# Patient Record
Sex: FEMALE | Race: WHITE | NOT HISPANIC OR LATINO | Employment: UNEMPLOYED | URBAN - NONMETROPOLITAN AREA
[De-identification: names, ages, dates, MRNs, and addresses within clinical notes are randomized per-mention and may not be internally consistent; named-entity substitution may affect disease eponyms.]

---

## 2017-02-24 ENCOUNTER — LAB REQUISITION (OUTPATIENT)
Dept: LAB | Facility: HOSPITAL | Age: 52
End: 2017-02-24

## 2017-02-24 DIAGNOSIS — Z00.00 ENCOUNTER FOR GENERAL ADULT MEDICAL EXAMINATION WITHOUT ABNORMAL FINDINGS: ICD-10-CM

## 2017-02-24 LAB
ALBUMIN SERPL-MCNC: 4.8 G/DL (ref 3.5–5)
ALBUMIN/GLOB SERPL: 1.9 G/DL (ref 1.1–2.5)
ALP SERPL-CCNC: 97 U/L (ref 24–120)
ALT SERPL W P-5'-P-CCNC: 78 U/L (ref 0–54)
ANION GAP SERPL CALCULATED.3IONS-SCNC: 13 MMOL/L (ref 4–13)
AST SERPL-CCNC: 49 U/L (ref 7–45)
BASOPHILS # BLD AUTO: 0.03 10*3/MM3 (ref 0–0.2)
BASOPHILS NFR BLD AUTO: 0.5 % (ref 0–2)
BILIRUB SERPL-MCNC: 0.5 MG/DL (ref 0.1–1)
BUN BLD-MCNC: 8 MG/DL (ref 5–21)
BUN/CREAT SERPL: 10.3 (ref 7–25)
CALCIUM SPEC-SCNC: 9.9 MG/DL (ref 8.4–10.4)
CHLORIDE SERPL-SCNC: 102 MMOL/L (ref 98–110)
CO2 SERPL-SCNC: 28 MMOL/L (ref 24–31)
CREAT BLD-MCNC: 0.78 MG/DL (ref 0.5–1.4)
DEPRECATED RDW RBC AUTO: 45.5 FL (ref 40–54)
EOSINOPHIL # BLD AUTO: 0.12 10*3/MM3 (ref 0–0.7)
EOSINOPHIL NFR BLD AUTO: 2 % (ref 0–4)
ERYTHROCYTE [DISTWIDTH] IN BLOOD BY AUTOMATED COUNT: 14.8 % (ref 12–15)
GFR SERPL CREATININE-BSD FRML MDRD: 78 ML/MIN/1.73
GLOBULIN UR ELPH-MCNC: 2.5 GM/DL
GLUCOSE BLD-MCNC: 79 MG/DL (ref 70–100)
HCT VFR BLD AUTO: 41.5 % (ref 37–47)
HGB BLD-MCNC: 13.9 G/DL (ref 12–16)
IMM GRANULOCYTES # BLD: 0.01 10*3/MM3 (ref 0–0.03)
IMM GRANULOCYTES NFR BLD: 0.2 % (ref 0–5)
LYMPHOCYTES # BLD AUTO: 1.45 10*3/MM3 (ref 0.72–4.86)
LYMPHOCYTES NFR BLD AUTO: 24.2 % (ref 15–45)
MCH RBC QN AUTO: 28.5 PG (ref 28–32)
MCHC RBC AUTO-ENTMCNC: 33.5 G/DL (ref 33–36)
MCV RBC AUTO: 85 FL (ref 82–98)
MONOCYTES # BLD AUTO: 0.59 10*3/MM3 (ref 0.19–1.3)
MONOCYTES NFR BLD AUTO: 9.9 % (ref 4–12)
NEUTROPHILS # BLD AUTO: 3.78 10*3/MM3 (ref 1.87–8.4)
NEUTROPHILS NFR BLD AUTO: 63.2 % (ref 39–78)
PLATELET # BLD AUTO: 280 10*3/MM3 (ref 130–400)
PMV BLD AUTO: 10.2 FL (ref 6–12)
POTASSIUM BLD-SCNC: 4.2 MMOL/L (ref 3.5–5.3)
PROT SERPL-MCNC: 7.3 G/DL (ref 6.3–8.7)
RBC # BLD AUTO: 4.88 10*6/MM3 (ref 4.2–5.4)
SODIUM BLD-SCNC: 143 MMOL/L (ref 135–145)
TSH SERPL DL<=0.05 MIU/L-ACNC: 2.63 MIU/ML (ref 0.47–4.68)
WBC NRBC COR # BLD: 5.98 10*3/MM3 (ref 4.8–10.8)

## 2017-02-24 PROCEDURE — 80053 COMPREHEN METABOLIC PANEL: CPT | Performed by: INTERNAL MEDICINE

## 2017-02-24 PROCEDURE — 85025 COMPLETE CBC W/AUTO DIFF WBC: CPT | Performed by: INTERNAL MEDICINE

## 2017-02-24 PROCEDURE — 84443 ASSAY THYROID STIM HORMONE: CPT | Performed by: INTERNAL MEDICINE

## 2017-04-13 ENCOUNTER — TRANSCRIBE ORDERS (OUTPATIENT)
Dept: ADMINISTRATIVE | Facility: HOSPITAL | Age: 52
End: 2017-04-13

## 2017-04-13 DIAGNOSIS — Z12.31 ENCOUNTER FOR SCREENING MAMMOGRAM FOR MALIGNANT NEOPLASM OF BREAST: Primary | ICD-10-CM

## 2017-04-14 ENCOUNTER — HOSPITAL ENCOUNTER (OUTPATIENT)
Dept: MAMMOGRAPHY | Facility: HOSPITAL | Age: 52
Discharge: HOME | End: 2017-04-14
Attending: OBSTETRICS & GYNECOLOGY | Admitting: OBSTETRICS & GYNECOLOGY

## 2017-04-14 DIAGNOSIS — Z12.31 ENCOUNTER FOR SCREENING MAMMOGRAM FOR MALIGNANT NEOPLASM OF BREAST: ICD-10-CM

## 2017-04-14 PROCEDURE — G0202 SCR MAMMO BI INCL CAD: HCPCS

## 2017-04-14 PROCEDURE — 77063 BREAST TOMOSYNTHESIS BI: CPT

## 2017-08-04 ENCOUNTER — TELEPHONE (OUTPATIENT)
Dept: FAMILY MEDICINE CLINIC | Age: 52
End: 2017-08-04

## 2017-08-04 RX ORDER — BUPROPION HYDROCHLORIDE 300 MG/1
300 TABLET ORAL EVERY MORNING
Qty: 90 TABLET | Refills: 1 | Status: SHIPPED | OUTPATIENT
Start: 2017-08-04 | End: 2017-08-07 | Stop reason: SDUPTHER

## 2017-08-07 DIAGNOSIS — E66.09 EXOGENOUS OBESITY: ICD-10-CM

## 2017-08-07 DIAGNOSIS — F41.8 DEPRESSION WITH ANXIETY: ICD-10-CM

## 2017-08-07 DIAGNOSIS — J30.9 ALLERGIC RHINITIS, UNSPECIFIED ALLERGIC RHINITIS TRIGGER, UNSPECIFIED RHINITIS SEASONALITY: ICD-10-CM

## 2017-08-07 DIAGNOSIS — E78.2 MIXED HYPERLIPIDEMIA: ICD-10-CM

## 2017-08-07 DIAGNOSIS — G25.81 RLS (RESTLESS LEGS SYNDROME): ICD-10-CM

## 2017-08-07 DIAGNOSIS — E04.2 MULTIPLE THYROID NODULES: ICD-10-CM

## 2017-08-07 DIAGNOSIS — G43.009 MIGRAINE WITHOUT AURA AND WITHOUT STATUS MIGRAINOSUS, NOT INTRACTABLE: ICD-10-CM

## 2017-08-07 DIAGNOSIS — R03.0 ELEVATED BLOOD PRESSURE READING WITHOUT DIAGNOSIS OF HYPERTENSION: ICD-10-CM

## 2017-08-07 DIAGNOSIS — G44.219 EPISODIC TENSION-TYPE HEADACHE, NOT INTRACTABLE: ICD-10-CM

## 2017-08-07 DIAGNOSIS — N94.10 DYSPAREUNIA IN FEMALE: ICD-10-CM

## 2017-08-07 DIAGNOSIS — E55.9 VITAMIN D DEFICIENCY: ICD-10-CM

## 2017-08-07 DIAGNOSIS — F51.01 IDIOPATHIC INSOMNIA: ICD-10-CM

## 2017-08-07 DIAGNOSIS — E03.9 ACQUIRED HYPOTHYROIDISM: ICD-10-CM

## 2017-08-07 DIAGNOSIS — M15.9 GENERALIZED OSTEOARTHRITIS: ICD-10-CM

## 2017-08-07 DIAGNOSIS — E03.9 ACQUIRED HYPOTHYROIDISM: Primary | ICD-10-CM

## 2017-08-07 RX ORDER — GLUCOSAMINE HCL 500 MG
TABLET ORAL
COMMUNITY

## 2017-08-07 RX ORDER — LANOLIN ALCOHOL/MO/W.PET/CERES
500 CREAM (GRAM) TOPICAL NIGHTLY
COMMUNITY
End: 2019-03-14 | Stop reason: SDUPTHER

## 2017-08-07 RX ORDER — TRIAMCINOLONE ACETONIDE 55 UG/1
2 SPRAY, METERED NASAL DAILY
COMMUNITY
End: 2017-10-25 | Stop reason: ALTCHOICE

## 2017-08-07 RX ORDER — LEVOTHYROXINE SODIUM 0.1 MG/1
100 TABLET ORAL DAILY
COMMUNITY
End: 2017-11-20 | Stop reason: SDUPTHER

## 2017-08-07 RX ORDER — TRAMADOL HYDROCHLORIDE 50 MG/1
50 TABLET ORAL EVERY 12 HOURS PRN
COMMUNITY
End: 2017-11-20 | Stop reason: SDUPTHER

## 2017-08-07 RX ORDER — MELOXICAM 15 MG/1
15 TABLET ORAL DAILY PRN
COMMUNITY
End: 2017-12-01 | Stop reason: CLARIF

## 2017-08-07 RX ORDER — BUPROPION HYDROCHLORIDE 300 MG/1
300 TABLET ORAL EVERY MORNING
Qty: 90 TABLET | Refills: 1 | Status: SHIPPED | OUTPATIENT
Start: 2017-08-07 | End: 2017-08-08 | Stop reason: ALTCHOICE

## 2017-08-07 RX ORDER — LANOLIN ALCOHOL/MO/W.PET/CERES
1000 CREAM (GRAM) TOPICAL DAILY
COMMUNITY

## 2017-08-07 RX ORDER — FLUTICASONE PROPIONATE 50 MCG
1 SPRAY, SUSPENSION (ML) NASAL DAILY
COMMUNITY
End: 2019-10-04 | Stop reason: SDUPTHER

## 2017-08-07 RX ORDER — TEMAZEPAM 15 MG/1
30 CAPSULE ORAL NIGHTLY PRN
COMMUNITY
End: 2017-08-31 | Stop reason: SDUPTHER

## 2017-08-07 RX ORDER — BUTALBITAL, ACETAMINOPHEN AND CAFFEINE 50; 325; 40 MG/1; MG/1; MG/1
2 TABLET ORAL EVERY 6 HOURS PRN
COMMUNITY
End: 2017-08-31 | Stop reason: SDUPTHER

## 2017-08-07 RX ORDER — OMEGA-3 FATTY ACIDS/FISH OIL 300-1000MG
CAPSULE ORAL
COMMUNITY

## 2017-08-07 RX ORDER — GABAPENTIN 300 MG/1
300 CAPSULE ORAL DAILY
COMMUNITY
End: 2018-08-01 | Stop reason: SDUPTHER

## 2017-08-08 ENCOUNTER — OFFICE VISIT (OUTPATIENT)
Dept: FAMILY MEDICINE CLINIC | Age: 52
End: 2017-08-08
Payer: COMMERCIAL

## 2017-08-08 VITALS
WEIGHT: 157.38 LBS | HEART RATE: 86 BPM | BODY MASS INDEX: 29.71 KG/M2 | OXYGEN SATURATION: 97 % | DIASTOLIC BLOOD PRESSURE: 70 MMHG | SYSTOLIC BLOOD PRESSURE: 118 MMHG | RESPIRATION RATE: 18 BRPM | HEIGHT: 61 IN | TEMPERATURE: 97.3 F

## 2017-08-08 DIAGNOSIS — H93.19 TINNITUS, UNSPECIFIED LATERALITY: ICD-10-CM

## 2017-08-08 DIAGNOSIS — J01.90 ACUTE SINUSITIS, RECURRENCE NOT SPECIFIED, UNSPECIFIED LOCATION: Primary | ICD-10-CM

## 2017-08-08 PROCEDURE — 99213 OFFICE O/P EST LOW 20 MIN: CPT | Performed by: NURSE PRACTITIONER

## 2017-08-08 RX ORDER — METHYLPREDNISOLONE 4 MG/1
TABLET ORAL
Qty: 1 KIT | Refills: 0 | Status: SHIPPED | OUTPATIENT
Start: 2017-08-08 | End: 2017-08-14

## 2017-08-08 RX ORDER — CEFDINIR 300 MG/1
300 CAPSULE ORAL 2 TIMES DAILY
Qty: 14 CAPSULE | Refills: 0 | Status: SHIPPED | OUTPATIENT
Start: 2017-08-08 | End: 2017-08-15

## 2017-08-08 ASSESSMENT — PATIENT HEALTH QUESTIONNAIRE - PHQ9
SUM OF ALL RESPONSES TO PHQ9 QUESTIONS 1 & 2: 0
SUM OF ALL RESPONSES TO PHQ QUESTIONS 1-9: 0
1. LITTLE INTEREST OR PLEASURE IN DOING THINGS: 0
2. FEELING DOWN, DEPRESSED OR HOPELESS: 0

## 2017-08-08 ASSESSMENT — ENCOUNTER SYMPTOMS
CHEST TIGHTNESS: 0
SORE THROAT: 1
COUGH: 1
WHEEZING: 0
SINUS PRESSURE: 1
SHORTNESS OF BREATH: 0

## 2017-08-18 ENCOUNTER — TELEPHONE (OUTPATIENT)
Dept: FAMILY MEDICINE CLINIC | Age: 52
End: 2017-08-18

## 2017-08-18 ENCOUNTER — OFFICE VISIT (OUTPATIENT)
Dept: FAMILY MEDICINE CLINIC | Age: 52
End: 2017-08-18
Payer: COMMERCIAL

## 2017-08-18 VITALS
RESPIRATION RATE: 18 BRPM | TEMPERATURE: 98.4 F | BODY MASS INDEX: 29.88 KG/M2 | DIASTOLIC BLOOD PRESSURE: 82 MMHG | OXYGEN SATURATION: 98 % | HEIGHT: 61 IN | HEART RATE: 98 BPM | WEIGHT: 158.25 LBS | SYSTOLIC BLOOD PRESSURE: 136 MMHG

## 2017-08-18 DIAGNOSIS — J01.11 ACUTE RECURRENT FRONTAL SINUSITIS: ICD-10-CM

## 2017-08-18 DIAGNOSIS — J02.9 ACUTE PHARYNGITIS, UNSPECIFIED ETIOLOGY: Primary | ICD-10-CM

## 2017-08-18 PROCEDURE — 96372 THER/PROPH/DIAG INJ SC/IM: CPT | Performed by: NURSE PRACTITIONER

## 2017-08-18 PROCEDURE — 99213 OFFICE O/P EST LOW 20 MIN: CPT | Performed by: NURSE PRACTITIONER

## 2017-08-18 RX ORDER — AZITHROMYCIN 250 MG/1
TABLET, FILM COATED ORAL
Qty: 1 PACKET | Refills: 0 | Status: SHIPPED | OUTPATIENT
Start: 2017-08-18 | End: 2017-08-28

## 2017-08-18 RX ORDER — HYDROCODONE POLISTIREX AND CHLORPHENIRAMINE POLISTIREX 10; 8 MG/5ML; MG/5ML
5 SUSPENSION, EXTENDED RELEASE ORAL EVERY 12 HOURS PRN
Qty: 120 ML | Refills: 0 | Status: SHIPPED | OUTPATIENT
Start: 2017-08-18 | End: 2017-10-25 | Stop reason: SDUPTHER

## 2017-08-18 RX ORDER — METHYLPREDNISOLONE ACETATE 80 MG/ML
80 INJECTION, SUSPENSION INTRA-ARTICULAR; INTRALESIONAL; INTRAMUSCULAR; SOFT TISSUE ONCE
Status: COMPLETED | OUTPATIENT
Start: 2017-08-18 | End: 2017-08-18

## 2017-08-18 RX ADMIN — METHYLPREDNISOLONE ACETATE 80 MG: 80 INJECTION, SUSPENSION INTRA-ARTICULAR; INTRALESIONAL; INTRAMUSCULAR; SOFT TISSUE at 14:30

## 2017-08-18 ASSESSMENT — ENCOUNTER SYMPTOMS
COUGH: 1
NAUSEA: 0
CHEST TIGHTNESS: 0
ABDOMINAL PAIN: 0
SINUS PRESSURE: 1
DIARRHEA: 0
WHEEZING: 0
SORE THROAT: 1
SHORTNESS OF BREATH: 0

## 2017-08-24 DIAGNOSIS — E78.2 MIXED HYPERLIPIDEMIA: ICD-10-CM

## 2017-08-24 DIAGNOSIS — E03.9 ACQUIRED HYPOTHYROIDISM: ICD-10-CM

## 2017-08-24 LAB
ALBUMIN SERPL-MCNC: 4.5 G/DL (ref 3.5–5.2)
ALP BLD-CCNC: 120 U/L (ref 35–104)
ALT SERPL-CCNC: 28 U/L (ref 5–33)
ANION GAP SERPL CALCULATED.3IONS-SCNC: 14 MMOL/L (ref 7–19)
AST SERPL-CCNC: 23 U/L (ref 5–32)
BILIRUB SERPL-MCNC: 0.3 MG/DL (ref 0.2–1.2)
BUN BLDV-MCNC: 7 MG/DL (ref 6–20)
CALCIUM SERPL-MCNC: 9.6 MG/DL (ref 8.6–10)
CHLORIDE BLD-SCNC: 101 MMOL/L (ref 98–111)
CHOLESTEROL, TOTAL: 226 MG/DL (ref 160–199)
CO2: 28 MMOL/L (ref 22–29)
CREAT SERPL-MCNC: 0.7 MG/DL (ref 0.5–0.9)
GFR NON-AFRICAN AMERICAN: >60
GLUCOSE BLD-MCNC: 82 MG/DL (ref 74–109)
HDLC SERPL-MCNC: 44 MG/DL (ref 65–121)
LDL CHOLESTEROL CALCULATED: 156 MG/DL
POTASSIUM SERPL-SCNC: 4 MMOL/L (ref 3.5–5)
SODIUM BLD-SCNC: 143 MMOL/L (ref 136–145)
TOTAL PROTEIN: 7.6 G/DL (ref 6.6–8.7)
TRIGL SERPL-MCNC: 130 MG/DL (ref 150–199)
TSH SERPL DL<=0.05 MIU/L-ACNC: 3.24 UIU/ML (ref 0.27–4.2)

## 2017-08-31 ENCOUNTER — OFFICE VISIT (OUTPATIENT)
Dept: FAMILY MEDICINE CLINIC | Age: 52
End: 2017-08-31
Payer: COMMERCIAL

## 2017-08-31 VITALS
OXYGEN SATURATION: 99 % | DIASTOLIC BLOOD PRESSURE: 84 MMHG | HEART RATE: 92 BPM | RESPIRATION RATE: 20 BRPM | TEMPERATURE: 98.5 F | BODY MASS INDEX: 29.55 KG/M2 | WEIGHT: 156.5 LBS | SYSTOLIC BLOOD PRESSURE: 130 MMHG | HEIGHT: 61 IN

## 2017-08-31 DIAGNOSIS — K02.9 DENTAL CARIES NOTED ON EXAMINATION: ICD-10-CM

## 2017-08-31 DIAGNOSIS — F51.01 PRIMARY INSOMNIA: ICD-10-CM

## 2017-08-31 DIAGNOSIS — G43.009 MIGRAINE WITHOUT AURA AND WITHOUT STATUS MIGRAINOSUS, NOT INTRACTABLE: Primary | ICD-10-CM

## 2017-08-31 DIAGNOSIS — F41.8 DEPRESSION WITH ANXIETY: ICD-10-CM

## 2017-08-31 DIAGNOSIS — G44.219 EPISODIC TENSION-TYPE HEADACHE, NOT INTRACTABLE: ICD-10-CM

## 2017-08-31 DIAGNOSIS — E03.9 ACQUIRED HYPOTHYROIDISM: ICD-10-CM

## 2017-08-31 DIAGNOSIS — G25.81 RESTLESS LEGS SYNDROME (RLS): ICD-10-CM

## 2017-08-31 DIAGNOSIS — E78.2 MIXED HYPERLIPIDEMIA: ICD-10-CM

## 2017-08-31 PROCEDURE — 99214 OFFICE O/P EST MOD 30 MIN: CPT | Performed by: INTERNAL MEDICINE

## 2017-08-31 RX ORDER — TEMAZEPAM 15 MG/1
30 CAPSULE ORAL NIGHTLY PRN
Qty: 30 CAPSULE | Refills: 2 | Status: SHIPPED | OUTPATIENT
Start: 2017-08-31 | End: 2019-05-08 | Stop reason: SDUPTHER

## 2017-08-31 RX ORDER — BUTALBITAL, ACETAMINOPHEN AND CAFFEINE 50; 325; 40 MG/1; MG/1; MG/1
TABLET ORAL
Qty: 90 TABLET | Refills: 0 | Status: SHIPPED | OUTPATIENT
Start: 2017-08-31 | End: 2017-11-20 | Stop reason: SDUPTHER

## 2017-08-31 ASSESSMENT — ENCOUNTER SYMPTOMS
EYE REDNESS: 0
COLOR CHANGE: 0
WHEEZING: 0
EYE PAIN: 0
ABDOMINAL PAIN: 0
SINUS PRESSURE: 0
DIARRHEA: 0
EYE DISCHARGE: 0
FACIAL SWELLING: 0
VOICE CHANGE: 0
CHEST TIGHTNESS: 0
SORE THROAT: 0
COUGH: 0
VOMITING: 0
RHINORRHEA: 0
SHORTNESS OF BREATH: 0
BLOOD IN STOOL: 0

## 2017-10-24 ENCOUNTER — TELEPHONE (OUTPATIENT)
Dept: FAMILY MEDICINE CLINIC | Age: 52
End: 2017-10-24

## 2017-10-25 ENCOUNTER — OFFICE VISIT (OUTPATIENT)
Dept: FAMILY MEDICINE CLINIC | Age: 52
End: 2017-10-25
Payer: COMMERCIAL

## 2017-10-25 VITALS
RESPIRATION RATE: 16 BRPM | TEMPERATURE: 98.4 F | OXYGEN SATURATION: 97 % | DIASTOLIC BLOOD PRESSURE: 78 MMHG | BODY MASS INDEX: 28.91 KG/M2 | WEIGHT: 153 LBS | SYSTOLIC BLOOD PRESSURE: 122 MMHG | HEART RATE: 82 BPM

## 2017-10-25 DIAGNOSIS — R30.0 DYSURIA: ICD-10-CM

## 2017-10-25 DIAGNOSIS — J20.9 ACUTE BRONCHITIS, UNSPECIFIED ORGANISM: Primary | ICD-10-CM

## 2017-10-25 LAB
BACTERIA: NEGATIVE /HPF
BILIRUBIN URINE: NEGATIVE
BLOOD, URINE: NEGATIVE
CLARITY: CLEAR
COLOR: YELLOW
EPITHELIAL CELLS, UA: 1 /HPF (ref 0–5)
GLUCOSE URINE: NEGATIVE MG/DL
HYALINE CASTS: 0 /HPF (ref 0–8)
KETONES, URINE: NEGATIVE MG/DL
LEUKOCYTE ESTERASE, URINE: ABNORMAL
NITRITE, URINE: NEGATIVE
PH UA: 7.5
PROTEIN UA: NEGATIVE MG/DL
RBC UA: 1 /HPF (ref 0–4)
SPECIFIC GRAVITY UA: 1.01
UROBILINOGEN, URINE: 0.2 E.U./DL
WBC UA: 1 /HPF (ref 0–5)

## 2017-10-25 PROCEDURE — 99213 OFFICE O/P EST LOW 20 MIN: CPT | Performed by: NURSE PRACTITIONER

## 2017-10-25 RX ORDER — CIPROFLOXACIN 500 MG/1
500 TABLET, FILM COATED ORAL 2 TIMES DAILY
Qty: 14 TABLET | Refills: 0 | Status: SHIPPED | OUTPATIENT
Start: 2017-10-25 | End: 2017-11-01

## 2017-10-25 RX ORDER — HYDROCODONE POLISTIREX AND CHLORPHENIRAMINE POLISTIREX 10; 8 MG/5ML; MG/5ML
5 SUSPENSION, EXTENDED RELEASE ORAL EVERY 12 HOURS PRN
Qty: 120 ML | Refills: 0 | Status: SHIPPED | OUTPATIENT
Start: 2017-10-25 | End: 2018-03-10 | Stop reason: ALTCHOICE

## 2017-10-25 ASSESSMENT — ENCOUNTER SYMPTOMS
CHEST TIGHTNESS: 0
COUGH: 1
SORE THROAT: 0
VOMITING: 0
DIARRHEA: 0
WHEEZING: 0
ABDOMINAL PAIN: 0
SHORTNESS OF BREATH: 0
NAUSEA: 0

## 2017-10-25 ASSESSMENT — PATIENT HEALTH QUESTIONNAIRE - PHQ9
SUM OF ALL RESPONSES TO PHQ QUESTIONS 1-9: 0
SUM OF ALL RESPONSES TO PHQ9 QUESTIONS 1 & 2: 0
1. LITTLE INTEREST OR PLEASURE IN DOING THINGS: 0
2. FEELING DOWN, DEPRESSED OR HOPELESS: 0

## 2017-10-25 NOTE — PROGRESS NOTES
JVD present. No tracheal deviation present. No thyromegaly present. Cardiovascular: Normal rate, regular rhythm, normal heart sounds and intact distal pulses. No murmur heard. Pulmonary/Chest: Effort normal. No respiratory distress. Mild rhonchi at bases. No wheeze or rales noted. Abdominal: Soft. She exhibits no distension and no mass. There is no tenderness. Musculoskeletal: Normal range of motion. She exhibits no edema. Lymphadenopathy:     She has no cervical adenopathy. Skin: Skin is warm and dry. No rash noted. Psychiatric: She has a normal mood and affect. Vitals reviewed. Assessment:    ICD-10-CM ICD-9-CM    1. Acute bronchitis, unspecified organism J20.9 466.0    2. Dysuria R30.0 788. 1 Urinalysis      Urine Culture       Plan:  -Treat empirically with cipro for acute bronchitis. -Refill tussionex to take prn for cough. She takes this appropriately and as prescribed. -UA and urine culture today due to urinary symptoms.  -Call for any acute worsening or no improvement. -F/U as scheduled. [facility] was seen today for cough and other. Diagnoses and all orders for this visit:    Acute bronchitis, unspecified organism    Dysuria  -     Urinalysis; Future  -     Urine Culture; Future    Other orders  -     hydrocodone-chlorpheniramine (Bob Mates ER) 10-8 MG/5ML SUER; Take 5 mLs by mouth every 12 hours as needed (cough) . -     ciprofloxacin (CIPRO) 500 MG tablet; Take 1 tablet by mouth 2 times daily for 7 days      Medications Discontinued During This Encounter   Medication Reason    triamcinolone (NASACORT ALLERGY 24HR) 55 MCG/ACT nasal inhaler Therapy completed    hydrocodone-chlorpheniramine (Bob Mates ER) 10-8 MG/5ML SUER Reorder     There are no Patient Instructions on file for this visit. Patient voices understanding and agrees to plans along with risks and benefits of plan.     Counseling:  John Lees's case, medications and options were discussed in detail. Patient was instructed to call the office if she questions regarding her treatment. Should her conditions worsen, she should return to office to be reassessed by KALI Reece. she Should to go the closest Emergency Department for any emergency. They verbalized understanding the above instructions. No Follow-up on file.

## 2017-10-27 LAB — URINE CULTURE, ROUTINE: NORMAL

## 2017-11-20 DIAGNOSIS — E03.9 ACQUIRED HYPOTHYROIDISM: ICD-10-CM

## 2017-11-20 DIAGNOSIS — G44.219 EPISODIC TENSION-TYPE HEADACHE, NOT INTRACTABLE: ICD-10-CM

## 2017-11-20 DIAGNOSIS — E78.2 MIXED HYPERLIPIDEMIA: ICD-10-CM

## 2017-11-20 DIAGNOSIS — G43.009 MIGRAINE WITHOUT AURA AND WITHOUT STATUS MIGRAINOSUS, NOT INTRACTABLE: ICD-10-CM

## 2017-11-20 LAB
ALBUMIN SERPL-MCNC: 4.7 G/DL (ref 3.5–5.2)
ALP BLD-CCNC: 109 U/L (ref 35–104)
ALT SERPL-CCNC: 46 U/L (ref 5–33)
ANION GAP SERPL CALCULATED.3IONS-SCNC: 18 MMOL/L (ref 7–19)
AST SERPL-CCNC: 38 U/L (ref 5–32)
BILIRUB SERPL-MCNC: 0.4 MG/DL (ref 0.2–1.2)
BUN BLDV-MCNC: 8 MG/DL (ref 6–20)
CALCIUM SERPL-MCNC: 9.5 MG/DL (ref 8.6–10)
CHLORIDE BLD-SCNC: 102 MMOL/L (ref 98–111)
CHOLESTEROL, TOTAL: 224 MG/DL (ref 160–199)
CO2: 24 MMOL/L (ref 22–29)
CREAT SERPL-MCNC: 0.7 MG/DL (ref 0.5–0.9)
GFR NON-AFRICAN AMERICAN: >60
GLUCOSE BLD-MCNC: 91 MG/DL (ref 74–109)
HDLC SERPL-MCNC: 51 MG/DL (ref 65–121)
LDL CHOLESTEROL CALCULATED: 141 MG/DL
POTASSIUM SERPL-SCNC: 3.8 MMOL/L (ref 3.5–5)
SODIUM BLD-SCNC: 144 MMOL/L (ref 136–145)
TOTAL PROTEIN: 7.2 G/DL (ref 6.6–8.7)
TRIGL SERPL-MCNC: 162 MG/DL (ref 0–149)
TSH SERPL DL<=0.05 MIU/L-ACNC: 0.8 UIU/ML (ref 0.27–4.2)

## 2017-11-21 RX ORDER — TRAMADOL HYDROCHLORIDE 50 MG/1
50 TABLET ORAL EVERY 12 HOURS PRN
Qty: 60 TABLET | Refills: 0 | Status: SHIPPED | OUTPATIENT
Start: 2017-11-21 | End: 2018-02-02 | Stop reason: SDUPTHER

## 2017-11-21 RX ORDER — LEVOTHYROXINE SODIUM 0.1 MG/1
100 TABLET ORAL DAILY
Qty: 90 TABLET | Refills: 3 | Status: SHIPPED | OUTPATIENT
Start: 2017-11-21 | End: 2018-09-13 | Stop reason: DRUGHIGH

## 2017-11-21 RX ORDER — BUTALBITAL, ACETAMINOPHEN AND CAFFEINE 50; 325; 40 MG/1; MG/1; MG/1
TABLET ORAL
Qty: 90 TABLET | Refills: 0 | Status: SHIPPED | OUTPATIENT
Start: 2017-11-21 | End: 2018-02-02 | Stop reason: SDUPTHER

## 2017-12-01 ENCOUNTER — OFFICE VISIT (OUTPATIENT)
Dept: FAMILY MEDICINE CLINIC | Age: 52
End: 2017-12-01
Payer: COMMERCIAL

## 2017-12-01 VITALS
HEART RATE: 85 BPM | TEMPERATURE: 97.9 F | WEIGHT: 155.6 LBS | SYSTOLIC BLOOD PRESSURE: 122 MMHG | OXYGEN SATURATION: 98 % | DIASTOLIC BLOOD PRESSURE: 78 MMHG | BODY MASS INDEX: 29.4 KG/M2

## 2017-12-01 DIAGNOSIS — M15.9 GENERALIZED OSTEOARTHRITIS: ICD-10-CM

## 2017-12-01 DIAGNOSIS — M54.42 CHRONIC BILATERAL LOW BACK PAIN WITH LEFT-SIDED SCIATICA: ICD-10-CM

## 2017-12-01 DIAGNOSIS — R74.8 ELEVATED LIVER ENZYMES: ICD-10-CM

## 2017-12-01 DIAGNOSIS — G43.001 MIGRAINE WITHOUT AURA AND WITH STATUS MIGRAINOSUS, NOT INTRACTABLE: ICD-10-CM

## 2017-12-01 DIAGNOSIS — Z00.00 ANNUAL PHYSICAL EXAM: ICD-10-CM

## 2017-12-01 DIAGNOSIS — G25.81 RLS (RESTLESS LEGS SYNDROME): ICD-10-CM

## 2017-12-01 DIAGNOSIS — E78.2 MIXED HYPERLIPIDEMIA: ICD-10-CM

## 2017-12-01 DIAGNOSIS — E03.9 ACQUIRED HYPOTHYROIDISM: Primary | ICD-10-CM

## 2017-12-01 DIAGNOSIS — F41.8 DEPRESSION WITH ANXIETY: ICD-10-CM

## 2017-12-01 DIAGNOSIS — G89.29 CHRONIC BILATERAL LOW BACK PAIN WITH LEFT-SIDED SCIATICA: ICD-10-CM

## 2017-12-01 DIAGNOSIS — E55.9 VITAMIN D DEFICIENCY: ICD-10-CM

## 2017-12-01 DIAGNOSIS — F51.01 IDIOPATHIC INSOMNIA: ICD-10-CM

## 2017-12-01 DIAGNOSIS — E66.09 EXOGENOUS OBESITY: ICD-10-CM

## 2017-12-01 DIAGNOSIS — G44.219 EPISODIC TENSION-TYPE HEADACHE, NOT INTRACTABLE: ICD-10-CM

## 2017-12-01 LAB
AMPHETAMINE SCREEN, URINE: NEGATIVE
BARBITURATE SCREEN, URINE: NEGATIVE
BENZODIAZEPINE SCREEN, URINE: NEGATIVE
COCAINE METABOLITE SCREEN URINE: NEGATIVE
MDMA URINE: NEGATIVE
METHADONE SCREEN, URINE: NEGATIVE
METHAMPHETAMINE, URINE: NEGATIVE
OPIATE SCREEN URINE: NEGATIVE
OXYCODONE SCREEN URINE: NEGATIVE
PHENCYCLIDINE SCREEN URINE: NEGATIVE
PROPOXYPHENE SCREEN, URINE: NEGATIVE
THC: NEGATIVE
TRICYCLIC ANTIDEPRESSANTS, UR: NEGATIVE

## 2017-12-01 PROCEDURE — 99214 OFFICE O/P EST MOD 30 MIN: CPT | Performed by: INTERNAL MEDICINE

## 2017-12-01 PROCEDURE — 96372 THER/PROPH/DIAG INJ SC/IM: CPT | Performed by: INTERNAL MEDICINE

## 2017-12-01 RX ORDER — METHYLPREDNISOLONE ACETATE 80 MG/ML
80 INJECTION, SUSPENSION INTRA-ARTICULAR; INTRALESIONAL; INTRAMUSCULAR; SOFT TISSUE ONCE
Status: COMPLETED | OUTPATIENT
Start: 2017-12-01 | End: 2017-12-01

## 2017-12-01 RX ORDER — CELECOXIB 200 MG/1
200 CAPSULE ORAL DAILY PRN
Qty: 30 CAPSULE | Refills: 2 | Status: SHIPPED | OUTPATIENT
Start: 2017-12-01 | End: 2018-09-13 | Stop reason: SDUPTHER

## 2017-12-01 RX ADMIN — METHYLPREDNISOLONE ACETATE 80 MG: 80 INJECTION, SUSPENSION INTRA-ARTICULAR; INTRALESIONAL; INTRAMUSCULAR; SOFT TISSUE at 12:53

## 2017-12-01 ASSESSMENT — ENCOUNTER SYMPTOMS
EYE DISCHARGE: 0
ABDOMINAL PAIN: 0
COLOR CHANGE: 0
WHEEZING: 0
VOMITING: 0
SHORTNESS OF BREATH: 0
COUGH: 1
EYE REDNESS: 0
BACK PAIN: 1
SORE THROAT: 0
DIARRHEA: 0
EYE PAIN: 0
BLOOD IN STOOL: 0
RHINORRHEA: 0
CHEST TIGHTNESS: 0
VOICE CHANGE: 0
SINUS PRESSURE: 0

## 2017-12-01 ASSESSMENT — PATIENT HEALTH QUESTIONNAIRE - PHQ9
SUM OF ALL RESPONSES TO PHQ QUESTIONS 1-9: 0
2. FEELING DOWN, DEPRESSED OR HOPELESS: 0
SUM OF ALL RESPONSES TO PHQ9 QUESTIONS 1 & 2: 0
1. LITTLE INTEREST OR PLEASURE IN DOING THINGS: 0

## 2017-12-01 NOTE — PROGRESS NOTES
Kandi Anderson is a 47 y.o. female who presents today for   Chief Complaint   Patient presents with    3 Month Follow-Up    Tinnitus    Other     discuss OTC meds       HPI  49y/o WF here for 3 mth f/u on acquired hypothyroidism, mixed hyperlipidemia, depression with anxiety, insomnia, recurrent migraine HAs, and chronic neck pain with controlled med refill. Her lower back has been more painful recently and shoots down her left leg at times. She takes meloxicam for OA pain since ibuprofen irritates her stomach if she takes it very often. Pain worsens when she sits down and stands up. She is left handed and carries purse on left side and her grandchildren also. HAs have actually been less frequent recently and fioricet works well when she does have one. Her mood has been good overall without mood swings and she is tolerating her buproprion XL. She has had some more stress recently because her daughter found out she is having her 4th child and her youngest is only 3 yr old. She helps her daughter by babysitting her grandchildren but does not think she will be able to watch all of them at once. She has issues with tinnitus in both ears but no vertigo/dizziness. Her mother also has this issue. No hearing loss or she does not think so. She has a Lipo flavonoid supplement her mother recommended to her and she wonders if it safe. She did just get treated for bronchitis which has improved but she is still congested and coughing some. No fever and not productive now. Review of Systems   Constitutional: Positive for fatigue. Negative for appetite change, chills and fever. HENT: Positive for congestion. Negative for ear pain, rhinorrhea, sinus pressure, sore throat and voice change. Eyes: Negative for pain, discharge and redness. Respiratory: Positive for cough. Negative for chest tightness, shortness of breath and wheezing. Cardiovascular: Negative for chest pain and palpitations.    Gastrointestinal: Negative for abdominal pain, blood in stool, diarrhea and vomiting. Endocrine: Negative for cold intolerance and polydipsia. Genitourinary: Negative for dysuria and hematuria. Musculoskeletal: Positive for arthralgias, back pain and neck pain. Negative for myalgias and neck stiffness. Skin: Negative for color change and rash. Allergic/Immunologic: Positive for environmental allergies. Neurological: Positive for headaches. Negative for dizziness, weakness and numbness. Hematological: Negative for adenopathy. Does not bruise/bleed easily. Psychiatric/Behavioral: Positive for sleep disturbance. Negative for agitation, confusion, self-injury and suicidal ideas. The patient is nervous/anxious. See HPI, no mood swings or anhedonia       Past Medical History:   Diagnosis Date    Allergic rhinitis     Chronic bilateral low back pain with left-sided sciatica 12/1/2017    Headache     Hyperlipidemia     Obesity     Osteoarthritis        Current Outpatient Prescriptions   Medication Sig Dispense Refill    celecoxib (CELEBREX) 200 MG capsule Take 1 capsule by mouth daily as needed for Pain (arthritis) 30 capsule 2    levothyroxine (SYNTHROID) 100 MCG tablet Take 1 tablet by mouth daily 90 tablet 3    traMADol (ULTRAM) 50 MG tablet Take 1 tablet by mouth every 12 hours as needed for Pain . 60 tablet 0    butalbital-acetaminophen-caffeine (FIORICET, ESGIC) -40 MG per tablet 1-2 tablets po q6 hours prn HAs 90 tablet 0    hydrocodone-chlorpheniramine (TUSSIONEX PENNKINETIC ER) 10-8 MG/5ML SUER Take 5 mLs by mouth every 12 hours as needed (cough) .  120 mL 0    temazepam (RESTORIL) 15 MG capsule Take 2 capsules by mouth nightly as needed for Sleep 30 capsule 2    vitamin B-12 (CYANOCOBALAMIN) 1000 MCG tablet Take 1,000 mcg by mouth daily      fluticasone (FLONASE) 50 MCG/ACT nasal spray 1 spray by Nasal route daily      gabapentin (NEURONTIN) 300 MG capsule Take 300 mg by mouth daily  Multiple Vitamins-Minerals (MULTIVITAMIN ADULT PO) Take by mouth      niacin (SLO-NIACIN) 500 MG extended release tablet Take 500 mg by mouth nightly      Omega 3 1000 MG CAPS Take by mouth      Cholecalciferol (VITAMIN D3) 3000 units TABS Take by mouth      buPROPion (WELLBUTRIN XL) 300 MG extended release tablet TAKE ONE TABLET BY MOUTH ONCE DAILY 30 tablet 3     Current Facility-Administered Medications   Medication Dose Route Frequency Provider Last Rate Last Dose    methylPREDNISolone acetate (DEPO-MEDROL) injection 80 mg  80 mg Intramuscular Once Jin Tran MD           No Known Allergies    Past Surgical History:   Procedure Laterality Date     SECTION      HYSTERECTOMY      MYOMECTOMY      TONSILLECTOMY         Social History   Substance Use Topics    Smoking status: Never Smoker    Smokeless tobacco: Never Used    Alcohol use Yes      Comment: rarely       Family History   Problem Relation Age of Onset    Heart Disease Mother     High Blood Pressure Mother     Other Mother      skin CA    Heart Disease Father     High Blood Pressure Father     Other Father      jaw CA    Heart Disease Sister     Diabetes Sister     Stroke Sister     High Blood Pressure Sister     Other Sister      fibromyalgia, thyroid CA       /78   Pulse 85   Temp 97.9 °F (36.6 °C) (Temporal)   Wt 155 lb 9.6 oz (70.6 kg)   SpO2 98%   BMI 29.40 kg/m²     Physical Exam   Constitutional: She is oriented to person, place, and time. No distress. HENT:   Head: Normocephalic and atraumatic. Eyes: Conjunctivae and EOM are normal. Pupils are equal, round, and reactive to light. Neck: Neck supple. No JVD present. No thyromegaly present. No carotid bruits   Cardiovascular: Normal rate, regular rhythm, normal heart sounds and intact distal pulses. Exam reveals no gallop and no friction rub. No murmur heard.   Pulmonary/Chest: Effort normal and breath sounds normal.   Abdominal: Soft. Bowel sounds are normal. She exhibits no distension. There is no tenderness. Musculoskeletal: She exhibits tenderness. She exhibits no edema. +bilateral lumbar paraspinal muscle spasm with mild TTP. Negative SLR bilaterally. Lymphadenopathy:     She has no cervical adenopathy. Neurological: She is alert and oriented to person, place, and time. Skin: Skin is warm. No rash noted. Psychiatric: She has a normal mood and affect. Her behavior is normal. Judgment and thought content normal.     Lab Results   Component Value Date    WBC 5.89 08/13/2015    HGB 14.1 08/13/2015    HCT 40.7 08/13/2015     08/13/2015    CHOL 224 (H) 11/20/2017    TRIG 162 (H) 11/20/2017    HDL 51 (L) 11/20/2017    LDLDIRECT 130 (H) 08/13/2015    ALT 46 (H) 11/20/2017    AST 38 (H) 11/20/2017     11/20/2017    K 3.8 11/20/2017     11/20/2017    CREATININE 0.7 11/20/2017    BUN 8 11/20/2017    CO2 24 11/20/2017    TSH 0.797 11/20/2017   FBG 91  Lab Results   Component Value Date    LDLCALC 141 11/20/2017    LDLDIRECT 130 (H) 08/13/2015     Assessment:    ICD-10-CM ICD-9-CM    1. Acquired hypothyroidism E03.9 244.9 TSH without Reflex   2. Depression with anxiety F41.8 300.4    3. Episodic tension-type headache, not intractable G44.219 339.11    4. Exogenous obesity E66.09 278.00    5. Idiopathic insomnia F51.01 307.42    6. Migraine without aura and with status migrainosus, not intractable G43.001 346.12    7. RLS (restless legs syndrome) G25.81 333.94    8. Mixed hyperlipidemia E78.2 272.2 Comprehensive Metabolic Panel      Lipid Panel   9. Generalized osteoarthritis M15.9 715.00 methylPREDNISolone acetate (DEPO-MEDROL) injection 80 mg   10. Chronic bilateral low back pain with left-sided sciatica M54.42 724.2 methylPREDNISolone acetate (DEPO-MEDROL) injection 80 mg    G89.29 724.3      338.29    11. Annual physical exam Z00.00 V70.0 CBC Auto Differential   12.  Vitamin D deficiency E55.9 268.9 Vitamin D 25 Procedures    CBC Auto Differential     Standing Status:   Future     Standing Expiration Date:   12/1/2018    Comprehensive Metabolic Panel     Standing Status:   Future     Standing Expiration Date:   12/1/2018    Lipid Panel     Standing Status:   Future     Standing Expiration Date:   12/1/2018     Order Specific Question:   Is Patient Fasting?/# of Hours     Answer:   yes/8 hrs    TSH without Reflex     Standing Status:   Future     Standing Expiration Date:   12/1/2018    Vitamin D 25 Hydroxy     Standing Status:   Future     Standing Expiration Date:   12/1/2018    Hepatitis Panel, Acute     Standing Status:   Future     Standing Expiration Date:   12/1/2018     Orders Placed This Encounter   Medications    celecoxib (CELEBREX) 200 MG capsule     Sig: Take 1 capsule by mouth daily as needed for Pain (arthritis)     Dispense:  30 capsule     Refill:  2    methylPREDNISolone acetate (DEPO-MEDROL) injection 80 mg     Medications Discontinued During This Encounter   Medication Reason    meloxicam (MOBIC) 15 MG tablet Formulary change     Patient Instructions     Patient Education        Back Stretches: Exercises  Your Care Instructions  Here are some examples of exercises for stretching your back. Start each exercise slowly. Ease off the exercise if you start to have pain. Your doctor or physical therapist will tell you when you can start these exercises and which ones will work best for you. How to do the exercises  Overhead stretch    1. Stand comfortably with your feet shoulder-width apart. 2. Looking straight ahead, raise both arms over your head and reach toward the ceiling. Do not allow your head to tilt back. 3. Hold for 15 to 30 seconds, then lower your arms to your sides. 4. Repeat 2 to 4 times. Side stretch    1. Stand comfortably with your feet shoulder-width apart. 2. Raise one arm over your head, and then lean to the other side.   3. Slide your hand down your leg as you let the weight of your arm gently stretch your side muscles. Hold for 15 to 30 seconds. 4. Repeat 2 to 4 times on each side. Press-up    1. Lie on your stomach, supporting your body with your forearms. 2. Press your elbows down into the floor to raise your upper back. As you do this, relax your stomach muscles and allow your back to arch without using your back muscles. As your press up, do not let your hips or pelvis come off the floor. 3. Hold for 15 to 30 seconds, then relax. 4. Repeat 2 to 4 times. Relax and rest    1. Lie on your back with a rolled towel under your neck and a pillow under your knees. Extend your arms comfortably to your sides. 2. Relax and breathe normally. 3. Remain in this position for about 10 minutes. 4. If you can, do this 2 or 3 times each day. Follow-up care is a key part of your treatment and safety. Be sure to make and go to all appointments, and call your doctor if you are having problems. It's also a good idea to know your test results and keep a list of the medicines you take. Where can you learn more? Go to https://Welcome Funds.RÃƒÂ¶sler miniDaT. org and sign in to your [employer] account. Enter G582 in the [employer] box to learn more about \"Back Stretches: Exercises. \"     If you do not have an account, please click on the \"Sign Up Now\" link. Current as of: March 21, 2017  Content Version: 11.3  © 1124-4925 [employer], Lab42. Care instructions adapted under license by [facility] ([facility]). If you have questions about a medical condition or this instruction, always ask your healthcare professional. [place] [address] any warranty or liability for your use of this information. Patient voices understanding and agrees to plans along with risks and benefits of plan. Counseling:  Rishi Mccalloks Lees's case, medications and options were discussed in detail. Patient was instructed to call the office if she   questions regarding her treatment.  Should

## 2017-12-01 NOTE — PATIENT INSTRUCTIONS
Patient Education        Back Stretches: Exercises  Your Care Instructions  Here are some examples of exercises for stretching your back. Start each exercise slowly. Ease off the exercise if you start to have pain. Your doctor or physical therapist will tell you when you can start these exercises and which ones will work best for you. How to do the exercises  Overhead stretch    1. Stand comfortably with your feet shoulder-width apart. 2. Looking straight ahead, raise both arms over your head and reach toward the ceiling. Do not allow your head to tilt back. 3. Hold for 15 to 30 seconds, then lower your arms to your sides. 4. Repeat 2 to 4 times. Side stretch    1. Stand comfortably with your feet shoulder-width apart. 2. Raise one arm over your head, and then lean to the other side. 3. Slide your hand down your leg as you let the weight of your arm gently stretch your side muscles. Hold for 15 to 30 seconds. 4. Repeat 2 to 4 times on each side. Press-up    1. Lie on your stomach, supporting your body with your forearms. 2. Press your elbows down into the floor to raise your upper back. As you do this, relax your stomach muscles and allow your back to arch without using your back muscles. As your press up, do not let your hips or pelvis come off the floor. 3. Hold for 15 to 30 seconds, then relax. 4. Repeat 2 to 4 times. Relax and rest    1. Lie on your back with a rolled towel under your neck and a pillow under your knees. Extend your arms comfortably to your sides. 2. Relax and breathe normally. 3. Remain in this position for about 10 minutes. 4. If you can, do this 2 or 3 times each day. Follow-up care is a key part of your treatment and safety. Be sure to make and go to all appointments, and call your doctor if you are having problems. It's also a good idea to know your test results and keep a list of the medicines you take. Where can you learn more?   Go to choice. You also may want to do other activities, such as running, swimming, cycling, or playing tennis or team sports. · Limit alcohol, or do not drink. Alcohol can damage the liver and cause health problems. When should you call for help? Call your doctor now or seek immediate medical care if:  · You have yellowing of the skin or the whites of the eyes. (This is called jaundice.)  · You have pain in the upper right part of your belly. Watch closely for changes in your health, and be sure to contact your doctor if:  · You have swelling in your legs or belly. · Your skin itches. Where can you learn more? Go to https://RocketickpeLocallyeb.[employer] Energy Technology. org and sign in to your Fleep account. Enter F964 in the Usermind box to learn more about \"Nonalcoholic Steatohepatitis (ALEX): Care Instructions. \"     If you do not have an account, please click on the \"Sign Up Now\" link. Current as of: August 9, 2016  Content Version: 11.3  © 3702-2944 [employer], Incorporated. Care instructions adapted under license by [facility] ([facility]). If you have questions about a medical condition or this instruction, always ask your healthcare professional. [address] any warranty or liability for your use of this information.

## 2017-12-28 RX ORDER — BUPROPION HYDROCHLORIDE 300 MG/1
TABLET ORAL
Qty: 30 TABLET | Refills: 3 | Status: SHIPPED | OUTPATIENT
Start: 2017-12-28 | End: 2018-03-05 | Stop reason: ALTCHOICE

## 2018-02-02 DIAGNOSIS — G44.219 EPISODIC TENSION-TYPE HEADACHE, NOT INTRACTABLE: ICD-10-CM

## 2018-02-02 DIAGNOSIS — G43.009 MIGRAINE WITHOUT AURA AND WITHOUT STATUS MIGRAINOSUS, NOT INTRACTABLE: ICD-10-CM

## 2018-02-02 RX ORDER — BUTALBITAL, ACETAMINOPHEN AND CAFFEINE 50; 325; 40 MG/1; MG/1; MG/1
TABLET ORAL
Qty: 90 TABLET | Refills: 0 | Status: SHIPPED | OUTPATIENT
Start: 2018-02-02 | End: 2018-04-02 | Stop reason: SDUPTHER

## 2018-02-02 RX ORDER — TRAMADOL HYDROCHLORIDE 50 MG/1
50 TABLET ORAL EVERY 12 HOURS PRN
Qty: 60 TABLET | Refills: 0 | Status: SHIPPED | OUTPATIENT
Start: 2018-02-02 | End: 2018-08-21 | Stop reason: SDUPTHER

## 2018-02-02 NOTE — TELEPHONE ENCOUNTER
From: Cuba Mack  Sent: 2/1/2018 10:40 PM CST  Subject: Medication Renewal Request    Valeria WOODARDRonnell Belcher Darin would like a refill of the following medications:  traMADol (ULTRAM) 50 MG tablet Mabel Ortiz MD]  butalbital-acetaminophen-caffeine (FIORICET, ESGIC) -40 MG per tablet Mabel Ortiz MD]    Preferred pharmacy: [address], Partha Springer [address] [place] [address]    Comment:

## 2018-02-26 DIAGNOSIS — E78.2 MIXED HYPERLIPIDEMIA: ICD-10-CM

## 2018-02-26 DIAGNOSIS — Z00.00 ANNUAL PHYSICAL EXAM: ICD-10-CM

## 2018-02-26 DIAGNOSIS — E03.9 ACQUIRED HYPOTHYROIDISM: ICD-10-CM

## 2018-02-26 DIAGNOSIS — R74.8 ELEVATED LIVER ENZYMES: ICD-10-CM

## 2018-02-26 DIAGNOSIS — E55.9 VITAMIN D DEFICIENCY: ICD-10-CM

## 2018-02-26 LAB
ALBUMIN SERPL-MCNC: 4.5 G/DL (ref 3.5–5.2)
ALP BLD-CCNC: 113 U/L (ref 35–104)
ALT SERPL-CCNC: 27 U/L (ref 5–33)
ANION GAP SERPL CALCULATED.3IONS-SCNC: 15 MMOL/L (ref 7–19)
AST SERPL-CCNC: 24 U/L (ref 5–32)
BASOPHILS ABSOLUTE: 0.1 K/UL (ref 0–0.2)
BASOPHILS RELATIVE PERCENT: 0.8 % (ref 0–1)
BILIRUB SERPL-MCNC: <0.2 MG/DL (ref 0.2–1.2)
BUN BLDV-MCNC: 6 MG/DL (ref 6–20)
CALCIUM SERPL-MCNC: 9.4 MG/DL (ref 8.6–10)
CHLORIDE BLD-SCNC: 102 MMOL/L (ref 98–111)
CHOLESTEROL, TOTAL: 216 MG/DL (ref 160–199)
CO2: 27 MMOL/L (ref 22–29)
CREAT SERPL-MCNC: 0.7 MG/DL (ref 0.5–0.9)
EOSINOPHILS ABSOLUTE: 0.2 K/UL (ref 0–0.6)
EOSINOPHILS RELATIVE PERCENT: 2.6 % (ref 0–5)
GFR NON-AFRICAN AMERICAN: >60
GLUCOSE BLD-MCNC: 91 MG/DL (ref 74–109)
HAV IGM SER IA-ACNC: NORMAL
HCT VFR BLD CALC: 40.5 % (ref 37–47)
HDLC SERPL-MCNC: 48 MG/DL (ref 65–121)
HEMOGLOBIN: 12.7 G/DL (ref 12–16)
HEPATITIS B CORE IGM ANTIBODY: NORMAL
HEPATITIS B SURFACE ANTIGEN INTERPRETATION: NORMAL
HEPATITIS C ANTIBODY INTERPRETATION: NORMAL
LDL CHOLESTEROL CALCULATED: 129 MG/DL
LYMPHOCYTES ABSOLUTE: 1.4 K/UL (ref 1.1–4.5)
LYMPHOCYTES RELATIVE PERCENT: 22.9 % (ref 20–40)
MCH RBC QN AUTO: 26 PG (ref 27–31)
MCHC RBC AUTO-ENTMCNC: 31.4 G/DL (ref 33–37)
MCV RBC AUTO: 83 FL (ref 81–99)
MONOCYTES ABSOLUTE: 0.5 K/UL (ref 0–0.9)
MONOCYTES RELATIVE PERCENT: 7.3 % (ref 0–10)
NEUTROPHILS ABSOLUTE: 4.1 K/UL (ref 1.5–7.5)
NEUTROPHILS RELATIVE PERCENT: 66.1 % (ref 50–65)
PDW BLD-RTO: 15.7 % (ref 11.5–14.5)
PLATELET # BLD: 337 K/UL (ref 130–400)
PMV BLD AUTO: 10.1 FL (ref 9.4–12.3)
POTASSIUM SERPL-SCNC: 4.1 MMOL/L (ref 3.5–5)
RBC # BLD: 4.88 M/UL (ref 4.2–5.4)
SODIUM BLD-SCNC: 144 MMOL/L (ref 136–145)
TOTAL PROTEIN: 7.2 G/DL (ref 6.6–8.7)
TRIGL SERPL-MCNC: 195 MG/DL (ref 0–149)
TSH SERPL DL<=0.05 MIU/L-ACNC: 0.84 UIU/ML (ref 0.27–4.2)
VITAMIN D 25-HYDROXY: 44.5 NG/ML
WBC # BLD: 6.2 K/UL (ref 4.8–10.8)

## 2018-03-05 ENCOUNTER — OFFICE VISIT (OUTPATIENT)
Dept: FAMILY MEDICINE CLINIC | Age: 53
End: 2018-03-05
Payer: COMMERCIAL

## 2018-03-05 VITALS
HEART RATE: 86 BPM | BODY MASS INDEX: 31.41 KG/M2 | HEIGHT: 60 IN | DIASTOLIC BLOOD PRESSURE: 80 MMHG | OXYGEN SATURATION: 96 % | SYSTOLIC BLOOD PRESSURE: 136 MMHG | WEIGHT: 160 LBS | TEMPERATURE: 97.9 F

## 2018-03-05 DIAGNOSIS — G43.009 MIGRAINE WITHOUT AURA AND WITHOUT STATUS MIGRAINOSUS, NOT INTRACTABLE: ICD-10-CM

## 2018-03-05 DIAGNOSIS — J01.41 ACUTE RECURRENT PANSINUSITIS: ICD-10-CM

## 2018-03-05 DIAGNOSIS — Z00.00 ANNUAL PHYSICAL EXAM: Primary | ICD-10-CM

## 2018-03-05 DIAGNOSIS — M54.42 CHRONIC BILATERAL LOW BACK PAIN WITH LEFT-SIDED SCIATICA: ICD-10-CM

## 2018-03-05 DIAGNOSIS — E55.9 VITAMIN D DEFICIENCY: ICD-10-CM

## 2018-03-05 DIAGNOSIS — E78.2 MIXED HYPERLIPIDEMIA: ICD-10-CM

## 2018-03-05 DIAGNOSIS — G89.29 CHRONIC BILATERAL LOW BACK PAIN WITH LEFT-SIDED SCIATICA: ICD-10-CM

## 2018-03-05 DIAGNOSIS — F51.01 IDIOPATHIC INSOMNIA: ICD-10-CM

## 2018-03-05 DIAGNOSIS — F41.8 DEPRESSION WITH ANXIETY: ICD-10-CM

## 2018-03-05 DIAGNOSIS — R51.9 SINUS HEADACHE: ICD-10-CM

## 2018-03-05 DIAGNOSIS — G44.219 EPISODIC TENSION-TYPE HEADACHE, NOT INTRACTABLE: ICD-10-CM

## 2018-03-05 DIAGNOSIS — G25.81 RLS (RESTLESS LEGS SYNDROME): ICD-10-CM

## 2018-03-05 DIAGNOSIS — E03.9 ACQUIRED HYPOTHYROIDISM: ICD-10-CM

## 2018-03-05 PROCEDURE — 96372 THER/PROPH/DIAG INJ SC/IM: CPT | Performed by: INTERNAL MEDICINE

## 2018-03-05 PROCEDURE — 99396 PREV VISIT EST AGE 40-64: CPT | Performed by: INTERNAL MEDICINE

## 2018-03-05 RX ORDER — BUPROPION HYDROCHLORIDE 150 MG/1
TABLET ORAL
Qty: 14 TABLET | Refills: 3 | Status: SHIPPED | OUTPATIENT
Start: 2018-03-05 | End: 2018-04-06 | Stop reason: ALTCHOICE

## 2018-03-05 RX ORDER — DESVENLAFAXINE 50 MG/1
50 TABLET, EXTENDED RELEASE ORAL DAILY
Qty: 30 TABLET | Refills: 3 | Status: SHIPPED | OUTPATIENT
Start: 2018-03-05 | End: 2018-07-24 | Stop reason: SDUPTHER

## 2018-03-05 RX ORDER — KETOROLAC TROMETHAMINE 30 MG/ML
60 INJECTION, SOLUTION INTRAMUSCULAR; INTRAVENOUS ONCE
Status: COMPLETED | OUTPATIENT
Start: 2018-03-05 | End: 2018-03-05

## 2018-03-05 RX ORDER — CEFDINIR 300 MG/1
300 CAPSULE ORAL 2 TIMES DAILY
Qty: 28 CAPSULE | Refills: 1 | Status: SHIPPED | OUTPATIENT
Start: 2018-03-05 | End: 2018-03-19

## 2018-03-05 RX ORDER — VITAMIN B COMPLEX
1 CAPSULE ORAL DAILY
COMMUNITY
End: 2018-04-26

## 2018-03-05 RX ADMIN — KETOROLAC TROMETHAMINE 60 MG: 30 INJECTION, SOLUTION INTRAMUSCULAR; INTRAVENOUS at 15:44

## 2018-03-05 ASSESSMENT — ENCOUNTER SYMPTOMS
BLOOD IN STOOL: 0
COUGH: 0
BACK PAIN: 1
VOICE CHANGE: 0
RHINORRHEA: 0
DIARRHEA: 0
WHEEZING: 0
EYE PAIN: 0
SHORTNESS OF BREATH: 0
SORE THROAT: 0
EYE DISCHARGE: 0
VOMITING: 0
EYE REDNESS: 0
CHEST TIGHTNESS: 0
COLOR CHANGE: 0
ABDOMINAL PAIN: 0
SINUS PRESSURE: 0

## 2018-03-05 NOTE — PATIENT INSTRUCTIONS
doctor if you are having problems. It's also a good idea to know your test results and keep a list of the medicines you take. How can you care for yourself at home? · Set realistic goals. Many people expect to lose much more weight than is likely. A weight loss of 5% to 10% of your body weight may be enough to improve your health. · Get family and friends involved to provide support. Talk to them about why you are trying to lose weight, and ask them to help. They can help by participating in exercise and having meals with you, even if they may be eating something different. · Find what works best for you. If you do not have time or do not like to cook, a program that offers meal replacement bars or shakes may be better for you. Or if you like to prepare meals, finding a plan that includes daily menus and recipes may be best.  · Ask your doctor about other health professionals who can help you achieve your weight loss goals. ¨ A dietitian can help you make healthy changes in your diet. ¨ An exercise specialist or  can help you develop a safe and effective exercise program.  ¨ A counselor or psychiatrist can help you cope with issues such as depression, anxiety, or family problems that can make it hard to focus on weight loss. · Consider joining a support group for people who are trying to lose weight. Your doctor can suggest groups in your area. Where can you learn more? Go to https://KARALITmichael.Fluentify. org and sign in to your [employer] [facility] account. Enter Y300 in the [facility] box to learn more about \"Starting a Weight Loss Plan: Care Instructions. \"     If you do not have an account, please click on the \"Sign Up Now\" link. Current as of: October 13, 2016  Content Version: 11.5  © 8788-5437 Healthwise, Incorporated. Care instructions adapted under license by [facility] ([facility]).  If you have questions about a medical condition or this instruction, always ask your healthcare

## 2018-03-05 NOTE — PROGRESS NOTES
per tablet 1-2 tablets po q6 hours prn HAs 90 tablet 0    levothyroxine (SYNTHROID) 100 MCG tablet Take 1 tablet by mouth daily 90 tablet 3    temazepam (RESTORIL) 15 MG capsule Take 2 capsules by mouth nightly as needed for Sleep 30 capsule 2    fluticasone (FLONASE) 50 MCG/ACT nasal spray 1 spray by Nasal route daily      gabapentin (NEURONTIN) 300 MG capsule Take 300 mg by mouth daily       Multiple Vitamins-Minerals (MULTIVITAMIN ADULT PO) Take by mouth      Omega 3 1000 MG CAPS Take by mouth      Cholecalciferol (VITAMIN D3) 3000 units TABS Take by mouth      celecoxib (CELEBREX) 200 MG capsule Take 1 capsule by mouth daily as needed for Pain (arthritis) 30 capsule 2    hydrocodone-chlorpheniramine (TUSSIONEX PENNKINETIC ER) 10-8 MG/5ML SUER Take 5 mLs by mouth every 12 hours as needed (cough) . 120 mL 0    vitamin B-12 (CYANOCOBALAMIN) 1000 MCG tablet Take 1,000 mcg by mouth daily      niacin (SLO-NIACIN) 500 MG extended release tablet Take 500 mg by mouth nightly       No current facility-administered medications for this visit.         No Known Allergies    Past Surgical History:   Procedure Laterality Date     SECTION      COLONOSCOPY  2015    colon polyp x1, recheck in 5 yrs (Dr Karen Rush)   [address]         Social History   Substance Use Topics    Smoking status: Never Smoker    Smokeless tobacco: Never Used    Alcohol use Yes      Comment: rarely       Family History   Problem Relation Age of Onset    Heart Disease Mother     High Blood Pressure Mother     Other Mother      skin CA    Osteoporosis Mother     Heart Disease Father     High Blood Pressure Father     Other Father      jaw CA    Heart Disease Sister     Diabetes Sister     Stroke Sister     High Blood Pressure Sister     Other Sister      fibromyalgia, thyroid CA       /80 (Site: Left Arm)   Pulse 86   Temp 97.9 °F (36.6 °C) (Temporal)   Ht 5' (1.524 m)   Wt MCV 83.0 02/26/2018     02/26/2018    LABLYMP 1.34 08/13/2015    LYMPHOPCT 22.9 02/26/2018    RBC 4.88 02/26/2018    MCH 26.0 (L) 02/26/2018    MCHC 31.4 (L) 02/26/2018    RDW 15.7 (H) 02/26/2018     Lab Results   Component Value Date    TSH 0.840 02/26/2018     Lab Results   Component Value Date    VITD25 44.5 02/26/2018     Cholesterol, Total 216   160 - 199 mg/dL Final 02/26/2018  2:12 PM [address] [place] [address] Lab   <160 MG/DL=OPTIMAL     160-199 MG/DL= DESIRABLE     200-239 MG/DL=BORDERLINE-INCREASED RISK OF ATHEROSCLEROTIC   CARDIOVASCULAR DISEASE     > OR = 240 MG/DL-ASSOCIATED WITH AN INCREASED RISK OF   ATHROSCLEROTIC CARDIOVASCULAR DISEASE    Triglycerides 195   0 - 149 mg/dL Final 02/26/2018  2:12 PM [address] [place] [address] Lab   HDL 48   65 - 121 mg/dL Final 02/26/2018  2:12 PM [address] [place] [address] Lab   VALUES>60 MG/DL ARE ASSOCIATED WITH A DECREASED RISK OF   ATHEROSCLEROTIC CARDIOVASCULAR DISEASE    LDL Calculated 129  <100 mg/dL Final 02/26/2018  2:12 PM [address] [place] [address] Lab   <100 MG/DL=OPITIMAL     100-129 MG/DL=DESIRABLE     130-159 MG/DL BORDERLINE=INCREASED RISK OF ATHEROSCLEROTIC   CARDIOVASCULAR DISEASE     > OR = 160 MG/DL=ASSOCIATED WITH AN INCREASE RISK OF   ATHEROSCLEROTIC CARDIOVASCULAR DISEASE      Hep A IgM Non-Reactive  Non-reactive Final 02/26/2018  2:12 PM [address] [place] [address] Lab   Hep B Core Ab, IgM Non-Reactive  Non-reactive Final 02/26/2018  2:12 PM [address] [place] [address] Lab   Hep B S Ag Interp Non-Reactive  Non-reactive Final 02/26/2018  2:12 PM [facility] [place] Lab   Hep C Ab Interp Non-Reactive   Final 02/26/2018  2:12 PM [address] [place] [address] Lab       Assessment:    ICD-10-CM ICD-9-CM    1. Annual physical exam Z00.00 V70.0    2. Acquired hypothyroidism E03.9 244.9    3. Vitamin D deficiency E55.9 268.9    4. RLS (restless legs syndrome) G25.81 333.94    5. Mixed hyperlipidemia E78.2 272.2    6.  Migraine without aura and without status migrainosus, not and pelvic exam. Ask your doctor how often you should have a Pap test. You may not need to have a Pap test as often as you used to. · Vision. Have your eyes checked every year or two or as often as your doctor suggests. Some experts recommend that you have yearly exams for glaucoma and other age-related eye problems starting at age 48. · Hearing. Tell your doctor if you notice any change in your hearing. You can have tests to find out how well you hear. · Diabetes. Ask your doctor whether you should have tests for diabetes. · Colon cancer. You should begin tests for colon cancer at age 48. You may have one of several tests. Your doctor will tell you how often to have tests based on your age and risk. Risks include whether you already had a precancerous polyp removed from your colon or whether your parents, sisters and brothers, or children have had colon cancer. · Thyroid disease. Talk to your doctor about whether to have your thyroid checked as part of a regular physical exam. Women have an increased chance of a thyroid problem. · Osteoporosis. You should begin tests for bone density at age 72. If you are younger than 72, ask your doctor whether you have factors that may increase your risk for this disease. You may want to have this test before age 72. · Heart attack and stroke risk. At least every 4 to 6 years, you should have your risk for heart attack and stroke assessed. Your doctor uses factors such as your age, blood pressure, cholesterol, and whether you smoke or have diabetes to show what your risk for a heart attack or stroke is over the next 10 years. When should you call for help? Watch closely for changes in your health, and be sure to contact your doctor if you have any problems or symptoms that concern you. Where can you learn more? Go to https://kevin.healthMarketArt. org and sign in to your Mev[employer] account.  Enter Y506 in the [employer] box to learn more about \"Well them to help. They can help by participating in exercise and having meals with you, even if they may be eating something different. · Find what works best for you. If you do not have time or do not like to cook, a program that offers meal replacement bars or shakes may be better for you. Or if you like to prepare meals, finding a plan that includes daily menus and recipes may be best.  · Ask your doctor about other health professionals who can help you achieve your weight loss goals. ¨ A dietitian can help you make healthy changes in your diet. ¨ An exercise specialist or  can help you develop a safe and effective exercise program.  ¨ A counselor or psychiatrist can help you cope with issues such as depression, anxiety, or family problems that can make it hard to focus on weight loss. · Consider joining a support group for people who are trying to lose weight. Your doctor can suggest groups in your area. Where can you learn more? Go to https://Pharmworks.Yandex. org and sign in to your AMDL account. Enter X224 in the [employer] box to learn more about \"Starting a Weight Loss Plan: Care Instructions. \"     If you do not have an account, please click on the \"Sign Up Now\" link. Current as of: October 13, 2016  Content Version: 11.5  © 9731-9181 [employer]. Care instructions adapted under license by [facility] ([facility]). If you have questions about a medical condition or this instruction, always ask your healthcare professional. [place] [address] any warranty or liability for your use of this information. Patient Education        Managing Your Allergies: Care Instructions  Your Care Instructions  Managing your allergies is an important part of staying healthy. Your doctor will help you find out what may be causing the allergies. Common causes of allergy symptoms are house dust and dust mites, animal dander, mold, and pollen.   As soon as you know what triggers your symptoms, try to reduce your exposure to your triggers. This can help prevent allergy symptoms, asthma, and other health problems. Ask your doctor about allergy medicine or immunotherapy. These treatments may help reduce or prevent allergy symptoms. Follow-up care is a key part of your treatment and safety. Be sure to make and go to all appointments, and call your doctor if you are having problems. It's also a good idea to know your test results and keep a list of the medicines you take. How can you care for yourself at home? · If you think that dust or dust mites are causing your allergies:  ¨ Wash sheets, pillowcases, and other bedding every week in hot water. ¨ Use airtight, dust-proof covers for pillows, duvets, and mattresses. Avoid plastic covers, because they tend to tear quickly and do not \"breathe. \" Wash according to the instructions. ¨ Remove extra blankets and pillows that you don't need. ¨ Use blankets that are machine-washable. ¨ Don't use home humidifiers. They can help mites live longer. · Use air-conditioning. Change or clean all filters every month. Keep windows closed. Use high-efficiency air filters. Don't use window or attic fans, which draw dust into the air. · If you're allergic to pet dander, keep pets outside or, at the very least, out of your bedroom. Old carpet and cloth-covered furniture can hold a lot of animal dander. You may need to replace them. · Look for signs of cockroaches. Use cockroach baits to get rid of them. Then clean your home well. · If you're allergic to mold, don't keep indoor plants, because molds can grow in soil. Get rid of furniture, rugs, and drapes that smell musty. Check for mold in the bathroom. · If you're allergic to pollen, stay inside when pollen counts are high. · Don't smoke or let anyone else smoke in your house. Don't use fireplaces or wood-burning stoves. Avoid paint fumes, perfumes, and other strong odors.   When

## 2018-04-02 DIAGNOSIS — G44.219 EPISODIC TENSION-TYPE HEADACHE, NOT INTRACTABLE: ICD-10-CM

## 2018-04-02 DIAGNOSIS — G43.009 MIGRAINE WITHOUT AURA AND WITHOUT STATUS MIGRAINOSUS, NOT INTRACTABLE: ICD-10-CM

## 2018-04-02 RX ORDER — BUTALBITAL, ACETAMINOPHEN AND CAFFEINE 50; 325; 40 MG/1; MG/1; MG/1
TABLET ORAL
Qty: 90 TABLET | Refills: 0 | Status: SHIPPED | OUTPATIENT
Start: 2018-04-02 | End: 2018-05-23 | Stop reason: SDUPTHER

## 2018-04-02 RX ORDER — TRAMADOL HYDROCHLORIDE 50 MG/1
50 TABLET ORAL EVERY 12 HOURS PRN
Qty: 60 TABLET | Refills: 0 | Status: SHIPPED | OUTPATIENT
Start: 2018-04-02 | End: 2018-07-02

## 2018-04-03 ENCOUNTER — TELEPHONE (OUTPATIENT)
Dept: FAMILY MEDICINE CLINIC | Age: 53
End: 2018-04-03

## 2018-04-03 NOTE — TELEPHONE ENCOUNTER
Called PT to let her know she would have to pick the scripts up from [facility] due to Dr. Carin Maier being out of the office she understood and will be picking them up.

## 2018-04-03 NOTE — TELEPHONE ENCOUNTER
Patient called to see if prescription has been called into [facility] she checked and they had not received it yet

## 2018-04-06 ENCOUNTER — OFFICE VISIT (OUTPATIENT)
Dept: FAMILY MEDICINE CLINIC | Age: 53
End: 2018-04-06
Payer: COMMERCIAL

## 2018-04-06 VITALS
OXYGEN SATURATION: 98 % | DIASTOLIC BLOOD PRESSURE: 90 MMHG | BODY MASS INDEX: 31.52 KG/M2 | WEIGHT: 161.4 LBS | TEMPERATURE: 99.4 F | SYSTOLIC BLOOD PRESSURE: 138 MMHG | HEART RATE: 95 BPM

## 2018-04-06 DIAGNOSIS — J01.11 ACUTE RECURRENT FRONTAL SINUSITIS: Primary | ICD-10-CM

## 2018-04-06 DIAGNOSIS — R05.9 COUGH: ICD-10-CM

## 2018-04-06 PROCEDURE — 99213 OFFICE O/P EST LOW 20 MIN: CPT | Performed by: CLINICAL NURSE SPECIALIST

## 2018-04-06 PROCEDURE — 96372 THER/PROPH/DIAG INJ SC/IM: CPT | Performed by: CLINICAL NURSE SPECIALIST

## 2018-04-06 RX ORDER — HYDROCODONE POLISTIREX AND CHLORPHENIRAMINE POLISTIREX 10; 8 MG/5ML; MG/5ML
5 SUSPENSION, EXTENDED RELEASE ORAL EVERY 12 HOURS PRN
Qty: 90 ML | Refills: 0 | Status: SHIPPED | OUTPATIENT
Start: 2018-04-06 | End: 2018-04-16 | Stop reason: SDUPTHER

## 2018-04-06 RX ORDER — METHYLPREDNISOLONE 4 MG/1
TABLET ORAL
Qty: 21 TABLET | Refills: 0 | Status: SHIPPED | OUTPATIENT
Start: 2018-04-06 | End: 2018-04-12

## 2018-04-06 RX ORDER — METHYLPREDNISOLONE ACETATE 80 MG/ML
80 INJECTION, SUSPENSION INTRA-ARTICULAR; INTRALESIONAL; INTRAMUSCULAR; SOFT TISSUE ONCE
Status: COMPLETED | OUTPATIENT
Start: 2018-04-06 | End: 2018-04-06

## 2018-04-06 RX ADMIN — METHYLPREDNISOLONE ACETATE 80 MG: 80 INJECTION, SUSPENSION INTRA-ARTICULAR; INTRALESIONAL; INTRAMUSCULAR; SOFT TISSUE at 14:54

## 2018-04-06 ASSESSMENT — ENCOUNTER SYMPTOMS
EYE DISCHARGE: 1
FACIAL SWELLING: 0
RHINORRHEA: 0
SINUS PAIN: 1
EYE PAIN: 0
COUGH: 1
VOMITING: 0
SINUS PRESSURE: 1
NAUSEA: 0
SHORTNESS OF BREATH: 0
SORE THROAT: 1
WHEEZING: 0
CHEST TIGHTNESS: 0

## 2018-04-10 ENCOUNTER — TELEPHONE (OUTPATIENT)
Dept: FAMILY MEDICINE CLINIC | Age: 53
End: 2018-04-10

## 2018-04-11 PROBLEM — Z00.00 ANNUAL PHYSICAL EXAM: Status: RESOLVED | Noted: 2018-03-05 | Resolved: 2018-04-11

## 2018-04-16 ENCOUNTER — TELEPHONE (OUTPATIENT)
Dept: FAMILY MEDICINE CLINIC | Age: 53
End: 2018-04-16

## 2018-04-16 DIAGNOSIS — R05.9 COUGH: ICD-10-CM

## 2018-04-16 RX ORDER — HYDROCODONE POLISTIREX AND CHLORPHENIRAMINE POLISTIREX 10; 8 MG/5ML; MG/5ML
5 SUSPENSION, EXTENDED RELEASE ORAL EVERY 12 HOURS PRN
Qty: 90 ML | Refills: 0 | Status: SHIPPED | OUTPATIENT
Start: 2018-04-16 | End: 2018-04-25

## 2018-04-17 ENCOUNTER — HOSPITAL ENCOUNTER (OUTPATIENT)
Dept: GENERAL RADIOLOGY | Age: 53
Discharge: HOME | End: 2018-04-17
Payer: COMMERCIAL

## 2018-04-17 ENCOUNTER — NURSE ONLY (OUTPATIENT)
Dept: FAMILY MEDICINE CLINIC | Age: 53
End: 2018-04-17

## 2018-04-17 DIAGNOSIS — R05.3 COUGH, PERSISTENT: Primary | ICD-10-CM

## 2018-04-17 DIAGNOSIS — R05.3 COUGH, PERSISTENT: ICD-10-CM

## 2018-04-17 PROCEDURE — 71046 X-RAY EXAM CHEST 2 VIEWS: CPT

## 2018-04-17 PROCEDURE — 99999 PR OFFICE/OUTPT VISIT,PROCEDURE ONLY: CPT | Performed by: INTERNAL MEDICINE

## 2018-04-19 ENCOUNTER — NURSE ONLY (OUTPATIENT)
Dept: FAMILY MEDICINE CLINIC | Age: 53
End: 2018-04-19

## 2018-04-19 DIAGNOSIS — R05.9 COUGH: Primary | ICD-10-CM

## 2018-04-19 DIAGNOSIS — R05.9 COUGH: ICD-10-CM

## 2018-04-24 LAB
BORDETELLA PERTUSSIS IGG: 0.71 IV
BORDETELLA PERTUSSIS IGM: 0.6 IV

## 2018-04-26 ENCOUNTER — OFFICE VISIT (OUTPATIENT)
Dept: FAMILY MEDICINE CLINIC | Age: 53
End: 2018-04-26
Payer: COMMERCIAL

## 2018-04-26 VITALS
BODY MASS INDEX: 30.08 KG/M2 | OXYGEN SATURATION: 98 % | HEART RATE: 95 BPM | SYSTOLIC BLOOD PRESSURE: 110 MMHG | WEIGHT: 154 LBS | DIASTOLIC BLOOD PRESSURE: 84 MMHG | TEMPERATURE: 98.2 F

## 2018-04-26 DIAGNOSIS — F41.8 DEPRESSION WITH ANXIETY: Primary | ICD-10-CM

## 2018-04-26 DIAGNOSIS — J01.41 ACUTE RECURRENT PANSINUSITIS: ICD-10-CM

## 2018-04-26 DIAGNOSIS — R05.3 PERSISTENT COUGH: ICD-10-CM

## 2018-04-26 PROCEDURE — 99214 OFFICE O/P EST MOD 30 MIN: CPT | Performed by: CLINICAL NURSE SPECIALIST

## 2018-04-26 RX ORDER — FLUTICASONE PROPIONATE 110 UG/1
2 AEROSOL, METERED RESPIRATORY (INHALATION) 2 TIMES DAILY
Qty: 1 INHALER | Refills: 0
Start: 2018-04-26 | End: 2018-09-13 | Stop reason: CLARIF

## 2018-04-26 ASSESSMENT — ENCOUNTER SYMPTOMS
FACIAL SWELLING: 0
NAUSEA: 0
SINUS PAIN: 1
SHORTNESS OF BREATH: 0
SINUS PRESSURE: 1
VOMITING: 0
EYE DISCHARGE: 0
SORE THROAT: 0
COUGH: 1
WHEEZING: 0
CHEST TIGHTNESS: 0
EYE PAIN: 0
RHINORRHEA: 1

## 2018-05-23 DIAGNOSIS — G44.219 EPISODIC TENSION-TYPE HEADACHE, NOT INTRACTABLE: ICD-10-CM

## 2018-05-23 DIAGNOSIS — G43.009 MIGRAINE WITHOUT AURA AND WITHOUT STATUS MIGRAINOSUS, NOT INTRACTABLE: ICD-10-CM

## 2018-05-23 RX ORDER — BUTALBITAL, ACETAMINOPHEN AND CAFFEINE 50; 325; 40 MG/1; MG/1; MG/1
TABLET ORAL
Qty: 90 TABLET | Refills: 0 | Status: SHIPPED | OUTPATIENT
Start: 2018-05-23 | End: 2018-07-23 | Stop reason: SDUPTHER

## 2018-07-23 DIAGNOSIS — G43.009 MIGRAINE WITHOUT AURA AND WITHOUT STATUS MIGRAINOSUS, NOT INTRACTABLE: ICD-10-CM

## 2018-07-23 DIAGNOSIS — G44.219 EPISODIC TENSION-TYPE HEADACHE, NOT INTRACTABLE: ICD-10-CM

## 2018-07-24 DIAGNOSIS — F41.8 DEPRESSION WITH ANXIETY: ICD-10-CM

## 2018-07-24 RX ORDER — BUTALBITAL, ACETAMINOPHEN AND CAFFEINE 50; 325; 40 MG/1; MG/1; MG/1
TABLET ORAL
Qty: 90 TABLET | Refills: 0 | Status: SHIPPED | OUTPATIENT
Start: 2018-07-24 | End: 2018-09-13 | Stop reason: SDUPTHER

## 2018-07-24 RX ORDER — DESVENLAFAXINE 50 MG/1
TABLET, EXTENDED RELEASE ORAL
Qty: 30 TABLET | Refills: 3 | Status: SHIPPED | OUTPATIENT
Start: 2018-07-24 | End: 2018-12-13 | Stop reason: SDUPTHER

## 2018-08-01 ENCOUNTER — OFFICE VISIT (OUTPATIENT)
Dept: FAMILY MEDICINE CLINIC | Age: 53
End: 2018-08-01
Payer: COMMERCIAL

## 2018-08-01 VITALS
TEMPERATURE: 97.8 F | WEIGHT: 151.9 LBS | HEIGHT: 61 IN | BODY MASS INDEX: 28.68 KG/M2 | OXYGEN SATURATION: 98 % | SYSTOLIC BLOOD PRESSURE: 146 MMHG | HEART RATE: 97 BPM | DIASTOLIC BLOOD PRESSURE: 88 MMHG

## 2018-08-01 DIAGNOSIS — R51.9 CHRONIC DAILY HEADACHE: ICD-10-CM

## 2018-08-01 DIAGNOSIS — F51.01 IDIOPATHIC INSOMNIA: ICD-10-CM

## 2018-08-01 DIAGNOSIS — G25.81 RLS (RESTLESS LEGS SYNDROME): ICD-10-CM

## 2018-08-01 DIAGNOSIS — G43.009 MIGRAINE WITHOUT AURA AND WITHOUT STATUS MIGRAINOSUS, NOT INTRACTABLE: Primary | ICD-10-CM

## 2018-08-01 DIAGNOSIS — M54.42 CHRONIC BILATERAL LOW BACK PAIN WITH LEFT-SIDED SCIATICA: ICD-10-CM

## 2018-08-01 DIAGNOSIS — F41.8 DEPRESSION WITH ANXIETY: ICD-10-CM

## 2018-08-01 DIAGNOSIS — G44.219 EPISODIC TENSION-TYPE HEADACHE, NOT INTRACTABLE: ICD-10-CM

## 2018-08-01 DIAGNOSIS — G89.29 CHRONIC BILATERAL LOW BACK PAIN WITH LEFT-SIDED SCIATICA: ICD-10-CM

## 2018-08-01 DIAGNOSIS — E03.9 ACQUIRED HYPOTHYROIDISM: ICD-10-CM

## 2018-08-01 DIAGNOSIS — E78.2 MIXED HYPERLIPIDEMIA: ICD-10-CM

## 2018-08-01 DIAGNOSIS — E55.9 VITAMIN D DEFICIENCY: ICD-10-CM

## 2018-08-01 DIAGNOSIS — R03.0 ELEVATED BLOOD PRESSURE READING WITHOUT DIAGNOSIS OF HYPERTENSION: ICD-10-CM

## 2018-08-01 PROCEDURE — 99214 OFFICE O/P EST MOD 30 MIN: CPT | Performed by: INTERNAL MEDICINE

## 2018-08-01 RX ORDER — GABAPENTIN 300 MG/1
CAPSULE ORAL
Qty: 90 CAPSULE | Refills: 2 | Status: SHIPPED | OUTPATIENT
Start: 2018-08-01 | End: 2018-12-13 | Stop reason: SDUPTHER

## 2018-08-01 RX ORDER — PREDNISONE 10 MG/1
TABLET ORAL
Qty: 14 TABLET | Refills: 1 | Status: SHIPPED | OUTPATIENT
Start: 2018-08-01 | End: 2018-09-13 | Stop reason: CLARIF

## 2018-08-01 ASSESSMENT — ENCOUNTER SYMPTOMS
COUGH: 0
EYE REDNESS: 0
WHEEZING: 0
SHORTNESS OF BREATH: 0
DIARRHEA: 0
RHINORRHEA: 0
BACK PAIN: 1
SORE THROAT: 0
EYE DISCHARGE: 0
VOMITING: 0
BLOOD IN STOOL: 0
EYE PAIN: 0
VOICE CHANGE: 0
ABDOMINAL PAIN: 0
COLOR CHANGE: 0
SINUS PRESSURE: 0
CHEST TIGHTNESS: 0

## 2018-08-01 NOTE — PROGRESS NOTES
Amy Bennett is a 46 y.o. female who presents today for   Chief Complaint   Patient presents with    Follow-up       HPI  47 y/o WF here for f/u on acquired hypothyroidism, depression and anxiety, migraine HAs, primary insomnia, and chronic neck and back pain with controlled med refill. Mood has been better on pristiq after transition from wellbutrin 4 mths ago. She had been on wellbutrin for a long time but it seemed to stop working and she became more irritable and short tempered. Despite her mood having improved, she is still having a lot of HAs. She is not sure if this is due to stress or lack of sleep or sinuses. HAs are daily at this point and usually SCHWAB is in the back of her head/neck and \"behind her eyes\". Fioricet does help her HAs but she has to take it daily. She is on gabapentin but only 300 mg at bedtime currently which is for migraine prophylaxis. She has a hx of migraine HAs, tension HAs, and also DDD/OA cervical spine however which can all be contributing to her HAs. She has been having to help her daughter care for her grandsons more because she just had another child recently so she needs more help with the other kids. She does take temazepam for insomnia but she does not take it every night. Hypothyroidism  Patient presents for evaluation of thyroid function. Symptoms consist of fatigue, weight gain, constipation. Symptoms have present for several months. The symptoms are moderate. The problem has been gradually worsening. Previous thyroid studies include TSH. Patient has been compliant with her levothyroxine 100 mcg daily. Amy Bennett is here for follow up of dyslipidemia. Compliance with treatment has been fair. The patient exercises intermittently. Patient denies muscle pain associated with their medications (Niacin and omega 3 FAs). Review of Systems   Constitutional: Positive for fatigue. Negative for appetite change, chills and fever.    HENT: Negative for ear pain, mouth daily      celecoxib (CELEBREX) 200 MG capsule Take 1 capsule by mouth daily as needed for Pain (arthritis) 30 capsule 2    levothyroxine (SYNTHROID) 100 MCG tablet Take 1 tablet by mouth daily 90 tablet 3    temazepam (RESTORIL) 15 MG capsule Take 2 capsules by mouth nightly as needed for Sleep 30 capsule 2    vitamin B-12 (CYANOCOBALAMIN) 1000 MCG tablet Take 1,000 mcg by mouth daily      fluticasone (FLONASE) 50 MCG/ACT nasal spray 1 spray by Nasal route daily      Multiple Vitamins-Minerals (MULTIVITAMIN ADULT PO) Take by mouth      niacin (SLO-NIACIN) 500 MG extended release tablet Take 500 mg by mouth nightly      Omega 3 1000 MG CAPS Take by mouth      Cholecalciferol (VITAMIN D3) 3000 units TABS Take by mouth       No current facility-administered medications for this visit. No Known Allergies    Past Surgical History:   Procedure Laterality Date     SECTION      COLONOSCOPY  2015    colon polyp x1, recheck in 5 yrs (Dr Amarilis Guardado)   [address]         Social History   Substance Use Topics    Smoking status: Never Smoker    Smokeless tobacco: Never Used    Alcohol use Yes      Comment: rarely       Family History   Problem Relation Age of Onset    Heart Disease Mother     High Blood Pressure Mother     Other Mother         skin CA    Osteoporosis Mother     Heart Disease Father     High Blood Pressure Father     Other Father         jaw CA    Heart Disease Sister     Diabetes Sister     Stroke Sister     High Blood Pressure Sister     Other Sister         fibromyalgia, thyroid CA       BP (!) 146/88 (Site: Left Arm)   Pulse 97   Temp 97.8 °F (36.6 °C)   Ht 5' 1\" (1.549 m)   Wt 151 lb 14.4 oz (68.9 kg)   SpO2 98%   BMI 28.70 kg/m²     Physical Exam   Constitutional: She is oriented to person, place, and time. No distress. HENT:   Head: Normocephalic and atraumatic.    Right Ear: External ear normal.   Left Ear: External ear normal.   Mouth/Throat: Oropharynx is clear and moist.   Eyes: Conjunctivae and EOM are normal. Pupils are equal, round, and reactive to light. Neck: Neck supple. No JVD present. No thyromegaly present. No carotid bruits   Cardiovascular: Normal rate, regular rhythm, normal heart sounds and intact distal pulses. Exam reveals no gallop and no friction rub. No murmur heard. Pulmonary/Chest: Effort normal and breath sounds normal.   Abdominal: Soft. Bowel sounds are normal. She exhibits no distension. There is no tenderness. Musculoskeletal: She exhibits tenderness. She exhibits no edema.   +mild cervical paraspinal muscle spasm and TTP on exam, minimal TTP bilateral occipital nerves, moderate muscle spasm and TTP bilateral trapezius muscles. Lymphadenopathy:     She has no cervical adenopathy. Neurological: She is alert and oriented to person, place, and time. Skin: Skin is warm. No rash noted. Psychiatric: She has a normal mood and affect. Her behavior is normal. Judgment and thought content normal.       Lab Results   Component Value Date    WBC 6.2 02/26/2018    HGB 12.7 02/26/2018    HCT 40.5 02/26/2018    MCV 83.0 02/26/2018     02/26/2018    LABLYMP 1.34 08/13/2015    LYMPHOPCT 22.9 02/26/2018    RBC 4.88 02/26/2018    MCH 26.0 (L) 02/26/2018    MCHC 31.4 (L) 02/26/2018    RDW 15.7 (H) 02/26/2018     Lab Results   Component Value Date     02/26/2018    K 4.1 02/26/2018     02/26/2018    CO2 27 02/26/2018    BUN 6 02/26/2018    CREATININE 0.7 02/26/2018    GLUCOSE 91 02/26/2018    CALCIUM 9.4 02/26/2018     Lab Results   Component Value Date    CHOL 216 02/26/2018    TRIG 195 02/26/2018    HDL 48 02/26/2018    LDLCALC 129 02/26/2018       Lab Results   Component Value Date    TSH 0.840 02/26/2018     Lab Results   Component Value Date    VITD25 44.5 02/26/2018       Assessment:    ICD-10-CM ICD-9-CM    1.  Migraine without aura and without status migrainosus, not hypertension    Vitamin D deficiency  -     Vitamin D 25 Hydroxy; Future    Mixed hyperlipidemia  -     Comprehensive Metabolic Panel; Future  -     Lipid Panel; Future    1. Increase gabapentin to 300 mg twice daily for 2 weeks then increase to 300 mg 3x/day (or 300 mg in am and 600 mg at bedtime) for better control of migraine HAs but may also improve treatment of neck and LBP with radiculopathy, insomnia, and RLS given gabapentin is also used to treat these problems. 2. Will also try treating with course of prednisone to see if this helps stop the chronic daily HAs. Continue fioricet prn HA (works for tension and migraine). 3. Most recent TSH and vitamin D 25OH levels in normal range. Will continue current medications and recheck levels prior to next follow-up. 4. BP elevated today but no previous hx of HTN. May be elevated due to stress or HA or HAs may be related to recent elevation of BP. Will try increasing the gabapentin for HA control and handout on DASH diet provided to help with BP control and recheck BP on RN schedule in 1-2 weeks. Pt warned prednisone taper may elevate BP temporarily also however. 5. Continue niacin and omega 3 FAs for cholesterol but stressed importance of increasing efforts at low cholesterol diet, routine exercise, and weight loss which can also help with mood and stress.    6. F/u in 6 weeks, sooner if symptoms worsen prior to appmt    Orders Placed This Encounter   Procedures    Comprehensive Metabolic Panel     Standing Status:   Future     Standing Expiration Date:   8/1/2019    Lipid Panel     Standing Status:   Future     Standing Expiration Date:   8/1/2019     Order Specific Question:   Is Patient Fasting?/# of Hours     Answer:   yes/8 hrs    TSH without Reflex     Standing Status:   Future     Standing Expiration Date:   8/1/2019    Vitamin D 25 Hydroxy     Standing Status:   Future     Standing Expiration Date:   8/1/2019     Orders Placed This Encounter Medications    predniSONE (DELTASONE) 10 MG tablet     Sig: 3 tabs daily x2 days then 2 tablets daily x2 days then 1 tablet daily x2 days     Dispense:  14 tablet     Refill:  1    gabapentin (NEURONTIN) 300 MG capsule     Si tablet twice daily x2 weeks and then increase to 1 tablet 3x/day. Dispense:  90 capsule     Refill:  2     Medications Discontinued During This Encounter   Medication Reason    gabapentin (NEURONTIN) 300 MG capsule REORDER     Patient Instructions       Patient Education            Current as of: 2017  Content Version: 11.6  © 1042-4893 [employer]. Care instructions adapted under license by [facility] ([facility]). If you have questions about a medical condition or this instruction, always ask your healthcare professional. [place] [address] any warranty or liability for your use of this information. Patient Education        Learning About How to Have a Healthy Back  What causes back pain? Back pain is often caused by overuse, strain, or injury. For example, people often hurt their backs playing sports or working in the yard, being jolted in a car accident, or lifting something too heavy. Aging plays a part too. Your bones and muscles tend to lose strength as you age, which makes injury more likely. The spongy discs between the bones of the spine (vertebrae) may suffer from wear and tear and no longer provide enough cushion between the bones. A disc that bulges or breaks open (herniated disc) can press on nerves, causing back pain. In some people, back pain is the result of arthritis, broken vertebrae caused by bone loss (osteoporosis), illness, or a spine problem. Although most people have back pain at one time or another, there are steps you can take to make it less likely. How can you have a healthy back? Reduce stress on your back through good posture  Slumping or slouching alone may not cause low back pain.  But after the back has been strained or injured, bad posture can make pain worse. · Sleep in a position that maintains your back's normal curves and on a mattress that feels comfortable. Sleep on your side with a pillow between your knees, or sleep on your back with a pillow under your knees. These positions can reduce strain on your back. · Stand and sit up straight. \"Good posture\" generally means your ears, shoulders, and hips are in a straight line. · If you must stand for a long time, put one foot on a stool, ledge, or box. Switch feet every now and then. · Sit in a chair that is low enough to let you place both feet flat on the floor with both knees nearly level with your hips. If your chair or desk is too high, use a footrest to raise your knees. Place a small pillow, a rolled-up towel, or a lumbar roll in the curve of your back if you need extra support. · Try a kneeling chair, which helps tilt your hips forward. This takes pressure off your lower back. · Try sitting on an exercise ball. It can rock from side to side, which helps keep your back loose. · When driving, keep your knees nearly level with your hips. Sit straight, and drive with both hands on the steering wheel. Your arms should be in a slightly bent position. Reduce stress on your back through careful lifting  · Squat down, bending at the hips and knees only. If you need to, put one knee to the floor and extend your other knee in front of you, bent at a right angle (half kneeling). · Press your chest straight forward. This helps keep your upper back straight while keeping a slight arch in your low back. · Hold the load as close to your body as possible, at the level of your belly button (navel). · Use your feet to change direction, taking small steps. · Lead with your hips as you change direction. Keep your shoulders in line with your hips as you move. · Set down your load carefully, squatting with your knees and hips only.   Exercise and stretch your back  · Do trouble speaking. ¨ Sudden confusion or trouble understanding simple statements. ¨ Sudden problems with walking or balance. ¨ A sudden, severe headache that is different from past headaches.    Call your doctor now or seek immediate medical care if:    · You have new or worse nausea and vomiting.     · You have a new or higher fever.     · Your headache gets much worse.    Watch closely for changes in your health, and be sure to contact your doctor if:    · You are not getting better after 2 days (48 hours). Where can you learn more? Go to https://[employer].Nimbuzz. org and sign in to your [employer] account. Enter 84 [id number] in the ScheduleSoft box to learn more about \"Tension Headache: Care Instructions. \"     If you do not have an account, please click on the \"Sign Up Now\" link. Current as of: October 9, 2017  Content Version: 11.6  © 4506-3826 [employer]. Care instructions adapted under license by [facility] ([facility]). If you have questions about a medical condition or this instruction, always ask your healthcare professional. [place] [address] any warranty or liability for your use of this information. Patient Education        Shoulder Blade: Exercises  Your Care Instructions  Here are some examples of typical exercises for your condition. Start each exercise slowly. Ease off the exercise if you start to have pain. Your doctor or physical therapist will tell you when you can start these exercises and which ones will work best for you. How to do the exercises  Shoulder roll    1. Stand tall with your chin slightly tucked. Imagine that a string at the top of your head is pulling you straight up. 2. Keep your arms relaxed. All motion will be in your shoulders. 3. Shrug your shoulders up toward your ears, then up and back. [place] your shoulders down and back, like you're sliding your hands down into your back pants pockets.   4. Repeat the circles at least 2 to 4 times.  5. This exercise is also helpful anytime you want to relax. Lower neck and upper back stretch    1. With your arms about shoulder height, clasp your hands in front of you. 2. Drop your chin toward your chest.  3. Reach straight forward so you are rounding your upper back. Think about pulling your shoulder blades apart. Melisa Gibbs feel a stretch across your upper back and shoulders. Hold for at least 6 seconds. 4. Repeat 2 to 4 times. Triceps stretch    1. Reach your arm straight up. 2. Keeping your elbow in place, bend your arm and reach your hand down behind your back. 3. With your other hand, apply gentle pressure to the bent elbow. Melisa Gibbs feel a stretch at the back of your upper arm and shoulder. Hold about 6 seconds. 4. Repeat 2 to 4 times with each arm. Shoulder stretch    1. Relax your shoulders. 2. Raise one arm to shoulder height, and reach it across your chest.  3. Pull the arm slightly toward you with your other arm. This will help you get a gentle stretch. Hold for about 6 seconds. 4. Repeat 2 to 4 times. Shoulder blade squeeze    1. Sit or stand up tall with your arms at your sides. 2. Keep your shoulders relaxed and down, not shrugged. 3. Squeeze your shoulder blades together. Hold for 6 seconds, then relax. 4. Repeat 8 to 12 times. Straight-arm shoulder blade squeeze    1. Sit or stand tall. Relax your shoulders. 2. With palms down, hold your elastic tubing or band straight out in front of you. 3. Start with slight tension in the tubing or band, with your hands about shoulder-width apart. 4. Slowly pull straight out to the sides, squeezing your shoulder blades together. Keep your arms straight and at shoulder height. Slowly release. 5. Repeat 8 to 12 times. Rowing    1. [place] your elastic tubing or band at about waist height. Take one end in each hand. 2. Sit or stand with your feet hip-width apart. 3. Hold your arms straight in front of you.  Adjust your distance to create chest, use a foam roll or tightly rolled blanket under your spine, from your tailbone to your head. 6. Relax in this position for at least 15 to 30 seconds while you breathe normally. Repeat 2 to 4 times. 7. Each day you do this exercise, add a little more time until you can relax in this position for 2 or 3 minutes. When you can relax for at least 2 minutes, you only need to do the exercise 1 time per session. Follow-up care is a key part of your treatment and safety. Be sure to make and go to all appointments, and call your doctor if you are having problems. It's also a good idea to know your test results and keep a list of the medicines you take. Where can you learn more? Go to https://"[email or web address].EdRover. org and sign in to your [employer] account. Enter [id number] in the Connectloud box to learn more about \"Shoulder Blade: Exercises. \"     If you do not have an account, please click on the \"Sign Up Now\" link. Current as of: November 29, 2017  Content Version: 11.6  © 7053-9388 [employer]. Care instructions adapted under license by [facility] [address] If you have questions about a medical condition or this instruction, always ask your healthcare professional. [address] any warranty or liability for your use of this information. Patient Education        DASH Diet: Care Instructions  Your Care Instructions    The DASH diet is an eating plan that can help lower your blood pressure. DASH stands for Dietary Approaches to Stop Hypertension. Hypertension is high blood pressure. The DASH diet focuses on eating foods that are high in calcium, potassium, and magnesium. These nutrients can lower blood pressure. The foods that are highest in these nutrients are fruits, vegetables, low-fat dairy products, nuts, seeds, and legumes.  But taking calcium, potassium, and magnesium supplements instead of eating foods that are high in those nutrients does not have the same

## 2018-08-01 NOTE — PATIENT INSTRUCTIONS
back is pressing to the floor and your hips and pelvis are slightly lifting off the floor. Hold for 6 seconds while breathing smoothly. ¨ Sit with your back flat against a wall. · Keep your core muscles strong. The muscles of your back, belly (abdomen), and buttocks support your spine. ¨ Pull in your belly and imagine pulling your navel toward your spine. Hold this for 6 seconds, then relax. Remember to keep breathing normally as you tense your muscles. ¨ Do curl-ups. Always do them with your knees bent. Keep your low back on the floor, and curl your shoulders toward your knees using a smooth, slow motion. Keep your arms folded across your chest. If this bothers your neck, try putting your hands behind your neck (not your head), with your elbows spread apart. ¨ Lie on your back with your knees bent and your feet flat on the floor. Tighten your belly muscles, and then push with your feet and raise your buttocks up a few inches. Hold this position 6 seconds as you continue to breathe normally, then lower yourself slowly to the floor. Repeat 8 to 12 times. ¨ If you like group exercise, try Pilates or yoga. These classes have poses that strengthen the core muscles. Lead a healthy lifestyle  · Stay at a healthy weight to avoid strain on your back. · Do not smoke. Smoking increases the risk of osteoporosis, which weakens the spine. If you need help quitting, talk to your doctor about stop-smoking programs and medicines. These can increase your chances of quitting for good. Where can you learn more? Go to https://BiopharmacopaeeleFirst To File.soup.me. org and sign in to your YouTab account. Enter L315 in the YoungCracks box to learn more about \"Learning About How to Have a Healthy Back. \"     If you do not have an account, please click on the \"Sign Up Now\" link. Current as of: November 29, 2017  Content Version: 11.6  © 1293-5160 [employer]orporated.  Care instructions adapted under license by [facility] better after 2 days (48 hours). Where can you learn more? Go to https://chpepiceweb.Ambient Corporation. org and sign in to your Share Practice account. Enter 84 [phone] in the Soluble Systems box to learn more about \"Tension Headache: Care Instructions. \"     If you do not have an account, please click on the \"Sign Up Now\" link. Current as of: October 9, 2017  Content Version: 11.6  © 9528-7472 [employer], [place]. Care instructions adapted under license by [facility] ([facility]). If you have questions about a medical condition or this instruction, always ask your healthcare professional. [place] [address] any warranty or liability for your use of this information. Patient Education        Shoulder Blade: Exercises  Your Care Instructions  Here are some examples of typical exercises for your condition. Start each exercise slowly. Ease off the exercise if you start to have pain. Your doctor or physical therapist will tell you when you can start these exercises and which ones will work best for you. How to do the exercises  Shoulder roll    1. Stand tall with your chin slightly tucked. Imagine that a string at the top of your head is pulling you straight up. 2. Keep your arms relaxed. All motion will be in your shoulders. 3. Shrug your shoulders up toward your ears, then up and back. Petty your shoulders down and back, like you're sliding your hands down into your back pants pockets. 4. Repeat the circles at least 2 to 4 times. 5. This exercise is also helpful anytime you want to relax. Lower neck and upper back stretch    1. With your arms about shoulder height, clasp your hands in front of you. 2. Drop your chin toward your chest.  3. Reach straight forward so you are rounding your upper back. Think about pulling your shoulder blades apart. Lamb Pert feel a stretch across your upper back and shoulders. Hold for at least 6 seconds. 4. Repeat 2 to 4 times. Triceps stretch    1.  Reach your arm straight up. 2. Keeping your elbow in place, bend your arm and reach your hand down behind your back. 3. With your other hand, apply gentle pressure to the bent elbow. Sajijose Laraira feel a stretch at the back of your upper arm and shoulder. Hold about 6 seconds. 4. Repeat 2 to 4 times with each arm. Shoulder stretch    1. Relax your shoulders. 2. Raise one arm to shoulder height, and reach it across your chest.  3. Pull the arm slightly toward you with your other arm. This will help you get a gentle stretch. Hold for about 6 seconds. 4. Repeat 2 to 4 times. Shoulder blade squeeze    1. Sit or stand up tall with your arms at your sides. 2. Keep your shoulders relaxed and down, not shrugged. 3. Squeeze your shoulder blades together. Hold for 6 seconds, then relax. 4. Repeat 8 to 12 times. Straight-arm shoulder blade squeeze    1. Sit or stand tall. Relax your shoulders. 2. With palms down, hold your elastic tubing or band straight out in front of you. 3. Start with slight tension in the tubing or band, with your hands about shoulder-width apart. 4. Slowly pull straight out to the sides, squeezing your shoulder blades together. Keep your arms straight and at shoulder height. Slowly release. 5. Repeat 8 to 12 times. Rowing    1. [place] your elastic tubing or band at about waist height. Take one end in each hand. 2. Sit or stand with your feet hip-width apart. 3. Hold your arms straight in front of you. Adjust your distance to create slight tension in the tubing or band. 4. Slightly tuck your chin. Relax your shoulders. 5. Without shrugging your shoulders, pull straight back. Your elbows will pass alongside your waist.  Pull-downs    1. [place] your elastic tubing or band in the top of a closed door. Take one end in each hand. 2. Either sit or stand, depending on what is more comfortable. If you feel unsteady, sit on a chair. 3. Start with your arms up and comfortably apart, elbows straight.  There should be a slight tension in the tubing or band. 4. Slightly tuck your chin, and look straight ahead. 5. Keeping your back straight, slowly pull down and back, bending your elbows. 6. Stop where your hands are level with your chin, in a \"goalpost\" position. 7. Repeat 8 to 12 times. Chest T stretch    1. Lie on your back. Raise your knees so they are bent. Plant your feet on the floor, hip-width apart. 2. Tuck your chin, and relax your shoulders. 3. Reach your arms straight out to the sides. If you don't feel a mild stretch in your shoulders and across your chest, use a foam roll or a tightly rolled blanket under your spine, from your tailbone to your head. 4. Relax in this position for at least 15 to 30 seconds while you breathe normally. Repeat 2 to 4 times. 5. As you get used to this stretch, keep adding a little more time until you are able relax in this position for 2 or 3 minutes. When you can relax for at least 2 minutes, you only need to do the exercise 1 time per session. Chest goalpost stretch    1. Lie on your back. Raise your knees so they are bent. Plant your feet on the floor, hip-width apart. 2. Tuck your chin, and relax your shoulders. 3. Reach your arms straight out to the sides. 4. Bend your arms at the elbows, with your hands pointed toward the top of your head. Your arms should make an L on either side of your head. Your palms should be facing up. 5. If you don't feel a mild stretch in your shoulders and across your chest, use a foam roll or tightly rolled blanket under your spine, from your tailbone to your head. 6. Relax in this position for at least 15 to 30 seconds while you breathe normally. Repeat 2 to 4 times. 7. Each day you do this exercise, add a little more time until you can relax in this position for 2 or 3 minutes. When you can relax for at least 2 minutes, you only need to do the exercise 1 time per session. Follow-up care is a key part of your treatment and safety.  Be sure to make and go to all appointments, and call your doctor if you are having problems. It's also a good idea to know your test results and keep a list of the medicines you take. Where can you learn more? Go to https://chmichael.eBIZ.mobility. org and sign in to your Superhuman account. Enter [phone] in the [facility] box to learn more about \"Shoulder Blade: Exercises. \"     If you do not have an account, please click on the \"Sign Up Now\" link. Current as of: November 29, 2017  Content Version: 11.6  © 5836-7295 [employer]. Care instructions adapted under license by [facility] ([facility]). If you have questions about a medical condition or this instruction, always ask your healthcare professional. [address] any warranty or liability for your use of this information. Patient Education        DASH Diet: Care Instructions  Your Care Instructions    The DASH diet is an eating plan that can help lower your blood pressure. DASH stands for Dietary Approaches to Stop Hypertension. Hypertension is high blood pressure. The DASH diet focuses on eating foods that are high in calcium, potassium, and magnesium. These nutrients can lower blood pressure. The foods that are highest in these nutrients are fruits, vegetables, low-fat dairy products, nuts, seeds, and legumes. But taking calcium, potassium, and magnesium supplements instead of eating foods that are high in those nutrients does not have the same effect. The DASH diet also includes whole grains, fish, and poultry. The DASH diet is one of several lifestyle changes your doctor may recommend to lower your high blood pressure. Your doctor may also want you to decrease the amount of sodium in your diet. Lowering sodium while following the DASH diet can lower blood pressure even further than just the DASH diet alone. Follow-up care is a key part of your treatment and safety.  Be sure to make and go to all appointments, and call with them. Once those changes become habit, add a few more changes. · Try some of the following:  ¨ Make it a goal to eat a fruit or vegetable at every meal and at snacks. This will make it easy to get the recommended amount of fruits and vegetables each day. ¨ Try yogurt topped with fruit and nuts for a snack or healthy dessert. ¨ Add lettuce, tomato, cucumber, and onion to sandwiches. ¨ Combine a ready-made pizza crust with low-fat mozzarella cheese and lots of vegetable toppings. Try using tomatoes, squash, spinach, broccoli, carrots, cauliflower, and onions. ¨ Have a variety of cut-up vegetables with a low-fat dip as an appetizer instead of chips and dip. ¨ Sprinkle sunflower seeds or chopped almonds over salads. Or try adding chopped walnuts or almonds to cooked vegetables. ¨ Try some vegetarian meals using beans and peas. Add garbanzo or kidney beans to salads. Make burritos and tacos with mashed sloan beans or black beans. Where can you learn more? Go to https://Air Ion DevicespeJobmetoo.AllTrails. org and sign in to your Caixin Media account. Enter X284 in the [facility] box to learn more about \"DASH Diet: Care Instructions. \"     If you do not have an account, please click on the \"Sign Up Now\" link. Current as of: December 6, 2017  Content Version: 11.6  © 0579-5686 Zal[employer], Incorporated. Care instructions adapted under license by [facility] ([facility]). If you have questions about a medical condition or this instruction, always ask your healthcare professional. [place] [address] any warranty or liability for your use of this information.

## 2018-08-15 ENCOUNTER — NURSE ONLY (OUTPATIENT)
Dept: FAMILY MEDICINE CLINIC | Age: 53
End: 2018-08-15

## 2018-08-15 VITALS — SYSTOLIC BLOOD PRESSURE: 136 MMHG | DIASTOLIC BLOOD PRESSURE: 72 MMHG

## 2018-08-15 DIAGNOSIS — I10 HYPERTENSION, UNSPECIFIED TYPE: ICD-10-CM

## 2018-08-15 PROCEDURE — 99999 PR OFFICE/OUTPT VISIT,PROCEDURE ONLY: CPT | Performed by: INTERNAL MEDICINE

## 2018-08-21 DIAGNOSIS — G89.29 CHRONIC NECK PAIN: Primary | ICD-10-CM

## 2018-08-21 DIAGNOSIS — M54.2 CHRONIC NECK PAIN: Primary | ICD-10-CM

## 2018-08-22 RX ORDER — TRAMADOL HYDROCHLORIDE 50 MG/1
TABLET ORAL
Qty: 60 TABLET | Refills: 2 | Status: SHIPPED | OUTPATIENT
Start: 2018-08-22 | End: 2018-08-24 | Stop reason: SDUPTHER

## 2018-08-24 DIAGNOSIS — M54.2 CHRONIC NECK PAIN: ICD-10-CM

## 2018-08-24 DIAGNOSIS — G89.29 CHRONIC NECK PAIN: ICD-10-CM

## 2018-08-24 RX ORDER — TRAMADOL HYDROCHLORIDE 50 MG/1
50 TABLET ORAL EVERY 8 HOURS PRN
Qty: 60 TABLET | Refills: 2 | Status: SHIPPED | OUTPATIENT
Start: 2018-08-24 | End: 2019-04-03 | Stop reason: SDUPTHER

## 2018-09-04 DIAGNOSIS — E78.2 MIXED HYPERLIPIDEMIA: ICD-10-CM

## 2018-09-04 DIAGNOSIS — E55.9 VITAMIN D DEFICIENCY: ICD-10-CM

## 2018-09-04 DIAGNOSIS — R05.9 COUGH: ICD-10-CM

## 2018-09-04 DIAGNOSIS — E03.9 ACQUIRED HYPOTHYROIDISM: ICD-10-CM

## 2018-09-04 LAB
ALBUMIN SERPL-MCNC: 4.4 G/DL (ref 3.5–5.2)
ALP BLD-CCNC: 110 U/L (ref 35–104)
ALT SERPL-CCNC: 19 U/L (ref 5–33)
ANION GAP SERPL CALCULATED.3IONS-SCNC: 16 MMOL/L (ref 7–19)
AST SERPL-CCNC: 23 U/L (ref 5–32)
BILIRUB SERPL-MCNC: <0.2 MG/DL (ref 0.2–1.2)
BUN BLDV-MCNC: 7 MG/DL (ref 6–20)
CALCIUM SERPL-MCNC: 9.5 MG/DL (ref 8.6–10)
CHLORIDE BLD-SCNC: 103 MMOL/L (ref 98–111)
CHOLESTEROL, TOTAL: 210 MG/DL (ref 160–199)
CO2: 24 MMOL/L (ref 22–29)
CREAT SERPL-MCNC: 0.6 MG/DL (ref 0.5–0.9)
GFR NON-AFRICAN AMERICAN: >60
GLUCOSE BLD-MCNC: 98 MG/DL (ref 74–109)
HDLC SERPL-MCNC: 45 MG/DL (ref 65–121)
LDL CHOLESTEROL CALCULATED: 136 MG/DL
POTASSIUM SERPL-SCNC: 3.8 MMOL/L (ref 3.5–5)
SODIUM BLD-SCNC: 143 MMOL/L (ref 136–145)
TOTAL PROTEIN: 6.6 G/DL (ref 6.6–8.7)
TRIGL SERPL-MCNC: 143 MG/DL (ref 0–149)
TSH SERPL DL<=0.05 MIU/L-ACNC: 0.14 UIU/ML (ref 0.27–4.2)
VITAMIN D 25-HYDROXY: 39.3 NG/ML

## 2018-09-10 DIAGNOSIS — R05.9 COUGH: ICD-10-CM

## 2018-09-10 RX ORDER — HYDROCODONE POLISTIREX AND CHLORPHENIRAMINE POLISTIREX 10; 8 MG/5ML; MG/5ML
5 SUSPENSION, EXTENDED RELEASE ORAL EVERY 12 HOURS PRN
Qty: 90 ML | Refills: 0 | Status: SHIPPED | OUTPATIENT
Start: 2018-09-10 | End: 2018-09-11 | Stop reason: SDUPTHER

## 2018-09-11 DIAGNOSIS — R05.9 COUGH: ICD-10-CM

## 2018-09-11 RX ORDER — HYDROCODONE POLISTIREX AND CHLORPHENIRAMINE POLISTIREX 10; 8 MG/5ML; MG/5ML
5 SUSPENSION, EXTENDED RELEASE ORAL EVERY 12 HOURS PRN
Qty: 90 ML | Refills: 0 | Status: SHIPPED | OUTPATIENT
Start: 2018-09-11 | End: 2018-09-20

## 2018-09-13 ENCOUNTER — OFFICE VISIT (OUTPATIENT)
Dept: FAMILY MEDICINE CLINIC | Age: 53
End: 2018-09-13
Payer: COMMERCIAL

## 2018-09-13 VITALS
SYSTOLIC BLOOD PRESSURE: 122 MMHG | OXYGEN SATURATION: 98 % | BODY MASS INDEX: 29.85 KG/M2 | RESPIRATION RATE: 18 BRPM | TEMPERATURE: 97.7 F | HEART RATE: 82 BPM | WEIGHT: 158 LBS | DIASTOLIC BLOOD PRESSURE: 88 MMHG

## 2018-09-13 DIAGNOSIS — M15.9 PRIMARY OSTEOARTHRITIS INVOLVING MULTIPLE JOINTS: ICD-10-CM

## 2018-09-13 DIAGNOSIS — G44.219 EPISODIC TENSION-TYPE HEADACHE, NOT INTRACTABLE: ICD-10-CM

## 2018-09-13 DIAGNOSIS — E55.9 VITAMIN D DEFICIENCY: ICD-10-CM

## 2018-09-13 DIAGNOSIS — Z79.899 MEDICATION MANAGEMENT: ICD-10-CM

## 2018-09-13 DIAGNOSIS — Z23 NEED FOR INFLUENZA VACCINATION: ICD-10-CM

## 2018-09-13 DIAGNOSIS — G25.81 RLS (RESTLESS LEGS SYNDROME): ICD-10-CM

## 2018-09-13 DIAGNOSIS — E78.2 MIXED HYPERLIPIDEMIA: ICD-10-CM

## 2018-09-13 DIAGNOSIS — F41.8 DEPRESSION WITH ANXIETY: ICD-10-CM

## 2018-09-13 DIAGNOSIS — G89.29 CHRONIC BILATERAL LOW BACK PAIN WITH LEFT-SIDED SCIATICA: ICD-10-CM

## 2018-09-13 DIAGNOSIS — G43.009 MIGRAINE WITHOUT AURA AND WITHOUT STATUS MIGRAINOSUS, NOT INTRACTABLE: Primary | ICD-10-CM

## 2018-09-13 DIAGNOSIS — E03.9 ACQUIRED HYPOTHYROIDISM: ICD-10-CM

## 2018-09-13 DIAGNOSIS — R51.9 CHRONIC DAILY HEADACHE: ICD-10-CM

## 2018-09-13 DIAGNOSIS — G43.009 MIGRAINE WITHOUT AURA AND WITHOUT STATUS MIGRAINOSUS, NOT INTRACTABLE: ICD-10-CM

## 2018-09-13 DIAGNOSIS — F51.01 IDIOPATHIC INSOMNIA: ICD-10-CM

## 2018-09-13 DIAGNOSIS — M54.42 CHRONIC BILATERAL LOW BACK PAIN WITH LEFT-SIDED SCIATICA: ICD-10-CM

## 2018-09-13 LAB
AMPHETAMINE SCREEN, URINE: ABNORMAL
BARBITURATE SCREEN, URINE: ABNORMAL
BENZODIAZEPINE SCREEN, URINE: ABNORMAL
BUPRENORPHINE URINE: ABNORMAL
COCAINE METABOLITE SCREEN URINE: ABNORMAL
GABAPENTIN SCREEN, URINE: ABNORMAL
METHADONE SCREEN, URINE: ABNORMAL
METHAMPHETAMINE, URINE: ABNORMAL
OPIATE SCREEN URINE: ABNORMAL
OXYCODONE SCREEN URINE: ABNORMAL
PHENCYCLIDINE SCREEN URINE: ABNORMAL
PROPOXYPHENE SCREEN, URINE: ABNORMAL
THC SCREEN, URINE: ABNORMAL
TRICYCLIC ANTIDEPRESSANTS, UR: ABNORMAL

## 2018-09-13 PROCEDURE — 80305 DRUG TEST PRSMV DIR OPT OBS: CPT | Performed by: INTERNAL MEDICINE

## 2018-09-13 PROCEDURE — 90471 IMMUNIZATION ADMIN: CPT | Performed by: INTERNAL MEDICINE

## 2018-09-13 PROCEDURE — 90686 IIV4 VACC NO PRSV 0.5 ML IM: CPT | Performed by: INTERNAL MEDICINE

## 2018-09-13 PROCEDURE — 99214 OFFICE O/P EST MOD 30 MIN: CPT | Performed by: INTERNAL MEDICINE

## 2018-09-13 RX ORDER — BUTALBITAL, ACETAMINOPHEN AND CAFFEINE 50; 325; 40 MG/1; MG/1; MG/1
1 TABLET ORAL EVERY 8 HOURS PRN
Qty: 90 TABLET | Refills: 0 | Status: SHIPPED | OUTPATIENT
Start: 2018-09-13 | End: 2018-11-02 | Stop reason: SDUPTHER

## 2018-09-13 RX ORDER — CELECOXIB 200 MG/1
200 CAPSULE ORAL DAILY PRN
Qty: 30 CAPSULE | Refills: 5 | Status: SHIPPED | OUTPATIENT
Start: 2018-09-13 | End: 2018-09-13 | Stop reason: CLARIF

## 2018-09-13 RX ORDER — CELECOXIB 200 MG/1
200 CAPSULE ORAL DAILY PRN
Qty: 30 CAPSULE | Refills: 5 | Status: SHIPPED | OUTPATIENT
Start: 2018-09-13 | End: 2019-05-08 | Stop reason: SDUPTHER

## 2018-09-13 RX ORDER — BUTALBITAL, ACETAMINOPHEN AND CAFFEINE 50; 325; 40 MG/1; MG/1; MG/1
1 TABLET ORAL EVERY 8 HOURS PRN
Qty: 90 TABLET | Refills: 0 | Status: SHIPPED | OUTPATIENT
Start: 2018-09-13 | End: 2018-09-13 | Stop reason: CLARIF

## 2018-09-13 RX ORDER — LEVOTHYROXINE SODIUM 88 MCG
88 TABLET ORAL DAILY
Qty: 30 TABLET | Refills: 3 | Status: SHIPPED | OUTPATIENT
Start: 2018-09-13 | End: 2018-10-03 | Stop reason: ALTCHOICE

## 2018-09-13 RX ORDER — VALACYCLOVIR HYDROCHLORIDE 500 MG/1
TABLET, FILM COATED ORAL
COMMUNITY
Start: 2018-07-24 | End: 2021-08-02

## 2018-09-13 ASSESSMENT — PATIENT HEALTH QUESTIONNAIRE - PHQ9
SUM OF ALL RESPONSES TO PHQ QUESTIONS 1-9: 0
2. FEELING DOWN, DEPRESSED OR HOPELESS: 0
SUM OF ALL RESPONSES TO PHQ9 QUESTIONS 1 & 2: 0
SUM OF ALL RESPONSES TO PHQ QUESTIONS 1-9: 0
1. LITTLE INTEREST OR PLEASURE IN DOING THINGS: 0

## 2018-09-13 NOTE — PROGRESS NOTES
Avi Pérez is a 46 y.o. female who presents today for   Chief Complaint   Patient presents with    Medication Refill       HPI  47 y/o WF here for f/u on migraine HAs, RLS, and chronic bilateral LBP after increase in gabapentin and completing prednisone taper. She is taking 600 mg of gabapentin at bedtime instead of 300 mg twice daily because that made her too sleepy during the day. She is still having HAs but she is also still having a lot of sinus pressure which had improved some with steroids but she may have gotten a cold over the past 2-3 weeks. She is coughing at night which makes it hard to sleep. Everyone has been sick at home also per patient with URI symptoms. She has been having more knee, elbow, and hand pain recently but she has not been taking her celebrex for OA as she has previously. She takes tramadol for chronic neck pain and for more severe HAs at times also but fioricet for less severe HAs. She has a hx of vitamin D deficiency and usually takes OTC vitamin D supplement to treat. Hypothyroidism  Patient presents for evaluation of thyroid function. Symptoms consist of fatigue, weight gain. Symptoms have present for several months. The symptoms are mild. The problem has been gradually improving. Previous thyroid studies include TSH. The hypothyroidism is due to hypothyroidism. Patient has been compliant with synthroid 88 mcg. Avi Pérez is here for follow up of dyslipidemia. Compliance with treatment has been fair. The patient exercises rarely. Patient denies muscle pain associated with their medications which include slo Niacin 500 mg ER and OTC fish oil. Review of Systems   Constitutional: Negative for appetite change, chills, fatigue and fever. HENT: Positive for congestion, sinus pain and sinus pressure. Negative for ear pain, rhinorrhea, sore throat and voice change. See HPI   Eyes: Negative for pain, discharge and redness. Respiratory: Positive for cough. Negative for chest tightness, shortness of breath and wheezing. Cardiovascular: Negative for chest pain and palpitations. Gastrointestinal: Negative for abdominal pain, blood in stool, diarrhea and vomiting. Endocrine: Negative for cold intolerance and polydipsia. Genitourinary: Negative for dysuria and hematuria. Musculoskeletal: Positive for arthralgias, back pain and neck pain. Negative for myalgias and neck stiffness. See HPI   Skin: Negative for color change and rash. Neurological: Positive for headaches. Negative for dizziness, speech difficulty, weakness and numbness. Hematological: Negative for adenopathy. Does not bruise/bleed easily. Psychiatric/Behavioral: Positive for sleep disturbance. Negative for confusion, dysphoric mood, self-injury and suicidal ideas. The patient is nervous/anxious. Past Medical History:   Diagnosis Date    Allergic rhinitis     Chronic bilateral low back pain with left-sided sciatica 12/1/2017    Colon polyp     Obesity     Osteoarthritis        Current Outpatient Prescriptions   Medication Sig Dispense Refill    valACYclovir (VALTREX) 500 MG tablet       SYNTHROID 88 MCG tablet Take 1 tablet by mouth Daily 30 tablet 3    traMADol (ULTRAM) 50 MG tablet Take 1 tablet by mouth every 8 hours as needed for Pain for up to 30 days. . 60 tablet 2    gabapentin (NEURONTIN) 300 MG capsule 1 tablet twice daily x2 weeks and then increase to 1 tablet 3x/day.  (Patient taking differently: Two times daily at night.) 90 capsule 2    desvenlafaxine succinate (PRISTIQ) 50 MG TB24 extended release tablet TAKE 1 TABLET BY MOUTH ONCE DAILY 30 tablet 3    Potassium 99 MG TABS Take 1 tablet by mouth daily      BIOTIN PO Take 1 tablet by mouth daily      TURMERIC PO Take 1 tablet by mouth daily      temazepam (RESTORIL) 15 MG capsule Take 2 capsules by mouth nightly as needed for Sleep 30 capsule 2    vitamin B-12 (CYANOCOBALAMIN) 1000 MCG tablet Take 1,000 ESGIC) -40 MG per tablet   3. Chronic daily headache R51 784.0 XR SINUSES (MIN 3 VIEWS )   4. Chronic bilateral low back pain with left-sided sciatica M54.42 724.2     G89.29 724.3      338.29    5. Primary osteoarthritis involving multiple joints M15.0 715.09 DISCONTINUED: celecoxib (CELEBREX) 200 MG capsule   6. Acquired hypothyroidism E03.9 244.9 SYNTHROID 88 MCG tablet      TSH without Reflex   7. Vitamin D deficiency E55.9 268.9    8. Mixed hyperlipidemia E78.2 272.2 Comprehensive Metabolic Panel      Lipid Panel   9. Idiopathic insomnia F51.01 307.42    10. RLS (restless legs syndrome) G25.81 333.94    11. Medication management Z79.899 V58.69 POCT Rapid Drug Screen   12. Need for influenza vaccination Z23 V04.81 INFLUENZA, QUADV, 3 YRS AND OLDER, IM, PF, PREFILL SYR OR SDV, 0.5ML (FLUZONE QUADV, PF)       Plan:  Thang Castellon was seen today for medication refill. Diagnoses and all orders for this visit:    Migraine without aura and without status migrainosus, not intractable  -     Discontinue: butalbital-acetaminophen-caffeine (FIORICET, ESGIC) -40 MG per tablet; Take 1 tablet by mouth every 8 hours as needed for Headaches or Migraine    Episodic tension-type headache, not intractable  -     Discontinue: butalbital-acetaminophen-caffeine (FIORICET, ESGIC) -40 MG per tablet; Take 1 tablet by mouth every 8 hours as needed for Headaches or Migraine    Chronic daily headache  -     XR SINUSES (MIN 3 VIEWS ); Future    Chronic bilateral low back pain with left-sided sciatica    Primary osteoarthritis involving multiple joints  -     Discontinue: celecoxib (CELEBREX) 200 MG capsule; Take 1 capsule by mouth daily as needed for Pain (arthritis)    Acquired hypothyroidism  -     SYNTHROID 88 MCG tablet; Take 1 tablet by mouth Daily  -     TSH without Reflex; Future    Vitamin D deficiency    Mixed hyperlipidemia  -     Comprehensive Metabolic Panel; Future  -     Lipid Panel;  Future    Idiopathic in about 3 months (around 12/13/2018) for high cholesterol, thyroid, Controlled med refill.

## 2018-09-23 ASSESSMENT — ENCOUNTER SYMPTOMS
SORE THROAT: 0
WHEEZING: 0
VOICE CHANGE: 0
BLOOD IN STOOL: 0
ABDOMINAL PAIN: 0
COUGH: 1
COLOR CHANGE: 0
SINUS PAIN: 1
EYE REDNESS: 0
SINUS PRESSURE: 1
DIARRHEA: 0
BACK PAIN: 1
EYE PAIN: 0
VOMITING: 0
RHINORRHEA: 0
SHORTNESS OF BREATH: 0
CHEST TIGHTNESS: 0
EYE DISCHARGE: 0

## 2018-10-03 RX ORDER — LEVOTHYROXINE SODIUM 88 UG/1
88 TABLET ORAL DAILY
Qty: 30 TABLET | Refills: 3 | Status: SHIPPED | OUTPATIENT
Start: 2018-10-03 | End: 2018-12-13 | Stop reason: SDUPTHER

## 2018-10-16 ENCOUNTER — OFFICE VISIT (OUTPATIENT)
Dept: FAMILY MEDICINE CLINIC | Age: 53
End: 2018-10-16
Payer: COMMERCIAL

## 2018-10-16 VITALS
WEIGHT: 152 LBS | DIASTOLIC BLOOD PRESSURE: 82 MMHG | SYSTOLIC BLOOD PRESSURE: 136 MMHG | TEMPERATURE: 96.5 F | HEART RATE: 92 BPM | BODY MASS INDEX: 28.72 KG/M2 | OXYGEN SATURATION: 98 % | RESPIRATION RATE: 18 BRPM

## 2018-10-16 DIAGNOSIS — R09.81 COUGH WITH CONGESTION OF PARANASAL SINUS: ICD-10-CM

## 2018-10-16 DIAGNOSIS — R05.8 COUGH WITH CONGESTION OF PARANASAL SINUS: ICD-10-CM

## 2018-10-16 DIAGNOSIS — J01.10 ACUTE NON-RECURRENT FRONTAL SINUSITIS: Primary | ICD-10-CM

## 2018-10-16 PROCEDURE — 99213 OFFICE O/P EST LOW 20 MIN: CPT | Performed by: NURSE PRACTITIONER

## 2018-10-16 RX ORDER — PREDNISONE 20 MG/1
TABLET ORAL
Qty: 5 TABLET | Refills: 0 | Status: SHIPPED | OUTPATIENT
Start: 2018-10-16 | End: 2018-12-13

## 2018-10-16 RX ORDER — MOMETASONE FUROATE 50 UG/1
2 SPRAY, METERED NASAL DAILY
Qty: 1 INHALER | Refills: 3 | Status: SHIPPED | OUTPATIENT
Start: 2018-10-16 | End: 2018-12-13

## 2018-10-16 RX ORDER — HYDROCODONE POLISTIREX AND CHLORPHENIRAMINE POLISTIREX 10; 8 MG/5ML; MG/5ML
5 SUSPENSION, EXTENDED RELEASE ORAL EVERY 12 HOURS PRN
Qty: 90 ML | Refills: 0 | Status: SHIPPED | OUTPATIENT
Start: 2018-10-16 | End: 2018-10-25

## 2018-10-16 RX ORDER — AMOXICILLIN AND CLAVULANATE POTASSIUM 875; 125 MG/1; MG/1
1 TABLET, FILM COATED ORAL 2 TIMES DAILY
Qty: 14 TABLET | Refills: 0 | Status: SHIPPED | OUTPATIENT
Start: 2018-10-16 | End: 2018-10-23

## 2018-10-16 ASSESSMENT — ENCOUNTER SYMPTOMS
SORE THROAT: 0
SHORTNESS OF BREATH: 0
CHEST TIGHTNESS: 0
SINUS PRESSURE: 1
ABDOMINAL PAIN: 0
WHEEZING: 0
DIARRHEA: 0
COUGH: 1
NAUSEA: 0

## 2018-11-02 DIAGNOSIS — G44.219 EPISODIC TENSION-TYPE HEADACHE, NOT INTRACTABLE: ICD-10-CM

## 2018-11-02 DIAGNOSIS — G43.009 MIGRAINE WITHOUT AURA AND WITHOUT STATUS MIGRAINOSUS, NOT INTRACTABLE: ICD-10-CM

## 2018-11-02 NOTE — TELEPHONE ENCOUNTER
From: Rodrick Oconnor  Sent: 11/2/2018 10:05 AM CDT  Subject: Medication Renewal Request    Constance Gula.  Xiao Barbosa would like a refill of the following medications:     butalbital-acetaminophen-caffeine (FIORICET, ESGIC) -40 MG per tablet Cuca Nichols MD]    Preferred pharmacy: [place] [address] [address]    Comment:

## 2018-11-06 RX ORDER — BUTALBITAL, ACETAMINOPHEN AND CAFFEINE 50; 325; 40 MG/1; MG/1; MG/1
1 TABLET ORAL EVERY 8 HOURS PRN
Qty: 90 TABLET | Refills: 0 | Status: SHIPPED | OUTPATIENT
Start: 2018-11-06 | End: 2019-01-02 | Stop reason: SDUPTHER

## 2018-12-03 ENCOUNTER — OFFICE VISIT (OUTPATIENT)
Dept: URGENT CARE | Age: 53
End: 2018-12-03
Payer: COMMERCIAL

## 2018-12-03 VITALS
TEMPERATURE: 98.4 F | DIASTOLIC BLOOD PRESSURE: 90 MMHG | OXYGEN SATURATION: 96 % | BODY MASS INDEX: 28.7 KG/M2 | HEART RATE: 92 BPM | RESPIRATION RATE: 18 BRPM | WEIGHT: 152 LBS | SYSTOLIC BLOOD PRESSURE: 136 MMHG | HEIGHT: 61 IN

## 2018-12-03 DIAGNOSIS — J06.9 URI, ACUTE: Primary | ICD-10-CM

## 2018-12-03 DIAGNOSIS — R52 BODY ACHES: ICD-10-CM

## 2018-12-03 LAB
INFLUENZA A ANTIBODY: NEGATIVE
INFLUENZA B ANTIBODY: NEGATIVE

## 2018-12-03 PROCEDURE — 87804 INFLUENZA ASSAY W/OPTIC: CPT | Performed by: NURSE PRACTITIONER

## 2018-12-03 PROCEDURE — 99213 OFFICE O/P EST LOW 20 MIN: CPT | Performed by: NURSE PRACTITIONER

## 2018-12-03 RX ORDER — METHYLPREDNISOLONE 4 MG/1
TABLET ORAL
Qty: 1 KIT | Refills: 0 | Status: SHIPPED | OUTPATIENT
Start: 2018-12-03 | End: 2019-01-02 | Stop reason: ALTCHOICE

## 2018-12-03 RX ORDER — LORATADINE 10 MG/1
10 TABLET ORAL DAILY
Qty: 30 TABLET | Refills: 0 | Status: SHIPPED | OUTPATIENT
Start: 2018-12-03 | End: 2019-10-04

## 2018-12-03 ASSESSMENT — ENCOUNTER SYMPTOMS
COLOR CHANGE: 0
EYES NEGATIVE: 1
SORE THROAT: 1
RHINORRHEA: 0
CHOKING: 0
STRIDOR: 0
TROUBLE SWALLOWING: 0
WHEEZING: 0
SHORTNESS OF BREATH: 0
CHEST TIGHTNESS: 0
COUGH: 1
GASTROINTESTINAL NEGATIVE: 1
VOICE CHANGE: 0
SINUS PRESSURE: 0

## 2018-12-03 NOTE — PROGRESS NOTES
(CYANOCOBALAMIN) 1000 MCG tablet Take 1,000 mcg by mouth daily      niacin (SLO-NIACIN) 500 MG extended release tablet Take 500 mg by mouth nightly       No current facility-administered medications for this visit. No Known Allergies    Health Maintenance   Topic Date Due    HIV screen  07/03/1979    Cervical cancer screen  07/03/1985    Shingles Vaccine (1 of 2 - 2 Dose Series) 07/03/2014    Breast cancer screen  04/14/2019    TSH testing  09/04/2019    Lipid screen  09/04/2023    Colon cancer screen colonoscopy  03/04/2025    DTaP/Tdap/Td vaccine (2 - Td) 08/01/2026    Flu vaccine  Completed    Hepatitis C screen  Completed       Subjective:     Review of Systems   Constitutional: Negative. Negative for fever. HENT: Positive for congestion and sore throat. Negative for rhinorrhea, sinus pressure, sneezing, trouble swallowing and voice change. Eyes: Negative. Respiratory: Positive for cough. Negative for choking, chest tightness, shortness of breath, wheezing and stridor. Cardiovascular: Negative. Gastrointestinal: Negative. Endocrine: Negative. Genitourinary: Negative. Musculoskeletal: Negative. Skin: Negative for color change. Neurological: Negative. Hematological: Negative. Psychiatric/Behavioral: Negative. Objective:     Physical Exam   Constitutional: She is oriented to person, place, and time. Vital signs are normal. She appears well-developed and well-nourished. She is active. Non-toxic appearance. She does not have a sickly appearance. She does not appear ill. No distress. HENT:   Head: Normocephalic and atraumatic. Right Ear: Hearing, tympanic membrane, external ear and ear canal normal.   Left Ear: Hearing, tympanic membrane, external ear and ear canal normal.   Nose: Nose normal. Right sinus exhibits no maxillary sinus tenderness and no frontal sinus tenderness.  Left sinus exhibits no maxillary sinus tenderness and no frontal sinus

## 2018-12-04 ENCOUNTER — TELEPHONE (OUTPATIENT)
Dept: FAMILY MEDICINE CLINIC | Age: 53
End: 2018-12-04

## 2018-12-05 DIAGNOSIS — R05.3 COUGH, PERSISTENT: Primary | ICD-10-CM

## 2018-12-05 RX ORDER — HYDROCODONE POLISTIREX AND CHLORPHENIRAMINE POLISTIREX 10; 8 MG/5ML; MG/5ML
5 SUSPENSION, EXTENDED RELEASE ORAL EVERY 12 HOURS PRN
Qty: 1 BOTTLE | Refills: 0 | Status: SHIPPED | OUTPATIENT
Start: 2018-12-05 | End: 2018-12-10 | Stop reason: SDUPTHER

## 2018-12-05 RX ORDER — HYDROCODONE POLISTIREX AND CHLORPHENIRAMINE POLISTIREX 10; 8 MG/5ML; MG/5ML
5 SUSPENSION, EXTENDED RELEASE ORAL EVERY 12 HOURS PRN
Qty: 1 BOTTLE | Refills: 0 | Status: SHIPPED | OUTPATIENT
Start: 2018-12-05 | End: 2018-12-05

## 2018-12-05 NOTE — TELEPHONE ENCOUNTER
Called [facility] in [facility] and cancelled rx there.  Spoke with [facility]

## 2018-12-06 DIAGNOSIS — E03.9 ACQUIRED HYPOTHYROIDISM: ICD-10-CM

## 2018-12-06 DIAGNOSIS — E78.2 MIXED HYPERLIPIDEMIA: ICD-10-CM

## 2018-12-06 LAB
ALBUMIN SERPL-MCNC: 4.3 G/DL (ref 3.5–5.2)
ALP BLD-CCNC: 112 U/L (ref 35–104)
ALT SERPL-CCNC: 22 U/L (ref 5–33)
ANION GAP SERPL CALCULATED.3IONS-SCNC: 17 MMOL/L (ref 7–19)
AST SERPL-CCNC: 28 U/L (ref 5–32)
BILIRUB SERPL-MCNC: 0.3 MG/DL (ref 0.2–1.2)
BUN BLDV-MCNC: 5 MG/DL (ref 6–20)
CALCIUM SERPL-MCNC: 9.5 MG/DL (ref 8.6–10)
CHLORIDE BLD-SCNC: 103 MMOL/L (ref 98–111)
CHOLESTEROL, TOTAL: 206 MG/DL (ref 160–199)
CO2: 25 MMOL/L (ref 22–29)
CREAT SERPL-MCNC: 0.7 MG/DL (ref 0.5–0.9)
GFR NON-AFRICAN AMERICAN: >60
GLUCOSE BLD-MCNC: 109 MG/DL (ref 74–109)
HDLC SERPL-MCNC: 36 MG/DL (ref 65–121)
LDL CHOLESTEROL CALCULATED: 133 MG/DL
POTASSIUM SERPL-SCNC: 3.5 MMOL/L (ref 3.5–5)
SODIUM BLD-SCNC: 145 MMOL/L (ref 136–145)
TOTAL PROTEIN: 7.2 G/DL (ref 6.6–8.7)
TRIGL SERPL-MCNC: 183 MG/DL (ref 0–149)
TSH SERPL DL<=0.05 MIU/L-ACNC: 3.39 UIU/ML (ref 0.27–4.2)

## 2018-12-10 ENCOUNTER — OFFICE VISIT (OUTPATIENT)
Dept: FAMILY MEDICINE CLINIC | Age: 53
End: 2018-12-10
Payer: COMMERCIAL

## 2018-12-10 ENCOUNTER — HOSPITAL ENCOUNTER (OUTPATIENT)
Dept: GENERAL RADIOLOGY | Age: 53
Discharge: HOME | End: 2018-12-10
Payer: COMMERCIAL

## 2018-12-10 VITALS
RESPIRATION RATE: 18 BRPM | OXYGEN SATURATION: 98 % | WEIGHT: 153 LBS | SYSTOLIC BLOOD PRESSURE: 132 MMHG | HEART RATE: 103 BPM | BODY MASS INDEX: 28.91 KG/M2 | TEMPERATURE: 97.7 F | DIASTOLIC BLOOD PRESSURE: 78 MMHG

## 2018-12-10 DIAGNOSIS — J01.40 ACUTE PANSINUSITIS, RECURRENCE NOT SPECIFIED: ICD-10-CM

## 2018-12-10 DIAGNOSIS — J20.9 ACUTE BRONCHITIS, UNSPECIFIED ORGANISM: Primary | ICD-10-CM

## 2018-12-10 DIAGNOSIS — R51.9 CHRONIC DAILY HEADACHE: ICD-10-CM

## 2018-12-10 DIAGNOSIS — R05.3 COUGH, PERSISTENT: ICD-10-CM

## 2018-12-10 PROCEDURE — 70220 X-RAY EXAM OF SINUSES: CPT

## 2018-12-10 PROCEDURE — 96372 THER/PROPH/DIAG INJ SC/IM: CPT | Performed by: NURSE PRACTITIONER

## 2018-12-10 PROCEDURE — 99213 OFFICE O/P EST LOW 20 MIN: CPT | Performed by: NURSE PRACTITIONER

## 2018-12-10 RX ORDER — METHYLPREDNISOLONE ACETATE 80 MG/ML
80 INJECTION, SUSPENSION INTRA-ARTICULAR; INTRALESIONAL; INTRAMUSCULAR; SOFT TISSUE ONCE
Status: COMPLETED | OUTPATIENT
Start: 2018-12-10 | End: 2018-12-10

## 2018-12-10 RX ORDER — HYDROCODONE POLISTIREX AND CHLORPHENIRAMINE POLISTIREX 10; 8 MG/5ML; MG/5ML
5 SUSPENSION, EXTENDED RELEASE ORAL EVERY 12 HOURS PRN
Qty: 100 ML | Refills: 0 | Status: SHIPPED | OUTPATIENT
Start: 2018-12-10 | End: 2018-12-20

## 2018-12-10 RX ORDER — AZITHROMYCIN 250 MG/1
TABLET, FILM COATED ORAL
Qty: 6 TABLET | Refills: 0 | Status: SHIPPED | OUTPATIENT
Start: 2018-12-10 | End: 2019-01-02 | Stop reason: ALTCHOICE

## 2018-12-10 RX ADMIN — METHYLPREDNISOLONE ACETATE 80 MG: 80 INJECTION, SUSPENSION INTRA-ARTICULAR; INTRALESIONAL; INTRAMUSCULAR; SOFT TISSUE at 11:29

## 2018-12-10 ASSESSMENT — ENCOUNTER SYMPTOMS
SHORTNESS OF BREATH: 1
CHEST TIGHTNESS: 0
WHEEZING: 1
SINUS PRESSURE: 1
SORE THROAT: 0
ABDOMINAL PAIN: 0
DIARRHEA: 0
NAUSEA: 0
COUGH: 1

## 2018-12-10 NOTE — PROGRESS NOTES
and dry. No rash noted. Psychiatric: She has a normal mood and affect. Vitals reviewed. Assessment:    ICD-10-CM    1. Acute bronchitis, unspecified organism J20.9 hydrocodone-chlorpheniramine (TUSSIONEX PENNKINETIC ER) 10-8 MG/5ML SUER     methylPREDNISolone acetate (DEPO-MEDROL) injection 80 mg   2. Acute pansinusitis, recurrence not specified J01.40    3. Cough, persistent R05 hydrocodone-chlorpheniramine (TUSSIONEX PENNKINETIC ER) 10-8 MG/5ML SUER       Plan:  -Acute bronchitis, sinusitis will be treated with zpak. -Depo medrol 80 mg IM today.  -She has a MDP at home per urgent care. May start tomorrow if not improving after steroid injection today.  -Tussionex refilled x 1 for prn use for cough.  -Advised to use rescue inhaler for sob/wheeze routinely twice daily for the next couple of days and prn.  -She will get the sinus XR today as previously ordered.  -Keep f/u with Dr. David Robert later this week as already scheduled. Advised to notify office of any acute worsening. Cloretta Schilder was seen today for cough, headache and congestion. Diagnoses and all orders for this visit:    Acute bronchitis, unspecified organism  -     hydrocodone-chlorpheniramine (TUSSIONEX PENNKINETIC ER) 10-8 MG/5ML SUER; Take 5 mLs by mouth every 12 hours as needed (cough) for up to 10 days. .  -     methylPREDNISolone acetate (DEPO-MEDROL) injection 80 mg; Inject 1 mL into the muscle once    Acute pansinusitis, recurrence not specified    Cough, persistent  -     hydrocodone-chlorpheniramine (TUSSIONEX PENNKINETIC ER) 10-8 MG/5ML SUER; Take 5 mLs by mouth every 12 hours as needed (cough) for up to 10 days. .    Other orders  -     azithromycin (ZITHROMAX) 250 MG tablet;  Take 2 tablets by mouth on day 1 followed by 1 tablet daily on days 2 - 5      Medications Discontinued During This Encounter   Medication Reason    hydrocodone-chlorpheniramine (Analilia Embs ER) 10-8 MG/5ML SUER REORDER     There are no Patient

## 2018-12-13 ENCOUNTER — TELEPHONE (OUTPATIENT)
Dept: FAMILY MEDICINE CLINIC | Age: 53
End: 2018-12-13

## 2018-12-13 ENCOUNTER — OFFICE VISIT (OUTPATIENT)
Dept: FAMILY MEDICINE CLINIC | Age: 53
End: 2018-12-13
Payer: COMMERCIAL

## 2018-12-13 ENCOUNTER — HOSPITAL ENCOUNTER (OUTPATIENT)
Dept: GENERAL RADIOLOGY | Age: 53
Discharge: HOME | End: 2018-12-13
Payer: COMMERCIAL

## 2018-12-13 VITALS
HEART RATE: 96 BPM | OXYGEN SATURATION: 100 % | HEIGHT: 61 IN | SYSTOLIC BLOOD PRESSURE: 130 MMHG | TEMPERATURE: 97.7 F | WEIGHT: 152 LBS | BODY MASS INDEX: 28.7 KG/M2 | DIASTOLIC BLOOD PRESSURE: 78 MMHG

## 2018-12-13 DIAGNOSIS — G43.009 MIGRAINE WITHOUT AURA AND WITHOUT STATUS MIGRAINOSUS, NOT INTRACTABLE: ICD-10-CM

## 2018-12-13 DIAGNOSIS — E03.9 ACQUIRED HYPOTHYROIDISM: ICD-10-CM

## 2018-12-13 DIAGNOSIS — G89.29 CHRONIC BILATERAL LOW BACK PAIN WITH LEFT-SIDED SCIATICA: Primary | ICD-10-CM

## 2018-12-13 DIAGNOSIS — J20.9 ACUTE BRONCHITIS DUE TO INFECTION: ICD-10-CM

## 2018-12-13 DIAGNOSIS — E78.2 MIXED HYPERLIPIDEMIA: ICD-10-CM

## 2018-12-13 DIAGNOSIS — G25.81 RLS (RESTLESS LEGS SYNDROME): ICD-10-CM

## 2018-12-13 DIAGNOSIS — E53.8 B12 DEFICIENCY: ICD-10-CM

## 2018-12-13 DIAGNOSIS — E55.9 VITAMIN D DEFICIENCY: ICD-10-CM

## 2018-12-13 DIAGNOSIS — R51.9 CHRONIC DAILY HEADACHE: ICD-10-CM

## 2018-12-13 DIAGNOSIS — M54.42 CHRONIC BILATERAL LOW BACK PAIN WITH LEFT-SIDED SCIATICA: Primary | ICD-10-CM

## 2018-12-13 DIAGNOSIS — Z00.00 ANNUAL PHYSICAL EXAM: ICD-10-CM

## 2018-12-13 DIAGNOSIS — F51.01 IDIOPATHIC INSOMNIA: ICD-10-CM

## 2018-12-13 DIAGNOSIS — F41.8 DEPRESSION WITH ANXIETY: ICD-10-CM

## 2018-12-13 PROCEDURE — 99215 OFFICE O/P EST HI 40 MIN: CPT | Performed by: INTERNAL MEDICINE

## 2018-12-13 PROCEDURE — 71046 X-RAY EXAM CHEST 2 VIEWS: CPT

## 2018-12-13 RX ORDER — GABAPENTIN 300 MG/1
CAPSULE ORAL
Qty: 60 CAPSULE | Refills: 2 | Status: SHIPPED | OUTPATIENT
Start: 2018-12-13 | End: 2019-05-08 | Stop reason: SDUPTHER

## 2018-12-13 RX ORDER — LEVOTHYROXINE SODIUM 88 UG/1
88 TABLET ORAL DAILY
Qty: 30 TABLET | Refills: 3 | Status: SHIPPED | OUTPATIENT
Start: 2018-12-13 | End: 2019-05-10 | Stop reason: SDUPTHER

## 2018-12-13 RX ORDER — DESVENLAFAXINE 50 MG/1
TABLET, EXTENDED RELEASE ORAL
Qty: 30 TABLET | Refills: 5 | Status: SHIPPED | OUTPATIENT
Start: 2018-12-13 | End: 2019-05-08 | Stop reason: SDUPTHER

## 2018-12-13 ASSESSMENT — ENCOUNTER SYMPTOMS
WHEEZING: 0
BLOOD IN STOOL: 0
EYE REDNESS: 0
DIARRHEA: 0
ABDOMINAL PAIN: 0
SINUS PAIN: 0
SINUS PRESSURE: 1
VOICE CHANGE: 0
EYE PAIN: 0
CHEST TIGHTNESS: 0
COUGH: 1
SORE THROAT: 0
SHORTNESS OF BREATH: 0
BACK PAIN: 1
EYE DISCHARGE: 0
VOMITING: 0
RHINORRHEA: 0
COLOR CHANGE: 0

## 2018-12-13 NOTE — PROGRESS NOTES
Lolis Vera is a 46 y.o. female who presents today for   Chief Complaint   Patient presents with    3 Month Follow-Up       HPI  45 y/o WF here for f/u on migraine HAs, RLS, chronic bilateral LBP, and elevated BMI. She has lost 6 lbs in the past 3 mths but has been less active and not taking her vitamins while sick recently. . She got a steroid shot 2 days ago with NP at office and was given azithromycin which she is on day 3/5 now and cough is starting to improve. Sinus xray was normal on 12/10/18. She is taking 600 mg of gabapentin at bedtime instead of 300 mg twice daily because that made her too sleepy during the day and she tolerates the higher dose at bedtime. She is still having HAs but she is also still having a lot of sinus pressure which had improved some with steroids but she may have gotten a cold over the past 2-3 weeks. She is coughing at night which makes it hard to sleep. Everyone has been sick at home also per patient with URI symptoms like she has had. She has been having more knee, elbow, and hand pain recently but she has not been taking her celebrex for OA as she had been previously. She takes tramadol for chronic neck pain and for more severe HAs at times also but fioricet for less severe HAs and both work well for her. She has not had any escalation in use. She has a hx of vitamin D deficiency and usually takes OTC vitamin D supplement to treat. Hypothyroidism  Patient presents for evaluation of thyroid function. Symptoms consist of fatigue, weight gain. Symptoms have present for several months. The symptoms are mild. The problem has been gradually improving. Previous thyroid studies include TSH. The hypothyroidism is due to hypothyroidism. Patient has been compliant with synthroid 88 mcg. Lab Results   Component Value Date    TSH 3.390 12/06/2018       Lolis Vera is here for follow up of dyslipidemia. Compliance with treatment has been fair.   The patient exercises azithromycin (ZITHROMAX) 250 MG tablet Take 2 tablets by mouth on day 1 followed by 1 tablet daily on days 2 - 5 6 tablet 0    methylPREDNISolone (MEDROL DOSEPACK) 4 MG tablet Take by mouth. 1 kit 0    butalbital-acetaminophen-caffeine (FIORICET, ESGIC) -40 MG per tablet Take 1 tablet by mouth every 8 hours as needed for Headaches or Migraine 90 tablet 0    valACYclovir (VALTREX) 500 MG tablet       celecoxib (CELEBREX) 200 MG capsule Take 1 capsule by mouth daily as needed for Pain (arthritis) 30 capsule 5    Potassium 99 MG TABS Take 1 tablet by mouth daily      BIOTIN PO Take 1 tablet by mouth daily      TURMERIC PO Take 1 tablet by mouth daily      temazepam (RESTORIL) 15 MG capsule Take 2 capsules by mouth nightly as needed for Sleep 30 capsule 2    vitamin B-12 (CYANOCOBALAMIN) 1000 MCG tablet Take 1,000 mcg by mouth daily      fluticasone (FLONASE) 50 MCG/ACT nasal spray 1 spray by Nasal route daily      Multiple Vitamins-Minerals (MULTIVITAMIN ADULT PO) Take by mouth      niacin (SLO-NIACIN) 500 MG extended release tablet Take 500 mg by mouth nightly      Omega 3 1000 MG CAPS Take by mouth      Cholecalciferol (VITAMIN D3) 3000 units TABS Take by mouth      loratadine (CLARITIN) 10 MG tablet Take 1 tablet by mouth daily 30 tablet 0     No current facility-administered medications for this visit.         No Known Allergies    Past Surgical History:   Procedure Laterality Date     SECTION      COLONOSCOPY  2015    colon polyp x1, recheck in 5 yrs (Dr Yusuf Terry)   Sue [place] HYSTERECTOMY      MYOMECTOMY      TONSILLECTOMY         Social History   Substance Use Topics    Smoking status: Never Smoker    Smokeless tobacco: Never Used    Alcohol use Yes      Comment: rarely       Family History   Problem Relation Age of Onset    Heart Disease Mother     High Blood Pressure Mother     Other Mother         skin CA    Osteoporosis Mother     Heart Disease Father     High Blood Pressure syndrome)  -     gabapentin (NEURONTIN) 300 MG capsule; 1 tablet twice daily. Chronic daily headache  -     gabapentin (NEURONTIN) 300 MG capsule; 1 tablet twice daily. Depression with anxiety  -     desvenlafaxine succinate (PRISTIQ) 50 MG TB24 extended release tablet; TAKE 1 TABLET BY MOUTH ONCE DAILY    Vitamin D deficiency  -     Vitamin D 25 Hydroxy; Future    Acquired hypothyroidism  -     levothyroxine (SYNTHROID) 88 MCG tablet; Take 1 tablet by mouth Daily  -     TSH without Reflex; Future    Mixed hyperlipidemia  -     Comprehensive Metabolic Panel; Future  -     Lipid Panel; Future    Idiopathic insomnia    Acute bronchitis due to infection  -     XR CHEST STANDARD (2 VW); Future    B12 deficiency  -     Vitamin B12; Future    Annual physical exam  -     CBC Auto Differential; Future    1. Labs reviewed with patient  2. Refills provided  3. migraine HAs without aura and chronic neck and LBP with radiculopathy-refill on gabapentin at current dose. UDS and controlled substance contract updated today. No unusual filling on current [facility] report. Tx continues to be medically necessary. Suspect HAs mildly exacerbated due to current illness. 4. Continue temazepam prn insomnia with pristiq for depression with anxiety that is overall controlled. 5. Cute bronchitis with bronchospasm-No PNA on CXR. Complete course of oral steroids and azithromycin with tussionex prn cough. Follow-up if symptoms worsen or if they do not improve in the next 3-5 days. 6. Acquired hypothyroidism-continue synthroid 88 mcg once daily given recent TSH in normal range on review. 7. Continue niacin and omega 3 FAs for high cholesterol but stressed importance of increasing efforts at low cholesterol diet, routine exercise, and weight loss which can also help with mood and stress. 8. Vitamin D deficiency well controlled on previous lab work.  Continue current vitamin D replacement and will recheck levels in 3 mths when labs next Patient Education        Learning About Vitamin D  Why is it important to get enough vitamin D? Your body needs vitamin D to absorb calcium. Calcium keeps your bones and muscles, including your heart, healthy and strong. If your muscles don't get enough calcium, they can cramp, hurt, or feel weak. You may have long-term (chronic) muscle aches and pains. If you don't get enough vitamin D throughout life, you have an increased chance of having thin and brittle bones (osteoporosis) in your later years. Children who don't get enough vitamin D may not grow as much as others their age. They also have a chance of getting a rare disease called rickets. It causes weak bones. Vitamin D and calcium are added to many foods. And your body uses sunshine to make its own vitamin D. How much vitamin D do you need? The [place] [employer] recommends that people ages 3 through 79 get 600 IU (international units) every day. Adults 71 and older need 800 IU every day. Blood tests for vitamin D can check your vitamin D level. But there is no standard normal range used by all laboratories. You're likely getting enough vitamin D if your levels are in the range of 20 to 50 ng/mL. How can you get more vitamin D? Foods that contain vitamin D include:  · [place], tuna, and mackerel. These are some of the best foods to eat when you need to get more vitamin D.  · Cheese, egg yolks, and beef liver. These foods have vitamin D in small amounts. · Milk, soy drinks, orange juice, yogurt, margarine, and some kinds of cereal have vitamin D added to them. Some people don't make vitamin D as well as others. They may have to take extra care in getting enough vitamin D. Things that reduce how much vitamin D your body makes include:  · Dark skin, such as many  Americans have. · Age, especially if you are older than 72. · Digestive problems, such as Crohn's or celiac disease. · Liver and kidney disease.   Some people who do not get High Cholesterol. \"     If you do not have an account, please click on the \"Sign Up Now\" link. Current as of: December 6, 2017  Content Version: 11.8  © 9991-1227 [employer]. Care instructions adapted under license by [facility] ([facility]). If you have questions about a medical condition or this instruction, always ask your healthcare professional. [place] [address] any warranty or liability for your use of this information. Patient voices understanding and agrees to plans along with risks and benefits of plan. Counseling:  Charlie Lees's case, medications and options were discussed in detail. Patient was instructed to call the office if she   questions regarding her treatment. Should her conditions worsen, she should return to office to be reassessed by Dr. Warden Lombardi. she  Should to go the closest Emergency Department for any emergency. They verbalized understanding the above instructions. Return in about 3 months (around 3/13/2019) for ECPE, high cholesterol, Controlled med refill, thyroid.

## 2019-01-02 ENCOUNTER — OFFICE VISIT (OUTPATIENT)
Dept: FAMILY MEDICINE CLINIC | Age: 54
End: 2019-01-02
Payer: COMMERCIAL

## 2019-01-02 VITALS
WEIGHT: 150.6 LBS | BODY MASS INDEX: 28.43 KG/M2 | SYSTOLIC BLOOD PRESSURE: 130 MMHG | HEIGHT: 61 IN | TEMPERATURE: 97.6 F | HEART RATE: 94 BPM | OXYGEN SATURATION: 96 % | DIASTOLIC BLOOD PRESSURE: 78 MMHG

## 2019-01-02 DIAGNOSIS — G44.219 EPISODIC TENSION-TYPE HEADACHE, NOT INTRACTABLE: ICD-10-CM

## 2019-01-02 DIAGNOSIS — R05.3 PERSISTENT COUGH FOR 3 WEEKS OR LONGER: Primary | ICD-10-CM

## 2019-01-02 DIAGNOSIS — G43.009 MIGRAINE WITHOUT AURA AND WITHOUT STATUS MIGRAINOSUS, NOT INTRACTABLE: ICD-10-CM

## 2019-01-02 DIAGNOSIS — J01.01 RECURRENT MAXILLARY SINUSITIS: ICD-10-CM

## 2019-01-02 PROCEDURE — 99214 OFFICE O/P EST MOD 30 MIN: CPT | Performed by: INTERNAL MEDICINE

## 2019-01-02 PROCEDURE — 96372 THER/PROPH/DIAG INJ SC/IM: CPT | Performed by: INTERNAL MEDICINE

## 2019-01-02 RX ORDER — GUAIFENESIN AND DEXTROMETHORPHAN HYDROBROMIDE 600; 30 MG/1; MG/1
1 TABLET, EXTENDED RELEASE ORAL 2 TIMES DAILY PRN
Qty: 28 TABLET | Refills: 0 | COMMUNITY
Start: 2019-01-02 | End: 2019-10-04 | Stop reason: ALTCHOICE

## 2019-01-02 RX ORDER — BENZONATATE 100 MG/1
200 CAPSULE ORAL 3 TIMES DAILY PRN
Qty: 45 CAPSULE | Refills: 1 | Status: SHIPPED | OUTPATIENT
Start: 2019-01-02 | End: 2019-01-09

## 2019-01-02 RX ORDER — LEVOFLOXACIN 500 MG/1
500 TABLET, FILM COATED ORAL DAILY
Qty: 10 TABLET | Refills: 1 | Status: SHIPPED | OUTPATIENT
Start: 2019-01-02 | End: 2019-01-03 | Stop reason: ALTCHOICE

## 2019-01-02 RX ORDER — BUTALBITAL, ACETAMINOPHEN AND CAFFEINE 50; 325; 40 MG/1; MG/1; MG/1
1 TABLET ORAL EVERY 8 HOURS PRN
Qty: 90 TABLET | Refills: 0 | Status: SHIPPED | OUTPATIENT
Start: 2019-01-02 | End: 2019-03-05 | Stop reason: SDUPTHER

## 2019-01-02 RX ORDER — CEFTRIAXONE 1 G/1
1 INJECTION, POWDER, FOR SOLUTION INTRAMUSCULAR; INTRAVENOUS ONCE
Status: COMPLETED | OUTPATIENT
Start: 2019-01-02 | End: 2019-01-02

## 2019-01-02 RX ADMIN — CEFTRIAXONE 1 G: 1 INJECTION, POWDER, FOR SOLUTION INTRAMUSCULAR; INTRAVENOUS at 16:33

## 2019-01-02 ASSESSMENT — ENCOUNTER SYMPTOMS
ABDOMINAL PAIN: 0
COLOR CHANGE: 0
EYE DISCHARGE: 0
SINUS PAIN: 1
WHEEZING: 0
SINUS PRESSURE: 1
SORE THROAT: 0
EYE REDNESS: 0
SHORTNESS OF BREATH: 0
BLOOD IN STOOL: 0
CHEST TIGHTNESS: 0
DIARRHEA: 0
EYE PAIN: 0
COUGH: 1
RHINORRHEA: 1
VOMITING: 0
VOICE CHANGE: 0

## 2019-01-03 DIAGNOSIS — A49.1 INFECTION DUE TO STREPTOCOCCUS PYOGENES: Primary | ICD-10-CM

## 2019-01-03 DIAGNOSIS — J32.9 CHRONIC RECURRENT SINUSITIS: ICD-10-CM

## 2019-01-03 RX ORDER — CLINDAMYCIN HYDROCHLORIDE 300 MG/1
300 CAPSULE ORAL 3 TIMES DAILY
Qty: 30 CAPSULE | Refills: 0 | Status: SHIPPED | OUTPATIENT
Start: 2019-01-03 | End: 2019-01-13

## 2019-01-04 ENCOUNTER — TELEPHONE (OUTPATIENT)
Dept: FAMILY MEDICINE CLINIC | Age: 54
End: 2019-01-04

## 2019-01-04 DIAGNOSIS — R05.3 PERSISTENT COUGH FOR 3 WEEKS OR LONGER: Primary | ICD-10-CM

## 2019-01-04 RX ORDER — HYDROCODONE POLISTIREX AND CHLORPHENIRAMINE POLISTIREX 10; 8 MG/5ML; MG/5ML
5 SUSPENSION, EXTENDED RELEASE ORAL EVERY 12 HOURS PRN
Qty: 100 ML | Refills: 0 | Status: SHIPPED | OUTPATIENT
Start: 2019-01-04 | End: 2019-01-14

## 2019-03-05 DIAGNOSIS — G44.219 EPISODIC TENSION-TYPE HEADACHE, NOT INTRACTABLE: ICD-10-CM

## 2019-03-05 DIAGNOSIS — G43.009 MIGRAINE WITHOUT AURA AND WITHOUT STATUS MIGRAINOSUS, NOT INTRACTABLE: ICD-10-CM

## 2019-03-05 RX ORDER — BUTALBITAL, ACETAMINOPHEN AND CAFFEINE 50; 325; 40 MG/1; MG/1; MG/1
1 TABLET ORAL EVERY 8 HOURS PRN
Qty: 90 TABLET | Refills: 0 | OUTPATIENT
Start: 2019-03-05

## 2019-03-05 RX ORDER — BUTALBITAL, ACETAMINOPHEN AND CAFFEINE 50; 325; 40 MG/1; MG/1; MG/1
1 TABLET ORAL EVERY 8 HOURS PRN
Qty: 90 TABLET | Refills: 0 | Status: SHIPPED | OUTPATIENT
Start: 2019-03-05 | End: 2019-05-03 | Stop reason: SDUPTHER

## 2019-03-08 DIAGNOSIS — Z00.00 ANNUAL PHYSICAL EXAM: ICD-10-CM

## 2019-03-08 DIAGNOSIS — E03.9 ACQUIRED HYPOTHYROIDISM: ICD-10-CM

## 2019-03-08 DIAGNOSIS — E55.9 VITAMIN D DEFICIENCY: ICD-10-CM

## 2019-03-08 DIAGNOSIS — E78.2 MIXED HYPERLIPIDEMIA: ICD-10-CM

## 2019-03-08 DIAGNOSIS — E53.8 B12 DEFICIENCY: ICD-10-CM

## 2019-03-08 LAB
ALBUMIN SERPL-MCNC: 4.8 G/DL (ref 3.5–5.2)
ALP BLD-CCNC: 115 U/L (ref 35–104)
ALT SERPL-CCNC: 17 U/L (ref 5–33)
ANION GAP SERPL CALCULATED.3IONS-SCNC: 15 MMOL/L (ref 7–19)
AST SERPL-CCNC: 23 U/L (ref 5–32)
BASOPHILS ABSOLUTE: 0 K/UL (ref 0–0.2)
BASOPHILS RELATIVE PERCENT: 0.5 % (ref 0–1)
BILIRUB SERPL-MCNC: 0.4 MG/DL (ref 0.2–1.2)
BUN BLDV-MCNC: 6 MG/DL (ref 6–20)
CALCIUM SERPL-MCNC: 9.7 MG/DL (ref 8.6–10)
CHLORIDE BLD-SCNC: 100 MMOL/L (ref 98–111)
CHOLESTEROL, TOTAL: 248 MG/DL (ref 160–199)
CO2: 27 MMOL/L (ref 22–29)
CREAT SERPL-MCNC: 0.7 MG/DL (ref 0.5–0.9)
EOSINOPHILS ABSOLUTE: 0.1 K/UL (ref 0–0.6)
EOSINOPHILS RELATIVE PERCENT: 1.6 % (ref 0–5)
GFR NON-AFRICAN AMERICAN: >60
GLUCOSE BLD-MCNC: 100 MG/DL (ref 74–109)
HCT VFR BLD CALC: 42.2 % (ref 37–47)
HDLC SERPL-MCNC: 45 MG/DL (ref 65–121)
HEMOGLOBIN: 13.5 G/DL (ref 12–16)
LDL CHOLESTEROL CALCULATED: 171 MG/DL
LYMPHOCYTES ABSOLUTE: 1.4 K/UL (ref 1.1–4.5)
LYMPHOCYTES RELATIVE PERCENT: 21.9 % (ref 20–40)
MCH RBC QN AUTO: 26.9 PG (ref 27–31)
MCHC RBC AUTO-ENTMCNC: 32 G/DL (ref 33–37)
MCV RBC AUTO: 84.2 FL (ref 81–99)
MONOCYTES ABSOLUTE: 0.4 K/UL (ref 0–0.9)
MONOCYTES RELATIVE PERCENT: 6.8 % (ref 0–10)
NEUTROPHILS ABSOLUTE: 4.4 K/UL (ref 1.5–7.5)
NEUTROPHILS RELATIVE PERCENT: 68.7 % (ref 50–65)
PDW BLD-RTO: 15.5 % (ref 11.5–14.5)
PLATELET # BLD: 303 K/UL (ref 130–400)
PMV BLD AUTO: 10.2 FL (ref 9.4–12.3)
POTASSIUM SERPL-SCNC: 3.8 MMOL/L (ref 3.5–5)
RBC # BLD: 5.01 M/UL (ref 4.2–5.4)
SODIUM BLD-SCNC: 142 MMOL/L (ref 136–145)
TOTAL PROTEIN: 7.5 G/DL (ref 6.6–8.7)
TRIGL SERPL-MCNC: 162 MG/DL (ref 0–149)
TSH SERPL DL<=0.05 MIU/L-ACNC: 1.86 UIU/ML (ref 0.27–4.2)
VITAMIN B-12: 383 PG/ML (ref 211–946)
VITAMIN D 25-HYDROXY: 40.7 NG/ML
WBC # BLD: 6.5 K/UL (ref 4.8–10.8)

## 2019-03-14 ENCOUNTER — OFFICE VISIT (OUTPATIENT)
Dept: FAMILY MEDICINE CLINIC | Age: 54
End: 2019-03-14
Payer: COMMERCIAL

## 2019-03-14 VITALS
TEMPERATURE: 98.1 F | WEIGHT: 153.8 LBS | DIASTOLIC BLOOD PRESSURE: 86 MMHG | HEART RATE: 86 BPM | BODY MASS INDEX: 29.04 KG/M2 | HEIGHT: 61 IN | OXYGEN SATURATION: 98 % | SYSTOLIC BLOOD PRESSURE: 128 MMHG

## 2019-03-14 DIAGNOSIS — M54.42 CHRONIC BILATERAL LOW BACK PAIN WITH LEFT-SIDED SCIATICA: ICD-10-CM

## 2019-03-14 DIAGNOSIS — Z00.00 ANNUAL PHYSICAL EXAM: Primary | ICD-10-CM

## 2019-03-14 DIAGNOSIS — R53.82 CHRONIC FATIGUE: ICD-10-CM

## 2019-03-14 DIAGNOSIS — F41.8 DEPRESSION WITH ANXIETY: ICD-10-CM

## 2019-03-14 DIAGNOSIS — F51.01 IDIOPATHIC INSOMNIA: ICD-10-CM

## 2019-03-14 DIAGNOSIS — G89.29 CHRONIC BILATERAL LOW BACK PAIN WITH LEFT-SIDED SCIATICA: ICD-10-CM

## 2019-03-14 DIAGNOSIS — R41.840 DIFFICULTY CONCENTRATING: ICD-10-CM

## 2019-03-14 DIAGNOSIS — G43.009 MIGRAINE WITHOUT AURA AND WITHOUT STATUS MIGRAINOSUS, NOT INTRACTABLE: ICD-10-CM

## 2019-03-14 DIAGNOSIS — E78.2 MIXED HYPERLIPIDEMIA: ICD-10-CM

## 2019-03-14 DIAGNOSIS — E53.8 B12 DEFICIENCY: ICD-10-CM

## 2019-03-14 DIAGNOSIS — E03.9 ACQUIRED HYPOTHYROIDISM: ICD-10-CM

## 2019-03-14 DIAGNOSIS — R51.9 CHRONIC DAILY HEADACHE: ICD-10-CM

## 2019-03-14 DIAGNOSIS — Z23 NEED FOR PROPHYLACTIC VACCINATION AND INOCULATION AGAINST VARICELLA: ICD-10-CM

## 2019-03-14 DIAGNOSIS — E55.9 VITAMIN D DEFICIENCY: ICD-10-CM

## 2019-03-14 DIAGNOSIS — Z12.31 ENCOUNTER FOR SCREENING MAMMOGRAM FOR BREAST CANCER: ICD-10-CM

## 2019-03-14 PROCEDURE — 99396 PREV VISIT EST AGE 40-64: CPT | Performed by: INTERNAL MEDICINE

## 2019-03-14 PROCEDURE — 99214 OFFICE O/P EST MOD 30 MIN: CPT | Performed by: INTERNAL MEDICINE

## 2019-03-14 RX ORDER — LANOLIN ALCOHOL/MO/W.PET/CERES
500 CREAM (GRAM) TOPICAL NIGHTLY
Qty: 30 TABLET | Refills: 5 | COMMUNITY
Start: 2019-03-14 | End: 2019-10-04

## 2019-03-14 ASSESSMENT — ENCOUNTER SYMPTOMS
VOICE CHANGE: 0
ABDOMINAL PAIN: 0
SINUS PAIN: 0
EYE DISCHARGE: 0
RHINORRHEA: 0
BLOOD IN STOOL: 0
COLOR CHANGE: 0
WHEEZING: 0
SHORTNESS OF BREATH: 0
CHEST TIGHTNESS: 0
DIARRHEA: 0
COUGH: 1
EYE PAIN: 0
VOMITING: 0
SORE THROAT: 0
BACK PAIN: 1
EYE REDNESS: 0

## 2019-03-14 ASSESSMENT — PATIENT HEALTH QUESTIONNAIRE - PHQ9
SUM OF ALL RESPONSES TO PHQ QUESTIONS 1-9: 2
SUM OF ALL RESPONSES TO PHQ9 QUESTIONS 1 & 2: 2
1. LITTLE INTEREST OR PLEASURE IN DOING THINGS: 1
2. FEELING DOWN, DEPRESSED OR HOPELESS: 1
SUM OF ALL RESPONSES TO PHQ QUESTIONS 1-9: 2

## 2019-03-27 ASSESSMENT — ENCOUNTER SYMPTOMS: SINUS PRESSURE: 0

## 2019-04-02 DIAGNOSIS — G89.29 CHRONIC NECK PAIN: ICD-10-CM

## 2019-04-02 DIAGNOSIS — M54.2 CHRONIC NECK PAIN: ICD-10-CM

## 2019-04-03 RX ORDER — TRAMADOL HYDROCHLORIDE 50 MG/1
50 TABLET ORAL EVERY 8 HOURS PRN
Qty: 21 TABLET | Refills: 0 | Status: SHIPPED | OUTPATIENT
Start: 2019-04-03 | End: 2019-05-04 | Stop reason: SDUPTHER

## 2019-05-03 DIAGNOSIS — G44.219 EPISODIC TENSION-TYPE HEADACHE, NOT INTRACTABLE: ICD-10-CM

## 2019-05-03 DIAGNOSIS — G43.009 MIGRAINE WITHOUT AURA AND WITHOUT STATUS MIGRAINOSUS, NOT INTRACTABLE: ICD-10-CM

## 2019-05-03 RX ORDER — BUTALBITAL, ACETAMINOPHEN AND CAFFEINE 50; 325; 40 MG/1; MG/1; MG/1
1 TABLET ORAL EVERY 8 HOURS PRN
Qty: 90 TABLET | Refills: 0 | Status: SHIPPED | OUTPATIENT
Start: 2019-05-03 | End: 2019-06-28 | Stop reason: SDUPTHER

## 2019-05-03 NOTE — TELEPHONE ENCOUNTER
Tara Dior called requesting a refill of the below medication which has been pended for you:     Requested Prescriptions     Pending Prescriptions Disp Refills    butalbital-acetaminophen-caffeine (FIORICET, ESGIC) -40 MG per tablet 90 tablet 0     Sig: Take 1 tablet by mouth every 8 hours as needed for Headaches or Migraine       Last Appointment Date: 3/14/2019  Next Appointment Date: 5/8/2019    Allergies   Allergen Reactions    Augmentin [Amoxicillin-Pot Clavulanate] Diarrhea

## 2019-05-08 ENCOUNTER — OFFICE VISIT (OUTPATIENT)
Dept: FAMILY MEDICINE CLINIC | Age: 54
End: 2019-05-08
Payer: COMMERCIAL

## 2019-05-08 VITALS
HEART RATE: 89 BPM | HEIGHT: 61 IN | DIASTOLIC BLOOD PRESSURE: 76 MMHG | SYSTOLIC BLOOD PRESSURE: 122 MMHG | TEMPERATURE: 97.4 F | RESPIRATION RATE: 18 BRPM | WEIGHT: 153 LBS | OXYGEN SATURATION: 98 % | BODY MASS INDEX: 28.89 KG/M2

## 2019-05-08 DIAGNOSIS — M15.9 PRIMARY OSTEOARTHRITIS INVOLVING MULTIPLE JOINTS: ICD-10-CM

## 2019-05-08 DIAGNOSIS — Z51.81 THERAPEUTIC DRUG MONITORING: ICD-10-CM

## 2019-05-08 DIAGNOSIS — G43.009 MIGRAINE WITHOUT AURA AND WITHOUT STATUS MIGRAINOSUS, NOT INTRACTABLE: ICD-10-CM

## 2019-05-08 DIAGNOSIS — F51.01 PRIMARY INSOMNIA: ICD-10-CM

## 2019-05-08 DIAGNOSIS — M54.42 CHRONIC BILATERAL LOW BACK PAIN WITH LEFT-SIDED SCIATICA: ICD-10-CM

## 2019-05-08 DIAGNOSIS — G89.29 CHRONIC BILATERAL LOW BACK PAIN WITH LEFT-SIDED SCIATICA: ICD-10-CM

## 2019-05-08 DIAGNOSIS — R51.9 CHRONIC DAILY HEADACHE: ICD-10-CM

## 2019-05-08 DIAGNOSIS — F41.8 DEPRESSION WITH ANXIETY: Primary | ICD-10-CM

## 2019-05-08 DIAGNOSIS — G25.81 RLS (RESTLESS LEGS SYNDROME): ICD-10-CM

## 2019-05-08 DIAGNOSIS — E78.2 MIXED HYPERLIPIDEMIA: ICD-10-CM

## 2019-05-08 PROCEDURE — 99214 OFFICE O/P EST MOD 30 MIN: CPT | Performed by: INTERNAL MEDICINE

## 2019-05-08 RX ORDER — GABAPENTIN 300 MG/1
CAPSULE ORAL
Qty: 60 CAPSULE | Refills: 2 | Status: SHIPPED | OUTPATIENT
Start: 2019-05-08 | End: 2020-01-13

## 2019-05-08 RX ORDER — CELECOXIB 200 MG/1
200 CAPSULE ORAL DAILY PRN
Qty: 30 CAPSULE | Refills: 5 | Status: SHIPPED | OUTPATIENT
Start: 2019-05-08 | End: 2021-01-25

## 2019-05-08 RX ORDER — TEMAZEPAM 15 MG/1
30 CAPSULE ORAL NIGHTLY PRN
Qty: 60 CAPSULE | Refills: 2 | Status: SHIPPED | OUTPATIENT
Start: 2019-05-08 | End: 2019-10-04

## 2019-05-08 RX ORDER — DESVENLAFAXINE 50 MG/1
TABLET, EXTENDED RELEASE ORAL
Qty: 30 TABLET | Refills: 5 | Status: SHIPPED | OUTPATIENT
Start: 2019-05-08 | End: 2019-11-21 | Stop reason: DRUGHIGH

## 2019-05-08 ASSESSMENT — ENCOUNTER SYMPTOMS
SINUS PAIN: 0
RHINORRHEA: 0
EYE REDNESS: 0
BLOOD IN STOOL: 0
SHORTNESS OF BREATH: 0
VOICE CHANGE: 0
SINUS PRESSURE: 0
COLOR CHANGE: 0
DIARRHEA: 0
VOMITING: 0
EYE PAIN: 0
EYE DISCHARGE: 0
BACK PAIN: 1
CHEST TIGHTNESS: 0
COUGH: 1
WHEEZING: 0
SORE THROAT: 0
ABDOMINAL PAIN: 0

## 2019-05-08 NOTE — PROGRESS NOTES
(NEURONTIN) 300 MG capsule 1 tablet twice daily 60 capsule 2    celecoxib (CELEBREX) 200 MG capsule Take 1 capsule by mouth daily as needed for Pain (arthritis) 30 capsule 5    temazepam (RESTORIL) 15 MG capsule Take 2 capsules by mouth nightly as needed for Sleep for up to 30 days. 60 capsule 2    traMADol (ULTRAM) 50 MG tablet TAKE 1 TABLET BY MOUTH EVERY 8 HOURS AS NEEDED FOR PAIN FOR 7 DAYS 45 tablet 0    butalbital-acetaminophen-caffeine (FIORICET, ESGIC) -40 MG per tablet Take 1 tablet by mouth every 8 hours as needed for Headaches or Migraine 90 tablet 0    niacin (SLO-NIACIN) 500 MG extended release tablet Take 1 tablet by mouth nightly 30 tablet 5    Dextromethorphan-Guaifenesin (MUCINEX DM)  MG TB12 Take 1 tablet by mouth 2 times daily as needed (cough) 28 tablet 0    levothyroxine (SYNTHROID) 88 MCG tablet Take 1 tablet by mouth Daily 30 tablet 3    loratadine (CLARITIN) 10 MG tablet Take 1 tablet by mouth daily 30 tablet 0    valACYclovir (VALTREX) 500 MG tablet       Potassium 99 MG TABS Take 1 tablet by mouth daily      BIOTIN PO Take 1 tablet by mouth daily      TURMERIC PO Take 1 tablet by mouth daily      vitamin B-12 (CYANOCOBALAMIN) 1000 MCG tablet Take 1,000 mcg by mouth daily      fluticasone (FLONASE) 50 MCG/ACT nasal spray 1 spray by Nasal route daily      Multiple Vitamins-Minerals (MULTIVITAMIN ADULT PO) Take by mouth      Omega 3 1000 MG CAPS Take by mouth      Cholecalciferol (VITAMIN D3) 3000 units TABS Take by mouth       No current facility-administered medications for this visit.         Allergies   Allergen Reactions    Augmentin [Amoxicillin-Pot Clavulanate] Diarrhea       Past Surgical History:   Procedure Laterality Date     SECTION      COLONOSCOPY  2015    colon polyp x1, recheck in 5 yrs (Dr Onel Davidson)   [facility]      TONSILLECTOMY         Social History     Tobacco Use    Smoking status: Never Smoker    Smokeless tobacco: Never Used   Substance Use Topics    Alcohol use: Yes     Comment: rarely    Drug use: No       Family History   Problem Relation Age of Onset    Heart Disease Mother     High Blood Pressure Mother     Other Mother         skin CA    Osteoporosis Mother     Heart Disease Father     High Blood Pressure Father     Other Father         jaw CA    Heart Disease Sister     Diabetes Sister     Stroke Sister     High Blood Pressure Sister     Other Sister         fibromyalgia, thyroid CA       /76   Pulse 89   Temp 97.4 °F (36.3 °C) (Temporal)   Resp 18   Ht 5' 1\" (1.549 m)   Wt 153 lb (69.4 kg)   SpO2 98%   BMI 28.91 kg/m²     Physical Exam   Constitutional: She is oriented to person, place, and time. No distress. Overweight WF, appears tired but NAD   HENT:   Head: Normocephalic and atraumatic. Right Ear: External ear and ear canal normal. Tympanic membrane is not erythematous. Left Ear: External ear and ear canal normal. Tympanic membrane is not erythematous. Nose: Mucosal edema present. Mouth/Throat: Uvula is midline, oropharynx is clear and moist and mucous membranes are normal. No oropharyngeal exudate. +mild nasal congestion and cobblestoning of posterior OP mucosa   Eyes: Pupils are equal, round, and reactive to light. Conjunctivae, EOM and lids are normal.   Neck: Neck supple. No JVD present. Carotid bruit is not present. No thyromegaly present. Cardiovascular: Normal rate, regular rhythm, normal heart sounds and intact distal pulses. Exam reveals no gallop and no friction rub. No murmur heard. Pulses:       Carotid pulses are 2+ on the right side, and 2+ on the left side. Posterior tibial pulses are 2+ on the right side, and 2+ on the left side. Pulmonary/Chest: Effort normal. She has no decreased breath sounds. She has no wheezes. She has no rhonchi. She has no rales. Abdominal: Soft. Bowel sounds are normal. She exhibits no distension.  There is no hepatosplenomegaly. There is no tenderness. Musculoskeletal: She exhibits no edema or tenderness. Lymphadenopathy:        Head (right side): No submandibular adenopathy present. Head (left side): No submandibular adenopathy present. She has no cervical adenopathy. Right: No supraclavicular adenopathy present. Left: No supraclavicular adenopathy present. Neurological: She is alert and oriented to person, place, and time. She displays no tremor. She exhibits normal muscle tone. Coordination normal.   CN II-XII and sensation grossly intact, Speech is clear, no facial droop   Skin: Skin is warm. No rash noted. No cyanosis. Nails show no clubbing. Psychiatric: Her speech is normal and behavior is normal. Judgment and thought content normal. Her mood appears anxious. Cognition and memory are normal.   Vitals reviewed. Lab Results   Component Value Date     03/08/2019    K 3.8 03/08/2019     03/08/2019    CO2 27 03/08/2019    BUN 6 03/08/2019    CREATININE 0.7 03/08/2019    GLUCOSE 100 03/08/2019    CALCIUM 9.7 03/08/2019    PROT 7.5 03/08/2019    LABALBU 4.8 03/08/2019    BILITOT 0.4 03/08/2019    ALKPHOS 115 (H) 03/08/2019    AST 23 03/08/2019    ALT 17 03/08/2019    LABGLOM >60 03/08/2019     Lab Results   Component Value Date    CHOL 248 (H) 03/08/2019    CHOL 206 (H) 12/06/2018    CHOL 210 (H) 09/04/2018     Lab Results   Component Value Date    TRIG 162 (H) 03/08/2019    TRIG 183 (H) 12/06/2018    TRIG 143 09/04/2018     Lab Results   Component Value Date    HDL 45 (L) 03/08/2019    HDL 36 (L) 12/06/2018    HDL 45 (L) 09/04/2018     Lab Results   Component Value Date    LDLCALC 171 03/08/2019    LDLCALC 133 12/06/2018    LDLCALC 136 09/04/2018     No results found for: LABVLDL, VLDL  No results found for: CHOLHDLRATIO  Assessment:    ICD-10-CM    1. Depression with anxiety F41.8 desvenlafaxine succinate (PRISTIQ) 50 MG TB24 extended release tablet   2.  RLS (restless legs syndrome) G25.81 gabapentin (NEURONTIN) 300 MG capsule   3. Primary insomnia F51.01 temazepam (RESTORIL) 15 MG capsule   4. Chronic bilateral low back pain with left-sided sciatica M54.42 gabapentin (NEURONTIN) 300 MG capsule    G89.29    5. Migraine without aura and without status migrainosus, not intractable G43.009 gabapentin (NEURONTIN) 300 MG capsule   6. Chronic daily headache R51 gabapentin (NEURONTIN) 300 MG capsule   7. Mixed hyperlipidemia E78.2 Lipid Panel     Comprehensive Metabolic Panel   8. Primary osteoarthritis involving multiple joints M15.0 celecoxib (CELEBREX) 200 MG capsule   9. Therapeutic drug monitoring Z51.81 CANCELED: POCT Rapid Drug Screen       Plan:  Fransisco Culver was seen today for follow-up and cough. Diagnoses and all orders for this visit:    Depression with anxiety  -     desvenlafaxine succinate (PRISTIQ) 50 MG TB24 extended release tablet; TAKE 1 TABLET BY MOUTH ONCE DAILY    RLS (restless legs syndrome)  -     gabapentin (NEURONTIN) 300 MG capsule; 1 tablet twice daily    Primary insomnia  -     temazepam (RESTORIL) 15 MG capsule; Take 2 capsules by mouth nightly as needed for Sleep for up to 30 days. Chronic bilateral low back pain with left-sided sciatica  -     gabapentin (NEURONTIN) 300 MG capsule; 1 tablet twice daily    Migraine without aura and without status migrainosus, not intractable  -     gabapentin (NEURONTIN) 300 MG capsule; 1 tablet twice daily    Chronic daily headache  -     gabapentin (NEURONTIN) 300 MG capsule; 1 tablet twice daily    Mixed hyperlipidemia  -     Lipid Panel; Future  -     Comprehensive Metabolic Panel; Future    Primary osteoarthritis involving multiple joints  -     celecoxib (CELEBREX) 200 MG capsule; Take 1 capsule by mouth daily as needed for Pain (arthritis)    Therapeutic drug monitoring  -     Cancel: POCT Rapid Drug Screen    1. Depression with anxiety-improving with improved sleep and concentration/focus have also improved. Discussed importance of continuing good sleep hygiene habits, increasing amount of sleep at night, and need for exercise to help with stress. Continue pristiq for depression with anxiety and 600 mg gabapentin at bedtime for RLS. The current medical regimen is effective. Continue present plan and medications. UDS and controlled substance contract updated today. No unusual filling on current [facility] report. Tx continues to be medically necessary. 2. migraine HAs without aura and chronic neck and LBP with radiculopathy-continue gabapentin at current dose. Continue fioricet prn HAs. Controlled substance contract and urine drug screen are up-to-date currently. No unusual filling on recent [facility] report. Treatment continues to be medically necessary. 3. Primary insomnia- improved with lower dose of 15 mg of temazepam prn insomnia instead of 30 mg (changed so that she could rest better but still feel able to wake up if grandchildren need her). Controlled substance contract and urine drug screen are up-to-date currently. No unusual filling on recent [facility] report. Treatment continues to be medically necessary. 4. Mild cough-suspect mild post-nasal gtt. Add mucinex DM to 200 mg tessalon perles at bedtime as needed and encouraged patient to resume claritin for allergies. Hydrocodone cough medicine not clinically indicated at this time. 5. F/u in 3 mths and will recheck fasting cholesterol prior to appmt      Over 50% of the total visit time of 25 min was spent on counseling and/or coordination of care of depression with anxiety, recurrent HAs, primary insomnia, RLS, and cough.       Orders Placed This Encounter   Procedures    Lipid Panel     Standing Status:   Future     Standing Expiration Date:   5/8/2020     Order Specific Question:   Is Patient Fasting?/# of Hours     Answer:   yes/8 hrs    Comprehensive Metabolic Panel     Standing Status:   Future     Standing Expiration Date:   5/8/2020     Orders Placed This Encounter   Medications    desvenlafaxine succinate (PRISTIQ) 50 MG TB24 extended release tablet     Sig: TAKE 1 TABLET BY MOUTH ONCE DAILY     Dispense:  30 tablet     Refill:  5     Please consider 90 day supplies to promote better adherence    gabapentin (NEURONTIN) 300 MG capsule     Si tablet twice daily     Dispense:  60 capsule     Refill:  2    celecoxib (CELEBREX) 200 MG capsule     Sig: Take 1 capsule by mouth daily as needed for Pain (arthritis)     Dispense:  30 capsule     Refill:  5    temazepam (RESTORIL) 15 MG capsule     Sig: Take 2 capsules by mouth nightly as needed for Sleep for up to 30 days. Dispense:  60 capsule     Refill:  2     Medications Discontinued During This Encounter   Medication Reason    desvenlafaxine succinate (PRISTIQ) 50 MG TB24 extended release tablet REORDER    gabapentin (NEURONTIN) 300 MG capsule REORDER    celecoxib (CELEBREX) 200 MG capsule REORDER    temazepam (RESTORIL) 15 MG capsule REORDER     There are no Patient Instructions on file for this visit. Patient voices understanding and agrees to plans along with risks and benefits of plan. Counseling:  Carlos Lees's case, medications and options were discussed in detail. Patient was instructed to call the office if she   questions regarding her treatment. Should her conditions worsen, she should return to office to be reassessed by Dr. Governor Phillips. she  Should to go the closest Emergency Department for any emergency. They verbalized understanding the above instructions. Return in about 3 months (around 2019) for recheck mood, Controlled med refill, high cholesterol.

## 2019-05-10 DIAGNOSIS — E03.9 ACQUIRED HYPOTHYROIDISM: ICD-10-CM

## 2019-05-10 RX ORDER — LEVOTHYROXINE SODIUM 88 UG/1
TABLET ORAL
Qty: 30 TABLET | Refills: 3 | Status: SHIPPED | OUTPATIENT
Start: 2019-05-10 | End: 2019-08-16 | Stop reason: SDUPTHER

## 2019-06-28 DIAGNOSIS — M54.2 CHRONIC NECK PAIN: ICD-10-CM

## 2019-06-28 DIAGNOSIS — G43.009 MIGRAINE WITHOUT AURA AND WITHOUT STATUS MIGRAINOSUS, NOT INTRACTABLE: ICD-10-CM

## 2019-06-28 DIAGNOSIS — G44.219 EPISODIC TENSION-TYPE HEADACHE, NOT INTRACTABLE: ICD-10-CM

## 2019-06-28 DIAGNOSIS — G89.29 CHRONIC NECK PAIN: ICD-10-CM

## 2019-06-28 RX ORDER — BUTALBITAL, ACETAMINOPHEN AND CAFFEINE 50; 325; 40 MG/1; MG/1; MG/1
1 TABLET ORAL EVERY 8 HOURS PRN
Qty: 90 TABLET | Refills: 0 | Status: SHIPPED | OUTPATIENT
Start: 2019-06-28 | End: 2019-08-28 | Stop reason: SDUPTHER

## 2019-06-28 RX ORDER — TRAMADOL HYDROCHLORIDE 50 MG/1
50 TABLET ORAL EVERY 6 HOURS PRN
Qty: 45 TABLET | Refills: 0 | Status: SHIPPED | OUTPATIENT
Start: 2019-06-28 | End: 2019-08-12 | Stop reason: SDUPTHER

## 2019-08-07 DIAGNOSIS — E78.2 MIXED HYPERLIPIDEMIA: ICD-10-CM

## 2019-08-07 LAB
ALBUMIN SERPL-MCNC: 4.6 G/DL (ref 3.5–5.2)
ALP BLD-CCNC: 105 U/L (ref 35–104)
ALT SERPL-CCNC: 22 U/L (ref 5–33)
ANION GAP SERPL CALCULATED.3IONS-SCNC: 13 MMOL/L (ref 7–19)
AST SERPL-CCNC: 27 U/L (ref 5–32)
BILIRUB SERPL-MCNC: 0.3 MG/DL (ref 0.2–1.2)
BUN BLDV-MCNC: 8 MG/DL (ref 6–20)
CALCIUM SERPL-MCNC: 9.2 MG/DL (ref 8.6–10)
CHLORIDE BLD-SCNC: 104 MMOL/L (ref 98–111)
CHOLESTEROL, TOTAL: 210 MG/DL (ref 160–199)
CO2: 24 MMOL/L (ref 22–29)
CREAT SERPL-MCNC: 0.6 MG/DL (ref 0.5–0.9)
GFR NON-AFRICAN AMERICAN: >60
GLUCOSE BLD-MCNC: 97 MG/DL (ref 74–109)
HDLC SERPL-MCNC: 46 MG/DL (ref 65–121)
LDL CHOLESTEROL CALCULATED: 136 MG/DL
POTASSIUM SERPL-SCNC: 3.6 MMOL/L (ref 3.5–5)
SODIUM BLD-SCNC: 141 MMOL/L (ref 136–145)
TOTAL PROTEIN: 7.5 G/DL (ref 6.6–8.7)
TRIGL SERPL-MCNC: 139 MG/DL (ref 0–149)

## 2019-08-12 DIAGNOSIS — G89.29 CHRONIC NECK PAIN: ICD-10-CM

## 2019-08-12 DIAGNOSIS — M54.2 CHRONIC NECK PAIN: ICD-10-CM

## 2019-08-12 RX ORDER — TRAMADOL HYDROCHLORIDE 50 MG/1
50 TABLET ORAL EVERY 6 HOURS PRN
Qty: 45 TABLET | Refills: 0 | Status: SHIPPED | OUTPATIENT
Start: 2019-08-12 | End: 2019-09-26

## 2019-08-16 ENCOUNTER — OFFICE VISIT (OUTPATIENT)
Dept: FAMILY MEDICINE CLINIC | Age: 54
End: 2019-08-16
Payer: COMMERCIAL

## 2019-08-16 VITALS
TEMPERATURE: 98.5 F | BODY MASS INDEX: 28.51 KG/M2 | WEIGHT: 151 LBS | RESPIRATION RATE: 16 BRPM | DIASTOLIC BLOOD PRESSURE: 80 MMHG | OXYGEN SATURATION: 99 % | HEART RATE: 95 BPM | SYSTOLIC BLOOD PRESSURE: 124 MMHG | HEIGHT: 61 IN

## 2019-08-16 DIAGNOSIS — Z23 NEED FOR SHINGLES VACCINE: ICD-10-CM

## 2019-08-16 DIAGNOSIS — F41.8 DEPRESSION WITH ANXIETY: Primary | ICD-10-CM

## 2019-08-16 DIAGNOSIS — G25.81 RLS (RESTLESS LEGS SYNDROME): ICD-10-CM

## 2019-08-16 DIAGNOSIS — M54.42 CHRONIC BILATERAL LOW BACK PAIN WITH LEFT-SIDED SCIATICA: ICD-10-CM

## 2019-08-16 DIAGNOSIS — E53.8 B12 DEFICIENCY: ICD-10-CM

## 2019-08-16 DIAGNOSIS — E03.9 ACQUIRED HYPOTHYROIDISM: ICD-10-CM

## 2019-08-16 DIAGNOSIS — Z51.81 THERAPEUTIC DRUG MONITORING: ICD-10-CM

## 2019-08-16 DIAGNOSIS — E55.9 VITAMIN D DEFICIENCY: ICD-10-CM

## 2019-08-16 DIAGNOSIS — G89.29 CHRONIC BILATERAL LOW BACK PAIN WITH LEFT-SIDED SCIATICA: ICD-10-CM

## 2019-08-16 DIAGNOSIS — F51.01 IDIOPATHIC INSOMNIA: ICD-10-CM

## 2019-08-16 DIAGNOSIS — E61.1 IRON DEFICIENCY: ICD-10-CM

## 2019-08-16 DIAGNOSIS — G43.009 MIGRAINE WITHOUT AURA AND WITHOUT STATUS MIGRAINOSUS, NOT INTRACTABLE: ICD-10-CM

## 2019-08-16 DIAGNOSIS — F51.01 PRIMARY INSOMNIA: ICD-10-CM

## 2019-08-16 DIAGNOSIS — M47.812 SPONDYLOSIS OF CERVICAL REGION WITHOUT MYELOPATHY OR RADICULOPATHY: ICD-10-CM

## 2019-08-16 DIAGNOSIS — E66.3 OVERWEIGHT (BMI 25.0-29.9): ICD-10-CM

## 2019-08-16 DIAGNOSIS — E78.2 MIXED HYPERLIPIDEMIA: ICD-10-CM

## 2019-08-16 LAB
AMPHETAMINE SCREEN, URINE: NORMAL
BARBITURATE SCREEN, URINE: POSITIVE
BENZODIAZEPINE SCREEN, URINE: NORMAL
BUPRENORPHINE URINE: NORMAL
COCAINE METABOLITE SCREEN URINE: NORMAL
GABAPENTIN SCREEN, URINE: NORMAL
MDMA URINE: NORMAL
METHADONE SCREEN, URINE: NORMAL
METHAMPHETAMINE, URINE: NORMAL
OPIATE SCREEN URINE: NORMAL
OXYCODONE SCREEN URINE: NORMAL
PHENCYCLIDINE SCREEN URINE: NORMAL
PROPOXYPHENE SCREEN, URINE: NORMAL
THC SCREEN, URINE: NORMAL
TRICYCLIC ANTIDEPRESSANTS, UR: NORMAL

## 2019-08-16 PROCEDURE — 99214 OFFICE O/P EST MOD 30 MIN: CPT | Performed by: INTERNAL MEDICINE

## 2019-08-16 PROCEDURE — 80305 DRUG TEST PRSMV DIR OPT OBS: CPT | Performed by: INTERNAL MEDICINE

## 2019-08-16 RX ORDER — LEVOTHYROXINE SODIUM 88 UG/1
TABLET ORAL
Qty: 90 TABLET | Refills: 3 | Status: SHIPPED | OUTPATIENT
Start: 2019-08-16 | End: 2020-08-26

## 2019-08-16 ASSESSMENT — ENCOUNTER SYMPTOMS
BLOOD IN STOOL: 0
ABDOMINAL PAIN: 0
EYE REDNESS: 0
WHEEZING: 0
BACK PAIN: 1
SHORTNESS OF BREATH: 0
VOMITING: 0
SINUS PAIN: 0
CHEST TIGHTNESS: 0
SORE THROAT: 0
COLOR CHANGE: 0
SINUS PRESSURE: 0
VOICE CHANGE: 0
DIARRHEA: 0
EYE DISCHARGE: 0
EYE PAIN: 0
RHINORRHEA: 0

## 2019-08-16 NOTE — PROGRESS NOTES
Anais Ramos is a 53 y.o. female who presents today for   Chief Complaint   Patient presents with    3 Month Follow-Up    Hyperlipidemia    Depression    Anxiety    Hypothyroidism    Chronic Pain     controlled med refill       Hyperlipidemia   Pertinent negatives include no chest pain, myalgias or shortness of breath. 45 y/o WF here for f/u on migraine HAs, RLS, chronic neck and bilateral LBP, HORACE, MDD, and elevated BMI/overweight. She has been having more pain and stiffness in her fingers and hands recently and she wonders if she can still take ibuprofen or aleve if she is taking celebrex. She woke up with legs, arms, and hands cramping the other night but took potassium, magnesium, and turmeric supplements which seemed to help the cramping per patient. Most recent potassium level was lower end of normal on 8/7/19 and those labs were collected before the cramping started. She takes 600 mg of gabapentin at bedtime for migraine prophylaxis as well as treatment of RLS and chronic LBP. If she takes the morning dose, it makes her too sleepy during the day. She takes tramadol for chronic neck/back pain once every 1-2 days and for more severe migraine HAs at times if fioricet is not effective. Migraine HA frequency is still a few times a week but not daily like they have been in the past. She has not had any escalation in use. She has a hx of vitamin D deficiency and usually takes OTC vitamin D supplement to treat. She is keeping her 3 yr old grandson a few nights a week now and then gets her 3 and 11 yr old grandchildren are staying with her less during the school week now since school has started so she is getting a little more sleep most nights Patient's daughter gets overwhelmed caring for the 4 kids so she is helping her by helping out more with her grandchildren. Mood has been good overall recently with pristiq and she has had less mood swings and anhedonia.  She takes temazepam to help with insomnia on nights she is not watching her grandchildren but she does not need it refilled today. Hypothyroidism  Patient presents for evaluation of thyroid function. Symptoms consist of fatigue, weight gain. Symptoms have present for several months. The symptoms are mild. The problem has been gradually improving. Previous thyroid studies include TSH. The hypothyroidism is due to hypothyroidism. Patient has been compliant with synthroid 88 mcg. Lab Results   Component Value Date    TSH 1.860 03/08/2019       Ravinder Fuller is here for follow up of dyslipidemia. Compliance with treatment has been fair. The patient exercises rarely. Patient denies muscle pain associated with their medications which include slo Niacin 500 mg ER and OTC fish oil. Review of Systems   Constitutional: Negative for appetite change, chills, fatigue, fever and unexpected weight change. HENT: Positive for congestion. Negative for ear pain, rhinorrhea, sinus pressure, sinus pain, sore throat and voice change. See HPI   Eyes: Negative for pain, discharge and redness. Respiratory: Negative for cough, chest tightness, shortness of breath and wheezing. Cardiovascular: Negative for chest pain and palpitations. Gastrointestinal: Negative for abdominal pain, blood in stool, diarrhea and vomiting. Endocrine: Negative for cold intolerance, heat intolerance, polydipsia and polyphagia. Genitourinary: Negative for dysuria and hematuria. Musculoskeletal: Positive for arthralgias, back pain and neck pain. Negative for myalgias and neck stiffness. See HPI   Skin: Negative for color change and rash. Allergic/Immunologic: Positive for environmental allergies. Neurological: Positive for headaches. Negative for dizziness, speech difficulty, weakness and numbness. Hematological: Negative for adenopathy. Does not bruise/bleed easily. Psychiatric/Behavioral: Positive for sleep disturbance.  Negative for confusion, dysphoric mood, self-injury and suicidal ideas. The patient is nervous/anxious. Anxiety and insomnia improving       Past Medical History:   Diagnosis Date    Allergic rhinitis     Chronic bilateral low back pain with left-sided sciatica 12/1/2017    Chronic bilateral low back pain with left-sided sciatica     Colon polyp     Depression with anxiety     Obesity     Osteoarthritis        Current Outpatient Medications   Medication Sig Dispense Refill    zoster recombinant adjuvanted vaccine (SHINGRIX) 50 MCG/0.5ML SUSR injection Inject 0.5 mLs into the muscle See Admin Instructions 1 dose now and repeat in 2-6 months 0.5 mL 0    levothyroxine (SYNTHROID) 88 MCG tablet TAKE 1 TABLET BY MOUTH ONCE DAILY 90 tablet 3    traMADol (ULTRAM) 50 MG tablet Take 1 tablet by mouth every 6 hours as needed for Pain for up to 45 days. 45 tablet 0    desvenlafaxine succinate (PRISTIQ) 50 MG TB24 extended release tablet TAKE 1 TABLET BY MOUTH ONCE DAILY 30 tablet 5    gabapentin (NEURONTIN) 300 MG capsule 1 tablet twice daily 60 capsule 2    celecoxib (CELEBREX) 200 MG capsule Take 1 capsule by mouth daily as needed for Pain (arthritis) 30 capsule 5    temazepam (RESTORIL) 15 MG capsule Take 2 capsules by mouth nightly as needed for Sleep for up to 30 days.  60 capsule 2    Dextromethorphan-Guaifenesin (MUCINEX DM)  MG TB12 Take 1 tablet by mouth 2 times daily as needed (cough) 28 tablet 0    loratadine (CLARITIN) 10 MG tablet Take 1 tablet by mouth daily 30 tablet 0    valACYclovir (VALTREX) 500 MG tablet       Potassium 99 MG TABS Take 1 tablet by mouth daily      BIOTIN PO Take 1 tablet by mouth daily      TURMERIC PO Take 1 tablet by mouth daily      vitamin B-12 (CYANOCOBALAMIN) 1000 MCG tablet Take 1,000 mcg by mouth daily      fluticasone (FLONASE) 50 MCG/ACT nasal spray 1 spray by Nasal route daily      Multiple Vitamins-Minerals (MULTIVITAMIN ADULT PO) Take by mouth      Omega 3 1000 MG keeps them if needed. Stressed importance of increasing amount of sleep at night to help with mood and anxiety also. The current medical regimen is effective. Continue present plan and medications. UDS and controlled substance contract updated today. No unusual filling on current [facility] report. Tx continues to be medically necessary and improves patient quality of life. No signs of misuse of controlled medication. 5. Depression with anxiety-improved since last visit with increase in amount of sleep at night/improvement in insomnia which has also helped her concentration somewhat. Discussed importance of good sleep hygiene habits, increasing amount of sleep at night, and need for exercise to help with stress. Continue pristiq for depression with anxiety   6. Acquired hypothyroidism-continue synthroid 88 mcg once daily given recent TSH in normal range on review of TSH in March and will recheck with next scheduled lab work. 7. Mixed hyperlipidemia-improved on recent lab work due to better compliance with low cholesterol diet and exercise. Encouraged patient to take slo-niacin at bedtime and omega 3 FAs for high cholesterol but stressed importance of need for continued efforts at low cholesterol diet, routine exercise, and weight loss which can also help with elevated BMI/overweight. 8. Vitamin D deficiency well controlled on lab work from March. Continue current vitamin D replacement and will recheck level with next scheduled labs. 9. shingrix vaccine recommended when it becomes available. Yearly flu vaccine also recommended. 10.  Follow up in 3 mths with labs as ordered prior to appmt and will also recheck labs for B12 and iron deficiency. Over 50% of the total visit time of 30 min was spent on counseling and/or coordination of care of depression with anxiety, recurrent HAs, chronic neck/back pain, acquired hypothyroidism, mixed hyperlipidemia, vitamin D deficiency, and elevated BMI.       Orders Placed This she  Should to go the closest Emergency Department for any emergency. They verbalized understanding the above instructions. Return in about 3 months (around 11/16/2019) for high cholesterol, thyroid, Controlled med refill, headaches.

## 2019-08-28 DIAGNOSIS — G44.219 EPISODIC TENSION-TYPE HEADACHE, NOT INTRACTABLE: ICD-10-CM

## 2019-08-28 DIAGNOSIS — G43.009 MIGRAINE WITHOUT AURA AND WITHOUT STATUS MIGRAINOSUS, NOT INTRACTABLE: ICD-10-CM

## 2019-08-29 RX ORDER — BUTALBITAL, ACETAMINOPHEN AND CAFFEINE 50; 325; 40 MG/1; MG/1; MG/1
TABLET ORAL
Qty: 90 TABLET | Refills: 0 | Status: SHIPPED | OUTPATIENT
Start: 2019-08-29 | End: 2019-11-04 | Stop reason: SDUPTHER

## 2019-09-02 PROBLEM — E66.3 OVERWEIGHT (BMI 25.0-29.9): Status: ACTIVE | Noted: 2019-09-02

## 2019-09-02 PROBLEM — M47.812 SPONDYLOSIS OF CERVICAL REGION WITHOUT MYELOPATHY OR RADICULOPATHY: Status: ACTIVE | Noted: 2019-09-02

## 2019-09-02 PROBLEM — R03.0 ELEVATED BLOOD PRESSURE READING WITHOUT DIAGNOSIS OF HYPERTENSION: Status: RESOLVED | Noted: 2017-08-07 | Resolved: 2019-09-02

## 2019-09-02 PROBLEM — E66.09 EXOGENOUS OBESITY: Status: RESOLVED | Noted: 2017-08-07 | Resolved: 2019-09-02

## 2019-09-02 ASSESSMENT — ENCOUNTER SYMPTOMS: COUGH: 0

## 2019-10-04 ENCOUNTER — OFFICE VISIT (OUTPATIENT)
Dept: FAMILY MEDICINE CLINIC | Age: 54
End: 2019-10-04
Payer: COMMERCIAL

## 2019-10-04 VITALS
SYSTOLIC BLOOD PRESSURE: 142 MMHG | HEART RATE: 89 BPM | BODY MASS INDEX: 29.06 KG/M2 | WEIGHT: 153.8 LBS | OXYGEN SATURATION: 98 % | TEMPERATURE: 97.5 F | DIASTOLIC BLOOD PRESSURE: 78 MMHG

## 2019-10-04 DIAGNOSIS — R05.9 COUGH: ICD-10-CM

## 2019-10-04 DIAGNOSIS — B96.89 ACUTE BACTERIAL SINUSITIS: Primary | ICD-10-CM

## 2019-10-04 DIAGNOSIS — J01.90 ACUTE BACTERIAL SINUSITIS: Primary | ICD-10-CM

## 2019-10-04 DIAGNOSIS — Z23 NEED FOR INFLUENZA VACCINATION: ICD-10-CM

## 2019-10-04 PROCEDURE — 99213 OFFICE O/P EST LOW 20 MIN: CPT | Performed by: CLINICAL NURSE SPECIALIST

## 2019-10-04 PROCEDURE — 90471 IMMUNIZATION ADMIN: CPT | Performed by: CLINICAL NURSE SPECIALIST

## 2019-10-04 PROCEDURE — 90686 IIV4 VACC NO PRSV 0.5 ML IM: CPT | Performed by: CLINICAL NURSE SPECIALIST

## 2019-10-04 RX ORDER — AZITHROMYCIN 250 MG/1
250 TABLET, FILM COATED ORAL SEE ADMIN INSTRUCTIONS
Qty: 6 TABLET | Refills: 0 | Status: SHIPPED | OUTPATIENT
Start: 2019-10-04 | End: 2019-10-09

## 2019-10-04 RX ORDER — TRAMADOL HYDROCHLORIDE 50 MG/1
50 TABLET ORAL EVERY 6 HOURS PRN
COMMUNITY
End: 2019-10-04 | Stop reason: SDUPTHER

## 2019-10-04 RX ORDER — FLUTICASONE PROPIONATE 50 MCG
1 SPRAY, SUSPENSION (ML) NASAL DAILY
Qty: 1 BOTTLE | Refills: 0 | Status: SHIPPED | OUTPATIENT
Start: 2019-10-04 | End: 2019-11-21 | Stop reason: ALTCHOICE

## 2019-10-04 RX ORDER — HYDROCODONE POLISTIREX AND CHLORPHENIRAMINE POLISTIREX 10; 8 MG/5ML; MG/5ML
5 SUSPENSION, EXTENDED RELEASE ORAL EVERY 12 HOURS PRN
Qty: 70 ML | Refills: 0 | Status: SHIPPED | OUTPATIENT
Start: 2019-10-04 | End: 2019-10-11

## 2019-10-04 ASSESSMENT — ENCOUNTER SYMPTOMS
NAUSEA: 0
EYE PAIN: 0
VOICE CHANGE: 1
SINUS PRESSURE: 1
SHORTNESS OF BREATH: 0
COUGH: 1
EYE DISCHARGE: 0
VOMITING: 0
WHEEZING: 0
CHEST TIGHTNESS: 0
FACIAL SWELLING: 0
RHINORRHEA: 0
SINUS PAIN: 1
SORE THROAT: 1

## 2019-11-04 DIAGNOSIS — G44.219 EPISODIC TENSION-TYPE HEADACHE, NOT INTRACTABLE: ICD-10-CM

## 2019-11-04 DIAGNOSIS — G43.009 MIGRAINE WITHOUT AURA AND WITHOUT STATUS MIGRAINOSUS, NOT INTRACTABLE: ICD-10-CM

## 2019-11-04 RX ORDER — BUTALBITAL, ACETAMINOPHEN AND CAFFEINE 50; 325; 40 MG/1; MG/1; MG/1
TABLET ORAL
Qty: 90 TABLET | Refills: 0 | Status: SHIPPED | OUTPATIENT
Start: 2019-11-04 | End: 2019-12-26

## 2019-11-19 ENCOUNTER — TELEPHONE (OUTPATIENT)
Dept: FAMILY MEDICINE CLINIC | Age: 54
End: 2019-11-19

## 2019-11-20 DIAGNOSIS — E03.9 ACQUIRED HYPOTHYROIDISM: ICD-10-CM

## 2019-11-20 DIAGNOSIS — E78.2 MIXED HYPERLIPIDEMIA: ICD-10-CM

## 2019-11-20 DIAGNOSIS — E55.9 VITAMIN D DEFICIENCY: ICD-10-CM

## 2019-11-20 DIAGNOSIS — E53.8 B12 DEFICIENCY: ICD-10-CM

## 2019-11-20 DIAGNOSIS — E61.1 IRON DEFICIENCY: ICD-10-CM

## 2019-11-20 LAB
ALBUMIN SERPL-MCNC: 4.7 G/DL (ref 3.5–5.2)
ALP BLD-CCNC: 106 U/L (ref 35–104)
ALT SERPL-CCNC: 21 U/L (ref 5–33)
ANION GAP SERPL CALCULATED.3IONS-SCNC: 16 MMOL/L (ref 7–19)
AST SERPL-CCNC: 24 U/L (ref 5–32)
BASOPHILS ABSOLUTE: 0.1 K/UL (ref 0–0.2)
BASOPHILS RELATIVE PERCENT: 0.9 % (ref 0–1)
BILIRUB SERPL-MCNC: 0.3 MG/DL (ref 0.2–1.2)
BUN BLDV-MCNC: 6 MG/DL (ref 6–20)
CALCIUM SERPL-MCNC: 9.5 MG/DL (ref 8.6–10)
CHLORIDE BLD-SCNC: 103 MMOL/L (ref 98–111)
CHOLESTEROL, TOTAL: 226 MG/DL (ref 160–199)
CO2: 22 MMOL/L (ref 22–29)
CREAT SERPL-MCNC: 0.6 MG/DL (ref 0.5–0.9)
EOSINOPHILS ABSOLUTE: 0.2 K/UL (ref 0–0.6)
EOSINOPHILS RELATIVE PERCENT: 2.7 % (ref 0–5)
GFR NON-AFRICAN AMERICAN: >60
GLUCOSE BLD-MCNC: 91 MG/DL (ref 74–109)
HCT VFR BLD CALC: 40.4 % (ref 37–47)
HDLC SERPL-MCNC: 46 MG/DL (ref 65–121)
HEMOGLOBIN: 12.6 G/DL (ref 12–16)
IMMATURE GRANULOCYTES #: 0 K/UL
IRON SATURATION: 16 % (ref 14–50)
IRON: 65 UG/DL (ref 37–145)
LDL CHOLESTEROL CALCULATED: 147 MG/DL
LYMPHOCYTES ABSOLUTE: 1.5 K/UL (ref 1.1–4.5)
LYMPHOCYTES RELATIVE PERCENT: 22.8 % (ref 20–40)
MCH RBC QN AUTO: 25.6 PG (ref 27–31)
MCHC RBC AUTO-ENTMCNC: 31.2 G/DL (ref 33–37)
MCV RBC AUTO: 82.1 FL (ref 81–99)
MONOCYTES ABSOLUTE: 0.5 K/UL (ref 0–0.9)
MONOCYTES RELATIVE PERCENT: 8.3 % (ref 0–10)
NEUTROPHILS ABSOLUTE: 4.1 K/UL (ref 1.5–7.5)
NEUTROPHILS RELATIVE PERCENT: 65.1 % (ref 50–65)
PDW BLD-RTO: 18.6 % (ref 11.5–14.5)
PLATELET # BLD: 288 K/UL (ref 130–400)
PMV BLD AUTO: 10 FL (ref 9.4–12.3)
POTASSIUM SERPL-SCNC: 3.4 MMOL/L (ref 3.5–5)
RBC # BLD: 4.92 M/UL (ref 4.2–5.4)
SODIUM BLD-SCNC: 141 MMOL/L (ref 136–145)
TOTAL IRON BINDING CAPACITY: 417 UG/DL (ref 250–400)
TOTAL PROTEIN: 7.3 G/DL (ref 6.6–8.7)
TRIGL SERPL-MCNC: 166 MG/DL (ref 0–149)
TSH SERPL DL<=0.05 MIU/L-ACNC: 2.44 UIU/ML (ref 0.27–4.2)
VITAMIN B-12: 452 PG/ML (ref 211–946)
VITAMIN D 25-HYDROXY: 32 NG/ML
WBC # BLD: 6.4 K/UL (ref 4.8–10.8)

## 2019-11-21 ENCOUNTER — OFFICE VISIT (OUTPATIENT)
Dept: FAMILY MEDICINE CLINIC | Age: 54
End: 2019-11-21
Payer: COMMERCIAL

## 2019-11-21 VITALS
SYSTOLIC BLOOD PRESSURE: 124 MMHG | WEIGHT: 152.4 LBS | HEART RATE: 74 BPM | DIASTOLIC BLOOD PRESSURE: 80 MMHG | HEIGHT: 61 IN | OXYGEN SATURATION: 98 % | BODY MASS INDEX: 28.77 KG/M2 | TEMPERATURE: 98.3 F

## 2019-11-21 DIAGNOSIS — J30.2 PERENNIAL ALLERGIC RHINITIS WITH SEASONAL VARIATION: ICD-10-CM

## 2019-11-21 DIAGNOSIS — E53.8 B12 DEFICIENCY: ICD-10-CM

## 2019-11-21 DIAGNOSIS — E66.3 OVERWEIGHT (BMI 25.0-29.9): ICD-10-CM

## 2019-11-21 DIAGNOSIS — F41.1 GAD (GENERALIZED ANXIETY DISORDER): ICD-10-CM

## 2019-11-21 DIAGNOSIS — G44.219 EPISODIC TENSION-TYPE HEADACHE, NOT INTRACTABLE: ICD-10-CM

## 2019-11-21 DIAGNOSIS — E55.9 VITAMIN D DEFICIENCY: ICD-10-CM

## 2019-11-21 DIAGNOSIS — G25.81 RLS (RESTLESS LEGS SYNDROME): ICD-10-CM

## 2019-11-21 DIAGNOSIS — E03.9 ACQUIRED HYPOTHYROIDISM: ICD-10-CM

## 2019-11-21 DIAGNOSIS — G89.29 CHRONIC NECK PAIN: ICD-10-CM

## 2019-11-21 DIAGNOSIS — F33.1 MODERATE RECURRENT MAJOR DEPRESSION (HCC): Primary | ICD-10-CM

## 2019-11-21 DIAGNOSIS — G43.009 MIGRAINE WITHOUT AURA AND WITHOUT STATUS MIGRAINOSUS, NOT INTRACTABLE: ICD-10-CM

## 2019-11-21 DIAGNOSIS — E61.1 IRON DEFICIENCY: ICD-10-CM

## 2019-11-21 DIAGNOSIS — G89.29 CHRONIC BILATERAL LOW BACK PAIN WITH LEFT-SIDED SCIATICA: ICD-10-CM

## 2019-11-21 DIAGNOSIS — F51.01 IDIOPATHIC INSOMNIA: ICD-10-CM

## 2019-11-21 DIAGNOSIS — M54.2 CHRONIC NECK PAIN: ICD-10-CM

## 2019-11-21 DIAGNOSIS — M54.42 CHRONIC BILATERAL LOW BACK PAIN WITH LEFT-SIDED SCIATICA: ICD-10-CM

## 2019-11-21 DIAGNOSIS — E78.2 MIXED HYPERLIPIDEMIA: ICD-10-CM

## 2019-11-21 DIAGNOSIS — J30.89 PERENNIAL ALLERGIC RHINITIS WITH SEASONAL VARIATION: ICD-10-CM

## 2019-11-21 PROCEDURE — 99215 OFFICE O/P EST HI 40 MIN: CPT | Performed by: INTERNAL MEDICINE

## 2019-11-21 RX ORDER — TRIAMCINOLONE ACETONIDE 55 UG/1
2 SPRAY, METERED NASAL DAILY
Qty: 1 INHALER | Refills: 5 | COMMUNITY
Start: 2019-11-21 | End: 2021-01-25

## 2019-11-21 RX ORDER — TRAMADOL HYDROCHLORIDE 50 MG/1
50 TABLET ORAL EVERY 12 HOURS PRN
Qty: 45 TABLET | Refills: 1 | Status: SHIPPED | OUTPATIENT
Start: 2019-11-21 | End: 2020-02-07

## 2019-11-21 RX ORDER — DESVENLAFAXINE 100 MG/1
100 TABLET, EXTENDED RELEASE ORAL DAILY
Qty: 30 TABLET | Refills: 5 | Status: SHIPPED | OUTPATIENT
Start: 2019-11-21 | End: 2020-04-23 | Stop reason: SDUPTHER

## 2019-11-21 ASSESSMENT — ENCOUNTER SYMPTOMS
BLOOD IN STOOL: 0
CHEST TIGHTNESS: 0
RHINORRHEA: 0
EYE REDNESS: 0
COUGH: 0
SORE THROAT: 0
COLOR CHANGE: 0
SINUS PRESSURE: 0
VOICE CHANGE: 0
SINUS PAIN: 0
EYE DISCHARGE: 0
EYE PAIN: 0
SHORTNESS OF BREATH: 0
WHEEZING: 0
DIARRHEA: 0
VOMITING: 0
BACK PAIN: 1
ABDOMINAL PAIN: 0

## 2019-12-03 PROBLEM — E61.1 IRON DEFICIENCY: Status: ACTIVE | Noted: 2019-12-03

## 2019-12-03 PROBLEM — F41.8 DEPRESSION WITH ANXIETY: Status: RESOLVED | Noted: 2017-08-07 | Resolved: 2019-12-03

## 2019-12-03 PROBLEM — E53.8 B12 DEFICIENCY: Status: ACTIVE | Noted: 2019-12-03

## 2019-12-13 ENCOUNTER — OFFICE VISIT (OUTPATIENT)
Dept: FAMILY MEDICINE CLINIC | Age: 54
End: 2019-12-13
Payer: COMMERCIAL

## 2019-12-13 VITALS
BODY MASS INDEX: 28.89 KG/M2 | RESPIRATION RATE: 18 BRPM | OXYGEN SATURATION: 98 % | HEART RATE: 80 BPM | WEIGHT: 153 LBS | DIASTOLIC BLOOD PRESSURE: 80 MMHG | HEIGHT: 61 IN | TEMPERATURE: 97.7 F | SYSTOLIC BLOOD PRESSURE: 132 MMHG

## 2019-12-13 DIAGNOSIS — J01.40 ACUTE NON-RECURRENT PANSINUSITIS: ICD-10-CM

## 2019-12-13 DIAGNOSIS — R05.8 COUGH WITH CONGESTION OF PARANASAL SINUS: Primary | ICD-10-CM

## 2019-12-13 DIAGNOSIS — R09.81 COUGH WITH CONGESTION OF PARANASAL SINUS: Primary | ICD-10-CM

## 2019-12-13 PROCEDURE — 99213 OFFICE O/P EST LOW 20 MIN: CPT | Performed by: NURSE PRACTITIONER

## 2019-12-13 RX ORDER — PSEUDOEPHEDRINE HCL 60 MG/1
30 TABLET ORAL EVERY 6 HOURS PRN
Qty: 30 TABLET | Refills: 1 | Status: SHIPPED | OUTPATIENT
Start: 2019-12-13 | End: 2019-12-23

## 2019-12-13 RX ORDER — CEFDINIR 300 MG/1
300 CAPSULE ORAL 2 TIMES DAILY
Qty: 20 CAPSULE | Refills: 0 | Status: SHIPPED | OUTPATIENT
Start: 2019-12-13 | End: 2019-12-23

## 2019-12-13 RX ORDER — HYDROCODONE POLISTIREX AND CHLORPHENIRAMINE POLISTIREX 10; 8 MG/5ML; MG/5ML
5 SUSPENSION, EXTENDED RELEASE ORAL EVERY 12 HOURS PRN
Qty: 100 ML | Refills: 0 | Status: SHIPPED | OUTPATIENT
Start: 2019-12-13 | End: 2020-09-18 | Stop reason: SDUPTHER

## 2019-12-13 ASSESSMENT — ENCOUNTER SYMPTOMS
CHEST TIGHTNESS: 0
VOMITING: 0
WHEEZING: 0
SORE THROAT: 1
ABDOMINAL PAIN: 0
NAUSEA: 0
SHORTNESS OF BREATH: 0
DIARRHEA: 0
SINUS PRESSURE: 1
COUGH: 1

## 2019-12-26 DIAGNOSIS — G44.219 EPISODIC TENSION-TYPE HEADACHE, NOT INTRACTABLE: ICD-10-CM

## 2019-12-26 DIAGNOSIS — G43.009 MIGRAINE WITHOUT AURA AND WITHOUT STATUS MIGRAINOSUS, NOT INTRACTABLE: ICD-10-CM

## 2019-12-26 RX ORDER — BUTALBITAL, ACETAMINOPHEN AND CAFFEINE 50; 325; 40 MG/1; MG/1; MG/1
TABLET ORAL
Qty: 90 TABLET | Refills: 0 | Status: SHIPPED | OUTPATIENT
Start: 2019-12-26 | End: 2020-02-18

## 2020-01-13 RX ORDER — GABAPENTIN 300 MG/1
CAPSULE ORAL
Qty: 60 CAPSULE | Refills: 2 | Status: SHIPPED | OUTPATIENT
Start: 2020-01-13 | End: 2020-04-23

## 2020-01-22 ENCOUNTER — OFFICE VISIT (OUTPATIENT)
Dept: FAMILY MEDICINE CLINIC | Age: 55
End: 2020-01-22
Payer: COMMERCIAL

## 2020-01-22 VITALS
HEART RATE: 98 BPM | OXYGEN SATURATION: 99 % | WEIGHT: 153.4 LBS | SYSTOLIC BLOOD PRESSURE: 136 MMHG | TEMPERATURE: 98.6 F | HEIGHT: 61 IN | BODY MASS INDEX: 28.96 KG/M2 | DIASTOLIC BLOOD PRESSURE: 86 MMHG

## 2020-01-22 PROCEDURE — 99214 OFFICE O/P EST MOD 30 MIN: CPT | Performed by: INTERNAL MEDICINE

## 2020-01-22 RX ORDER — TIZANIDINE 2 MG/1
2 TABLET ORAL EVERY 8 HOURS PRN
Qty: 60 TABLET | Refills: 2 | Status: SHIPPED | OUTPATIENT
Start: 2020-01-22 | End: 2020-09-25 | Stop reason: SINTOL

## 2020-01-22 ASSESSMENT — PATIENT HEALTH QUESTIONNAIRE - PHQ9
2. FEELING DOWN, DEPRESSED OR HOPELESS: 0
SUM OF ALL RESPONSES TO PHQ9 QUESTIONS 1 & 2: 0
SUM OF ALL RESPONSES TO PHQ QUESTIONS 1-9: 0
SUM OF ALL RESPONSES TO PHQ QUESTIONS 1-9: 0
1. LITTLE INTEREST OR PLEASURE IN DOING THINGS: 0

## 2020-01-22 ASSESSMENT — ENCOUNTER SYMPTOMS
BLOOD IN STOOL: 0
EYE PAIN: 0
RHINORRHEA: 0
BACK PAIN: 1
DIARRHEA: 0
SHORTNESS OF BREATH: 0
COLOR CHANGE: 0
VOICE CHANGE: 0
SORE THROAT: 0
EYE REDNESS: 0
WHEEZING: 0
CHEST TIGHTNESS: 0
SINUS PAIN: 0
COUGH: 0
EYE DISCHARGE: 0
ABDOMINAL PAIN: 0
VOMITING: 0
SINUS PRESSURE: 0

## 2020-01-22 NOTE — PATIENT INSTRUCTIONS
Patient Education        Learning About Anxiety Disorders  What are anxiety disorders? Anxiety disorders are a type of medical problem. They cause severe anxiety. When you feel anxious, you feel that something bad is about to happen. This feeling interferes with your life. These disorders include:  · Generalized anxiety disorder. You feel worried and stressed about many everyday events and activities. This goes on for several months and disrupts your life on most days. · Panic disorder. You have repeated panic attacks. A panic attack is a sudden, intense fear or anxiety. It may make you feel short of breath. Your heart may pound. · Social anxiety disorder. You feel very anxious about what you will say or do in front of people. For example, you may be scared to talk or eat in public. This problem affects your daily life. · Phobias. You are very scared of a specific object, situation, or activity. For example, you may fear spiders, high places, or small spaces. What are the symptoms? Generalized anxiety disorder  Symptoms may include:  · Feeling worried and stressed about many things almost every day. · Feeling tired or irritable. You may have a hard time concentrating. · Having headaches or muscle aches. · Having a hard time getting to sleep or staying asleep. Panic disorder  You may have repeated panic attacks when there is no reason for feeling afraid. You may change your daily activities because you worry that you will have another attack. Symptoms may include:  · Intense fear, terror, or anxiety. · Trouble breathing or very fast breathing. · Chest pain or tightness. · A heartbeat that races or is not regular. Social anxiety disorder  Symptoms may include:  · Fear about a social situation, such as eating in front of others or speaking in public. You may worry a lot. Or you may be afraid that something bad will happen. · Anxiety that can cause you to blush, sweat, and feel shaky.   · A heartbeat that is faster than normal.  · A hard time focusing. Phobias  Symptoms may include:  · More fear than most people of being around an object, being in a situation, or doing an activity. You might also be stressed about the chance of being around the thing you fear. · Worry about losing control, panicking, fainting, or having physical symptoms like a faster heartbeat when you are around the situation or object. How are these disorders treated? Anxiety disorders can be treated with medicines or counseling. A combination of both may be used. Medicines may include:  · Antidepressants. These may help your symptoms by keeping chemicals in your brain in balance. · Benzodiazepines. These may give you short-term relief of your symptoms. Some people use cognitive-behavioral therapy. A therapist helps you learn to change stressful or bad thoughts into helpful thoughts. Lead a healthy lifestyle  A healthy lifestyle may help you feel better. · Get at least 30 minutes of exercise on most days of the week. Walking is a good choice. · Eat a healthy diet. Include fruits, vegetables, lean proteins, and whole grains in your diet each day. · Try to go to bed at the same time every night. Try for 8 hours of sleep a night. · Find ways to manage stress. Try relaxation exercises. · Avoid alcohol and illegal drugs. Follow-up care is a key part of your treatment and safety. Be sure to make and go to all appointments, and call your doctor if you are having problems. It's also a good idea to know your test results and keep a list of the medicines you take. Where can you learn more? Go to https://kevin.[employer]. org and sign in to your [employer] account. Enter Y750 in the [facility] box to learn more about \"Learning About Anxiety Disorders. \"     If you do not have an account, please click on the \"Sign Up Now\" link. Current as of: May 28, 2019  Content Version: 12.3  © 6346-7259 Healthwise, Incorporated. Care instructions adapted under license by [facility] ([facility]). If you have questions about a medical condition or this instruction, always ask your healthcare professional. [address] any warranty or liability for your use of this information. Patient Education        Recovering From Depression: Care Instructions  Your Care Instructions    Taking good care of yourself is important as you recover from depression. In time, your symptoms will fade as your treatment takes hold. Do not give up. Instead, focus your energy on getting better. Your mood will improve. It just takes some time. Focus on things that can help you feel better, such as being with friends and family, eating well, and getting enough rest. But take things slowly. Do not do too much too soon. You will begin to feel better gradually. Follow-up care is a key part of your treatment and safety. Be sure to make and go to all appointments, and call your doctor if you are having problems. It's also a good idea to know your test results and keep a list of the medicines you take. How can you care for yourself at home? Be realistic  · If you have a large task to do, break it up into smaller steps you can handle, and just do what you can. · You may want to put off important decisions until your depression has lifted. If you have plans that will have a major impact on your life, such as marriage, divorce, or a job change, try to wait a bit. Talk it over with friends and loved ones who can help you look at the overall picture first.  · Reaching out to people for help is important. Do not isolate yourself. Let your family and friends help you. Find someone you can trust and confide in, and talk to that person. · Be patient, and be kind to yourself. Remember that depression is not your fault and is not something you can overcome with willpower alone. Treatment is necessary for depression, just like for any other illness.  Feeling better takes time, and your mood will improve little by little. Stay active  · Stay busy and get outside. Take a walk, or try some other light exercise. · Talk with your doctor about an exercise program. Exercise can help with mild depression. · Go to a movie or concert. Take part in a Sabianism activity or other social gathering. Go to a ball game. · Ask a friend to have dinner with you. Take care of yourself  · Eat a balanced diet with plenty of fresh fruits and vegetables, whole grains, and lean protein. If you have lost your appetite, eat small snacks rather than large meals. · Avoid drinking alcohol or using illegal drugs. Do not take medicines that have not been prescribed for you. They may interfere with medicines you may be taking for depression, or they may make your depression worse. · Take your medicines exactly as they are prescribed. You may start to feel better within 1 to 3 weeks of taking antidepressant medicine. But it can take as many as 6 to 8 weeks to see more improvement. If you have questions or concerns about your medicines, or if you do not notice any improvement by 3 weeks, talk to your doctor. · If you have any side effects from your medicine, tell your doctor. Antidepressants can make you feel tired, dizzy, or nervous. Some people have dry mouth, constipation, headaches, sexual problems, or diarrhea. Many of these side effects are mild and will go away on their own after you have been taking the medicine for a few weeks. Some may last longer. Talk to your doctor if side effects are bothering you too much. You might be able to try a different medicine. · Get enough sleep. If you have problems sleeping:  ? Go to bed at the same time every night, and get up at the same time every morning. ? Keep your bedroom dark and quiet. ? Do not exercise after 5:00 p.m.  ? Avoid drinks with caffeine after 5:00 p.m. · Avoid sleeping pills unless they are prescribed by the doctor treating your depression. disclaims any warranty or liability for your use of this information. Patient Education        Tension Headache: Care Instructions  Your Care Instructions  Most headaches are tension headaches. These headaches tend to happen again, especially if you are under stress. A tension headache may cause pain or a feeling of pressure all over your head. You probably can't pinpoint the center of the pain. If you keep getting tension headaches, the best thing you can do to limit them is to find out what is causing them and then make changes in those areas. Follow-up care is a key part of your treatment and safety. Be sure to make and go to all appointments, and call your doctor if you are having problems. It's also a good idea to know your test results and keep a list of the medicines you take. How can you care for yourself at home? · Rest in a quiet, dark room with a cool cloth on your forehead until your headache is gone. Close your eyes, and try to relax or go to sleep. Don't watch TV or read. Avoid using the computer. · Use a warm, moist towel or a heating pad set on low to relax tight shoulder and neck muscles. · Have someone gently massage your neck and shoulders. · Take pain medicines exactly as directed. ? If the doctor gave you a prescription medicine for pain, take it as prescribed. ? If you are not taking a prescription pain medicine, ask your doctor if you can take an over-the-counter medicine. · Be careful not to take pain medicine more often than the instructions allow, because you may get worse or more frequent headaches when the medicine wears off. · If you get another tension headache, stop what you are doing and sit quietly for a moment. Close your eyes and breathe slowly. Try to relax your head and neck muscles. · Do not ignore new symptoms that occur with a headache, such as fever, weakness or numbness, vision changes, or confusion. These may be signs of a more serious problem.   To help or seek immediate medical care if:    · You have new or worse nausea and vomiting.     · You have a new or higher fever.     · Your headache gets much worse.    Watch closely for changes in your health, and be sure to contact your doctor if:    · You are not getting better after 2 days (48 hours). Where can you learn more? Go to https://chpepiceweb.[employer]. org and sign in to your [employer] account. Enter 86 17 50 in the [employer] box to learn more about \"Tension Headache: Care Instructions. \"     If you do not have an account, please click on the \"Sign Up Now\" link. Current as of: March 28, 2019  Content Version: 12.3  © 6649-8307 [employer]. Care instructions adapted under license by [facility] ([facility]). If you have questions about a medical condition or this instruction, always ask your healthcare professional. [place] [address] any warranty or liability for your use of this information. Patient Education        Tension Headache: Care Instructions  Your Care Instructions  Most headaches are tension headaches. These headaches tend to happen again, especially if you are under stress. A tension headache may cause pain or a feeling of pressure all over your head. You probably can't pinpoint the center of the pain. If you keep getting tension headaches, the best thing you can do to limit them is to find out what is causing them and then make changes in those areas. Follow-up care is a key part of your treatment and safety. Be sure to make and go to all appointments, and call your doctor if you are having problems. It's also a good idea to know your test results and keep a list of the medicines you take. How can you care for yourself at home? · Rest in a quiet, dark room with a cool cloth on your forehead until your headache is gone. Close your eyes, and try to relax or go to sleep. Don't watch TV or read. Avoid using the computer.   · Use a warm, moist towel or a heating pad set on low to relax tight shoulder and neck muscles. · Have someone gently massage your neck and shoulders. · Take pain medicines exactly as directed. ? If the doctor gave you a prescription medicine for pain, take it as prescribed. ? If you are not taking a prescription pain medicine, ask your doctor if you can take an over-the-counter medicine. · Be careful not to take pain medicine more often than the instructions allow, because you may get worse or more frequent headaches when the medicine wears off. · If you get another tension headache, stop what you are doing and sit quietly for a moment. Close your eyes and breathe slowly. Try to relax your head and neck muscles. · Do not ignore new symptoms that occur with a headache, such as fever, weakness or numbness, vision changes, or confusion. These may be signs of a more serious problem. To help prevent headaches  · Keep a headache diary so you can figure out what triggers your headaches. Avoiding triggers may help you prevent headaches. Record when each headache began, how long it lasted, and what the pain was like (throbbing, aching, stabbing, or dull). List anything that may have triggered the headache, such as being physically or emotionally stressed or being anxious or depressed. Other possible triggers are hunger, anger, fatigue, poor posture, and muscle strain. · Find healthy ways to deal with stress. Headaches are most common during or right after stressful times. Take time to relax before and after you do something that has caused a headache in the past.  · Exercise daily to relieve stress. Relaxation exercises may help reduce tension. · Get plenty of sleep. · Eat regularly and well. Long periods without food can trigger a headache. · Treat yourself to a massage. Some people find that massages are very helpful in relieving tension. · Try to keep your muscles relaxed by keeping good posture.  Check your jaw, face, neck, and shoulder muscles for tension, and try to relax them. When sitting at a desk, change positions often, and stretch for 30 seconds each hour. · Reduce eyestrain from computers by blinking frequently and looking away from the computer screen every so often. Make sure you have proper eyewear and that your monitor is set up properly, about an arm's length away. When should you call for help? Call 911 anytime you think you may need emergency care. For example, call if:    · You have signs of a stroke. These may include:  ? Sudden numbness, paralysis, or weakness in your face, arm, or leg, especially on only one side of your body. ? Sudden vision changes. ? Sudden trouble speaking. ? Sudden confusion or trouble understanding simple statements. ? Sudden problems with walking or balance. ? A sudden, severe headache that is different from past headaches.    Call your doctor now or seek immediate medical care if:    · You have new or worse nausea and vomiting.     · You have a new or higher fever.     · Your headache gets much worse.    Watch closely for changes in your health, and be sure to contact your doctor if:    · You are not getting better after 2 days (48 hours). Where can you learn more? Go to https://[employer]. org and sign in to your [employer] account. Enter 10 17 89 in the [employer] box to learn more about \"Tension Headache: Care Instructions. \"     If you do not have an account, please click on the \"Sign Up Now\" link. Current as of: March 28, 2019  Content Version: 12.3  © 3715-7532 Healthwise, Incorporated. Care instructions adapted under license by [facility] ([facility]). If you have questions about a medical condition or this instruction, always ask your healthcare professional. [place] [address] any warranty or liability for your use of this information.

## 2020-01-22 NOTE — PROGRESS NOTES
After obtaining consent, and per orders of Dr. Opal Pratt, injection of Shingrix given in Right deltoid by Rei Brewer. Patient instructed to remain in clinic for 20 minutes afterwards, and to report any adverse reaction to me immediately.

## 2020-01-22 NOTE — PROGRESS NOTES
Efrain Boss is a 53 y.o. female who presents today for   Chief Complaint   Patient presents with    Follow-up     2 month mood check       Hypertension   Associated symptoms include headaches and neck pain. Pertinent negatives include no chest pain, palpitations or shortness of breath. Headache    Associated symptoms include back pain and neck pain. Pertinent negatives include no abdominal pain, coughing, dizziness, ear pain, eye pain, eye redness, fever, numbness, rhinorrhea, sinus pressure, sore throat, vomiting or weakness. Hyperlipidemia   Pertinent negatives include no chest pain or shortness of breath. 47 y/o WF here for f/u Major depression and HORACE after increase on pristiq at last visit. Also f/u on migraine HAs, RLS, chronic neck and bilateral LBP, HORACE, MDD, and elevated BMI/overweight. Mood has been a lot been better with the increased dose but her HAs have worsened again over the past month. She does not think she has a sinus infection but she does stay congested. She does note when her grandchildren get home from school and are very noisy, she starts to feel tense and gets squeezing in posterior neck up the back of her head and then all over like tension possibly. She cannot take Excedrin that contains aspirin but the fioricet she has helps the headaches some. She has been more congestion, sinus pressure, and dry nasal passages for the past few days. No sinus pain or green sinus drainage or fever. She is out of her flonase NS but she plans to get some today to resume for allergies. She saw a multi-pack of Nasacort at [facility] for only $27 and wonders if this would work just as well.      BMI Readings from Last 3 Encounters:   01/28/20 28.68 kg/m²   01/22/20 28.98 kg/m²   12/13/19 28.91 kg/m²     Wt Readings from Last 3 Encounters:   01/28/20 151 lb 12.8 oz (68.9 kg)   01/22/20 153 lb 6.4 oz (69.6 kg)   12/13/19 153 lb (69.4 kg)       She takes 600 mg of gabapentin at bedtime for migraine prophylaxis as well as treatment of RLS and chronic neck/LBP. She cannot tolerate twice daily gabapentin because it makes her too sleepy during the day however. She takes tramadol for chronic neck/back pain once every 1-2 days and for more severe migraine HAs at times if fioricet is not effective. Migraine HA frequency has been more frequent recently. She has not had any escalation in use. She has a hx of vitamin D deficiency and usually takes OTC vitamin D supplement to treat. Review of Systems   Constitutional: Negative for appetite change, chills, fatigue, fever and unexpected weight change. HENT: Positive for congestion. Negative for ear pain, rhinorrhea, sinus pressure, sinus pain, sore throat and voice change. See HPI   Eyes: Negative for pain, discharge and redness. Respiratory: Negative for cough, chest tightness, shortness of breath and wheezing. Cardiovascular: Negative for chest pain and palpitations. Gastrointestinal: Negative for abdominal pain, blood in stool, diarrhea and vomiting. Endocrine: Negative for cold intolerance, heat intolerance, polydipsia and polyphagia. Genitourinary: Negative for dysuria and hematuria. Musculoskeletal: Positive for arthralgias, back pain and neck pain. Negative for neck stiffness. See HPI   Skin: Negative for color change and rash. Allergic/Immunologic: Positive for environmental allergies. Neurological: Positive for headaches. Negative for dizziness, speech difficulty, weakness and numbness. Hematological: Negative for adenopathy. Does not bruise/bleed easily. Psychiatric/Behavioral: Positive for sleep disturbance. Negative for confusion, dysphoric mood, self-injury and suicidal ideas. The patient is nervous/anxious.          Anxiety and insomnia improving       Past Medical History:   Diagnosis Date    Allergic rhinitis     Chronic bilateral low back pain with left-sided sciatica 12/1/2017    Chronic bilateral low back pain with left-sided sciatica     Colon polyp     Depression with anxiety     Depression with anxiety     Obesity     Osteoarthritis        Current Outpatient Medications   Medication Sig Dispense Refill    tiZANidine (ZANAFLEX) 2 MG tablet Take 1 tablet by mouth every 8 hours as needed (muscle spasm/tension) 60 tablet 2    gabapentin (NEURONTIN) 300 MG capsule TAKE 1 CAPSULE BY MOUTH TWICE DAILY 60 capsule 2    butalbital-acetaminophen-caffeine (FIORICET, ESGIC) -40 MG per tablet TAKE 1 TABLET BY MOUTH EVERY 8 HOURS AS NEEDED FOR HEADACHE OR  MIGRAINES 90 tablet 0    desvenlafaxine succinate (PRISTIQ) 100 MG TB24 extended release tablet Take 1 tablet by mouth daily 30 tablet 5    triamcinolone (NASACORT ALLERGY 24HR) 55 MCG/ACT nasal inhaler 2 sprays by Each Nostril route daily 1 Inhaler 5    levothyroxine (SYNTHROID) 88 MCG tablet TAKE 1 TABLET BY MOUTH ONCE DAILY 90 tablet 3    celecoxib (CELEBREX) 200 MG capsule Take 1 capsule by mouth daily as needed for Pain (arthritis) 30 capsule 5    valACYclovir (VALTREX) 500 MG tablet       Potassium 99 MG TABS Take 1 tablet by mouth daily      BIOTIN PO Take 1 tablet by mouth daily      TURMERIC PO Take 1 tablet by mouth daily      vitamin B-12 (CYANOCOBALAMIN) 1000 MCG tablet Take 1,000 mcg by mouth daily      Multiple Vitamins-Minerals (MULTIVITAMIN ADULT PO) Take by mouth      Omega 3 1000 MG CAPS Take by mouth      Cholecalciferol (VITAMIN D3) 3000 units TABS Take by mouth      hydrocodone-chlorpheniramine (TUSSIONEX) 10-8 MG/5ML SUER Take 5 mLs by mouth every 12 hours as needed (cough) for up to 10 days.  115 mL 0    methylPREDNISolone (MEDROL DOSEPACK) 4 MG tablet Take by mouth as directed 1 kit 0    levofloxacin (LEVAQUIN) 500 MG tablet Take 1 tablet by mouth daily for 10 days Take with food 10 tablet 0    benzonatate (TESSALON) 100 MG capsule Take 2 capsules by mouth 3 times daily as needed for Cough 60 capsule 1     No current facility-administered medications for this visit. Allergies   Allergen Reactions    Augmentin [Amoxicillin-Pot Clavulanate] Diarrhea    Aspirin Other (See Comments)       Past Surgical History:   Procedure Laterality Date     SECTION      COLONOSCOPY  2015    colon polyp x1, recheck in 5 yrs (Dr Rob Fernandez)   [address]         Social History     Tobacco Use    Smoking status: Never Smoker    Smokeless tobacco: Never Used   Substance Use Topics    Alcohol use: Yes     Comment: rarely    Drug use: No       Family History   Problem Relation Age of Onset    Heart Disease Mother     High Blood Pressure Mother     Other Mother         skin CA    Osteoporosis Mother     Heart Disease Father     High Blood Pressure Father     Other Father         jaw CA    Heart Disease Sister     Diabetes Sister     Stroke Sister     High Blood Pressure Sister     Other Sister         fibromyalgia, thyroid CA       /86   Pulse 98   Temp 98.6 °F (37 °C)   Ht 5' 1\" (1.549 m)   Wt 153 lb 6.4 oz (69.6 kg)   SpO2 99%   BMI 28.98 kg/m²     Physical Exam   Constitutional: She is oriented to person, place, and time. She does not appear ill. No distress. Overweight WF, appears tired but NAD   HENT:   Head: Normocephalic and atraumatic. Right Ear: External ear and ear canal normal. Tympanic membrane is retracted. Tympanic membrane is not erythematous. Left Ear: External ear and ear canal normal. Tympanic membrane is retracted. Tympanic membrane is not erythematous. Nose: Mucosal edema present. Right sinus exhibits no maxillary sinus tenderness and no frontal sinus tenderness. Left sinus exhibits no maxillary sinus tenderness and no frontal sinus tenderness. Mouth/Throat: Uvula is midline, oropharynx is clear and moist and mucous membranes are normal. No oropharyngeal exudate. Eyes: Pupils are equal, round, and reactive to light.  Conjunctivae and EOM exacerbated. ok to use OTC  Triamcinolone NS for better control of chronic congestion and post-nasal gtt and encouraged daily use. Follow up if symptoms worsen or fail to improve with consistent use. 5. shingrix updated today  6. ECPE and follow up in 3 mths with lab orders provided to follow up on chronic medical problems. Over 50% of the total visit time of 30 min was spent on counseling and/or coordination of care of:   1. Moderate recurrent major depression ([address]    2. HORACE (generalized anxiety disorder)    3. Episodic tension-type headache, not intractable    4. RLS (restless legs syndrome)    5. Muscle spasm    6. Migraine without aura and without status migrainosus, not intractable    7. Idiopathic insomnia    8. Acquired hypothyroidism    9. Mixed hyperlipidemia    10. Vitamin D deficiency    11. B12 deficiency    12. Iron deficiency    13. Overweight (BMI 25.0-29.9)    14. Need for shingles vaccine    15. Annual physical exam       Orders Placed This Encounter   Procedures    CBC Auto Differential     Standing Status:   Future     Standing Expiration Date:   1/22/2021    Comprehensive Metabolic Panel     Standing Status:   Future     Standing Expiration Date:   1/22/2021    Lipid Panel     Standing Status:   Future     Standing Expiration Date:   1/22/2021     Order Specific Question:   Is Patient Fasting?/# of Hours     Answer:   yes/8 hrs    TSH without Reflex     Standing Status:   Future     Standing Expiration Date:   1/22/2021    T4, Free     Standing Status:   Future     Standing Expiration Date:   1/22/2021    Vitamin D 25 Hydroxy     Standing Status:   Future     Standing Expiration Date:   1/22/2021    Vitamin B12     Standing Status:   Future     Standing Expiration Date:   1/22/2021    Iron and TIBC     Standing Status:   Future     Standing Expiration Date:   1/22/2021     Order Specific Question:   Is Patient Fasting? Answer:   yes     Order Specific Question:   No of Hours? breathing. · Chest pain or tightness. · A heartbeat that races or is not regular. Social anxiety disorder  Symptoms may include:  · Fear about a social situation, such as eating in front of others or speaking in public. You may worry a lot. Or you may be afraid that something bad will happen. · Anxiety that can cause you to blush, sweat, and feel shaky. · A heartbeat that is faster than normal.  · A hard time focusing. Phobias  Symptoms may include:  · More fear than most people of being around an object, being in a situation, or doing an activity. You might also be stressed about the chance of being around the thing you fear. · Worry about losing control, panicking, fainting, or having physical symptoms like a faster heartbeat when you are around the situation or object. How are these disorders treated? Anxiety disorders can be treated with medicines or counseling. A combination of both may be used. Medicines may include:  · Antidepressants. These may help your symptoms by keeping chemicals in your brain in balance. · Benzodiazepines. These may give you short-term relief of your symptoms. Some people use cognitive-behavioral therapy. A therapist helps you learn to change stressful or bad thoughts into helpful thoughts. Lead a healthy lifestyle  A healthy lifestyle may help you feel better. · Get at least 30 minutes of exercise on most days of the week. Walking is a good choice. · Eat a healthy diet. Include fruits, vegetables, lean proteins, and whole grains in your diet each day. · Try to go to bed at the same time every night. Try for 8 hours of sleep a night. · Find ways to manage stress. Try relaxation exercises. · Avoid alcohol and illegal drugs. Follow-up care is a key part of your treatment and safety. Be sure to make and go to all appointments, and call your doctor if you are having problems. It's also a good idea to know your test results and keep a list of the medicines you take.   Where can you learn more? Go to https://chpepiceweb.[employer]. org and sign in to your Tapstream account. Enter A462 in the Prizzm box to learn more about \"Learning About Anxiety Disorders. \"     If you do not have an account, please click on the \"Sign Up Now\" link. Current as of: May 28, 2019  Content Version: 12.3  © 0303-0138 SeeMore [employer]. Care instructions adapted under license by [facility] ([facility]). If you have questions about a medical condition or this instruction, always ask your healthcare professional. [place] [address] any warranty or liability for your use of this information. Patient Education        Recovering From Depression: Care Instructions  Your Care Instructions    Taking good care of yourself is important as you recover from depression. In time, your symptoms will fade as your treatment takes hold. Do not give up. Instead, focus your energy on getting better. Your mood will improve. It just takes some time. Focus on things that can help you feel better, such as being with friends and family, eating well, and getting enough rest. But take things slowly. Do not do too much too soon. You will begin to feel better gradually. Follow-up care is a key part of your treatment and safety. Be sure to make and go to all appointments, and call your doctor if you are having problems. It's also a good idea to know your test results and keep a list of the medicines you take. How can you care for yourself at home? Be realistic  · If you have a large task to do, break it up into smaller steps you can handle, and just do what you can. · You may want to put off important decisions until your depression has lifted. If you have plans that will have a major impact on your life, such as marriage, divorce, or a job change, try to wait a bit.  Talk it over with friends and loved ones who can help you look at the overall picture first.  · Reaching out to people for help medicine wears off. · If you get another tension headache, stop what you are doing and sit quietly for a moment. Close your eyes and breathe slowly. Try to relax your head and neck muscles. · Do not ignore new symptoms that occur with a headache, such as fever, weakness or numbness, vision changes, or confusion. These may be signs of a more serious problem. To help prevent headaches  · Keep a headache diary so you can figure out what triggers your headaches. Avoiding triggers may help you prevent headaches. Record when each headache began, how long it lasted, and what the pain was like (throbbing, aching, stabbing, or dull). List anything that may have triggered the headache, such as being physically or emotionally stressed or being anxious or depressed. Other possible triggers are hunger, anger, fatigue, poor posture, and muscle strain. · Find healthy ways to deal with stress. Headaches are most common during or right after stressful times. Take time to relax before and after you do something that has caused a headache in the past.  · Exercise daily to relieve stress. Relaxation exercises may help reduce tension. · Get plenty of sleep. · Eat regularly and well. Long periods without food can trigger a headache. · Treat yourself to a massage. Some people find that massages are very helpful in relieving tension. · Try to keep your muscles relaxed by keeping good posture. Check your jaw, face, neck, and shoulder muscles for tension, and try to relax them. When sitting at a desk, change positions often, and stretch for 30 seconds each hour. · Reduce eyestrain from computers by blinking frequently and looking away from the computer screen every so often. Make sure you have proper eyewear and that your monitor is set up properly, about an arm's length away. When should you call for help? Call 911 anytime you think you may need emergency care. For example, call if:    · You have signs of a stroke.  These may include:  ? Sudden numbness, paralysis, or weakness in your face, arm, or leg, especially on only one side of your body. ? Sudden vision changes. ? Sudden trouble speaking. ? Sudden confusion or trouble understanding simple statements. ? Sudden problems with walking or balance. ? A sudden, severe headache that is different from past headaches.    Call your doctor now or seek immediate medical care if:    · You have new or worse nausea and vomiting.     · You have a new or higher fever.     · Your headache gets much worse.    Watch closely for changes in your health, and be sure to contact your doctor if:    · You are not getting better after 2 days (48 hours). Where can you learn more? Go to https://NextUser.[employer]. org and sign in to your Audigence account. Enter 36 [id number] in the CarePartners Plus box to learn more about \"Tension Headache: Care Instructions. \"     If you do not have an account, please click on the \"Sign Up Now\" link. Current as of: March 28, 2019  Content Version: 12.3  © 9319-6599 [employer]vance. Care instructions adapted under license by [facility] ([facility]). If you have questions about a medical condition or this instruction, always ask your healthcare professional. [place] [address] any warranty or liability for your use of this information. Patient Education        Tension Headache: Care Instructions  Your Care Instructions  Most headaches are tension headaches. These headaches tend to happen again, especially if you are under stress. A tension headache may cause pain or a feeling of pressure all over your head. You probably can't pinpoint the center of the pain. If you keep getting tension headaches, the best thing you can do to limit them is to find out what is causing them and then make changes in those areas. Follow-up care is a key part of your treatment and safety.  Be sure to make and go to all appointments, and call your doctor if you are having problems. It's also a good idea to know your test results and keep a list of the medicines you take. How can you care for yourself at home? · Rest in a quiet, dark room with a cool cloth on your forehead until your headache is gone. Close your eyes, and try to relax or go to sleep. Don't watch TV or read. Avoid using the computer. · Use a warm, moist towel or a heating pad set on low to relax tight shoulder and neck muscles. · Have someone gently massage your neck and shoulders. · Take pain medicines exactly as directed. ? If the doctor gave you a prescription medicine for pain, take it as prescribed. ? If you are not taking a prescription pain medicine, ask your doctor if you can take an over-the-counter medicine. · Be careful not to take pain medicine more often than the instructions allow, because you may get worse or more frequent headaches when the medicine wears off. · If you get another tension headache, stop what you are doing and sit quietly for a moment. Close your eyes and breathe slowly. Try to relax your head and neck muscles. · Do not ignore new symptoms that occur with a headache, such as fever, weakness or numbness, vision changes, or confusion. These may be signs of a more serious problem. To help prevent headaches  · Keep a headache diary so you can figure out what triggers your headaches. Avoiding triggers may help you prevent headaches. Record when each headache began, how long it lasted, and what the pain was like (throbbing, aching, stabbing, or dull). List anything that may have triggered the headache, such as being physically or emotionally stressed or being anxious or depressed. Other possible triggers are hunger, anger, fatigue, poor posture, and muscle strain. · Find healthy ways to deal with stress. Headaches are most common during or right after stressful times.  Take time to relax before and after you do something that has caused a headache in the past.  · Exercise daily to relieve stress. Relaxation exercises may help reduce tension. · Get plenty of sleep. · Eat regularly and well. Long periods without food can trigger a headache. · Treat yourself to a massage. Some people find that massages are very helpful in relieving tension. · Try to keep your muscles relaxed by keeping good posture. Check your jaw, face, neck, and shoulder muscles for tension, and try to relax them. When sitting at a desk, change positions often, and stretch for 30 seconds each hour. · Reduce eyestrain from computers by blinking frequently and looking away from the computer screen every so often. Make sure you have proper eyewear and that your monitor is set up properly, about an arm's length away. When should you call for help? Call 911 anytime you think you may need emergency care. For example, call if:    · You have signs of a stroke. These may include:  ? Sudden numbness, paralysis, or weakness in your face, arm, or leg, especially on only one side of your body. ? Sudden vision changes. ? Sudden trouble speaking. ? Sudden confusion or trouble understanding simple statements. ? Sudden problems with walking or balance. ? A sudden, severe headache that is different from past headaches.    Call your doctor now or seek immediate medical care if:    · You have new or worse nausea and vomiting.     · You have a new or higher fever.     · Your headache gets much worse.    Watch closely for changes in your health, and be sure to contact your doctor if:    · You are not getting better after 2 days (48 hours). Where can you learn more? Go to https://"[employer]".Nourish. org and sign in to your Sproom account. Enter 05 [id number] in the SoftRun box to learn more about \"Tension Headache: Care Instructions. \"     If you do not have an account, please click on the \"Sign Up Now\" link. Current as of: March 28, 2019  Content Version: 12.3  © 4033-2100 Healthwise, [facility].  Care

## 2020-01-28 ENCOUNTER — OFFICE VISIT (OUTPATIENT)
Dept: FAMILY MEDICINE CLINIC | Age: 55
End: 2020-01-28
Payer: COMMERCIAL

## 2020-01-28 VITALS
DIASTOLIC BLOOD PRESSURE: 80 MMHG | BODY MASS INDEX: 28.66 KG/M2 | OXYGEN SATURATION: 98 % | TEMPERATURE: 97.8 F | HEART RATE: 88 BPM | HEIGHT: 61 IN | WEIGHT: 151.8 LBS | SYSTOLIC BLOOD PRESSURE: 118 MMHG

## 2020-01-28 LAB
INFLUENZA A ANTIBODY: NORMAL
INFLUENZA B ANTIBODY: NORMAL
S PYO AG THROAT QL: NORMAL

## 2020-01-28 PROCEDURE — 99213 OFFICE O/P EST LOW 20 MIN: CPT | Performed by: INTERNAL MEDICINE

## 2020-01-28 PROCEDURE — 96372 THER/PROPH/DIAG INJ SC/IM: CPT | Performed by: INTERNAL MEDICINE

## 2020-01-28 PROCEDURE — 87880 STREP A ASSAY W/OPTIC: CPT | Performed by: INTERNAL MEDICINE

## 2020-01-28 PROCEDURE — 87804 INFLUENZA ASSAY W/OPTIC: CPT | Performed by: INTERNAL MEDICINE

## 2020-01-28 RX ORDER — METHYLPREDNISOLONE ACETATE 80 MG/ML
80 INJECTION, SUSPENSION INTRA-ARTICULAR; INTRALESIONAL; INTRAMUSCULAR; SOFT TISSUE ONCE
Status: COMPLETED | OUTPATIENT
Start: 2020-01-28 | End: 2020-01-28

## 2020-01-28 RX ORDER — HYDROCODONE POLISTIREX AND CHLORPHENIRAMINE POLISTIREX 10; 8 MG/5ML; MG/5ML
5 SUSPENSION, EXTENDED RELEASE ORAL EVERY 12 HOURS PRN
Qty: 115 ML | Refills: 0 | Status: SHIPPED | OUTPATIENT
Start: 2020-01-28 | End: 2020-02-07

## 2020-01-28 RX ORDER — LEVOFLOXACIN 500 MG/1
500 TABLET, FILM COATED ORAL DAILY
Qty: 10 TABLET | Refills: 0 | Status: SHIPPED | OUTPATIENT
Start: 2020-01-28 | End: 2020-02-07

## 2020-01-28 RX ORDER — METHYLPREDNISOLONE 4 MG/1
TABLET ORAL
Qty: 1 KIT | Refills: 0 | Status: SHIPPED | OUTPATIENT
Start: 2020-01-28 | End: 2020-02-03

## 2020-01-28 RX ORDER — BENZONATATE 100 MG/1
200 CAPSULE ORAL 3 TIMES DAILY PRN
Qty: 60 CAPSULE | Refills: 1 | Status: SHIPPED | OUTPATIENT
Start: 2020-01-28 | End: 2020-02-07

## 2020-01-28 RX ADMIN — METHYLPREDNISOLONE ACETATE 80 MG: 80 INJECTION, SUSPENSION INTRA-ARTICULAR; INTRALESIONAL; INTRAMUSCULAR; SOFT TISSUE at 15:52

## 2020-01-28 ASSESSMENT — ENCOUNTER SYMPTOMS
DIARRHEA: 0
EYE PAIN: 0
BLOOD IN STOOL: 0
SINUS PRESSURE: 1
SHORTNESS OF BREATH: 0
RHINORRHEA: 1
COUGH: 1
EYE DISCHARGE: 0
CHEST TIGHTNESS: 0
COLOR CHANGE: 0
SINUS PAIN: 1
EYE REDNESS: 0
SORE THROAT: 1
VOMITING: 1
VOICE CHANGE: 1
WHEEZING: 0
ABDOMINAL PAIN: 0

## 2020-01-28 NOTE — PROGRESS NOTES
with left-sided sciatica 12/1/2017    Chronic bilateral low back pain with left-sided sciatica     Colon polyp     Depression with anxiety     Depression with anxiety     Obesity     Osteoarthritis        Current Outpatient Medications   Medication Sig Dispense Refill    hydrocodone-chlorpheniramine (TUSSIONEX) 10-8 MG/5ML SUER Take 5 mLs by mouth every 12 hours as needed (cough) for up to 10 days.  115 mL 0    methylPREDNISolone (MEDROL DOSEPACK) 4 MG tablet Take by mouth as directed 1 kit 0    levofloxacin (LEVAQUIN) 500 MG tablet Take 1 tablet by mouth daily for 10 days Take with food 10 tablet 0    benzonatate (TESSALON) 100 MG capsule Take 2 capsules by mouth 3 times daily as needed for Cough 60 capsule 1    tiZANidine (ZANAFLEX) 2 MG tablet Take 1 tablet by mouth every 8 hours as needed (muscle spasm/tension) 60 tablet 2    gabapentin (NEURONTIN) 300 MG capsule TAKE 1 CAPSULE BY MOUTH TWICE DAILY 60 capsule 2    butalbital-acetaminophen-caffeine (FIORICET, ESGIC) -40 MG per tablet TAKE 1 TABLET BY MOUTH EVERY 8 HOURS AS NEEDED FOR HEADACHE OR  MIGRAINES 90 tablet 0    desvenlafaxine succinate (PRISTIQ) 100 MG TB24 extended release tablet Take 1 tablet by mouth daily 30 tablet 5    triamcinolone (NASACORT ALLERGY 24HR) 55 MCG/ACT nasal inhaler 2 sprays by Each Nostril route daily 1 Inhaler 5    levothyroxine (SYNTHROID) 88 MCG tablet TAKE 1 TABLET BY MOUTH ONCE DAILY 90 tablet 3    celecoxib (CELEBREX) 200 MG capsule Take 1 capsule by mouth daily as needed for Pain (arthritis) 30 capsule 5    valACYclovir (VALTREX) 500 MG tablet       Potassium 99 MG TABS Take 1 tablet by mouth daily      BIOTIN PO Take 1 tablet by mouth daily      TURMERIC PO Take 1 tablet by mouth daily      vitamin B-12 (CYANOCOBALAMIN) 1000 MCG tablet Take 1,000 mcg by mouth daily      Multiple Vitamins-Minerals (MULTIVITAMIN ADULT PO) Take by mouth      Omega 3 1000 MG CAPS Take by mouth      Cholecalciferol (VITAMIN D3) 3000 units TABS Take by mouth       Current Facility-Administered Medications   Medication Dose Route Frequency Provider Last Rate Last Dose    methylPREDNISolone acetate (DEPO-MEDROL) injection 80 mg  80 mg Intramuscular Once Rudy Johnson MD           Allergies   Allergen Reactions    Augmentin [Amoxicillin-Pot Clavulanate] Diarrhea       Past Surgical History:   Procedure Laterality Date     SECTION      COLONOSCOPY  2015    colon polyp x1, recheck in 5 yrs (Dr Sun Chapman)   [address]         Social History     Tobacco Use    Smoking status: Never Smoker    Smokeless tobacco: Never Used   Substance Use Topics    Alcohol use: Yes     Comment: rarely    Drug use: No       Family History   Problem Relation Age of Onset    Heart Disease Mother     High Blood Pressure Mother     Other Mother         skin CA    Osteoporosis Mother     Heart Disease Father     High Blood Pressure Father     Other Father         jaw CA    Heart Disease Sister     Diabetes Sister     Stroke Sister     High Blood Pressure Sister     Other Sister         fibromyalgia, thyroid CA       /80   Pulse 88   Temp 97.8 °F (36.6 °C)   Ht 5' 1\" (1.549 m)   Wt 151 lb 12.8 oz (68.9 kg)   SpO2 98%   BMI 28.68 kg/m²     Physical Exam  Vitals signs reviewed. Constitutional:       General: She is not in acute distress. Appearance: She is well-developed and well-groomed. She is ill-appearing. She is not toxic-appearing. HENT:      Head: Normocephalic and atraumatic. Right Ear: External ear normal. Tympanic membrane is retracted. Tympanic membrane is not erythematous. Left Ear: External ear normal. Tympanic membrane is retracted. Tympanic membrane is not erythematous. Nose: Congestion present. No rhinorrhea. Right Turbinates: Swollen. Left Turbinates: Swollen.       Right Sinus: Maxillary sinus tenderness and frontal sinus nostril, and squeeze gently. Kaleen Oracio your nose. · Put a hot, wet towel or a warm gel pack on your face 3 or 4 times a day for 5 to 10 minutes each time. · Try a decongestant nasal spray like oxymetazoline (Afrin). Do not use it for more than 3 days in a row. Using it for more than 3 days can make your congestion worse. When should you call for help? Call your doctor now or seek immediate medical care if:    · You have new or worse swelling or redness in your face or around your eyes.     · You have a new or higher fever.    Watch closely for changes in your health, and be sure to contact your doctor if:    · You have new or worse facial pain.     · The mucus from your nose becomes thicker (like pus) or has new blood in it.     · You are not getting better as expected. Where can you learn more? Go to https://[employer].Iwebalize. org and sign in to your [employer] account. Enter Q267 in the [employer] box to learn more about \"Sinusitis: Care Instructions. \"     If you do not have an account, please click on the \"Sign Up Now\" link. Current as of: July 28, 2019  Content Version: 12.3  © 6433-2499 Healthwise, Incorporated. Care instructions adapted under license by [facility] ([facility]). If you have questions about a medical condition or this instruction, always ask your healthcare professional. [place] [address] any warranty or liability for your use of this information. Patient Education        Cough: Care Instructions  Your Care Instructions    A cough is your body's response to something that bothers your throat or airways. Many things can cause a cough. You might cough because of a cold or the flu, bronchitis, or asthma. Smoking, postnasal drip, allergies, and stomach acid that backs up into your throat also can cause coughs. A cough is a symptom, not a disease. Most coughs stop when the cause, such as a cold, goes away.  You can take a few steps at home to cough less and feel

## 2020-01-28 NOTE — PATIENT INSTRUCTIONS
Patient Education        Sinusitis: Care Instructions  Your Care Instructions    Sinusitis is an infection of the lining of the sinus cavities in your head. Sinusitis often follows a cold. It causes pain and pressure in your head and face. In most cases, sinusitis gets better on its own in 1 to 2 weeks. But some mild symptoms may last for several weeks. Sometimes antibiotics are needed. Follow-up care is a key part of your treatment and safety. Be sure to make and go to all appointments, and call your doctor if you are having problems. It's also a good idea to know your test results and keep a list of the medicines you take. How can you care for yourself at home? · Take an over-the-counter pain medicine, such as acetaminophen (Tylenol), ibuprofen (Advil, Motrin), or naproxen (Aleve). Read and follow all instructions on the label. · If the doctor prescribed antibiotics, take them as directed. Do not stop taking them just because you feel better. You need to take the full course of antibiotics. · Be careful when taking over-the-counter cold or flu medicines and Tylenol at the same time. Many of these medicines have acetaminophen, which is Tylenol. Read the labels to make sure that you are not taking more than the recommended dose. Too much acetaminophen (Tylenol) can be harmful. · Breathe warm, moist air from a steamy shower, a hot bath, or a sink filled with hot water. Avoid cold, dry air. Using a humidifier in your home may help. Follow the directions for cleaning the machine. · Use saline (saltwater) nasal washes to help keep your nasal passages open and wash out mucus and bacteria. You can buy saline nose drops at a grocery store or drugstore. Or you can make your own at home by adding 1 teaspoon of salt and 1 teaspoon of baking soda to 2 cups of distilled water. If you make your own, fill a bulb syringe with the solution, insert the tip into your nostril, and squeeze gently. Jaquez Roque your nose.   · Put a hot, wet towel or a warm gel pack on your face 3 or 4 times a day for 5 to 10 minutes each time. · Try a decongestant nasal spray like oxymetazoline (Afrin). Do not use it for more than 3 days in a row. Using it for more than 3 days can make your congestion worse. When should you call for help? Call your doctor now or seek immediate medical care if:    · You have new or worse swelling or redness in your face or around your eyes.     · You have a new or higher fever.    Watch closely for changes in your health, and be sure to contact your doctor if:    · You have new or worse facial pain.     · The mucus from your nose becomes thicker (like pus) or has new blood in it.     · You are not getting better as expected. Where can you learn more? Go to https://Demandforcepeartureweb.SETVI. org and sign in to your [employer] account. Enter O459 in the [employer] box to learn more about \"Sinusitis: Care Instructions. \"     If you do not have an account, please click on the \"Sign Up Now\" link. Current as of: July 28, 2019  Content Version: 12.3  © 2698-8650 [employer]. Care instructions adapted under license by [facility] ([facility]). If you have questions about a medical condition or this instruction, always ask your healthcare professional. [place] [address] any warranty or liability for your use of this information. Patient Education        Cough: Care Instructions  Your Care Instructions    A cough is your body's response to something that bothers your throat or airways. Many things can cause a cough. You might cough because of a cold or the flu, bronchitis, or asthma. Smoking, postnasal drip, allergies, and stomach acid that backs up into your throat also can cause coughs. A cough is a symptom, not a disease. Most coughs stop when the cause, such as a cold, goes away. You can take a few steps at home to cough less and feel better.   Follow-up care is a key part of your treatment and safety. Be sure to make and go to all appointments, and call your doctor if you are having problems. It's also a good idea to know your test results and keep a list of the medicines you take. How can you care for yourself at home? · Drink lots of water and other fluids. This helps thin the mucus and soothes a dry or sore throat. Honey or lemon juice in hot water or tea may ease a dry cough. · Take cough medicine as directed by your doctor. · Prop up your head on pillows to help you breathe and ease a dry cough. · Try cough drops to soothe a dry or sore throat. Cough drops don't stop a cough. Medicine-flavored cough drops are no better than candy-flavored drops or hard candy. · Do not smoke. Avoid secondhand smoke. If you need help quitting, talk to your doctor about stop-smoking programs and medicines. These can increase your chances of quitting for good. When should you call for help? Call 911 anytime you think you may need emergency care. For example, call if:    · You have severe trouble breathing.    Call your doctor now or seek immediate medical care if:    · You cough up blood.     · You have new or worse trouble breathing.     · You have a new or higher fever.     · You have a new rash.    Watch closely for changes in your health, and be sure to contact your doctor if:    · You cough more deeply or more often, especially if you notice more mucus or a change in the color of your mucus.     · You have new symptoms, such as a sore throat, an earache, or sinus pain.     · You do not get better as expected. Where can you learn more? Go to https://[employer].Now In Store. org and sign in to your [employer] account. Enter D279 in the Hers box to learn more about \"Cough: Care Instructions. \"     If you do not have an account, please click on the \"Sign Up Now\" link. Current as of: June 9, 2019  Content Version: 12.3  © 0068-6561 Healthwise, Incorporated.  Care instructions adapted under license by [facility] ([facility]). If you have questions about a medical condition or this instruction, always ask your healthcare professional. [address] any warranty or liability for your use of this information.

## 2020-02-07 RX ORDER — TRAMADOL HYDROCHLORIDE 50 MG/1
TABLET ORAL
Qty: 45 TABLET | Refills: 1 | Status: SHIPPED | OUTPATIENT
Start: 2020-02-07 | End: 2020-04-20

## 2020-02-18 RX ORDER — BUTALBITAL, ACETAMINOPHEN AND CAFFEINE 50; 325; 40 MG/1; MG/1; MG/1
TABLET ORAL
Qty: 90 TABLET | Refills: 0 | Status: SHIPPED | OUTPATIENT
Start: 2020-02-18 | End: 2020-03-20

## 2020-03-20 RX ORDER — BUTALBITAL, ACETAMINOPHEN AND CAFFEINE 50; 325; 40 MG/1; MG/1; MG/1
TABLET ORAL
Qty: 90 TABLET | Refills: 0 | Status: SHIPPED | OUTPATIENT
Start: 2020-03-20 | End: 2020-04-20

## 2020-03-25 ENCOUNTER — OFFICE VISIT (OUTPATIENT)
Age: 55
End: 2020-03-25
Payer: COMMERCIAL

## 2020-03-25 VITALS
HEIGHT: 61 IN | HEART RATE: 82 BPM | SYSTOLIC BLOOD PRESSURE: 122 MMHG | OXYGEN SATURATION: 93 % | WEIGHT: 150 LBS | BODY MASS INDEX: 28.32 KG/M2 | TEMPERATURE: 98.2 F | DIASTOLIC BLOOD PRESSURE: 82 MMHG

## 2020-03-25 LAB
INFLUENZA A ANTIBODY: NORMAL
INFLUENZA B ANTIBODY: NORMAL
S PYO AG THROAT QL: NORMAL

## 2020-03-25 PROCEDURE — 99213 OFFICE O/P EST LOW 20 MIN: CPT | Performed by: PHYSICIAN ASSISTANT

## 2020-03-25 PROCEDURE — 87804 INFLUENZA ASSAY W/OPTIC: CPT | Performed by: PHYSICIAN ASSISTANT

## 2020-03-25 PROCEDURE — 87880 STREP A ASSAY W/OPTIC: CPT | Performed by: PHYSICIAN ASSISTANT

## 2020-03-25 ASSESSMENT — ENCOUNTER SYMPTOMS: COUGH: 1

## 2020-03-25 NOTE — PROGRESS NOTES
Subjective:      Patient ID: Roderick Covington is a 53 y.o. female. HPI  Surjit Gomez presents with a 2-3 day history of congestion, cough. She states she went to donate platelets on Monday and had a 99 temperature.       Results for orders placed or performed in visit on 03/25/20   POCT Influenza A/B   Result Value Ref Range    Influenza A Ab NEG     Influenza B Ab NEG    POCT rapid strep A   Result Value Ref Range    Strep A Ag None Detected None Detected       Roderick Covington is a 53 y.o. female with the following history as recorded in [facility]:  Patient Active Problem List    Diagnosis Date Noted    Iron deficiency 12/03/2019    B12 deficiency 12/03/2019    Overweight (BMI 25.0-29.9) 09/02/2019    Spondylosis of cervical region without myelopathy or radiculopathy 09/02/2019    Chronic fatigue 03/14/2019    Difficulty concentrating 03/14/2019    Chronic bilateral low back pain with left-sided sciatica 12/01/2017    Acquired hypothyroidism 08/07/2017    Allergic rhinitis 08/07/2017    Vitamin D deficiency 08/07/2017    Dyspareunia in female 08/07/2017    Episodic tension type headache 08/07/2017    Idiopathic insomnia 08/07/2017    Migraine without aura 08/07/2017    Mixed hyperlipidemia 08/07/2017    Multiple thyroid nodules 08/07/2017    Generalized osteoarthritis 08/07/2017    RLS (restless legs syndrome) 08/07/2017     Current Outpatient Medications   Medication Sig Dispense Refill    butalbital-acetaminophen-caffeine (FIORICET, ESGIC) -40 MG per tablet TAKE 1 TABLET BY MOUTH EVERY 8 HOURS AS NEEDED FOR HEADACHE OR  MIGRAINE 90 tablet 0    tiZANidine (ZANAFLEX) 2 MG tablet Take 1 tablet by mouth every 8 hours as needed (muscle spasm/tension) 60 tablet 2    gabapentin (NEURONTIN) 300 MG capsule TAKE 1 CAPSULE BY MOUTH TWICE DAILY 60 capsule 2    desvenlafaxine succinate (PRISTIQ) 100 MG TB24 extended release tablet Take 1 tablet by mouth daily 30 tablet 5    triamcinolone (NASACORT ALLERGY 24HR) 55 MCG/ACT nasal inhaler 2 sprays by Each Nostril route daily 1 Inhaler 5    levothyroxine (SYNTHROID) 88 MCG tablet TAKE 1 TABLET BY MOUTH ONCE DAILY 90 tablet 3    celecoxib (CELEBREX) 200 MG capsule Take 1 capsule by mouth daily as needed for Pain (arthritis) 30 capsule 5    valACYclovir (VALTREX) 500 MG tablet       Potassium 99 MG TABS Take 1 tablet by mouth daily      BIOTIN PO Take 1 tablet by mouth daily      TURMERIC PO Take 1 tablet by mouth daily      vitamin B-12 (CYANOCOBALAMIN) 1000 MCG tablet Take 1,000 mcg by mouth daily      Multiple Vitamins-Minerals (MULTIVITAMIN ADULT PO) Take by mouth      Omega 3 1000 MG CAPS Take by mouth      Cholecalciferol (VITAMIN D3) 3000 units TABS Take by mouth       No current facility-administered medications for this visit. Allergies: Augmentin [amoxicillin-pot clavulanate] and Aspirin  Past Medical History:   Diagnosis Date    Allergic rhinitis     Chronic bilateral low back pain with left-sided sciatica 2017    Chronic bilateral low back pain with left-sided sciatica     Colon polyp     Depression with anxiety     Depression with anxiety     Obesity     Osteoarthritis      Past Surgical History:   Procedure Laterality Date     SECTION      COLONOSCOPY  2015    colon polyp x1, recheck in 5 yrs (Dr Sun Chapman)   [facility] Kim HYSTERECTOMY      MYOMECTOMY      TONSILLECTOMY       Family History   Problem Relation Age of Onset    Heart Disease Mother     High Blood Pressure Mother     Other Mother         skin CA    Osteoporosis Mother     Heart Disease Father     High Blood Pressure Father     Other Father         jaw CA    Heart Disease Sister     Diabetes Sister     Stroke Sister     High Blood Pressure Sister     Other Sister         fibromyalgia, thyroid CA     Social History     Tobacco Use    Smoking status: Never Smoker    Smokeless tobacco: Never Used   Substance Use Topics    Alcohol use:  Yes Comment: rarely       Review of Systems   HENT: Positive for congestion. Respiratory: Positive for cough. All other systems reviewed and are negative. Objective:   Physical Exam  Constitutional:       Appearance: Normal appearance. HENT:      Right Ear: A middle ear effusion is present. Left Ear: A middle ear effusion is present. Mouth/Throat:      Mouth: Mucous membranes are dry. Eyes:      Extraocular Movements: Extraocular movements intact. Conjunctiva/sclera: Conjunctivae normal.      Pupils: Pupils are equal, round, and reactive to light. Cardiovascular:      Rate and Rhythm: Normal rate and regular rhythm. Pulmonary:      Effort: Pulmonary effort is normal.      Breath sounds: Normal breath sounds. Neurological:      General: No focal deficit present. Mental Status: She is alert and oriented to person, place, and time. Psychiatric:         Mood and Affect: Mood normal.         Behavior: Behavior normal.         Thought Content: Thought content normal.         Judgment: Judgment normal.         Assessment:      Acute nasopharyngitis      Plan:      I reassured her that her symptoms were consistent with a common cold. I recommended she take sudafed and claritin. I instructed her to come back if her symptoms worsened or fail to improve.           Liana Jin PA-C

## 2020-03-25 NOTE — PROGRESS NOTES
Chief Complaint   Patient presents with    Cough    Fever    Fatigue       HPI    Respiratory Symptoms:  Patient complains of cough, fever, fatigue for 2 days. Symptoms have been worsening with time. She denies nasal congestion. Relevant PMH: No pertinent PMH. Smoking history:  She  reports that she has never smoked. She has never used smokeless tobacco.     She has had no known ill contacts. Treatment to date: none. Pertinent travel history:  Patient has not travelled. Vitals:    03/25/20 1556   BP: 122/82   Pulse: 82   Temp: 98.2 °F (36.8 °C)   SpO2: (!) 83%     Physical Exam         Assessment/Plan:  There are no diagnoses linked to this encounter. Plan       No follow-ups on file.

## 2020-04-21 RX ORDER — TRAMADOL HYDROCHLORIDE 50 MG/1
TABLET ORAL
Qty: 45 TABLET | Refills: 0 | Status: SHIPPED | OUTPATIENT
Start: 2020-04-21 | End: 2020-05-22

## 2020-04-21 RX ORDER — BUTALBITAL, ACETAMINOPHEN AND CAFFEINE 50; 325; 40 MG/1; MG/1; MG/1
TABLET ORAL
Qty: 90 TABLET | Refills: 0 | Status: SHIPPED | OUTPATIENT
Start: 2020-04-21 | End: 2020-05-22

## 2020-04-23 ENCOUNTER — E-VISIT (OUTPATIENT)
Dept: FAMILY MEDICINE CLINIC | Age: 55
End: 2020-04-23

## 2020-04-23 ENCOUNTER — TELEMEDICINE (OUTPATIENT)
Dept: FAMILY MEDICINE CLINIC | Age: 55
End: 2020-04-23
Payer: COMMERCIAL

## 2020-04-23 PROCEDURE — 99214 OFFICE O/P EST MOD 30 MIN: CPT | Performed by: INTERNAL MEDICINE

## 2020-04-23 RX ORDER — DESVENLAFAXINE 100 MG/1
100 TABLET, EXTENDED RELEASE ORAL DAILY
Qty: 30 TABLET | Refills: 5 | Status: SHIPPED | OUTPATIENT
Start: 2020-04-23 | End: 2020-07-20 | Stop reason: ALTCHOICE

## 2020-04-23 RX ORDER — GABAPENTIN 300 MG/1
CAPSULE ORAL
Qty: 60 CAPSULE | Refills: 2
Start: 2020-04-23 | End: 2020-06-17 | Stop reason: SDUPTHER

## 2020-04-23 ASSESSMENT — ENCOUNTER SYMPTOMS
SORE THROAT: 0
PHOTOPHOBIA: 1
ABDOMINAL PAIN: 0
RHINORRHEA: 0
VOMITING: 0
COLOR CHANGE: 0
COUGH: 0
BACK PAIN: 1
SINUS PRESSURE: 0
CHEST TIGHTNESS: 0
DIARRHEA: 0
EYE DISCHARGE: 0
VOICE CHANGE: 0
EYE REDNESS: 0
EYE PAIN: 0
SHORTNESS OF BREATH: 0
BLOOD IN STOOL: 0
WHEEZING: 0

## 2020-04-23 NOTE — PROGRESS NOTES
Constitutional: Positive for fatigue. Negative for appetite change, chills, fever and unexpected weight change. HENT: Negative for ear pain, rhinorrhea, sinus pressure, sore throat and voice change. Eyes: Positive for photophobia. Negative for pain, discharge and redness. Respiratory: Negative for cough, chest tightness, shortness of breath and wheezing. Cardiovascular: Negative for chest pain and palpitations. Gastrointestinal: Negative for abdominal pain, blood in stool, diarrhea and vomiting. Endocrine: Negative for cold intolerance, heat intolerance and polydipsia. Genitourinary: Negative for dysuria and hematuria. Musculoskeletal: Positive for back pain, myalgias and neck pain. Negative for arthralgias and neck stiffness. +muscle spasms in neck/shoulders   Skin: Negative for color change and rash. Neurological: Positive for headaches. Negative for dizziness, tremors, syncope, speech difficulty, weakness and numbness. Hematological: Negative for adenopathy. Does not bruise/bleed easily. Psychiatric/Behavioral: Positive for decreased concentration, dysphoric mood and sleep disturbance. Negative for agitation, confusion, self-injury and suicidal ideas. The patient is nervous/anxious. See HPI   All other systems reviewed and are negative. Prior to Visit Medications    Medication Sig Taking?  Authorizing Provider   desvenlafaxine succinate (PRISTIQ) 100 MG TB24 extended release tablet Take 1 tablet by mouth daily Yes Raphael Graham MD   gabapentin (NEURONTIN) 300 MG capsule TAKE 2 CAPSULES PO QHS Yes Raphael Graham MD   butalbital-acetaminophen-caffeine (FIORICET, ESGIC) -40 MG per tablet TAKE 1 TABLET BY MOUTH EVERY 8 HOURS AS NEEDED FOR HEADACHE OR  MIGRAINE  Raphael Graham MD   traMADol (ULTRAM) 50 MG tablet TAKE 1 TABLET BY MOUTH EVERY 12 HOURS AS NEEDED FOR PAIN  Raphael Graham MD   tiZANidine (ZANAFLEX) 2 MG tablet Take 1 tablet by mouth every 8 hours as needed (muscle spasm/tension)  Eric Schultz MD   triamcinolone (NASACORT ALLERGY 24HR) 55 MCG/ACT nasal inhaler 2 sprays by Each Nostril route daily  Eric Schultz MD   levothyroxine (SYNTHROID) 88 MCG tablet TAKE 1 TABLET BY MOUTH ONCE DAILY  Eric Schultz MD   celecoxib (CELEBREX) 200 MG capsule Take 1 capsule by mouth daily as needed for Pain (arthritis)  Eric Schultz MD   valACYclovir (VALTREX) 500 MG tablet   Historical Provider, MD   Potassium 99 MG TABS Take 1 tablet by mouth daily  Historical Provider, MD   BIOTIN PO Take 1 tablet by mouth daily  Historical Provider, MD   TURMERIC PO Take 1 tablet by mouth daily  Historical Provider, MD   vitamin B-12 (CYANOCOBALAMIN) 1000 MCG tablet Take 1,000 mcg by mouth daily  Historical Provider, MD   Multiple Vitamins-Minerals (MULTIVITAMIN ADULT PO) Take by mouth  Historical Provider, MD   Omega 3 1000 MG CAPS Take by mouth  Historical Provider, MD   Cholecalciferol (VITAMIN D3) 3000 units TABS Take by mouth  Historical Provider, MD       Social History     Tobacco Use    Smoking status: Never Smoker    Smokeless tobacco: Never Used   Substance Use Topics    Alcohol use: Yes     Comment: rarely    Drug use: No        Allergies   Allergen Reactions    Augmentin [Amoxicillin-Pot Clavulanate] Diarrhea    Aspirin Other (See Comments)   ,   Past Medical History:   Diagnosis Date    Allergic rhinitis     Chronic bilateral low back pain with left-sided sciatica 2017    Colon polyp     Depression with anxiety     Obesity     Osteoarthritis    ,   Past Surgical History:   Procedure Laterality Date     SECTION      COLONOSCOPY  2015    colon polyp x1, recheck in 5 yrs (Dr Maria Luisa Bruner)   [facility] HYSTERECTOMY      MYOMECTOMY      TONSILLECTOMY     ,   Social History     Tobacco Use    Smoking status: Never Smoker    Smokeless tobacco: Never Used   Substance Use Topics    Alcohol use:  Yes

## 2020-06-12 DIAGNOSIS — E03.9 ACQUIRED HYPOTHYROIDISM: ICD-10-CM

## 2020-06-12 DIAGNOSIS — E61.1 IRON DEFICIENCY: ICD-10-CM

## 2020-06-12 DIAGNOSIS — E55.9 VITAMIN D DEFICIENCY: ICD-10-CM

## 2020-06-12 DIAGNOSIS — E53.8 B12 DEFICIENCY: ICD-10-CM

## 2020-06-12 DIAGNOSIS — E78.2 MIXED HYPERLIPIDEMIA: ICD-10-CM

## 2020-06-12 LAB
ALBUMIN SERPL-MCNC: 4.8 G/DL (ref 3.5–5.2)
ALP BLD-CCNC: 112 U/L (ref 35–104)
ALT SERPL-CCNC: 21 U/L (ref 5–33)
ANION GAP SERPL CALCULATED.3IONS-SCNC: 14 MMOL/L (ref 7–19)
AST SERPL-CCNC: 25 U/L (ref 5–32)
BASOPHILS ABSOLUTE: 0.1 K/UL (ref 0–0.2)
BASOPHILS RELATIVE PERCENT: 0.8 % (ref 0–1)
BILIRUB SERPL-MCNC: 0.3 MG/DL (ref 0.2–1.2)
BUN BLDV-MCNC: 7 MG/DL (ref 6–20)
CALCIUM SERPL-MCNC: 9.4 MG/DL (ref 8.6–10)
CHLORIDE BLD-SCNC: 103 MMOL/L (ref 98–111)
CHOLESTEROL, TOTAL: 221 MG/DL (ref 160–199)
CO2: 25 MMOL/L (ref 22–29)
CREAT SERPL-MCNC: 0.7 MG/DL (ref 0.5–0.9)
EOSINOPHILS ABSOLUTE: 0.2 K/UL (ref 0–0.6)
EOSINOPHILS RELATIVE PERCENT: 3.1 % (ref 0–5)
GFR NON-AFRICAN AMERICAN: >60
GLUCOSE BLD-MCNC: 94 MG/DL (ref 74–109)
HCT VFR BLD CALC: 40.4 % (ref 37–47)
HDLC SERPL-MCNC: 46 MG/DL (ref 65–121)
HEMOGLOBIN: 12.6 G/DL (ref 12–16)
IMMATURE GRANULOCYTES #: 0 K/UL
IRON SATURATION: 10 % (ref 14–50)
IRON: 42 UG/DL (ref 37–145)
LDL CHOLESTEROL CALCULATED: 145 MG/DL
LYMPHOCYTES ABSOLUTE: 1.3 K/UL (ref 1.1–4.5)
LYMPHOCYTES RELATIVE PERCENT: 20.3 % (ref 20–40)
MCH RBC QN AUTO: 25.1 PG (ref 27–31)
MCHC RBC AUTO-ENTMCNC: 31.2 G/DL (ref 33–37)
MCV RBC AUTO: 80.5 FL (ref 81–99)
MONOCYTES ABSOLUTE: 0.5 K/UL (ref 0–0.9)
MONOCYTES RELATIVE PERCENT: 7.5 % (ref 0–10)
NEUTROPHILS ABSOLUTE: 4.4 K/UL (ref 1.5–7.5)
NEUTROPHILS RELATIVE PERCENT: 68 % (ref 50–65)
PDW BLD-RTO: 17 % (ref 11.5–14.5)
PLATELET # BLD: 348 K/UL (ref 130–400)
PMV BLD AUTO: 10.3 FL (ref 9.4–12.3)
POTASSIUM SERPL-SCNC: 3.8 MMOL/L (ref 3.5–5)
RBC # BLD: 5.02 M/UL (ref 4.2–5.4)
SODIUM BLD-SCNC: 142 MMOL/L (ref 136–145)
T4 FREE: 1.06 NG/DL (ref 0.93–1.7)
TOTAL IRON BINDING CAPACITY: 438 UG/DL (ref 250–400)
TOTAL PROTEIN: 7.2 G/DL (ref 6.6–8.7)
TRIGL SERPL-MCNC: 149 MG/DL (ref 0–149)
TSH SERPL DL<=0.05 MIU/L-ACNC: 1.32 UIU/ML (ref 0.27–4.2)
VITAMIN B-12: 605 PG/ML (ref 211–946)
VITAMIN D 25-HYDROXY: 41.2 NG/ML
WBC # BLD: 6.5 K/UL (ref 4.8–10.8)

## 2020-06-16 ENCOUNTER — PATIENT MESSAGE (OUTPATIENT)
Dept: FAMILY MEDICINE CLINIC | Age: 55
End: 2020-06-16

## 2020-06-17 ENCOUNTER — TELEMEDICINE (OUTPATIENT)
Dept: FAMILY MEDICINE CLINIC | Age: 55
End: 2020-06-17
Payer: COMMERCIAL

## 2020-06-17 PROCEDURE — 99215 OFFICE O/P EST HI 40 MIN: CPT | Performed by: INTERNAL MEDICINE

## 2020-06-17 RX ORDER — GABAPENTIN 300 MG/1
CAPSULE ORAL
Qty: 60 CAPSULE | Refills: 2 | Status: SHIPPED | OUTPATIENT
Start: 2020-06-17 | End: 2020-09-25 | Stop reason: SDUPTHER

## 2020-06-17 NOTE — PATIENT INSTRUCTIONS
Patient Education        Learning About Anxiety Disorders  What are anxiety disorders? Anxiety disorders are a type of medical problem. They cause severe anxiety. When you feel anxious, you feel that something bad is about to happen. This feeling interferes with your life. These disorders include:  · Generalized anxiety disorder. You feel worried and stressed about many everyday events and activities. This goes on for several months and disrupts your life on most days. · Panic disorder. You have repeated panic attacks. A panic attack is a sudden, intense fear or anxiety. It may make you feel short of breath. Your heart may pound. · Social anxiety disorder. You feel very anxious about what you will say or do in front of people. For example, you may be scared to talk or eat in public. This problem affects your daily life. · Phobias. You are very scared of a specific object, situation, or activity. For example, you may fear spiders, high places, or small spaces. What are the symptoms? Generalized anxiety disorder  Symptoms may include:  · Feeling worried and stressed about many things almost every day. · Feeling tired or irritable. You may have a hard time concentrating. · Having headaches or muscle aches. · Having a hard time getting to sleep or staying asleep. Panic disorder  You may have repeated panic attacks when there is no reason for feeling afraid. You may change your daily activities because you worry that you will have another attack. Symptoms may include:  · Intense fear, terror, or anxiety. · Trouble breathing or very fast breathing. · Chest pain or tightness. · A heartbeat that races or is not regular. Social anxiety disorder  Symptoms may include:  · Fear about a social situation, such as eating in front of others or speaking in public. You may worry a lot. Or you may be afraid that something bad will happen. · Anxiety that can cause you to blush, sweat, and feel shaky.   · A heartbeat that is faster than normal.  · A hard time focusing. Phobias  Symptoms may include:  · More fear than most people of being around an object, being in a situation, or doing an activity. You might also be stressed about the chance of being around the thing you fear. · Worry about losing control, panicking, fainting, or having physical symptoms like a faster heartbeat when you are around the situation or object. How are these disorders treated? Anxiety disorders can be treated with medicines or counseling. A combination of both may be used. Medicines may include:  · Antidepressants. These may help your symptoms by keeping chemicals in your brain in balance. · Benzodiazepines. These may give you short-term relief of your symptoms. Some people use cognitive-behavioral therapy. A therapist helps you learn to change stressful or bad thoughts into helpful thoughts. Lead a healthy lifestyle  A healthy lifestyle may help you feel better. · Get at least 30 minutes of exercise on most days of the week. Walking is a good choice. · Eat a healthy diet. Include fruits, vegetables, lean proteins, and whole grains in your diet each day. · Try to go to bed at the same time every night. Try for 8 hours of sleep a night. · Find ways to manage stress. Try relaxation exercises. · Avoid alcohol and illegal drugs. Follow-up care is a key part of your treatment and safety. Be sure to make and go to all appointments, and call your doctor if you are having problems. It's also a good idea to know your test results and keep a list of the medicines you take. Where can you learn more? Go to https://kevin.Centrifuge Systems. org and sign in to your Artklikk account. Enter R763 in the [facility] box to learn more about \"Learning About Anxiety Disorders. \"     If you do not have an account, please click on the \"Sign Up Now\" link.   Current as of: January 31, 2020               Content Version: 12.5  © 0376-2646 Healthwise, Incorporated. Care instructions adapted under license by [facility] ([facility]). If you have questions about a medical condition or this instruction, always ask your healthcare professional. [address] any warranty or liability for your use of this information. Patient Education        Neck: Exercises  Introduction  Here are some examples of exercises for you to try. The exercises may be suggested for a condition or for rehabilitation. Start each exercise slowly. Ease off the exercises if you start to have pain. You will be told when to start these exercises and which ones will work best for you. How to do the exercises  Neck stretch   1. This stretch works best if you keep your shoulder down as you lean away from it. To help you remember to do this, start by relaxing your shoulders and lightly holding on to your thighs or your chair. 2. Tilt your head toward your shoulder and hold for 15 to 30 seconds. Let the weight of your head stretch your muscles. 3. If you would like a little added stretch, use your hand to gently and steadily pull your head toward your shoulder. For example, keeping your right shoulder down, lean your head to the left. 4. Repeat 2 to 4 times toward each shoulder. Diagonal neck stretch   1. Turn your head slightly toward the direction you will be stretching, and tilt your head diagonally toward your chest and hold for 15 to 30 seconds. 2. If you would like a little added stretch, use your hand to gently and steadily pull your head forward on the diagonal.  3. Repeat 2 to 4 times toward each side. Dorsal glide stretch   The dorsal glide stretches the back of the neck. If you feel pain, do not glide so far back. Some people find this exercise easier to do while lying on their backs with an ice pack on the neck. 1. Sit or stand tall and look straight ahead. 2. Slowly tuck your chin as you glide your head backward over your body  3.  Hold for a count of 6, and then relax for up to 10 seconds. 4. Repeat 8 to 12 times. Chest and shoulder stretch   1. Sit or stand tall and glide your head backward as in the dorsal glide stretch. 2. Raise both arms so that your hands are next to your ears. 3. Take a deep breath, and as you breathe out, lower your elbows down and behind your back. You will feel your shoulder blades slide down and together, and at the same time you will feel a stretch across your chest and the front of your shoulders. 4. Hold for about 6 seconds, and then relax for up to 10 seconds. 5. Repeat 8 to 12 times. Strengthening: Hands on head   1. Move your head backward, forward, and side to side against gentle pressure from your hands, holding each position for about 6 seconds. 2. Repeat 8 to 12 times. Follow-up care is a key part of your treatment and safety. Be sure to make and go to all appointments, and call your doctor if you are having problems. It's also a good idea to know your test results and keep a list of the medicines you take. Where can you learn more? Go to https://GID GrouppepicTrumpet Search.Tatara Systems. org and sign in to your Guavus account. Enter P975 in the [employer] box to learn more about \"Neck: Exercises. \"     If you do not have an account, please click on the \"Sign Up Now\" link. Current as of: March 2, 2020               Content Version: 12.5  © 6805-7571 iSECUREtrac. Care instructions adapted under license by [facility] ([facility]). If you have questions about a medical condition or this instruction, always ask your healthcare professional. [place] [address] any warranty or liability for your use of this information. Patient Education        Work-Life Balance: Care Instructions  Your Care Instructions    Do you ever feel like there is not enough time to do all of the things you have to do, and no time at all for the things you enjoy? If so, you are not alone.   On average, people in the [place] have worked more and more hours each year since 1970. But in recent years, fewer people say they want to take on more at work, even if they would get promoted or get paid more money. More and more workers say they want time to spend with their families and to do things that are important to them. Do you ever feel:  · That you always have more and more work to do at your job? · That too many people depend on you every day? · That you never have enough time for your family or friends? · That you never have time for hobbies or things you enjoy? · That each second of your day is scheduled? If you answered \"yes\" to any of these questions, take steps at work and at home to get your life into balance. Follow-up care is a key part of your treatment and safety. Be sure to make and go to all appointments, and call your doctor if you are having problems. How can you care for yourself at home? Manage your time  · Focus on the important things. Taking on too much can wear you out. Look at how you spend your time, and redirect your focus. Learn to say \"no\" and let go of things that do not matter. · Set one small goal at a time. Use a day planner. Break large projects into smaller ones. · Ask for help. Let your children, your spouse, your coworkers, and other people in your life help you get things done. · Leave your job at the office. If you give up free time to get more work done, you may pay for it with stress. If your job offers a flexible work schedule, use it to fit your own work style. For instance, come in earlier to have a longer lunch break, or make time for a yoga class or workout during your workday. · Unplug. Do not let technology (such as your cell phone or the Internet) erase the line between your time and your employer's time. Lower job stress  Job stress causes trouble at work and at home. At work, you may worry about things you have not had time to do at home.  At home, you may worry about your job. This cycle upsets your work-life balance. Lowering your job stress can get your life back in balance. Job stress can be caused by:  · Pressure and deadlines. · Heavy workloads or long hours. · Not being allowed to make decisions. · Health and safety hazards. · Feeling you may lose your job. · Unclear or changing job duties. · Too much responsibility. · Work that is very tiring or boring. Do any of these things bother you? Consider talking with your boss to change things. There are some things that you may not be able to control. But even a few small changes might help lower your stress. Take advantage of programs at work  Businesses make money and are better off in other ways if their employees are healthy and happy. For this reason, many companies have programs to help balance work life and home life. These programs may include:  · Flexible schedules and hours. · Time off for family reasons, education, or community service. · Being able to work from home. · Employee assistance programs to provide counseling. · Child-care programs. Check to see if your company has any of these or other programs that could help you. If not, consider talking to your boss about why work-life balance programs make good business sense. Even if your company does not start an official program, you may be able to get flexible hours, time off, or the ability to do some work from home. Know when to quit  If you are truly unhappy because of a stressful job, and if the suggestions here have not worked, it may be time to think about changing jobs or changing careers. But before you quit, take time to research your options. Where can you learn more? Go to https://kevin.IASO Pharma. org and sign in to your Stampsy account. Enter K145 in the [facility] box to learn more about \"Work-Life Balance: Care Instructions. \"     If you do not have an account, please click on the \"Sign Up Now\" link. Current as of: December 16, 2019               Content Version: 12.5  © 5627-3270 ScentAir. Care instructions adapted under license by [facility] ([facility]). If you have questions about a medical condition or this instruction, always ask your healthcare professional. [address] any warranty or liability for your use of this information. Patient Education        Learning About Progressive Muscle Relaxation for Stress  What are progressive muscle relaxation and stress? Stress is what you feel when you have to handle more than you are used to. A lot of things can cause stress. You may feel stress when you go on a job interview, take a test, or run a race. This kind of short-term stress is normal and even useful. It can help you if you need to work hard or react quickly. Stress also can last a long time. Long-term stress is caused by stressful situations or events. Examples of long-term stress include long-term health problems, ongoing problems at work, and conflicts in your family. Long-term stress can harm your health. When you have anxiety or stress in your life, one of the ways your body responds is with muscle tension. Progressive muscle relaxation is a method that helps relieve that tension. How does progressive muscle relaxation reduce stress? The body responds to stress with muscle tension, which can cause pain or discomfort. In turn, tense muscles relay to the body that it's stressed. That keeps the cycle of stress and muscle tension going. Progressive muscle relaxation helps break this cycle by reducing muscle tension and general mental anxiety. This method often helps people get to sleep. How do you do progressive muscle relaxation? To do progressive muscle relaxation, first you tense a group of muscles as you breathe in. Then you relax them as you breathe out. Notice how your muscles feel when you relax them.  You work on your muscle groups in a certain order. Through practice, you can learn to feel the difference between a tensed muscle and a relaxed muscle. Then you can learn how to turn on this relaxed state at the first sign of a muscle tensing up due to stress. Practicing this method for a few weeks will help you get better at this skill. In time you'll be able to use this method to relieve stress. When you first start, it may help to use an audio recording until you learn all the muscle groups in order. Check online for these recordings. Choose a place where you won't be interrupted. It should have space for you to lie down on your back and stretch out comfortably, such as on a carpeted floor. Follow-up care is a key part of your treatment and safety. Be sure to make and go to all appointments, and call your doctor if you are having problems. It's also a good idea to know your test results and keep a list of the medicines you take. Where can you learn more? Go to https://LoadStar SensorspeSWEEPiO.Visedo. org and sign in to your CodeStreet account. Enter N198 in the [employer] box to learn more about \"Learning About Progressive Muscle Relaxation for Stress. \"     If you do not have an account, please click on the \"Sign Up Now\" link. Current as of: December 16, 2019               Content Version: 12.5  © 6634-2521 Healthwise, Incorporated. Care instructions adapted under license by [facility] ([facility]). If you have questions about a medical condition or this instruction, always ask your healthcare professional. [place] [address] any warranty or liability for your use of this information. Patient Education        Migraine Headache: Care Instructions  Your Care Instructions  Migraines are painful, throbbing headaches that often start on one side of the head. They may cause nausea and vomiting and make you sensitive to light, sound, or smell. Without treatment, migraines can last from 4 hours to a few days.  Medicines can help prevent migraines or stop them after they have started. Your doctor can help you find which ones work best for you. Follow-up care is a key part of your treatment and safety. Be sure to make and go to all appointments, and call your doctor if you are having problems. It's also a good idea to know your test results and keep a list of the medicines you take. How can you care for yourself at home? · Do not drive if you have taken a prescription pain medicine. · Rest in a quiet, dark room until your headache is gone. Close your eyes, and try to relax or go to sleep. Don't watch TV or read. · Put a cold, moist cloth or cold pack on the painful area for 10 to 20 minutes at a time. Put a thin cloth between the cold pack and your skin. · Use a warm, moist towel or a heating pad set on low to relax tight shoulder and neck muscles. · Have someone gently massage your neck and shoulders. · Take your medicines exactly as prescribed. Call your doctor if you think you are having a problem with your medicine. You will get more details on the specific medicines your doctor prescribes. · Don't take medicine for headache pain too often. Talk to your doctor if you are taking medicine more than 2 days a week to stop a headache. Taking too much pain medicine can lead to more headaches. These are called medicine-overuse headaches. To prevent migraines  · Keep a headache diary so you can figure out what triggers your headaches. Avoiding triggers may help you prevent headaches. Record when each headache began, how long it lasted, and what the pain was like. Write down any other symptoms you had with the headache, such as nausea, flashing lights or dark spots, or sensitivity to bright light or loud noise. Note if the headache occurred near your period. List anything that might have triggered the headache. Triggers may include certain foods (chocolate, cheese, wine) or odors, smoke, bright light, stress, or lack of sleep.   · If your doctor has prescribed medicine for your migraines, take it as directed. You may have medicine that you take only when you get a migraine and medicine that you take all the time to help prevent migraines. ? If your doctor has prescribed medicine for when you get a headache, take it at the first sign of a migraine, unless your doctor has given you other instructions. ? If your doctor has prescribed medicine to prevent migraines, take it exactly as prescribed. Call your doctor if you think you are having a problem with your medicine. · Find healthy ways to deal with stress. Migraines are most common during or right after stressful times. Try finding ways to reduce stress like practicing mindfulness or deep breathing exercises. · Get plenty of sleep and exercise. But be careful to not push yourself too hard during exercise. It may trigger a headache. · Eat meals on a regular schedule. Avoid foods and drinks that often trigger migraines. These include chocolate, alcohol (especially red wine and port), aspartame, monosodium glutamate (MSG), and some additives found in foods (such as hot dogs, mendoza, cold cuts, aged cheeses, and pickled foods). · Limit caffeine. Don't drink too much coffee, tea, or soda. But don't quit caffeine suddenly. That can also give you migraines. · Do not smoke or allow others to smoke around you. If you need help quitting, talk to your doctor about stop-smoking programs and medicines. These can increase your chances of quitting for good. · If you are taking birth control pills or hormone therapy, talk to your doctor about whether they are triggering your migraines. When should you call for help? [id number] anytime you think you may need emergency care. For example, call if:  · You have signs of a stroke. These may include:  ? Sudden numbness, paralysis, or weakness in your face, arm, or leg, especially on only one side of your body. ? Sudden vision changes.   ? Sudden trouble speaking. ? Sudden confusion or trouble understanding simple statements. ? Sudden problems with walking or balance. ? A sudden, severe headache that is different from past headaches. Call your doctor now or seek immediate medical care if:  · You have new or worse nausea and vomiting. · You have a new or higher fever. · Your headache gets much worse. Watch closely for changes in your health, and be sure to contact your doctor if:  · You are not getting better after 2 days (48 hours). Where can you learn more? Go to https://UberseqpeDelectableeb.Socset.. org and sign in to your Weplay account. Enter F817 in the Brazzlebox box to learn more about \"Migraine Headache: Care Instructions. \"     If you do not have an account, please click on the \"Sign Up Now\" link. Current as of: November 20, 2019               Content Version: 12.5  © 6163-8887 Healthwise, Incorporated. Care instructions adapted under license by [facility] ([facility]). If you have questions about a medical condition or this instruction, always ask your healthcare professional. [address] any warranty or liability for your use of this information.

## 2020-06-19 ENCOUNTER — NURSE ONLY (OUTPATIENT)
Dept: FAMILY MEDICINE CLINIC | Age: 55
End: 2020-06-19
Payer: COMMERCIAL

## 2020-06-19 PROCEDURE — 90471 IMMUNIZATION ADMIN: CPT | Performed by: INTERNAL MEDICINE

## 2020-06-19 PROCEDURE — 90750 HZV VACC RECOMBINANT IM: CPT | Performed by: INTERNAL MEDICINE

## 2020-06-19 NOTE — PROGRESS NOTES
After obtaining consent, and per orders of Dr. Jose Borges, injection of Shingrix given in Right deltoid by Suze Ramirez. Patient instructed to remain in clinic for 20 minutes afterwards, and to report any adverse reaction to me immediately.

## 2020-06-24 ENCOUNTER — TELEPHONE (OUTPATIENT)
Dept: GASTROENTEROLOGY | Facility: CLINIC | Age: 55
End: 2020-06-24

## 2020-06-24 ENCOUNTER — TELEPHONE (OUTPATIENT)
Dept: FAMILY MEDICINE CLINIC | Age: 55
End: 2020-06-24

## 2020-06-24 NOTE — TELEPHONE ENCOUNTER
Sita Zhang was called to schedule a head CT WO contrast.  Sarah at [facility] said the patient need to call the office to take care of something before she can even be put in the schedule. I told Khari Loza ok I'll give her a call. Rosaura Bryan was called and I relayed the message to her and she said ok and asked for the number. I gave her the number and she stated she'll give them a call right now.

## 2020-06-29 RX ORDER — BUTALBITAL, ACETAMINOPHEN AND CAFFEINE 50; 325; 40 MG/1; MG/1; MG/1
TABLET ORAL
Qty: 90 TABLET | Refills: 0 | Status: SHIPPED | OUTPATIENT
Start: 2020-06-29 | End: 2020-07-31

## 2020-06-29 NOTE — TELEPHONE ENCOUNTER
Timo Maximiliano called to request a refill on her medication.       Last office visit : 1/28/2020   Next office visit : 9/23/2020     Requested Prescriptions     Pending Prescriptions Disp Refills    butalbital-acetaminophen-caffeine (FIORICET, ESGIC) -40 MG per tablet [Pharmacy Med Name: Butalbital-APAP-Caffeine -40 MG Oral Tablet] 90 tablet 0     Sig: TAKE 1 TABLET BY MOUTH EVERY 8 HOURS AS NEEDED FOR HEADACHE OR  MIGRAINE            Kedar Carrasco

## 2020-07-12 ENCOUNTER — TELEPHONE (OUTPATIENT)
Dept: FAMILY MEDICINE CLINIC | Age: 55
End: 2020-07-12

## 2020-07-12 ASSESSMENT — ENCOUNTER SYMPTOMS
COLOR CHANGE: 0
WHEEZING: 0
SINUS PRESSURE: 0
BLOOD IN STOOL: 0
SHORTNESS OF BREATH: 0
ABDOMINAL PAIN: 0
RHINORRHEA: 0
PHOTOPHOBIA: 1
EYE DISCHARGE: 0
EYE PAIN: 0
VOICE CHANGE: 0
COUGH: 0
SORE THROAT: 0
CHEST TIGHTNESS: 0
DIARRHEA: 0
VOMITING: 0
EYE REDNESS: 0

## 2020-07-12 NOTE — TELEPHONE ENCOUNTER
Her CT scan of the head was totally normal. Are her headaches any better? If they are not, we need to try something other than the gabapentin for migraine prophylaxis or have neurology evaluate her.  Has she ever taken propranolol for migraine prophylaxis in the past?

## 2020-07-13 RX ORDER — PROPRANOLOL HCL 60 MG
60 CAPSULE, EXTENDED RELEASE 24HR ORAL DAILY
Qty: 30 CAPSULE | Refills: 3 | Status: SHIPPED | OUTPATIENT
Start: 2020-07-13 | End: 2020-11-18

## 2020-07-13 NOTE — TELEPHONE ENCOUNTER
Spoke with BARRETT and she stated that her headaches haven't really changed, they're still the same. She has not taken propranolol in the past for anything.

## 2020-07-16 ENCOUNTER — OFFICE VISIT (OUTPATIENT)
Dept: PSYCHIATRY | Age: 55
End: 2020-07-16
Payer: COMMERCIAL

## 2020-07-16 PROCEDURE — 90791 PSYCH DIAGNOSTIC EVALUATION: CPT | Performed by: SOCIAL WORKER

## 2020-07-16 SDOH — SOCIAL STABILITY: SOCIAL NETWORK: HOW OFTEN DO YOU GET TOGETHER WITH FRIENDS OR RELATIVES?: MORE THAN THREE TIMES A WEEK

## 2020-07-16 SDOH — SOCIAL STABILITY: SOCIAL NETWORK
IN A TYPICAL WEEK, HOW MANY TIMES DO YOU TALK ON THE PHONE WITH FAMILY, FRIENDS, OR NEIGHBORS?: MORE THAN THREE TIMES A WEEK

## 2020-07-16 SDOH — SOCIAL STABILITY: SOCIAL NETWORK: HOW OFTEN DO YOU ATTEND CHURCH OR RELIGIOUS SERVICES?: MORE THAN 4 TIMES PER YEAR

## 2020-07-16 SDOH — ECONOMIC STABILITY: INCOME INSECURITY: HOW HARD IS IT FOR YOU TO PAY FOR THE VERY BASICS LIKE FOOD, HOUSING, MEDICAL CARE, AND HEATING?: SOMEWHAT HARD

## 2020-07-16 SDOH — SOCIAL STABILITY: SOCIAL INSECURITY: WITHIN THE LAST YEAR, HAVE YOU BEEN HUMILIATED OR EMOTIONALLY ABUSED IN OTHER WAYS BY YOUR PARTNER OR EX-PARTNER?: NO

## 2020-07-16 SDOH — ECONOMIC STABILITY: FOOD INSECURITY: WITHIN THE PAST 12 MONTHS, THE FOOD YOU BOUGHT JUST DIDN'T LAST AND YOU DIDN'T HAVE MONEY TO GET MORE.: NEVER TRUE

## 2020-07-16 SDOH — SOCIAL STABILITY: SOCIAL INSECURITY: WITHIN THE LAST YEAR, HAVE YOU BEEN AFRAID OF YOUR PARTNER OR EX-PARTNER?: NO

## 2020-07-16 SDOH — ECONOMIC STABILITY: TRANSPORTATION INSECURITY
IN THE PAST 12 MONTHS, HAS THE LACK OF TRANSPORTATION KEPT YOU FROM MEDICAL APPOINTMENTS OR FROM GETTING MEDICATIONS?: NO

## 2020-07-16 SDOH — ECONOMIC STABILITY: INCOME INSECURITY: IN THE LAST 12 MONTHS, WAS THERE A TIME WHEN YOU WERE NOT ABLE TO PAY THE MORTGAGE OR RENT ON TIME?: NO

## 2020-07-16 SDOH — SOCIAL STABILITY: SOCIAL NETWORK
DO YOU BELONG TO ANY CLUBS OR ORGANIZATIONS SUCH AS CHURCH GROUPS UNIONS, FRATERNAL OR ATHLETIC GROUPS, OR SCHOOL GROUPS?: YES

## 2020-07-16 SDOH — SOCIAL STABILITY: SOCIAL INSECURITY
WITHIN THE LAST YEAR, HAVE YOU BEEN KICKED, HIT, SLAPPED, OR OTHERWISE PHYSICALLY HURT BY YOUR PARTNER OR EX-PARTNER?: NO

## 2020-07-16 SDOH — SOCIAL STABILITY: SOCIAL NETWORK: ARE YOU MARRIED, WIDOWED, DIVORCED, SEPARATED, NEVER MARRIED, OR LIVING WITH A PARTNER?: MARRIED

## 2020-07-16 SDOH — SOCIAL STABILITY: SOCIAL INSECURITY
WITHIN THE LAST YEAR, HAVE TO BEEN RAPED OR FORCED TO HAVE ANY KIND OF SEXUAL ACTIVITY BY YOUR PARTNER OR EX-PARTNER?: NO

## 2020-07-16 SDOH — SOCIAL STABILITY: SOCIAL NETWORK: HOW OFTEN DO YOU ATTENT MEETINGS OF THE CLUB OR ORGANIZATION YOU BELONG TO?: MORE THAN 4 TIMES PER YEAR

## 2020-07-16 SDOH — ECONOMIC STABILITY: TRANSPORTATION INSECURITY
IN THE PAST 12 MONTHS, HAS LACK OF TRANSPORTATION KEPT YOU FROM MEETINGS, WORK, OR FROM GETTING THINGS NEEDED FOR DAILY LIVING?: NO

## 2020-07-16 SDOH — ECONOMIC STABILITY: HOUSING INSECURITY: IN THE LAST 12 MONTHS, HOW MANY PLACES HAVE YOU LIVED?: 1

## 2020-07-16 SDOH — ECONOMIC STABILITY: FOOD INSECURITY: WITHIN THE PAST 12 MONTHS, YOU WORRIED THAT YOUR FOOD WOULD RUN OUT BEFORE YOU GOT MONEY TO BUY MORE.: NEVER TRUE

## 2020-07-16 SDOH — ECONOMIC STABILITY: HOUSING INSECURITY
IN THE LAST 12 MONTHS, WAS THERE A TIME WHEN YOU DID NOT HAVE A STEADY PLACE TO SLEEP OR SLEPT IN A SHELTER (INCLUDING NOW)?: NO

## 2020-07-16 ASSESSMENT — PATIENT HEALTH QUESTIONNAIRE - PHQ9
SUM OF ALL RESPONSES TO PHQ QUESTIONS 1-9: 19
5. POOR APPETITE OR OVEREATING: 1
3. TROUBLE FALLING OR STAYING ASLEEP: 3
10. IF YOU CHECKED OFF ANY PROBLEMS, HOW DIFFICULT HAVE THESE PROBLEMS MADE IT FOR YOU TO DO YOUR WORK, TAKE CARE OF THINGS AT HOME, OR GET ALONG WITH OTHER PEOPLE: 2
7. TROUBLE CONCENTRATING ON THINGS, SUCH AS READING THE NEWSPAPER OR WATCHING TELEVISION: 3
SUM OF ALL RESPONSES TO PHQ9 QUESTIONS 1 & 2: 5
8. MOVING OR SPEAKING SO SLOWLY THAT OTHER PEOPLE COULD HAVE NOTICED. OR THE OPPOSITE, BEING SO FIGETY OR RESTLESS THAT YOU HAVE BEEN MOVING AROUND A LOT MORE THAN USUAL: 2
1. LITTLE INTEREST OR PLEASURE IN DOING THINGS: 3
SUM OF ALL RESPONSES TO PHQ QUESTIONS 1-9: 19
4. FEELING TIRED OR HAVING LITTLE ENERGY: 2
6. FEELING BAD ABOUT YOURSELF - OR THAT YOU ARE A FAILURE OR HAVE LET YOURSELF OR YOUR FAMILY DOWN: 3
2. FEELING DOWN, DEPRESSED OR HOPELESS: 2
9. THOUGHTS THAT YOU WOULD BE BETTER OFF DEAD, OR OF HURTING YOURSELF: 0

## 2020-07-16 ASSESSMENT — ANXIETY QUESTIONNAIRES
GAD7 TOTAL SCORE: 16
2. NOT BEING ABLE TO STOP OR CONTROL WORRYING: 2-OVER HALF THE DAYS
3. WORRYING TOO MUCH ABOUT DIFFERENT THINGS: 2-OVER HALF THE DAYS
7. FEELING AFRAID AS IF SOMETHING AWFUL MIGHT HAPPEN: 2-OVER HALF THE DAYS
4. TROUBLE RELAXING: 3-NEARLY EVERY DAY
1. FEELING NERVOUS, ANXIOUS, OR ON EDGE: 2-OVER HALF THE DAYS
6. BECOMING EASILY ANNOYED OR IRRITABLE: 3-NEARLY EVERY DAY
5. BEING SO RESTLESS THAT IT IS HARD TO SIT STILL: 2-OVER HALF THE DAYS

## 2020-07-16 NOTE — PATIENT INSTRUCTIONS
Watch 2 videos:  #1 - Dulce Crowley - Stop It    [email or web address]  The female is the record in our head that plays over and over  Blanca Mattes is what we are going to say to the record          #2 - Reema's Daily Affirmation    [email or web address]  Dulce Crowley is thought stopping and then we replace it with Radha Kumar
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
## 2020-07-17 ENCOUNTER — TELEPHONE (OUTPATIENT)
Dept: PSYCHIATRY | Age: 55
End: 2020-07-17

## 2020-07-17 NOTE — TELEPHONE ENCOUNTER
Pt was in the office on 07/16/2020 and when her copay was taken it shows it went through her bank twice but on our end the payment had only go through once. [facility] [phone] and spoke with Joby Bain and she stated she was only seeing on charge that had gone through the patients account and didn't see any pending charges.

## 2020-07-17 NOTE — TELEPHONE ENCOUNTER
I have contacted pt to let her know I have spoken to the billing department and they do not show that her payment of 50.00 went through twice. I let her know they said she needed call her back and see if the payment was reversed from the bank . Patient stated she was still in bed and would just look later today and call her bank if it was still showing up twice.

## 2020-07-20 ENCOUNTER — OFFICE VISIT (OUTPATIENT)
Dept: PSYCHIATRY | Age: 55
End: 2020-07-20
Payer: COMMERCIAL

## 2020-07-20 VITALS
BODY MASS INDEX: 28.89 KG/M2 | OXYGEN SATURATION: 97 % | HEIGHT: 61 IN | SYSTOLIC BLOOD PRESSURE: 115 MMHG | HEART RATE: 64 BPM | WEIGHT: 153 LBS | TEMPERATURE: 97.6 F | DIASTOLIC BLOOD PRESSURE: 74 MMHG

## 2020-07-20 PROCEDURE — 99205 OFFICE O/P NEW HI 60 MIN: CPT | Performed by: NURSE PRACTITIONER

## 2020-07-20 RX ORDER — FLUVOXAMINE MALEATE 50 MG/1
TABLET, COATED ORAL
Qty: 30 TABLET | Refills: 3 | Status: SHIPPED | OUTPATIENT
Start: 2020-07-20 | End: 2020-12-02

## 2020-07-20 RX ORDER — DESVENLAFAXINE 25 MG/1
25 TABLET, EXTENDED RELEASE ORAL DAILY
Qty: 7 TABLET | Refills: 0 | Status: SHIPPED | OUTPATIENT
Start: 2020-07-28 | End: 2020-08-17 | Stop reason: ALTCHOICE

## 2020-07-20 RX ORDER — DESVENLAFAXINE 50 MG/1
50 TABLET, EXTENDED RELEASE ORAL DAILY
Qty: 7 TABLET | Refills: 0 | Status: SHIPPED | OUTPATIENT
Start: 2020-07-21 | End: 2020-08-17 | Stop reason: ALTCHOICE

## 2020-07-20 SDOH — HEALTH STABILITY: MENTAL HEALTH: HOW OFTEN DO YOU HAVE A DRINK CONTAINING ALCOHOL?: MONTHLY OR LESS

## 2020-07-20 SDOH — HEALTH STABILITY: MENTAL HEALTH: HOW MANY STANDARD DRINKS CONTAINING ALCOHOL DO YOU HAVE ON A TYPICAL DAY?: 1 OR 2

## 2020-07-20 NOTE — PROGRESS NOTES
IN: 1440  OUT: 1    PSYCHIATRIC EVALUATION    Date of Service:  7/20/2020    Chief Complaint   Patient presents with    New Patient    Anxiety    Depression       HISTORY OF PRESENT ILLNESS  The patient is a 63 y.o.   female who is here for psychiatric evaluation due to complaints of depression, anxiety and headaches which have worsened since March, 2020. Today patient states, \" I've been depressed and anxious and there is lot going on. \" She reports that the problems with COVID, the problems going on with the government, and marital problems with her daughter and son-in-law have bothered her. She also cares for her grandchildren at times. She describes a headache that is in the back of her head that starts with muscle tension in her upper shoulders and neck. She describes that this is an ongoing problem. PSYCHIATRIC HISTORY  See social documentation. Previous Suicide Attempts: See social documentation    PREVIOUS MED TRIALS    See social documentation    FAMILY PSYCHIATRIC HISTORY    See social documentation. Social History     See social documentation      BP: /74 (Site: Right Upper Arm, Position: Sitting, Cuff Size: Medium Adult)   Pulse 64   Temp 97.6 °F (36.4 °C)   Ht 5' 1\" (1.549 m)   Wt 153 lb (69.4 kg)   SpO2 97%   Breastfeeding No   BMI 28.91 kg/m²       See past medical history below.       Information obtained via patient and chart review    PCP is  Ajay Eng MD    Allergies: Augmentin [amoxicillin-pot clavulanate] and Aspirin      Review of Systems - 14 point review:  Negative except being treated for: migraine headaches, muscle tension, HTN    Constitutional: (fevers, chills, night sweats, wt loss/gain, change in appetite, fatigue, somnolence)    HEENT: (ear pain or discharge, hearing loss, ear ringing, sinus pressure, nosebleed, nasal discharge, sore throat, oral sores, tooth pain, bleeding gums, hoarse voice, neck pain)      Cardiovascular: (HTN, MG extended release capsule Take 1 capsule by mouth daily 20   Benny Garibay MD   butalbital-acetaminophen-caffeine (FIORICET, ESGIC) -34 MG per tablet TAKE 1 TABLET BY MOUTH EVERY 8 HOURS AS NEEDED FOR HEADACHE OR  MIGRAINE 20   Benny Garibay MD   gabapentin (NEURONTIN) 300 MG capsule TAKE 2 CAPSULES PO QHS 20  Benny Garibay MD   tiZANidine (ZANAFLEX) 2 MG tablet Take 1 tablet by mouth every 8 hours as needed (muscle spasm/tension) 20   Benny Garibay MD   triamcinolone (NASACORT ALLERGY 24HR) 55 MCG/ACT nasal inhaler 2 sprays by Each Nostril route daily 19   Benny Garibay MD   levothyroxine (SYNTHROID) 88 MCG tablet TAKE 1 TABLET BY MOUTH ONCE DAILY 19   Benny Garibay MD   celecoxib (CELEBREX) 200 MG capsule Take 1 capsule by mouth daily as needed for Pain (arthritis) 19   Benny Garibay MD   valACYclovir (VALTREX) 500 MG tablet  18   Historical Provider, MD   Potassium 99 MG TABS Take 1 tablet by mouth daily    Historical Provider, MD   BIOTIN PO Take 1 tablet by mouth daily    Historical Provider, MD   TURMERIC PO Take 1 tablet by mouth daily    Historical Provider, MD   vitamin B-12 (CYANOCOBALAMIN) 1000 MCG tablet Take 1,000 mcg by mouth daily    Historical Provider, MD   Multiple Vitamins-Minerals (MULTIVITAMIN ADULT PO) Take by mouth    Historical Provider, MD   Omega 3 1000 MG CAPS Take by mouth    Historical Provider, MD   Cholecalciferol (VITAMIN D3) 3000 units TABS Take by mouth    Historical Provider, MD       Past Medical History:   Diagnosis Date    Allergic rhinitis     Chronic bilateral low back pain with left-sided sciatica 2017    Colon polyp     Depression with anxiety     Obesity     Osteoarthritis        Past Surgical History:   Procedure Laterality Date     SECTION      COLONOSCOPY  2015    colon polyp x1, recheck in 5 yrs (Dr Jeanette Wilson)   [address] [employer]  MYOMECTOMY      TONSILLECTOMY           Family History   Problem Relation Age of Onset    Heart Disease Mother     High Blood Pressure Mother     Other Mother         skin CA    Osteoporosis Mother     Heart Disease Father     High Blood Pressure Father     Other Father         jaw CA    Heart Disease Sister     Diabetes Sister     Stroke Sister     High Blood Pressure Sister     Other Sister         fibromyalgia, thyroid CA     Social History     Socioeconomic History    Marital status:      Spouse name: None    Number of children: 2    Years of education: None    Highest education level: Associate degree: occupational, technical, or vocational program   Occupational History    None   Social Needs    Financial resource strain: Somewhat hard    Food insecurity     Worry: Never true     Inability: Never true    Transportation needs     Medical: No     Non-medical: No   Tobacco Use    Smoking status: Never Smoker    Smokeless tobacco: Never Used   Substance and Sexual Activity    Alcohol use:  Yes     Alcohol/week: 1.0 standard drinks     Types: 1 Standard drinks or equivalent per week     Frequency: Monthly or less     Drinks per session: 1 or 2     Comment: rarely    Drug use: Never    Sexual activity: Yes     Partners: Male   Lifestyle    Physical activity     Days per week: None     Minutes per session: None    Stress: None   Relationships    Social connections     Talks on phone: More than three times a week     Gets together: More than three times a week     Attends Catholic service: More than 4 times per year     Active member of club or organization: Yes     Attends meetings of clubs or organizations: More than 4 times per year     Relationship status:     Intimate partner violence     Fear of current or ex partner: No     Emotionally abused: No     Physically abused: No     Forced sexual activity: No   Other Topics Concern    None   Social History Narrative PRIOR MEDICATION TRIALS    Desvenlafaxine (ineffective)    Wellbutrin    Temazepam    Lexapro    Sertraline    . SLEEP STUDY: no,  reports that she snores and has some daytime fatigue    . negative history of seizures. .    negative history of head trauma. Tamia Genaro PREVIOUS PSYCHIATRIC HISTORY    Sister, depression/anxiety    Daughter, depression/anxiety (bipolar ?)    . FAMILY PSYCHIATRIC HISTORY    Sister, depression/anxiety    Daughter, depression/anxiety (bipolar ?)    Mother, anxiety    . SOCIAL HISTORY    Born and Raised - [place], 2 parent home with 2 siblings. Characterizes childhood as happy. Trauma and/or Abuse - denies    Legal - denies    Substance Use - see history    Work History - see history    Education - see history     status - denies    . PAST SUICIDE ATTEMPTS:    no    .    INPATIENT HOSPITALIZATIONS:    no    .    DRUG REHABILITATION:    no    .    PSYCHIATRIC REVIEW OF SYSTEMS, 7/20/2020    . Mood:  positive for low motivation; feeling down, depressed, hopeless, sleep disturbance, trouble concentrating, guilt, denies suicidality      (Depression: sadness, tearfulness, sleep, appetite, energy, concentration, sexual function, guilt, psychomotor agitation or slowing, interest, suicidality)    . Elisa: negative      (impulsivity, grandiosity, recklessness, excessive energy, decreased need for sleep, increased spending beyond means, hyperverbal, grandiose, racing thoughts, hypersexuality)    . Other: positive for irritable      (Irritability, lability, anger)    . Anxiety:  positive for worries about politics, her daughter's marital problems, feeling nervous/anxious/on edge; worrying too much about different things      (Generalized anxiety: where, when, who, how long, how frequent)    . Panic Disorder symptoms: negative      (Palpitations, racing heart beat, sweating, sense of impending doom, fear of recurrence, shortness of breath)    . OCD symptoms:  positive for obsessive about organization      (checking, cleaning, organizing, rituals, hang-ups, obsessive thoughts, counting, rational vs. Irrational beliefs)    . PTSD symptoms:  negative      (nightmares, flashbacks, startle response, avoidance)    . Social anxiety symptoms:  negative    . Simple phobias: negative      (heights, planes, spiders, etc.)    . Psychosis: negative      (hallucinations, auditory, visual, tactile, olfactory)    . Paranoia: negative    . Delusions:  negative      (TV, radio, thought broadcasting, mind control, referential thinking)      (persecutory delusion - e.g., believing one is being followed and harassed by gangs)      (grandiose delusion - e.g., believing one is a billionaire  who owns casinos around the world)      (erotomanic delusion - e.g., believing a famous  is in love with them)       (somatic delusion - e.g., believing one's sinuses have been infested by worms)      (delusions of reference - e.g., believing dialogue on a TV program is directed specifically towards the patient)      (delusions of control - e.g., believing one's thoughts and movements are controlled by planetary overlords)    . Patient's perception: negative      (Spiritual or cultural context of symptoms, reality testing)    . ADHD symptoms: negative      (able to focus and concentrate, scattered thoughts, disorganized thoughts)    . Eating Disorder symptoms:  negative      (binging, purging, excessive exercising)       Psychiatric Review of Systems: See history      MENTAL STATUS EXAMINATION  Patient is  A & O x 4. Appearance:  well-appearing appropriately dressed for age and season. Cognition:  Recent memory intact , remote memory intact , good fund of knowledge, of average intelligence level. Speech:  normal no evidence of language or speech disorder or defect. Language: Naming: intact; Word Finding: intact fluent.  Conversation:no evidence of delusions  Behavior:  Cooperative and Good eye contact  Mood: anxious  Affect: congruent with mood  Thought Content: negative delusions, negative hallucinations, positive for needing things to be organized, having control of things obsessions,  negativehomicidal and negative suicidal   Thought Process: linear, goal directed and coherent  Associations: logical connections  Attention Span and Concentration: Normal  Judgement Insight:  normal and appropriate   Gait and Station:normal gait and station                         Abnormal side effects: Denies   Sleep: difficulty falling asleep   Appetite: ok      Lab Results   Component Value Date     06/12/2020    K 3.8 06/12/2020     06/12/2020    CO2 25 06/12/2020    BUN 7 06/12/2020    CREATININE 0.7 06/12/2020    GLUCOSE 94 06/12/2020    CALCIUM 9.4 06/12/2020    PROT 7.2 06/12/2020    LABALBU 4.8 06/12/2020    BILITOT 0.3 06/12/2020    ALKPHOS 112 (H) 06/12/2020    AST 25 06/12/2020    ALT 21 06/12/2020    LABGLOM >60 06/12/2020     Lab Results   Component Value Date     06/12/2020    K 3.8 06/12/2020     06/12/2020    CO2 25 06/12/2020    BUN 7 06/12/2020    CREATININE 0.7 06/12/2020    GLUCOSE 94 06/12/2020    CALCIUM 9.4 06/12/2020      Lab Results   Component Value Date    CHOL 221 (H) 06/12/2020     Lab Results   Component Value Date    TRIG 149 06/12/2020     Lab Results   Component Value Date    HDL 46 (L) 06/12/2020     Lab Results   Component Value Date    LDLCALC 145 06/12/2020     No results found for: LABVLDL, VLDL  No results found for: CHOLHDLRATIO  No results found for: LABA1C  No results found for: EAG  Lab Results   Component Value Date    TSH 1.320 06/12/2020     Lab Results   Component Value Date    VITD25 41.2 06/12/2020       Assessment:   1. Severe episode of recurrent major depressive disorder, without psychotic features ([facility] [address]    2.  HORACE (generalized anxiety disorder)        R/O Obsessive Compulsive Disorder  R/O Insomnia, due to Mental Disorder    Assessments Administered:    PHQ9: 20, severe  GAD7: 19, severe    PLAN  Start:  Wk 1: Fluvoxamine, 50mg, take 1/2 tab nightly for one week, then increase  Wk 2: Fluvoxamine, 50mg, take 1 tab nightly    Taper and Stop:  Wk 1: Desvenlafaxine, 50mg, daily for 1 week, then  Wk 2: Desvenlafaxine, 25mg, daily for 1 week, then stop    Follow up: Return in about 4 weeks (around 8/17/2020). 1. The risks, benefits, side effects, indications, contraindications, and adverse effects of the medications have been discussed. Yes.  2. The pt has verbalized understanding and has capacity to give informed consent. 3. The Leann Alvarez report has been reviewed according to [facility] regulations. 4. Supportive therapy offered. 5. Follow up: Return in about 4 weeks (around 8/17/2020). 6. The patient has been advised to call with any problems. 7. Controlled substance Treatment Plan: none. 8. The above listed medications have been continued, modifications in meds and other orders/labs as follows:      Orders Placed This Encounter   Medications    desvenlafaxine succinate (PRISTIQ) 25 MG TB24 extended release tablet     Sig: Take 1 tablet by mouth daily for 7 days     Dispense:  7 tablet     Refill:  0     She is on a taper of this medication.  desvenlafaxine succinate (PRISTIQ) 50 MG TB24 extended release tablet     Sig: Take 1 tablet by mouth daily for 7 days     Dispense:  7 tablet     Refill:  0     She is on a taper of this medication.  fluvoxaMINE (LUVOX) 50 MG tablet     Sig: Take 1/2 tab by mouth nightly for 7 days, then increase to 1 tab nightly. Dispense:  30 tablet     Refill:  3      No orders of the defined types were placed in this encounter. 9. Additional comments: She reports that Desvenlafaxine has been ineffective for her anxiety/depression. She describes tension in her neck, upper shoulders and back of her head when she goes to bed.  Reviewed relaxation exercises with her, deep

## 2020-07-20 NOTE — PATIENT INSTRUCTIONS
thioridazine, or tizanidine. Do not use fluvoxamine within 14 days before or 14 days after you have taken an MAO inhibitor. A dangerous drug interaction could occur. MAO inhibitors include isocarboxazid, linezolid, methylene blue injection, phenelzine, rasagiline, selegiline, tranylcypromine, and others. To make sure fluvoxamine is safe for you, tell your doctor if you have ever had:  · liver or kidney disease;  · narrow-angle glaucoma;  · heart disease, high blood pressure, or a stroke;  · a bleeding or blood clotting disorder;  · seizures or epilepsy;  · bipolar disorder (manic depression); or  · low levels or sodium in your blood (an electrolyte imbalance). Some medicines can interact with fluvoxamine and cause a serious condition called serotonin syndrome. Be sure your doctor knows if you also take stimulant medicine, opioid medicine, herbal products, or medicine for depression, mental illness, Parkinson's disease, migraine headaches, serious infections, or prevention of nausea and vomiting. Ask your doctor before making any changes in how or when you take your medications. Some young people have thoughts about suicide when first taking an antidepressant. Your doctor should check your progress at regular visits. Your family or other caregivers should also be alert to changes in your mood or symptoms. Tell your doctor right away if you become pregnant while taking this medicine. Fluvoxamine may cause serious lung problems or other complications in a  if you take the medication during late pregnancy. However, you may have a relapse of OCD symptoms if you stop taking fluvoxamine. Do not start or stop taking this medicine during pregnancy without your doctor's advice. Fluvoxamine can pass into breast milk and may harm a nursing baby. You should not breast-feed while you are using fluvoxamine. Do not give this medicine to anyone under 25years old without medical advice.   How should I take fluvoxamine? Fluvoxamine is usually taken at night. Follow all directions on your prescription label. Your doctor may occasionally change your dose. Do not take this medicine in larger or smaller amounts or for longer than recommended. You may take fluvoxamine with or without food. Do not crush, chew, break, or open an extended-release capsule. Swallow it whole. You should not stop using fluvoxamine suddenly. Follow your doctor's instructions about tapering your dose. Store at room temperature away from moisture and heat. Keep the bottle tightly closed when not in use. What happens if I miss a dose? Take the missed dose as soon as you remember. Skip the missed dose if it is almost time for your next scheduled dose. Do not take extra medicine to make up the missed dose. What happens if I overdose? Seek emergency medical attention or call the Poison Help line at 1-272.654.9231. What should I avoid while taking fluvoxamine? Drinking alcohol can increase certain side effects of fluvoxamine. Ask your doctor before taking a nonsteroidal anti-inflammatory drug (NSAID) for pain, arthritis, fever, or swelling. This includes aspirin, ibuprofen (Advil, Motrin), naproxen (Aleve), celecoxib (Celebrex), diclofenac, indomethacin, meloxicam, and others. Using an NSAID with fluvoxamine may cause you to bruise or bleed easily. Fluvoxamine may impair your thinking or reactions. Avoid driving or operating machinery until you know how fluvoxamine will affect you. What are the possible side effects of fluvoxamine? Get emergency medical help if you have signs of an allergic reaction: skin rash, blisters, or hives; fever, joint pain; difficult breathing; swelling of your face, lips, tongue, or throat.   Report any new or worsening symptoms to your doctor, such as: mood or behavior changes, anxiety, panic attacks, trouble sleeping, or if you feel impulsive, irritable, agitated, hostile, aggressive, restless, hyperactive (mentally or physically), more depressed, or have thoughts about suicide or hurting yourself. Call your doctor at once if you have;  · anxiety, racing thoughts, risk-taking behavior, sleep problems (insomnia), feelings of extreme happiness or irritability;  · blurred vision, tunnel vision, eye pain or swelling, or seeing halos around lights;  · seizure (convulsions);  · changes in weight or appetite;  · easy bruising or unusual bleeding;  · low levels of sodium in the body --headache, confusion, memory problems, severe weakness, loss of coordination, feeling unsteady; or  · severe nervous system reaction --very stiff (rigid) muscles, high fever, sweating, confusion, fast or uneven heartbeats, tremors, feeling like you might pass out. Seek medical attention right away if you have symptoms of serotonin syndrome, such as: agitation, hallucinations, fever, sweating, shivering, fast heart rate, muscle stiffness, twitching, loss of coordination, nausea, vomiting, or diarrhea  Common side effects may include:  · drowsiness, dizziness;  · shaking, feeling anxious;  · depressed mood;  · sleep problems (insomnia);  · upset stomach, gas, loss of appetite;  · nausea, vomiting, diarrhea;  · dry mouth, yawning;  · sore throat;  · muscle pain;  · sweating, rash;  · heavy menstrual periods; or  · decreased sex drive, abnormal ejaculation, trouble having an orgasm. This is not a complete list of side effects and others may occur. Call your doctor for medical advice about side effects. You may report side effects to FDA at 3-083-FDA-2492. What other drugs will affect fluvoxamine? Taking fluvoxamine with other drugs that make you sleepy can worsen this effect. Ask your doctor before taking a sleeping pill, opioid medication, muscle relaxer, or medicine for anxiety, depression, or seizures. Many drugs can interact with fluvoxamine. Not all possible interactions are listed here.  Tell your doctor about all your current medicines absence of a warning for a given drug or drug combination in no way should be construed to indicate that the drug or drug combination is safe, effective or appropriate for any given patient. [facility] does not assume any responsibility for any aspect of healthcare administered with the aid of information [facility] provides. The information contained herein is not intended to cover all possible uses, directions, precautions, warnings, drug interactions, allergic reactions, or adverse effects. If you have questions about the drugs you are taking, check with your doctor, nurse or pharmacist.  Copyright 1152-5780 [address]: 18.01. Revision date: 7/25/2017. Care instructions adapted under license by [facility] [place] ([facility]). If you have questions about a medical condition or this instruction, always ask your healthcare professional. [address] any warranty or liability for your use of this information.

## 2020-07-31 RX ORDER — TRAMADOL HYDROCHLORIDE 50 MG/1
TABLET ORAL
Qty: 45 TABLET | Refills: 0 | Status: SHIPPED | OUTPATIENT
Start: 2020-07-31 | End: 2020-09-08

## 2020-07-31 RX ORDER — BUTALBITAL, ACETAMINOPHEN AND CAFFEINE 50; 325; 40 MG/1; MG/1; MG/1
TABLET ORAL
Qty: 90 TABLET | Refills: 0 | Status: SHIPPED | OUTPATIENT
Start: 2020-07-31 | End: 2020-09-08

## 2020-08-07 ENCOUNTER — TRANSCRIBE ORDERS (OUTPATIENT)
Dept: ADMINISTRATIVE | Facility: HOSPITAL | Age: 55
End: 2020-08-07

## 2020-08-07 DIAGNOSIS — Z20.822 ENCOUNTER FOR LABORATORY TESTING FOR COVID-19 VIRUS: Primary | ICD-10-CM

## 2020-08-08 ENCOUNTER — LAB (OUTPATIENT)
Dept: LAB | Facility: HOSPITAL | Age: 55
End: 2020-08-08

## 2020-08-08 DIAGNOSIS — Z20.822 ENCOUNTER FOR LABORATORY TESTING FOR COVID-19 VIRUS: ICD-10-CM

## 2020-08-08 PROCEDURE — U0003 INFECTIOUS AGENT DETECTION BY NUCLEIC ACID (DNA OR RNA); SEVERE ACUTE RESPIRATORY SYNDROME CORONAVIRUS 2 (SARS-COV-2) (CORONAVIRUS DISEASE [COVID-19]), AMPLIFIED PROBE TECHNIQUE, MAKING USE OF HIGH THROUGHPUT TECHNOLOGIES AS DESCRIBED BY CMS-2020-01-R: HCPCS

## 2020-08-08 PROCEDURE — C9803 HOPD COVID-19 SPEC COLLECT: HCPCS

## 2020-08-09 LAB
COVID LABCORP PRIORITY: NORMAL
SARS-COV-2 RNA RESP QL NAA+PROBE: NOT DETECTED

## 2020-08-14 ENCOUNTER — VIRTUAL VISIT (OUTPATIENT)
Dept: PSYCHIATRY | Age: 55
End: 2020-08-14
Payer: COMMERCIAL

## 2020-08-14 PROCEDURE — 90837 PSYTX W PT 60 MINUTES: CPT | Performed by: SOCIAL WORKER

## 2020-08-14 NOTE — PROGRESS NOTES
TELEHEALTH EVALUATION--Audio/Visual (During PTRXY-64 public health emergency)    Therapy Progress Note  Tania Schirmer MSW, LCSW  8/14/2020  2:00 PM  3:00 PM    Patient location  : home  LCSW location : [facility]      Time spent with Patient: 60 minutes  This is patient's second  Therapy appointment. Reason for Consult:  depression and anxiety  Referring Provider: No referring provider defined for this encounter. Flaca Johns ,a 63 y.o. female, for initial evaluation visit. Pt provided informed consent for the behavioral health program. Discussed with patient model of service to include the limits of confidentiality (i.e. abuse reporting, suicide intervention, etc.) and short-term intervention focused approach. Discussed no show and late cancellation policy. Pt indicated understanding. S:  Pt reported she has been struggling with increased anxiety and unable to sleep do to stressors. Pt reported she has been watching the new, social media, and talking to her daughter about the current trauma and drama being reported on. Discussed limiting viewing of unnecessary negativity and setting boundaries with people talking about the negative events. Discussed negative thought stopping, challenging negative thoughts, and practicing coping skills routinely to have them to be easily utilized. Pt denies Suicidal Ideations, Homicidal Ideation, Auditory Hallucinations, Visual Hallucinations, Tactical Hallucinations.     MSE:    Appearance    alert, cooperative, mild distress  Appetite normal  Sleep disturbance Yes  Fatigue Yes  Loss of pleasure Yes  Impulsive behavior No  Speech    normal rate and normal volume  Mood    Anxious  Depressed  Affect    depressed affect  Thought Content    cognitive distortions  Thought Process    circumstantial  Associations    logical connections  Insight    Fair  Judgment    Intact  Orientation    oriented to person, place, time, and general circumstances  Memory recent and remote memory intact  Attention/Concentration    intact  Morbid ideation No  Suicide Assessment    no suicidal ideation      History:  Social History     Socioeconomic History    Marital status:      Spouse name: Not on file    Number of children: 2    Years of education: Not on file    Highest education level: Associate degree: occupational, technical, or vocational program   Occupational History    Not on file   Social Needs    Financial resource strain: Somewhat hard    Food insecurity     Worry: Never true     Inability: Never true    Transportation needs     Medical: No     Non-medical: No   Tobacco Use    Smoking status: Never Smoker    Smokeless tobacco: Never Used   Substance and Sexual Activity    Alcohol use: Yes     Alcohol/week: 1.0 standard drinks     Types: 1 Standard drinks or equivalent per week     Frequency: Monthly or less     Drinks per session: 1 or 2     Comment: rarely    Drug use: Never    Sexual activity: Yes     Partners: Male   Lifestyle    Physical activity     Days per week: Not on file     Minutes per session: Not on file    Stress: Not on file   Relationships    Social connections     Talks on phone: More than three times a week     Gets together: More than three times a week     Attends Church service: More than 4 times per year     Active member of club or organization: Yes     Attends meetings of clubs or organizations: More than 4 times per year     Relationship status:     Intimate partner violence     Fear of current or ex partner: No     Emotionally abused: No     Physically abused: No     Forced sexual activity: No   Other Topics Concern    Not on file   Social History Narrative    PRIOR MEDICATION TRIALS    Desvenlafaxine (ineffective)    Wellbutrin    Temazepam    Lexapro    Sertraline    . SLEEP STUDY: no,  reports that she snores and has some daytime fatigue    . negative history of seizures.     .    negative history of head trauma. Kevin Herbert PREVIOUS PSYCHIATRIC HISTORY    Sister, depression/anxiety    Daughter, depression/anxiety (bipolar ?)    . FAMILY PSYCHIATRIC HISTORY    Sister, depression/anxiety    Daughter, depression/anxiety (bipolar ?)    Mother, anxiety    . SOCIAL HISTORY    Born and Raised - [place], 2 parent home with 2 siblings. Characterizes childhood as happy. Trauma and/or Abuse - denies    Legal - denies    Substance Use - see history    Work History - see history    Education - see history     status - denies    . PAST SUICIDE ATTEMPTS:    no    .    INPATIENT HOSPITALIZATIONS:    no    .    DRUG REHABILITATION:    no    .    PSYCHIATRIC REVIEW OF SYSTEMS, 7/20/2020    . Mood:  positive for low motivation; feeling down, depressed, hopeless, sleep disturbance, trouble concentrating, guilt, denies suicidality      (Depression: sadness, tearfulness, sleep, appetite, energy, concentration, sexual function, guilt, psychomotor agitation or slowing, interest, suicidality)    . Elisa: negative      (impulsivity, grandiosity, recklessness, excessive energy, decreased need for sleep, increased spending beyond means, hyperverbal, grandiose, racing thoughts, hypersexuality)    . Other: positive for irritable      (Irritability, lability, anger)    . Anxiety:  positive for worries about politics, her daughter's marital problems, feeling nervous/anxious/on edge; worrying too much about different things      (Generalized anxiety: where, when, who, how long, how frequent)    . Panic Disorder symptoms: negative      (Palpitations, racing heart beat, sweating, sense of impending doom, fear of recurrence, shortness of breath)    . OCD symptoms:  positive for obsessive about organization      (checking, cleaning, organizing, rituals, hang-ups, obsessive thoughts, counting, rational vs. Irrational beliefs)    .     PTSD symptoms:  negative      (nightmares, flashbacks, startle increase to 1 tab nightly. 30 tablet 3    propranolol (INDERAL LA) 60 MG extended release capsule Take 1 capsule by mouth daily 30 capsule 3    gabapentin (NEURONTIN) 300 MG capsule TAKE 2 CAPSULES PO QHS 60 capsule 2    tiZANidine (ZANAFLEX) 2 MG tablet Take 1 tablet by mouth every 8 hours as needed (muscle spasm/tension) 60 tablet 2    triamcinolone (NASACORT ALLERGY 24HR) 55 MCG/ACT nasal inhaler 2 sprays by Each Nostril route daily 1 Inhaler 5    levothyroxine (SYNTHROID) 88 MCG tablet TAKE 1 TABLET BY MOUTH ONCE DAILY 90 tablet 3    celecoxib (CELEBREX) 200 MG capsule Take 1 capsule by mouth daily as needed for Pain (arthritis) 30 capsule 5    valACYclovir (VALTREX) 500 MG tablet       Potassium 99 MG TABS Take 1 tablet by mouth daily      BIOTIN PO Take 1 tablet by mouth daily      TURMERIC PO Take 1 tablet by mouth daily      vitamin B-12 (CYANOCOBALAMIN) 1000 MCG tablet Take 1,000 mcg by mouth daily      Multiple Vitamins-Minerals (MULTIVITAMIN ADULT PO) Take by mouth      Omega 3 1000 MG CAPS Take by mouth      Cholecalciferol (VITAMIN D3) 3000 units TABS Take by mouth       No current facility-administered medications for this visit. Social History:   Social History     Socioeconomic History    Marital status:      Spouse name: Not on file    Number of children: 2    Years of education: Not on file    Highest education level: Associate degree: occupational, technical, or vocational program   Occupational History    Not on file   Social Needs    Financial resource strain: Somewhat hard    Food insecurity     Worry: Never true     Inability: Never true    Transportation needs     Medical: No     Non-medical: No   Tobacco Use    Smoking status: Never Smoker    Smokeless tobacco: Never Used   Substance and Sexual Activity    Alcohol use:  Yes     Alcohol/week: 1.0 standard drinks     Types: 1 Standard drinks or equivalent per week     Frequency: Monthly or less     Drinks per session: 1 or 2     Comment: rarely    Drug use: Never    Sexual activity: Yes     Partners: Male   Lifestyle    Physical activity     Days per week: Not on file     Minutes per session: Not on file    Stress: Not on file   Relationships    Social connections     Talks on phone: More than three times a week     Gets together: More than three times a week     Attends Worship service: More than 4 times per year     Active member of club or organization: Yes     Attends meetings of clubs or organizations: More than 4 times per year     Relationship status:     Intimate partner violence     Fear of current or ex partner: No     Emotionally abused: No     Physically abused: No     Forced sexual activity: No   Other Topics Concern    Not on file   Social History Narrative    PRIOR MEDICATION TRIALS    Desvenlafaxine (ineffective)    Wellbutrin    Temazepam    Lexapro    Sertraline    . SLEEP STUDY: no,  reports that she snores and has some daytime fatigue    . negative history of seizures. .    negative history of head trauma. Tyron Gonzalez PREVIOUS PSYCHIATRIC HISTORY    Sister, depression/anxiety    Daughter, depression/anxiety (bipolar ?)    . FAMILY PSYCHIATRIC HISTORY    Sister, depression/anxiety    Daughter, depression/anxiety (bipolar ?)    Mother, anxiety    . SOCIAL HISTORY    Born and Raised - [place], 2 parent home with 2 siblings. Characterizes childhood as happy. Trauma and/or Abuse - denies    Legal - denies    Substance Use - see history    Work History - see history    Education - see history     status - denies    . PAST SUICIDE ATTEMPTS:    no    .    INPATIENT HOSPITALIZATIONS:    no    .    DRUG REHABILITATION:    no    .    PSYCHIATRIC REVIEW OF SYSTEMS, 7/20/2020    .     Mood:  positive for low motivation; feeling down, depressed, hopeless, sleep disturbance, trouble concentrating, guilt, denies suicidality      (Depression: sadness, tearfulness, sleep, appetite, energy, concentration, sexual function, guilt, psychomotor agitation or slowing, interest, suicidality)    . Elisa: negative      (impulsivity, grandiosity, recklessness, excessive energy, decreased need for sleep, increased spending beyond means, hyperverbal, grandiose, racing thoughts, hypersexuality)    . Other: positive for irritable      (Irritability, lability, anger)    . Anxiety:  positive for worries about politics, her daughter's marital problems, feeling nervous/anxious/on edge; worrying too much about different things      (Generalized anxiety: where, when, who, how long, how frequent)    . Panic Disorder symptoms: negative      (Palpitations, racing heart beat, sweating, sense of impending doom, fear of recurrence, shortness of breath)    . OCD symptoms:  positive for obsessive about organization      (checking, cleaning, organizing, rituals, hang-ups, obsessive thoughts, counting, rational vs. Irrational beliefs)    . PTSD symptoms:  negative      (nightmares, flashbacks, startle response, avoidance)    . Social anxiety symptoms:  negative    . Simple phobias: negative      (heights, planes, spiders, etc.)    . Psychosis: negative      (hallucinations, auditory, visual, tactile, olfactory)    . Paranoia: negative    .     Delusions:  negative      (TV, radio, thought broadcasting, mind control, referential thinking)      (persecutory delusion - e.g., believing one is being followed and harassed by gangs)      (grandiose delusion - e.g., believing one is a billionaire  who owns casinos around the world)      (erotomanic delusion - e.g., believing a famous  is in love with them)       (somatic delusion - e.g., believing one's sinuses have been infested by worms)      (delusions of reference - e.g., believing dialogue on a TV program is directed specifically towards the patient)      (delusions of control - e.g., believing one's thoughts and movements are controlled by planetary overlords)    . Patient's perception: negative      (Spiritual or cultural context of symptoms, reality testing)    . ADHD symptoms: negative      (able to focus and concentrate, scattered thoughts, disorganized thoughts)    . Eating Disorder symptoms:  negative      (binging, purging, excessive exercising)       TOBACCO:   reports that she has never smoked. She has never used smokeless tobacco.  ETOH:   reports current alcohol use of about 1.0 standard drinks of alcohol per week. Family History:   Family History   Problem Relation Age of Onset    Heart Disease Mother     High Blood Pressure Mother     Other Mother         skin CA    Osteoporosis Mother     Heart Disease Father     High Blood Pressure Father     Other Father         jaw CA    Heart Disease Sister     Diabetes Sister     Stroke Sister     High Blood Pressure Sister     Other Sister         fibromyalgia, thyroid CA       Diagnosis:    Major depressive disorder; recurrent and moderate  Generalized anxiety disorder      Diagnosis Date    Allergic rhinitis     Chronic bilateral low back pain with left-sided sciatica 12/1/2017    Colon polyp     Depression with anxiety     Obesity     Osteoarthritis      Problems with primary support group, Problems related to the social environment and Economic problems    Plan:  1. Continue medication management  2. CBT to target cognitive distortions  3.  Discuss therapeutic goals    Pt interventions:  Trained in strategies for increasing balanced thinking, Trained in relaxation strategies, Provided education, Discussed self-care (sleep, nutrition, rewarding activities, social support, exercise), Established rapport, Supportive techniques and Identified maladaptive thoughts      Martinez Chris MSW, LCSW    Pursuant to the emergency declaration under the [address] [address]5 waiver authority and the Coronavirus

## 2020-08-14 NOTE — PATIENT INSTRUCTIONS
Sleep Hygiene Guidelines    Good dental hygiene is important in determining the health of your teeth and gums. We all know we are supposed to brush and floss regularly. Those who do so are more likely to have strong, healthy gums and less cavities. Similarly, good sleep hygiene is important in determining the quality and quantity of your sleep. Below are guidelines for good sleep hygiene practices. Review these guidelines and evaluate how well you practice good sleep hygiene. Caffeine:  Avoid Caffeine 6-8 Hours Before Bedtime       Caffeine disturbs sleep, even in people who do not think they experience a stimulation effect. Individuals with insomnia are often more sensitive to mild stimulants than are normal sleepers. Caffeine is found in items such as coffee, tea, soda, chocolate, and many over-the-counter medications (e.g., Excedrin). Thus, drinking caffeinated beverages should be avoided near bedtime and during the night. You might consider a trial period of no caffeine if you tend to be sensitive to its effects. Nicotine:  Avoid Nicotine Before Bedtime       Although some smokers claim that smoking helps them relax, but nicotine is a stimulant. The initial relaxing effects occur with the initial entry of the nicotine, but as the nicotine builds in the system it produces an effect similar to caffeine. Thus, smoking, dipping, or chewing tobacco should be avoided near bedtime and during the night. Dont smoke to get yourself back to sleep. Alcohol:  Avoid Alcohol After Dinner       Alcohol often promotes the onset of sleep, but as alcohol is metabolized sleep becomes disturbed and fragmented. Thus, a large amount of alcohol is a poor sleep aid and should not be used as such. Limit alcohol use to small quantities to moderate quantities.     Sleeping Pills:  Sleep Medications are Effective Only Temporarily       Scientists have shown that sleep medications lose their effectiveness in about 2 - 4 weeks when taken regularly. Despite advertisements to the contrary, over-the-counter sleeping aids have little impact on sleep beyond the placebo effect. Over time, sleeping pills actually can make sleep problems worse. When sleeping pills have been used for a long period, withdrawal from the medication can lead to an insomnia rebound. Thus, after long-term use, many individuals incorrectly conclude that they need sleeping pills in order to sleep normally. Keep use of sleep pills infrequent, but dont worry if you need t use one on an occasional basis. Regular Exercise       Get regular exercise, preferably 40 minutes each day of an activity that causes sweating. .  Exercise in the late afternoon or early evening seems to aid sleep, although the positive effect often takes several weeks to become noticeable. Exercising sporadically is not likely to improve sleep, and exercise within 2 hours of bedtime may elevate nervous system activity and interfere with sleep onset. Hot Baths  Spending 20 minutes in a tub of hot water an hour or two prior to bedtime may promote sleep and is strongly recommended. Bedroom Environment: Moderate Temperature, Quiet, and Dark       Extremes of heat or cold can disrupt sleep. A quiet environment is more sleep promoting than a noisy one. Noises can be masked with background white noise (such as the noise of a fan) or with earplugs. Bedrooms may be darkened with black-out shades or sleep masks can be worn. Position clocks out-of-sight since clock-watching can increase worry about the effects of lack of sleep. Be sure your mattress is not too soft or too firm and that your pillow is the right height and firmness. Eating       A light bedtime snack, such a glass of warm milk, cheese, or a bowl of cereal can promote sleep.   You should avoid the following foods at bedtime:  any caffeinated foods (e.g., chocolate), peanuts, beans, most raw fruits and vegetables (since they may cause gas), and high-fat foods such as potato chips or corn chips. Avoid snacks in the middle of the nights since awakening may become associated with hunger. If you have trouble with regurgitation, be especially careful to avid heavy meals and spices in the evening. Do not go to bed too hungry or too full. It may help to elevate you head with some pillows. Avoid Naps       Avoid naps, the sleep you obtain during the day takes away from you sleep need that night resulting in lighter, more restless sleep, difficulty falling asleep or early morning awakening. If you must nap, keep it brief, and take the nap about 8 hours after arising. It is best to set an alarm to ensure you dont sleep more than 10-15 minutes. Limit Your Time in Bed        Restrict your sleep period to the average number of hours you have actually slept per night during the preceding week. Quality of sleep is important. Too much time in bed can decrease the quality on subsequent night and contribute to the maintenance of existing sleep problems. Dont lay in bed for extended times not sleep. If you arent asleep in about 15-20 minutes go ahead and get up. Do something outside the bedroom that is relaxing. When you feel sleepy (i.e., yawning, head bobbing, eyes closing, concentration decreasing, then return to bed. Dont confuse tiredness with sleepiness, they are different. Tiredness doesnt lead to sleep, only sleepiness does. Regular Sleep Schedule       Keep a regular time each day, 7 days a week, to get out of bed. Keeping a regular awaking time helps set your circadian rhythm set so that your body learns to sleep at the desired time. Use the attached form to develop a plan for improving you sleep hygiene. It will take time for you sleep to get back in line so once you begin your sleep hygiene plan, stick with if for at least 6-8 weeks.       Planned Improvements of My Sleep Hygiene    Check Those  That Apply  _____ Avoid Caffeine 6-8 Hours Before Bedtime. I will not have caffeine after ________ PM.    ____ Avoid Nicotine Before Bedtime. I will not have a cigarette after _________ PM.    ______  Limit Alcohol Use. I will not have more than _______ drinks in the evening.    ______ Avoid Use of Sleeping Pills. (If you are currently using them regularly, all changes should be   medical supervised by your medical provider). ______ Do Exercise Regularly, But Not Within 2 Hours of Bedtime. I ________________ for ____   minutes, on the following days ____________________________________________________    ______ Ensure your Bedroom is a Comfortable Temperature, Quiet, and Dark and Your   Mattress and Pillow are good. I will make the  following changes to my bedroom   ____________________________________________________________________________    ______ Do Take a Hot Bath 1-2 Hours Prior to Bedtime. I will take a hot bath about ______ PM.    ______ Eat a Light Snack at Bedtime but Avoid Large or Problematic Foods. I will eat     __________________  or _____________________ or __________________ before bed.    ______ Avoid Naps. I try not to nap, if I must, I will limit it to _______ minutes, about 8 hours after I   awoke and will use alarm to limit my nap time. ______ Limit Time In Bed. I have been sleeping on average ______ hours per night, therefore I will   limit my time in bed to _____ hours (the same number). If Im not asleep in about 15 to 20   minutes I will get up and not return to bed until Im sleepy.    ______ Stay on a Regular Sleep Schedule  I will get up at _______ AM, 7 days a week, no matter   how poorly I slept that night.

## 2020-08-17 ENCOUNTER — VIRTUAL VISIT (OUTPATIENT)
Dept: PSYCHIATRY | Age: 55
End: 2020-08-17
Payer: COMMERCIAL

## 2020-08-17 PROCEDURE — 99214 OFFICE O/P EST MOD 30 MIN: CPT | Performed by: NURSE PRACTITIONER

## 2020-08-17 NOTE — PATIENT INSTRUCTIONS
Plan:  Continue:  Fluvoxamine, 50mg, nightly    Continue therapy with K. Ray    Follow up: Return in about 3 months (around 11/17/2020).

## 2020-08-17 NOTE — PROGRESS NOTES
2020    TELEHEALTH EVALUATION -- Audio/Visual (During AOAGT-96 public health emergency)    HPI:    Mandi Beck (:  1965) has requested an audio/video evaluation for the following concern(s):    Depression/anxiety    Review of Systems    Prior to Visit Medications    Medication Sig Taking? Authorizing Provider   butalbital-acetaminophen-caffeine (FIORICET, ESGIC) -40 MG per tablet TAKE 1 TABLET BY MOUTH EVERY 8 HOURS AS NEEDED FOR HEADACHE OR MIGRAINE  Gila Stokes MD   traMADol (ULTRAM) 50 MG tablet TAKE 1 TABLET BY MOUTH EVERY 12 HOURS AS NEEDED FOR PAIN  Gila Stokes MD   fluvoxaMINE (LUVOX) 50 MG tablet Take 1/2 tab by mouth nightly for 7 days, then increase to 1 tab nightly.   KALI Mchugh - NP   propranolol (INDERAL LA) 60 MG extended release capsule Take 1 capsule by mouth daily  Gila Stokes MD   gabapentin (NEURONTIN) 300 MG capsule TAKE 2 CAPSULES PO QHS  Gila Stokes MD   tiZANidine (ZANAFLEX) 2 MG tablet Take 1 tablet by mouth every 8 hours as needed (muscle spasm/tension)  Gila Stokes MD   triamcinolone (NASACORT ALLERGY 24HR) 55 MCG/ACT nasal inhaler 2 sprays by Each Nostril route daily  Gila Stokes MD   levothyroxine (SYNTHROID) 88 MCG tablet TAKE 1 TABLET BY MOUTH ONCE DAILY  Gila Stokes MD   celecoxib (CELEBREX) 200 MG capsule Take 1 capsule by mouth daily as needed for Pain (arthritis)  Gila Stokes MD   valACYclovir (VALTREX) 500 MG tablet   Historical Provider, MD   Potassium 99 MG TABS Take 1 tablet by mouth daily  Historical Provider, MD   BIOTIN PO Take 1 tablet by mouth daily  Historical Provider, MD   TURMERIC PO Take 1 tablet by mouth daily  Historical Provider, MD   vitamin B-12 (CYANOCOBALAMIN) 1000 MCG tablet Take 1,000 mcg by mouth daily  Historical Provider, MD   Multiple Vitamins-Minerals (MULTIVITAMIN ADULT PO) Take by mouth  Historical Provider, MD   [place] 3 1000 MG CAPS Take by mouth  Historical Provider, MD   Cholecalciferol (VITAMIN D3) 3000 units TABS Take by mouth  Historical Provider, MD       Social History     Tobacco Use    Smoking status: Never Smoker    Smokeless tobacco: Never Used   Substance Use Topics    Alcohol use: Yes     Alcohol/week: 1.0 standard drinks     Types: 1 Standard drinks or equivalent per week     Frequency: Monthly or less     Drinks per session: 1 or 2     Comment: rarely    Drug use: Never            PHYSICAL EXAMINATION:  [ INSTRUCTIONS:  \"[x]\" Indicates a positive item  \"[]\" Indicates a negative item  -- DELETE ALL ITEMS NOT EXAMINED]    Constitutional: [x] Appears well-developed and well-nourished [x] No apparent distress      [] Abnormal-   Mental status  [x] Alert and awake  [x] Oriented to person/place/time [x]Able to follow commands    Psychiatric:       [x] Normal Affect [x] No Hallucinations        [] Abnormal-     Other pertinent observable physical exam findings-     Isaac Cool is a 63 y.o. female being evaluated by a Virtual Visit (video visit) encounter to address concerns as mentioned above. A caregiver was present when appropriate. Due to this being a TeleHealth encounter ([facility]), evaluation of the following organ systems was limited: Vitals/Constitutional/EENT/Resp/CV/GI//MS/Neuro/Skin/Heme-Lymph-Imm. Pursuant to the emergency declaration under the [facility], [address] [place] [address] authority and the [employer] General Act, this Virtual Visit was conducted with patient's (and/or legal guardian's) consent, to reduce the patient's risk of exposure to COVID-19 and provide necessary medical care. The patient (and/or legal guardian) has also been advised to contact this office for worsening conditions or problems, and seek emergency medical treatment and/or call 911 if deemed necessary.      Patient identification was verified at the start of the visit: Yes    Total time spent on this encounter: 25 min    Services were provided through a video synchronous discussion virtually to substitute for in-person clinic visit. Patient and provider were located at their individual homes. --KALI Field NP on 8/17/2020 at 2:31 PM    An electronic signature was used to authenticate this note. 8/17/2020 2:31 PM   Progress Note    IN:  1400  OUT: [address] [facility] 1965      Chief Complaint   Patient presents with    Follow-up    Depression    Anxiety         Subjective:  Patient is a 63 y.o. female diagnosed with MDD and presents today for follow-up. Last seen in clinic on 7/20/20 and prior records were reviewed. Today patient states, \"I think so (in regards to how she is doing). \" She reports that her anxiety is better. She does report dreams most nights which are vivid, not frightening, just strange. She reports that in the last week or week and a half she has had some episodes of crying. She thinks this is getting better. Patient reports side effects as follows: vivid dreams, some crying (Fluvoxamine). No evidence of EPS, no cogwheeling or abnormal motor movements. Absent  suicidal ideation. Reports compliance with medications as good . Current Substance Use:  See history    BP: There were no vitals taken for this visit.       Review of Systems - 14 point review:  Negative except being treated for: migraine headaches, muscle tension, HTN, depression, anxiety      Constitutional: (fevers, chills, night sweats, wt loss/gain, change in appetite, fatigue, somnolence)    HEENT: (ear pain or discharge, hearing loss, ear ringing, sinus pressure, nosebleed, nasal discharge, sore throat, oral sores, tooth pain, bleeding gums, hoarse voice, neck pain)      Cardiovascular: (HTN, chest pain, elevated cholesterol/lipids, palpitations, leg swelling, leg pain with walking)    Respiratory: (cough, wheezing, snoring, SOB with activity (dyspnea), SOB while lying flat (orthopnea), awakening with severe SOB (paroxysmal nocturnal dyspnea))    Gastrointestinal: (NVD, constipation, abdominal pain, bright red stools, black tarry stools, stool incontinence)     Genitourinary:  (pelvic pain, burning or frequency of urination, urinary urgency, blood in urine incomplete bladder emptying, urinary incontinence, STD; MEN: testicular pain or swelling, erectile dysfunction; WOMEN: LMP, heavy menstrual bleeding (menorrhagia), irregular periods, postmenopausal bleeding, menstrual pain (dymenorrhea, vaginal discharge)    Musculoskeletal: (bone pain/fracture, joint pain or swelling, musle pain)    Integumentary: (rashes, acne, non-healing sores, itching, breast lumps, breast pain, nipple discharge, hair loss)    Neurologic: (HA, muscle weakness, paresthesias (numbness, coldness, crawling or prickling), memory loss, seizure, dizziness)    Psychiatric:  (anxiety, sadness, irritability/anger, insomnia, suicidality)    Endocrine: (heat or cold intolerance, excessive thirst (polydipsia), excessive hunger (polyphagia))    Immune/Allergic: (hives, seasonal or environmental allergies, HIV exposure)    Hematologic/Lymphatic: (lymph node enlargement, easy bleeding or bruising)    History obtained via chart review and patient    PCP is Venkat Meyer MD       Current Meds:    Prior to Admission medications    Medication Sig Start Date End Date Taking? Authorizing Provider   butalbital-acetaminophen-caffeine (FIORICET, ESGIC) -40 MG per tablet TAKE 1 TABLET BY MOUTH EVERY 8 HOURS AS NEEDED FOR HEADACHE OR MIGRAINE 7/31/20   Venkat Meyer MD   traMADol (ULTRAM) 50 MG tablet TAKE 1 TABLET BY MOUTH EVERY 12 HOURS AS NEEDED FOR PAIN 7/31/20 8/30/20  Venkat Meyer MD   fluvoxaMINE (LUVOX) 50 MG tablet Take 1/2 tab by mouth nightly for 7 days, then increase to 1 tab nightly.  7/20/20   KALI Bowman - ERIKA   propranolol (INDERAL LA) 60 MG extended release capsule Take 1 capsule by mouth daily 7/13/20   Melly George MD   gabapentin (NEURONTIN) 300 MG capsule TAKE 2 CAPSULES PO QHS 6/17/20 9/16/20  Melly George MD   tiZANidine (ZANAFLEX) 2 MG tablet Take 1 tablet by mouth every 8 hours as needed (muscle spasm/tension) 1/22/20   Melly George MD   triamcinolone (NASACORT ALLERGY 24HR) 55 MCG/ACT nasal inhaler 2 sprays by Each Nostril route daily 11/21/19   Melly George MD   levothyroxine (SYNTHROID) 88 MCG tablet TAKE 1 TABLET BY MOUTH ONCE DAILY 8/16/19   Melly George MD   celecoxib (CELEBREX) 200 MG capsule Take 1 capsule by mouth daily as needed for Pain (arthritis) 5/8/19   Melly George MD   valACYclovir (VALTREX) 500 MG tablet  7/24/18   Historical Provider, MD   Potassium 99 MG TABS Take 1 tablet by mouth daily    Historical Provider, MD   BIOTIN PO Take 1 tablet by mouth daily    Historical Provider, MD   TURMERIC PO Take 1 tablet by mouth daily    Historical Provider, MD   vitamin B-12 (CYANOCOBALAMIN) 1000 MCG tablet Take 1,000 mcg by mouth daily    Historical Provider, MD   Multiple Vitamins-Minerals (MULTIVITAMIN ADULT PO) Take by mouth    Historical Provider, MD   Omega 3 1000 MG CAPS Take by mouth    Historical Provider, MD   Cholecalciferol (VITAMIN D3) 3000 units TABS Take by mouth    Historical Provider, MD     Social History     Socioeconomic History    Marital status:      Spouse name: None    Number of children: 2    Years of education: None    Highest education level: Associate degree: occupational, technical, or vocational program   Occupational History    None   Social Needs    Financial resource strain: Somewhat hard    Food insecurity     Worry: Never true     Inability: Never true    Transportation needs     Medical: No     Non-medical: No   Tobacco Use    Smoking status: Never Smoker    Smokeless tobacco: Never Used   Substance and Sexual Activity    Alcohol use: Yes     Alcohol/week: 1.0 standard drinks     Types: 1 Standard drinks or equivalent per week     Frequency: Monthly or less     Drinks per session: 1 or 2     Comment: rarely    Drug use: Never    Sexual activity: Yes     Partners: Male   Lifestyle    Physical activity     Days per week: None     Minutes per session: None    Stress: None   Relationships    Social connections     Talks on phone: More than three times a week     Gets together: More than three times a week     Attends [employer] service: More than 4 times per year     Active member of club or organization: Yes     Attends meetings of clubs or organizations: More than 4 times per year     Relationship status:     Intimate partner violence     Fear of current or ex partner: No     Emotionally abused: No     Physically abused: No     Forced sexual activity: No   Other Topics Concern    None   Social History Narrative    PRIOR MEDICATION TRIALS    Desvenlafaxine (ineffective)    Wellbutrin    Temazepam    Lexapro    Sertraline    . SLEEP STUDY: no,  reports that she snores and has some daytime fatigue    . negative history of seizures. .    negative history of head trauma. Anshu Cross PREVIOUS PSYCHIATRIC HISTORY    Sister, depression/anxiety    Daughter, depression/anxiety (bipolar ?)    . FAMILY PSYCHIATRIC HISTORY    Sister, depression/anxiety    Daughter, depression/anxiety (bipolar ?)    Mother, anxiety    . SOCIAL HISTORY    Born and Raised - [place], 2 parent home with 2 siblings. Characterizes childhood as happy. Trauma and/or Abuse - denies    Legal - denies    Substance Use - see history    Work History - see history    Education - see history     status - denies    . PAST SUICIDE ATTEMPTS:    no    .    INPATIENT HOSPITALIZATIONS:    no    .    DRUG REHABILITATION:    no    .    PSYCHIATRIC REVIEW OF SYSTEMS, 7/20/2020    .     Mood:  positive for low motivation; feeling down, depressed, hopeless, sleep disturbance, trouble concentrating, guilt, denies suicidality      (Depression: sadness, tearfulness, sleep, appetite, energy, concentration, sexual function, guilt, psychomotor agitation or slowing, interest, suicidality)    . Elisa: negative      (impulsivity, grandiosity, recklessness, excessive energy, decreased need for sleep, increased spending beyond means, hyperverbal, grandiose, racing thoughts, hypersexuality)    . Other: positive for irritable      (Irritability, lability, anger)    . Anxiety:  positive for worries about politics, her daughter's marital problems, feeling nervous/anxious/on edge; worrying too much about different things      (Generalized anxiety: where, when, who, how long, how frequent)    . Panic Disorder symptoms: negative      (Palpitations, racing heart beat, sweating, sense of impending doom, fear of recurrence, shortness of breath)    . OCD symptoms:  positive for obsessive about organization      (checking, cleaning, organizing, rituals, hang-ups, obsessive thoughts, counting, rational vs. Irrational beliefs)    . PTSD symptoms:  negative      (nightmares, flashbacks, startle response, avoidance)    . Social anxiety symptoms:  negative    . Simple phobias: negative      (heights, planes, spiders, etc.)    . Psychosis: negative      (hallucinations, auditory, visual, tactile, olfactory)    . Paranoia: negative    .     Delusions:  negative      (TV, radio, thought broadcasting, mind control, referential thinking)      (persecutory delusion - e.g., believing one is being followed and harassed by gangs)      (grandiose delusion - e.g., believing one is a [address] who owns casinos [place] the [place])      (erotomanic delusion - e.g., believing a famous  is in love with them)       (somatic delusion - e.g., believing one's sinuses have been infested by worms)      (delusions of reference - e.g., believing dialogue on a TV program is directed specifically towards the patient)      (delusions of control - e.g., believing one's thoughts and movements are controlled by planetary overlords)    . Patient's perception: negative      (Spiritual or cultural context of symptoms, reality testing)    . ADHD symptoms: negative      (able to focus and concentrate, scattered thoughts, disorganized thoughts)    . Eating Disorder symptoms:  negative      (binging, purging, excessive exercising)       MSE:  Patient is  A & O x 4. Appearance:  well-appearing and good grooming appropriately dressed for season and age. Cognition:  Recent memory intact , remote memory intact , good fund of knowledge, average  intelligence level. Speech:  normal  Language: Naming: intact;  Word Finding: intact  Conversation no evidence of delusions  Behavior:  Cooperative and Good eye contact  Mood: \"good\"  Affect: congruent with mood  Thought Content: negative delusions, negative hallucinations, negative obsessions,  negativehomicidal and negative suicidal   Thought Process: linear, goal directed and coherent  Associations: logical connections  Attention Span and concentration: Normal   Judgement Insight:  normal and appropriate  Gait and Station:TERE   Sleep: avg 5-6 hrs    Appetite: ok      Lab Results   Component Value Date     06/12/2020    K 3.8 06/12/2020     06/12/2020    CO2 25 06/12/2020    BUN 7 06/12/2020    CREATININE 0.7 06/12/2020    GLUCOSE 94 06/12/2020    CALCIUM 9.4 06/12/2020    PROT 7.2 06/12/2020    LABALBU 4.8 06/12/2020    BILITOT 0.3 06/12/2020    ALKPHOS 112 (H) 06/12/2020    AST 25 06/12/2020    ALT 21 06/12/2020    LABGLOM >60 06/12/2020     Lab Results   Component Value Date     06/12/2020    K 3.8 06/12/2020     06/12/2020    CO2 25 06/12/2020    BUN 7 06/12/2020    CREATININE 0.7 06/12/2020    GLUCOSE 94 06/12/2020    CALCIUM 9.4 06/12/2020      Lab Results   Component Value Date    CHOL 221 (H) 06/12/2020     Lab Results   Component Value Date    TRIG 149 06/12/2020     Lab Results   Component Value Date    HDL 46 (L) 06/12/2020     Lab Results   Component Value Date    LDLCALC 145 06/12/2020     No results found for: LABVLDL, VLDL  No results found for: CHOLHDLRATIO  No results found for: LABA1C  No results found for: EAG  Lab Results   Component Value Date    TSH 1.320 06/12/2020     Lab Results   Component Value Date    VITD25 41.2 06/12/2020       Assessments Administered:    PHQ9: 7, mild  GAD7: 4, normal    Cognition: Can spell \"world\" backwards: Yes                    Can do serial 7's: Yes    Assessment:   1. Mild episode of recurrent major depressive disorder ([facility] [address]    2. HORACE (generalized anxiety disorder)         R/O Obsessive Compulsive Disorder    Plan:  Continue:  Fluvoxamine, 50mg, nightly    Continue therapy with K. Ray    Follow up: Return in about 3 months (around 11/17/2020). 1. The risks, benefits, side effects, indications, contraindications, and adverse effects of the medications have been discussed. Yes.  2. The pt has verbalized understanding and has capacity to give informed consent. 3. The Lalito Barros report has been reviewed according to [facility] regulations. 4. Supportive therapy offered. 5. Follow up: Return in about 3 months (around 11/17/2020). 6. The patient has been advised to call with any problems. 7. Controlled substance Treatment Plan: none. 8. The above listed medications have been continued, modifications in meds and other orders/labs as follows: No orders of the defined types were placed in this encounter. No orders of the defined types were placed in this encounter. 9. Additional comments: She has responded well to Fluvoxamine. She reports some crying and some vivid dreams. Continued to encourage her to practice relaxation techniques and deep breathing at night to relieve the muscle tension in her neck.  She is continuing therapy with K. Belinda Araujo. Will make no medication changes this visit and will follow up in about 3 months. She was encouraged to call should the dreams/crying spells worsen. 10.Over 50% of the total visit time of   25  minutes was spent on counseling and/or coordination of care of:                        1. Mild episode of recurrent major depressive disorder ([address]    2.  HORACE (generalized anxiety disorder)                      Psychotherapy Topics: mood/medication effectiveness       Carin Mcneill PMHNP-BC

## 2020-09-16 ENCOUNTER — TELEPHONE (OUTPATIENT)
Dept: PSYCHIATRY | Age: 55
End: 2020-09-16

## 2020-09-16 NOTE — TELEPHONE ENCOUNTER
Pt was a no show/answer for appointment. During time before and 16 minutes after pt's scheduled appointment, LCSW:     waited for pt to login MyChart.

## 2020-09-17 ENCOUNTER — TELEPHONE (OUTPATIENT)
Dept: FAMILY MEDICINE CLINIC | Age: 55
End: 2020-09-17

## 2020-09-17 ENCOUNTER — TELEPHONE (OUTPATIENT)
Dept: PSYCHIATRY | Age: 55
End: 2020-09-17

## 2020-09-17 RX ORDER — BUTALBITAL, ACETAMINOPHEN AND CAFFEINE 50; 325; 40 MG/1; MG/1; MG/1
TABLET ORAL
Qty: 90 TABLET | Refills: 0 | Status: CANCELLED | OUTPATIENT
Start: 2020-09-17

## 2020-09-17 RX ORDER — TRAMADOL HYDROCHLORIDE 50 MG/1
50 TABLET ORAL EVERY 12 HOURS PRN
Qty: 45 TABLET | Refills: 2 | Status: CANCELLED | OUTPATIENT
Start: 2020-09-17 | End: 2020-10-17

## 2020-09-18 ENCOUNTER — VIRTUAL VISIT (OUTPATIENT)
Dept: FAMILY MEDICINE CLINIC | Age: 55
End: 2020-09-18
Payer: COMMERCIAL

## 2020-09-18 ENCOUNTER — TELEPHONE (OUTPATIENT)
Dept: FAMILY MEDICINE CLINIC | Age: 55
End: 2020-09-18

## 2020-09-18 DIAGNOSIS — E78.5 HYPERLIPIDEMIA, UNSPECIFIED HYPERLIPIDEMIA TYPE: ICD-10-CM

## 2020-09-18 DIAGNOSIS — D50.9 IRON DEFICIENCY ANEMIA, UNSPECIFIED IRON DEFICIENCY ANEMIA TYPE: ICD-10-CM

## 2020-09-18 LAB
ALBUMIN SERPL-MCNC: 4.6 G/DL (ref 3.5–5.2)
ALP BLD-CCNC: 104 U/L (ref 35–104)
ALT SERPL-CCNC: 16 U/L (ref 5–33)
ANION GAP SERPL CALCULATED.3IONS-SCNC: 11 MMOL/L (ref 7–19)
AST SERPL-CCNC: 21 U/L (ref 5–32)
BASOPHILS ABSOLUTE: 0.1 K/UL (ref 0–0.2)
BASOPHILS RELATIVE PERCENT: 0.6 % (ref 0–1)
BILIRUB SERPL-MCNC: 0.3 MG/DL (ref 0.2–1.2)
BUN BLDV-MCNC: 7 MG/DL (ref 6–20)
CALCIUM SERPL-MCNC: 9.5 MG/DL (ref 8.6–10)
CHLORIDE BLD-SCNC: 106 MMOL/L (ref 98–111)
CHOLESTEROL, FASTING: 217 MG/DL (ref 160–199)
CO2: 25 MMOL/L (ref 22–29)
CREAT SERPL-MCNC: 0.8 MG/DL (ref 0.5–0.9)
EOSINOPHILS ABSOLUTE: 0.3 K/UL (ref 0–0.6)
EOSINOPHILS RELATIVE PERCENT: 3 % (ref 0–5)
GFR AFRICAN AMERICAN: >59
GFR NON-AFRICAN AMERICAN: >60
GLUCOSE FASTING: 102 MG/DL (ref 74–109)
HCT VFR BLD CALC: 39 % (ref 37–47)
HDLC SERPL-MCNC: 43 MG/DL (ref 65–121)
HEMOGLOBIN: 12.5 G/DL (ref 12–16)
IMMATURE GRANULOCYTES #: 0 K/UL
IRON: 56 UG/DL (ref 37–145)
LDL CHOLESTEROL CALCULATED: 139 MG/DL
LYMPHOCYTES ABSOLUTE: 1.5 K/UL (ref 1.1–4.5)
LYMPHOCYTES RELATIVE PERCENT: 18.4 % (ref 20–40)
MCH RBC QN AUTO: 25.4 PG (ref 27–31)
MCHC RBC AUTO-ENTMCNC: 32.1 G/DL (ref 33–37)
MCV RBC AUTO: 79.1 FL (ref 81–99)
MONOCYTES ABSOLUTE: 0.6 K/UL (ref 0–0.9)
MONOCYTES RELATIVE PERCENT: 7.1 % (ref 0–10)
NEUTROPHILS ABSOLUTE: 5.8 K/UL (ref 1.5–7.5)
NEUTROPHILS RELATIVE PERCENT: 70.7 % (ref 50–65)
PDW BLD-RTO: 15.7 % (ref 11.5–14.5)
PLATELET # BLD: 310 K/UL (ref 130–400)
PMV BLD AUTO: 10.6 FL (ref 9.4–12.3)
POTASSIUM SERPL-SCNC: 4.2 MMOL/L (ref 3.5–5)
RBC # BLD: 4.93 M/UL (ref 4.2–5.4)
SODIUM BLD-SCNC: 142 MMOL/L (ref 136–145)
TOTAL PROTEIN: 7.1 G/DL (ref 6.6–8.7)
TRIGLYCERIDE, FASTING: 176 MG/DL (ref 0–149)
WBC # BLD: 8.2 K/UL (ref 4.8–10.8)

## 2020-09-18 PROCEDURE — 99213 OFFICE O/P EST LOW 20 MIN: CPT | Performed by: INTERNAL MEDICINE

## 2020-09-18 RX ORDER — HYDROCODONE POLISTIREX AND CHLORPHENIRAMINE POLISTIREX 10; 8 MG/5ML; MG/5ML
5 SUSPENSION, EXTENDED RELEASE ORAL EVERY 12 HOURS PRN
Qty: 100 ML | Refills: 0 | Status: SHIPPED | OUTPATIENT
Start: 2020-09-18 | End: 2020-09-18 | Stop reason: SDUPTHER

## 2020-09-18 RX ORDER — HYDROCODONE POLISTIREX AND CHLORPHENIRAMINE POLISTIREX 10; 8 MG/5ML; MG/5ML
5 SUSPENSION, EXTENDED RELEASE ORAL EVERY 12 HOURS PRN
Qty: 100 ML | Refills: 0 | Status: SHIPPED | OUTPATIENT
Start: 2020-09-18 | End: 2020-09-28

## 2020-09-18 ASSESSMENT — ENCOUNTER SYMPTOMS
EYE REDNESS: 0
EYE PAIN: 0
COUGH: 1
SINUS PAIN: 0
BLOOD IN STOOL: 0
DIARRHEA: 0
COLOR CHANGE: 0
SINUS PRESSURE: 1
SORE THROAT: 1
RHINORRHEA: 1
EYE DISCHARGE: 0
CHEST TIGHTNESS: 0
ABDOMINAL PAIN: 0
VOICE CHANGE: 0
VOMITING: 0
SHORTNESS OF BREATH: 0
WHEEZING: 0

## 2020-09-18 NOTE — PROGRESS NOTES
2020    TELEHEALTH EVALUATION -- Audio/Visual (During TPPFU-69 public health emergency)    HPI:    Kia Moralez (:  1965) has requested an audio/video evaluation for the following concern(s):  1. Location of patient - Home  2. Location of physician - Office  3. Other individuals on this call - no    64 y/o WF presents for tele-health VV for c/o worsening cough and post-nasal gtt due to allergies/sinuses past few days. No fever. She does have a sore throat but cough is dry cough. She has had some sinus pressure vs sinus pain she gets with sinusitis. No loss of sense of taste or smell. No body aches or SOA. Review of Systems   Constitutional: Negative for appetite change, chills, fatigue and fever. HENT: Positive for congestion, postnasal drip, rhinorrhea, sinus pressure, sneezing and sore throat. Negative for ear pain, sinus pain and voice change. Eyes: Negative for pain, discharge and redness. Respiratory: Positive for cough. Negative for chest tightness, shortness of breath and wheezing. Cardiovascular: Negative for chest pain and palpitations. Gastrointestinal: Negative for abdominal pain, blood in stool, diarrhea and vomiting. Endocrine: Negative for cold intolerance, heat intolerance and polydipsia. Genitourinary: Negative for dysuria and hematuria. Musculoskeletal: Negative for arthralgias, myalgias, neck pain and neck stiffness. Skin: Negative for color change and rash. Allergic/Immunologic: Positive for environmental allergies. Neurological: Negative for dizziness, tremors, syncope, speech difficulty, weakness, numbness and headaches. Hematological: Negative for adenopathy. Does not bruise/bleed easily. Psychiatric/Behavioral: Negative for confusion, dysphoric mood and sleep disturbance. The patient is not nervous/anxious. All other systems reviewed and are negative. Prior to Visit Medications    Medication Sig Taking?  Authorizing Provider HYDROcodone-chlorpheniramine (TUSSIONEX PENNKINETIC ER) 10-8 MG/5ML SUER Take 5 mLs by mouth every 12 hours as needed (cough) for up to 10 days. Yes Norberto Duron MD   gabapentin (NEURONTIN) 300 MG capsule TAKE 2 CAPSULES PO QHS  Norberto Duron MD   baclofen (LIORESAL) 10 MG tablet Take 1 tablet by mouth 3 times daily as needed (muscle spasms/neck pain)  Norberto Duron MD   omeprazole (PRILOSEC) 40 MG delayed release capsule Take 1 capsule by mouth nightly  Norberto Duron MD   butalbital-acetaminophen-caffeine (FIORICET, ESGIC) -40 MG per tablet TAKE 1 TABLET BY MOUTH EVERY 8 HOURS AS NEEDED FOR MIGRAINE HEADACHE  Norberto Duron MD   traMADol (ULTRAM) 50 MG tablet TAKE 1 TABLET BY MOUTH EVERY 12 HOURS AS NEEDED FOR PAIN  Norberto Duron MD   levothyroxine (SYNTHROID) 88 MCG tablet Take 1 tablet by mouth once daily  Norberto Duron MD   fluvoxaMINE (LUVOX) 50 MG tablet Take 1/2 tab by mouth nightly for 7 days, then increase to 1 tab nightly.   Dre Ridley APRN - NP   propranolol (INDERAL LA) 60 MG extended release capsule Take 1 capsule by mouth daily  Norberto Duron MD   triamcinolone (NASACORT ALLERGY 24HR) 55 MCG/ACT nasal inhaler 2 sprays by Each Nostril route daily  Norberto Duron MD   celecoxib (CELEBREX) 200 MG capsule Take 1 capsule by mouth daily as needed for Pain (arthritis)  Patient not taking: Reported on 9/25/2020  Norberto Duron MD   valACYclovir (VALTREX) 500 MG tablet   Historical Provider, MD   Potassium 99 MG TABS Take 1 tablet by mouth daily  Historical Provider, MD   BIOTIN PO Take 1 tablet by mouth daily  Historical Provider, MD   TURMERIC PO Take 1 tablet by mouth daily  Historical Provider, MD   vitamin B-12 (CYANOCOBALAMIN) 1000 MCG tablet Take 1,000 mcg by mouth daily  Historical Provider, MD   Multiple Vitamins-Minerals (MULTIVITAMIN ADULT PO) Take by mouth  Historical Provider, MD   Omega 3 1000 MG CAPS Take by mouth  Historical Provider, MD   Cholecalciferol (VITAMIN D3) 3000 units TABS Take by mouth  Historical Provider, MD       Social History     Tobacco Use    Smoking status: Never Smoker    Smokeless tobacco: Never Used   Substance Use Topics    Alcohol use: Yes     Alcohol/week: 1.0 standard drinks     Types: 1 Standard drinks or equivalent per week     Frequency: Monthly or less     Drinks per session: 1 or 2     Comment: rarely    Drug use: Never        Allergies   Allergen Reactions    Augmentin [Amoxicillin-Pot Clavulanate] Diarrhea    Aspirin Other (See Comments)   ,   Past Medical History:   Diagnosis Date    Allergic rhinitis     Chronic bilateral low back pain with left-sided sciatica 2017    Colon polyp     Depression with anxiety     Obesity     Osteoarthritis    ,   Past Surgical History:   Procedure Laterality Date     SECTION      COLONOSCOPY  2015    colon polyp x1, recheck in 5 yrs (Dr Jeanette Wilson)   [place] HYSTERECTOMY     Laray Petit     ,   Social History     Tobacco Use    Smoking status: Never Smoker    Smokeless tobacco: Never Used   Substance Use Topics    Alcohol use:  Yes     Alcohol/week: 1.0 standard drinks     Types: 1 Standard drinks or equivalent per week     Frequency: Monthly or less     Drinks per session: 1 or 2     Comment: rarely    Drug use: Never   ,   Family History   Problem Relation Age of Onset    Heart Disease Mother     High Blood Pressure Mother     Other Mother         skin CA    Osteoporosis Mother     Heart Disease Father     High Blood Pressure Father     Other Father         jaw CA    Heart Disease Sister     Diabetes Sister     Stroke Sister     High Blood Pressure Sister     Other Sister         fibromyalgia, thyroid CA   ,   Immunization History   Administered Date(s) Administered    Influenza, Quadv, IM, PF (6 mo and older Fluzone, Flulaval, Fluarix, and 3 yrs and older Afluria) 2018, 10/04/2019, 09/25/2020    Tdap (Boostrix, Adacel) 08/01/2016    Zoster Recombinant (Shingrix) 01/22/2020, 06/19/2020       PHYSICAL EXAMINATION:  [ INSTRUCTIONS:  \"[x]\" Indicates a positive item  \"[]\" Indicates a negative item  -- DELETE ALL ITEMS NOT EXAMINED]  Vital Signs: (As obtained by patient/caregiver or practitioner observation)    Blood pressure-  Heart rate-    Respiratory rate-    Temperature-  Pulse oximetry-     Constitutional: [] Appears well-developed and well-nourished [] No apparent distress, non-toxic      [] Abnormal-   Mental status  [] Alert and awake  [] Oriented to person/place/time []Able to follow commands      Eyes:  EOM    []  Normal  [] Abnormal-  Sclera  []  Normal  [] Abnormal -         Discharge []  None visible  [] Abnormal -    HENT:   [] Normocephalic, atraumatic. [x] Abnormal -+audible nasal congestion  [] Mouth/Throat: Mucous membranes are moist.     External Ears [] Normal  [] Abnormal-     Neck: [] No visualized mass     Pulmonary/Chest: [] Respiratory effort normal.  [] No visualized signs of difficulty breathing or respiratory distress        [x] Abnormal- +infrequent dry cough without wheezing or increased work of breathing     Musculoskeletal:   [] Normal gait with no signs of ataxia         [] Normal range of motion of neck        [] Abnormal-       Neurological:        [] No Facial Asymmetry (Cranial nerve 7 motor function) (limited exam to video visit)          [] No gaze palsy        [] Abnormal-         Skin:        [] No significant exanthematous lesions or discoloration noted on facial skin         [] Abnormal-            Psychiatric:       [] Normal Affect [] No Hallucinations        [] Abnormal-     Other pertinent observable physical exam findings-     Due to this being a TeleHealth encounter, evaluation of the following organ systems is limited: Vitals/Constitutional/EENT/Resp/CV/GI//MS/Neuro/Skin/Heme-Lymph-Imm.     ASSESSMENT/PLAN:  Pascual Brittle was seen today for cough.    Diagnoses and all orders for this visit:    Cough  -     Discontinue: HYDROcodone-chlorpheniramine (TUSSIONEX PENNKINETIC ER) 10-8 MG/5ML SUER; Take 5 mLs by mouth every 12 hours as needed (cough) for up to 10 days.  -     HYDROcodone-chlorpheniramine (TUSSIONEX PENNKINETIC ER) 10-8 MG/5ML SUER; Take 5 mLs by mouth every 12 hours as needed (cough) for up to 10 days. Post-nasal drip  -     Discontinue: HYDROcodone-chlorpheniramine (TUSSIONEX PENNKINETIC ER) 10-8 MG/5ML SUER; Take 5 mLs by mouth every 12 hours as needed (cough) for up to 10 days.  -     HYDROcodone-chlorpheniramine (TUSSIONEX PENNKINETIC ER) 10-8 MG/5ML SUER; Take 5 mLs by mouth every 12 hours as needed (cough) for up to 10 days. Cough with congestion of paranasal sinus    Seasonal allergic rhinitis due to other allergic trigger  -     Discontinue: HYDROcodone-chlorpheniramine (TUSSIONEX PENNKINETIC ER) 10-8 MG/5ML SUER; Take 5 mLs by mouth every 12 hours as needed (cough) for up to 10 days.  -     HYDROcodone-chlorpheniramine (TUSSIONEX PENNKINETIC ER) 10-8 MG/5ML SUER; Take 5 mLs by mouth every 12 hours as needed (cough) for up to 10 days.    -encouraged daily use of flonase NS 2 sprays in each nostril 1-2x/day to help with nasal congestion and post-nasal gtt due to allergic rhinitis  -refill on tussionex for moderate to severe cough (patient warned of risk of sedation and constipation with medication), UDS and BAUTISTA are up to date and scanned into chart  -may use Mucinex DM every 12 hours as needed for cough/congestion  -Return if symptoms worsen or fail to improve. Over 50% of the total visit time of 15 minutes was spent on counseling and/or coordination of care of:   1. Cough    2. Post-nasal drip    3. Cough with congestion of paranasal sinus    4. Seasonal allergic rhinitis due to other allergic trigger          An  electronic signature was used to authenticate this note.     --Rocky Arrington MD on 9/26/2020 at 11:45 PM        Pursuant to the emergency declaration under the [facility] waiver authority and the [employer] Act, this Virtual  Visit was conducted, with patient's consent, to reduce the patient's risk of exposure to COVID-19 and provide continuity of care for an established patient. Services were provided through a video synchronous discussion virtually to substitute for in-person clinic visit.

## 2020-09-18 NOTE — TELEPHONE ENCOUNTER
[facility] [address] called and said they don't have the [facility] that you called in so they cancelled it and ask that it be sent to [place] [facility], per patient.

## 2020-09-23 ENCOUNTER — TELEPHONE (OUTPATIENT)
Dept: FAMILY MEDICINE CLINIC | Age: 55
End: 2020-09-23

## 2020-09-23 NOTE — TELEPHONE ENCOUNTER
Patient returned call and reports she had car  Trouble and missed her appointment. Patient would like to know when she can be worked back in. Please advise.

## 2020-09-24 NOTE — TELEPHONE ENCOUNTER
"Encounter Date: 7/18/2020    SCRIBE #1 NOTE: I, Jia Coleman, am scribing for, and in the presence of,  BONY Villalpando. I have scribed the following portions of the note - Other sections scribed: HPI, ROS, PE.       History     Chief Complaint   Patient presents with    Otalgia     Pt to ER with c/o right ear pain x 2 days. Pt recently went swimming "3 days in a row." Pt currently 20 weeks preg. no complaint with pregnancy      This patient is a 32 y.o. pregnant female with HTN presenting to the ED with right ear "thumping" pain x this morning radiating to her jaw. Reports her ear was itching the night before, so she stuck her finger in it to feel around. Reports going to sleep after and waking up with pain. Denies ear drainage and dental pain. Denies ever having similar symptoms. Reports taking tylenol this morning with temporary relief. Reports going swimming recently.     The history is provided by the patient. No  was used.     Review of patient's allergies indicates:  No Known Allergies  Past Medical History:   Diagnosis Date    Hypertension     Miscarriage      History reviewed. No pertinent surgical history.  Family History   Problem Relation Age of Onset    Hypertension Mother      Social History     Tobacco Use    Smoking status: Never Smoker    Smokeless tobacco: Never Used   Substance Use Topics    Alcohol use: No    Drug use: No     Review of Systems   Constitutional: Negative for chills and fatigue.   HENT: Positive for ear pain. Negative for dental problem, ear discharge and sinus pain.         Mild jaw pain   Gastrointestinal: Negative for diarrhea, nausea and vomiting.   All other systems reviewed and are negative.      Physical Exam     Initial Vitals [07/18/20 1107]   BP Pulse Resp Temp SpO2   (!) 140/83 (!) 111 20 97.3 °F (36.3 °C) 99 %      MAP       --         Physical Exam    Nursing note and vitals reviewed.  Constitutional: She appears well-developed and " Risa, do you see anywhere to get her rescheduled tomorrow or next week? well-nourished. No distress.   HENT:   Head: Normocephalic and atraumatic.   Right Ear: Hearing, tympanic membrane and external ear normal. There is tenderness. No mastoid tenderness. Tympanic membrane is not perforated, not erythematous and not bulging.   Left Ear: Hearing, tympanic membrane and external ear normal. No tenderness. No mastoid tenderness. Tympanic membrane is not perforated, not erythematous and not bulging.   Right maxillary and mandibular 3rd molars cracked. Normal gumline and oropharynx   Eyes: Conjunctivae are normal.   Neck: Normal range of motion. Neck supple.   Cardiovascular: Normal rate and regular rhythm.   Pulmonary/Chest: Effort normal. No respiratory distress.   Abdominal: Soft. Normal appearance. There is no abdominal tenderness.   Musculoskeletal: Normal range of motion.   Neurological: She is alert and oriented to person, place, and time.   Skin: Skin is warm and dry. No rash noted.   Psychiatric: She has a normal mood and affect. Her behavior is normal.         ED Course   Procedures  Labs Reviewed - No data to display       Imaging Results    None          Medical Decision Making:   History:   Old Medical Records: I decided to obtain old medical records.    This is an emergent evaluation of a 32 y.o. female presenting to the ED for otalgia. Denies trauma, fever, and hearing loss. Afebrile. Patient is non-toxic.    Presentation consistent with OE. May also have referred dental pain. No obstruction/impaction or TM perforation. No signs of auricular perichondritis, AOM, bullous myringitis, and mastoiditis. I doubt bacterial rhinosinusitis and deep space head/neck infection. No evidence to suggest herpes zoster oticus or otomycosis. No signs of cholesteatoma. Less consistent with inner ear etiology, including for labyrinthitis and meniere's.      No indication for ear wick placement today. Discharged home with supportive care. Advised patient avoid activities that will place them at  risk for retaining water in the ears. Instructed to follow up with PCP and ENT for reevaluation and management of symptoms.     I discussed the diagnosis, treatment plan, indications for return to the emergency department, and for expected follow-up. The patient/family/caretaker verbalized an understanding. The patient/family/caretaker are asked if there are any questions or concerns. We discuss the case, until all issues are addressed to the patient/family/caretaker's satisfaction. Patient/family/caretaker understands and is agreeable to the plan.          Scribe Attestation:   Scribe #1: I performed the above scribed service and the documentation accurately describes the services I performed. I attest to the accuracy of the note.    Scribe attestation: I, Joshua Montemayor, personally performed the services described in this documentation. All medical record entries made by the scribe were at my direction and in my presence.  I have reviewed the chart and agree that the record reflects my personal performance and is accurate and complete                       Clinical Impression:     1. Otalgia of right ear    2. 12 weeks gestation of pregnancy    3. Cracked tooth                ED Disposition Condition    Discharge Stable        ED Prescriptions     Medication Sig Dispense Start Date End Date Auth. Provider    neomycin-polymyxin-hydrocortisone (CORTISPORIN) 3.5-10,000-1 mg/mL-unit/mL-% otic suspension Place 4 drops into the right ear 3 (three) times daily. 10 mL 7/18/2020  Joshua Montemayor PA-C        Follow-up Information     Follow up With Specialties Details Why Contact Info    follow up with your OBGYN for reevaluation        [facility] Emergency Department Emergency Medicine Go to  If symptoms worsen [address] [facility] [id number] [phone]                                     Joshua Montemayor PA-C  07/18/20 [id number]

## 2020-09-25 ENCOUNTER — OFFICE VISIT (OUTPATIENT)
Dept: FAMILY MEDICINE CLINIC | Age: 55
End: 2020-09-25
Payer: COMMERCIAL

## 2020-09-25 VITALS
SYSTOLIC BLOOD PRESSURE: 134 MMHG | HEIGHT: 61 IN | TEMPERATURE: 97.6 F | OXYGEN SATURATION: 97 % | BODY MASS INDEX: 29.29 KG/M2 | HEART RATE: 61 BPM | WEIGHT: 155.13 LBS | DIASTOLIC BLOOD PRESSURE: 78 MMHG

## 2020-09-25 LAB
AMPHETAMINE SCREEN, URINE: NORMAL
BARBITURATE SCREEN, URINE: NORMAL
BENZODIAZEPINE SCREEN, URINE: NORMAL
BUPRENORPHINE URINE: NORMAL
COCAINE METABOLITE SCREEN URINE: NORMAL
GABAPENTIN SCREEN, URINE: NORMAL
MDMA URINE: NORMAL
METHADONE SCREEN, URINE: NORMAL
METHAMPHETAMINE, URINE: NORMAL
OPIATE SCREEN URINE: NORMAL
OXYCODONE SCREEN URINE: NORMAL
PHENCYCLIDINE SCREEN URINE: NORMAL
PROPOXYPHENE SCREEN, URINE: NORMAL
THC SCREEN, URINE: NORMAL
TRICYCLIC ANTIDEPRESSANTS, UR: NORMAL

## 2020-09-25 PROCEDURE — 80305 DRUG TEST PRSMV DIR OPT OBS: CPT | Performed by: INTERNAL MEDICINE

## 2020-09-25 PROCEDURE — 99396 PREV VISIT EST AGE 40-64: CPT | Performed by: INTERNAL MEDICINE

## 2020-09-25 PROCEDURE — 90471 IMMUNIZATION ADMIN: CPT | Performed by: INTERNAL MEDICINE

## 2020-09-25 PROCEDURE — 99214 OFFICE O/P EST MOD 30 MIN: CPT | Performed by: INTERNAL MEDICINE

## 2020-09-25 PROCEDURE — 90686 IIV4 VACC NO PRSV 0.5 ML IM: CPT | Performed by: INTERNAL MEDICINE

## 2020-09-25 RX ORDER — OMEPRAZOLE 40 MG/1
40 CAPSULE, DELAYED RELEASE ORAL NIGHTLY
Qty: 30 CAPSULE | Refills: 2 | Status: SHIPPED | OUTPATIENT
Start: 2020-09-25 | End: 2021-01-04

## 2020-09-25 RX ORDER — BACLOFEN 10 MG/1
10 TABLET ORAL 3 TIMES DAILY PRN
Qty: 90 TABLET | Refills: 2 | Status: SHIPPED | OUTPATIENT
Start: 2020-09-25 | End: 2021-08-30

## 2020-09-25 RX ORDER — GABAPENTIN 300 MG/1
CAPSULE ORAL
Qty: 60 CAPSULE | Refills: 2 | Status: SHIPPED | OUTPATIENT
Start: 2020-09-25 | End: 2021-01-04 | Stop reason: SDUPTHER

## 2020-09-25 ASSESSMENT — ENCOUNTER SYMPTOMS
WHEEZING: 0
DIARRHEA: 0
BLOOD IN STOOL: 0
VOICE CHANGE: 0
CHEST TIGHTNESS: 0
ABDOMINAL PAIN: 0
EYE PAIN: 0
VOMITING: 0
EYE DISCHARGE: 0
SINUS PRESSURE: 0
COUGH: 1
SORE THROAT: 0
RHINORRHEA: 0
BACK PAIN: 1
SHORTNESS OF BREATH: 0
COLOR CHANGE: 0
EYE REDNESS: 0

## 2020-09-25 ASSESSMENT — VISUAL ACUITY: OU: 1

## 2020-09-25 NOTE — PROGRESS NOTES
After obtaining consent, and per orders of Dr. Kamla Rome, injection of afluria given in Right deltoid by Daphne Barclay. Patient instructed to remain in clinic for 20 minutes afterwards, and to report any adverse reaction to me immediately.

## 2020-09-25 NOTE — PROGRESS NOTES
Anurag Knox is a 63 y.o. female who presents today for   Chief Complaint   Patient presents with    Annual Exam    Chronic Pain     controlled med refill    Insomnia    Hypothyroidism       HPI  62 y/o WF here for annual wellness and follow up on Major depression, HORACE, migraine HAs, RLS, chronic neck and bilateral LBP, HORACE, MDD, and elevated BMI/overweight. She needs her flu vaccine today and she is about 6 mths past due for her repeat colonoscopy with Dr Seda Hurley to recheck for hx of colon polyps. Patient has been followed by gynecology (Dr Shirin Le) previously for yearly breast exams Pap smears, and mammograms with last visit being a little over 1 year ago. She has had a hysterectomy years ago however and would like to just get her breast exam and female cancer screening here with her PCP now. She usually donates platelets every other month but has not been able to recently due to low iron. Iron level improved on recent blood work but vitamins with iron irritate her stomach. Cough, postnasal drip, and congestion that patient did a video visit for last week has improved with prescription for Tussionex and treatment of allergies. She is on propranolol for her chronic migraine and tension HAs currently and was told by Neurology to stop the gabapentin she had been taking which was not controlling her HAs but her RLS and chronic pain are worse somewhat since stopping it. She would like to resume it if possible. Patient is also followed by [id number] [place] [address] psychiatry now for recurrent major depression, HORACE, and possible OCD which is improving with fluvoxamine 50 mg nightly and counseling with psychiatry. Hypothyroidism  Patient presents for follow up on thyroid function. Symptoms consist of fatigue, anxiousness. Symptoms have present for several months. The symptoms are mild. The problem has been stable. Patient has been compliant with levothyroxine.     Lab Results   Component Value Date    TSH 1.320 06/12/2020 T4FREE 1.06 06/12/2020       Rajat Mcdonald is here for follow up of dyslipidemia. Compliance with treatment has been fair. The patient exercises rarely. Patient denies muscle pain associated with their medications which include omega 3 FA. BMI Readings from Last 3 Encounters:   09/25/20 29.31 kg/m²   07/20/20 28.91 kg/m²   03/25/20 28.34 kg/m²     Wt Readings from Last 3 Encounters:   09/25/20 155 lb 2 oz (70.4 kg)   07/20/20 153 lb (69.4 kg)   03/25/20 150 lb (68 kg)     Review of Systems   Constitutional: Negative for appetite change, chills, fatigue and fever. HENT: Positive for congestion. Negative for ear pain, rhinorrhea, sinus pressure, sore throat and voice change. Eyes: Negative for pain, discharge and redness. Respiratory: Positive for cough and choking. Negative for chest tightness, shortness of breath and wheezing. See HPI   Cardiovascular: Negative for chest pain and palpitations. Gastrointestinal: Negative for abdominal pain, blood in stool, diarrhea and vomiting. Endocrine: Negative for cold intolerance, heat intolerance and polydipsia. Genitourinary: Negative for dysuria and hematuria. Musculoskeletal: Positive for arthralgias, back pain, myalgias and neck pain. Negative for neck stiffness. See HPI   Skin: Negative for color change and rash. Allergic/Immunologic: Positive for environmental allergies. Neurological: Positive for headaches. Negative for dizziness, tremors, syncope, speech difficulty, weakness and numbness. Hematological: Negative for adenopathy. Does not bruise/bleed easily. Psychiatric/Behavioral: Positive for dysphoric mood and sleep disturbance. Negative for confusion, self-injury and suicidal ideas. The patient is nervous/anxious. See HPI   All other systems reviewed and are negative.       Past Medical History:   Diagnosis Date    Allergic rhinitis     Chronic bilateral low back pain with left-sided sciatica 12/1/2017    Colon polyp     Depression with anxiety     Obesity     Osteoarthritis        Current Outpatient Medications   Medication Sig Dispense Refill    gabapentin (NEURONTIN) 300 MG capsule TAKE 2 CAPSULES PO QHS 60 capsule 2    baclofen (LIORESAL) 10 MG tablet Take 1 tablet by mouth 3 times daily as needed (muscle spasms/neck pain) 90 tablet 2    omeprazole (PRILOSEC) 40 MG delayed release capsule Take 1 capsule by mouth nightly 30 capsule 2    butalbital-acetaminophen-caffeine (FIORICET, ESGIC) -40 MG per tablet TAKE 1 TABLET BY MOUTH EVERY 8 HOURS AS NEEDED FOR MIGRAINE HEADACHE 90 tablet 0    traMADol (ULTRAM) 50 MG tablet TAKE 1 TABLET BY MOUTH EVERY 12 HOURS AS NEEDED FOR PAIN 45 tablet 2    levothyroxine (SYNTHROID) 88 MCG tablet Take 1 tablet by mouth once daily 30 tablet 2    fluvoxaMINE (LUVOX) 50 MG tablet Take 1/2 tab by mouth nightly for 7 days, then increase to 1 tab nightly. 30 tablet 3    propranolol (INDERAL LA) 60 MG extended release capsule Take 1 capsule by mouth daily 30 capsule 3    triamcinolone (NASACORT ALLERGY 24HR) 55 MCG/ACT nasal inhaler 2 sprays by Each Nostril route daily 1 Inhaler 5    Potassium 99 MG TABS Take 1 tablet by mouth daily      BIOTIN PO Take 1 tablet by mouth daily      TURMERIC PO Take 1 tablet by mouth daily      vitamin B-12 (CYANOCOBALAMIN) 1000 MCG tablet Take 1,000 mcg by mouth daily      Multiple Vitamins-Minerals (MULTIVITAMIN ADULT PO) Take by mouth      Omega 3 1000 MG CAPS Take by mouth      Cholecalciferol (VITAMIN D3) 3000 units TABS Take by mouth      celecoxib (CELEBREX) 200 MG capsule Take 1 capsule by mouth daily as needed for Pain (arthritis) (Patient not taking: Reported on 9/25/2020) 30 capsule 5    valACYclovir (VALTREX) 500 MG tablet        No current facility-administered medications for this visit.         Allergies   Allergen Reactions    Augmentin [Amoxicillin-Pot Clavulanate] Diarrhea    Aspirin Other (See Comments)       Past Surgical History:   Procedure Laterality Date     SECTION      COLONOSCOPY  2015    colon polyp x1, recheck in 5 yrs (Dr Sherwin Lockwood)   [address]      TONSILLECTOMY         Social History     Tobacco Use    Smoking status: Never Smoker    Smokeless tobacco: Never Used   Substance Use Topics    Alcohol use: Yes     Alcohol/week: 1.0 standard drinks     Types: 1 Standard drinks or equivalent per week     Frequency: Monthly or less     Drinks per session: 1 or 2     Comment: rarely    Drug use: Never       Family History   Problem Relation Age of Onset    Heart Disease Mother     High Blood Pressure Mother     Other Mother         skin CA    Osteoporosis Mother     Heart Disease Father     High Blood Pressure Father     Other Father         jaw CA    Heart Disease Sister     Diabetes Sister     Stroke Sister     High Blood Pressure Sister     Other Sister         fibromyalgia, thyroid CA       /78   Pulse 61   Temp 97.6 °F (36.4 °C)   Ht 5' 1\" (1.549 m)   Wt 155 lb 2 oz (70.4 kg)   SpO2 97%   BMI 29.31 kg/m²     Physical Exam  Vitals signs and nursing note reviewed. Constitutional:       General: She is not in acute distress. Appearance: Normal appearance. She is well-developed, well-groomed and normal weight. She is not ill-appearing, toxic-appearing or diaphoretic. HENT:      Head: Normocephalic and atraumatic. Right Ear: Tympanic membrane, ear canal and external ear normal.      Left Ear: Tympanic membrane, ear canal and external ear normal.      Nose: Nose normal.      Mouth/Throat:      Lips: Pink. Mouth: Mucous membranes are moist. No oral lesions. Tongue: No lesions. Palate: No mass and lesions. Pharynx: Oropharynx is clear. Uvula midline. No pharyngeal swelling, oropharyngeal exudate, posterior oropharyngeal erythema or uvula swelling. Tonsils: 1+ on the right. 1+ on the left.    Eyes:      General: Lids are normal. posterior cervical adenopathy. Upper Body:      Right upper body: No supraclavicular, axillary or pectoral adenopathy. Left upper body: No supraclavicular, axillary or pectoral adenopathy. Lower Body: No right inguinal adenopathy. No left inguinal adenopathy. Skin:     General: Skin is warm and dry. Capillary Refill: Capillary refill takes less than 2 seconds. Coloration: Skin is not cyanotic. Findings: No rash. Nails: There is no clubbing. Neurological:      Mental Status: She is alert and oriented to person, place, and time. Cranial Nerves: No cranial nerve deficit, dysarthria or facial asymmetry. Sensory: Sensation is intact. Motor: Motor function is intact. No weakness, tremor, atrophy or abnormal muscle tone. Coordination: Coordination is intact. Romberg sign negative. Coordination normal.      Gait: Gait is intact. Deep Tendon Reflexes: Reflexes are normal and symmetric. Reflex Scores:       Brachioradialis reflexes are 2+ on the right side and 2+ on the left side. Patellar reflexes are 2+ on the right side and 2+ on the left side. Comments: CN II-XII grossly intact, speech clear, MAEW, no focal deficits   Psychiatric:         Attention and Perception: Attention and perception normal.         Mood and Affect: Mood and affect normal.         Speech: Speech normal.         Behavior: Behavior normal. Behavior is cooperative. Thought Content:  Thought content normal.         Cognition and Memory: Cognition and memory normal.         Judgment: Judgment normal.         Lab Results   Component Value Date    WBC 8.2 09/18/2020    HGB 12.5 09/18/2020    HCT 39.0 09/18/2020    MCV 79.1 (L) 09/18/2020     09/18/2020    LABLYMP 1.34 08/13/2015    LYMPHOPCT 18.4 (L) 09/18/2020    RBC 4.93 09/18/2020    MCH 25.4 (L) 09/18/2020    MCHC 32.1 (L) 09/18/2020    RDW 15.7 (H) 09/18/2020     Lab Results   Component Value Date     09/18/2020    K 4.2 09/18/2020     09/18/2020    CO2 25 09/18/2020    BUN 7 09/18/2020    CREATININE 0.8 09/18/2020    GLUCOSE 94 06/12/2020    CALCIUM 9.5 09/18/2020    PROT 7.1 09/18/2020    LABALBU 4.6 09/18/2020    BILITOT 0.3 09/18/2020    ALKPHOS 104 09/18/2020    AST 21 09/18/2020    ALT 16 09/18/2020    LABGLOM >60 09/18/2020    GFRAA >59 09/18/2020       Lab Results   Component Value Date    TSH 1.320 06/12/2020    T4FREE 1.06 06/12/2020     Lab Results   Component Value Date    CHOL 221 (H) 06/12/2020    CHOL 226 (H) 11/20/2019    CHOL 210 (H) 08/07/2019     Lab Results   Component Value Date    TRIG 149 06/12/2020    TRIG 166 (H) 11/20/2019    TRIG 139 08/07/2019     Lab Results   Component Value Date    HDL 43 (L) 09/18/2020    HDL 46 (L) 06/12/2020    HDL 46 (L) 11/20/2019     Lab Results   Component Value Date    LDLCALC 139 09/18/2020    LDLCALC 145 06/12/2020    LDLCALC 147 11/20/2019     No results found for: LABVLDL, VLDL  No results found for: CHOLHDLRATIO    Results for orders placed or performed in visit on 09/25/20   POCT Rapid Drug Screen   Result Value Ref Range    Amphetamine Screen, Urine neg     Barbiturate Screen, Urine pos     Benzodiazepine Screen, Urine neg     Buprenorphine Urine neg     Cocaine Metabolite Screen, Urine neg     Gabapentin Screen, Urine neg     MDMA, Urine neg     Methamphetamine, Urine neg     Methadone Screen, Urine neg     Opiate Scrn, Ur neg     Oxycodone Screen, Ur neg     PCP Screen, Urine neg     Propoxyphene Screen, Urine neg     THC Screen, Urine neg     Tricyclic Antidepressants, Urine neg        Assessment:    ICD-10-CM    1. Annual physical exam  Z00.00    2. Acquired hypothyroidism  E03.9    3. Episodic tension-type headache, not intractable  G44.219    4. Idiopathic insomnia  F51.01    5. Mixed hyperlipidemia  E78.2    6. Vitamin D deficiency  E55.9    7. RLS (restless legs syndrome)  G25.81 gabapentin (NEURONTIN) 300 MG capsule   8.  Medication management  Z79.899 POCT Rapid Drug Screen   9. Chronic bilateral low back pain with left-sided sciatica  M54.42 gabapentin (NEURONTIN) 300 MG capsule    G89.29    10. Migraine without aura and without status migrainosus, not intractable  G43.009 gabapentin (NEURONTIN) 300 MG capsule   11. Chronic daily headache  R51 gabapentin (NEURONTIN) 300 MG capsule   12. Chronic neck pain  M54.2 gabapentin (NEURONTIN) 300 MG capsule    G89.29 baclofen (LIORESAL) 10 MG tablet   13. Muscle spasm  M62.838 baclofen (LIORESAL) 10 MG tablet   14. Nocturnal cough  R05 omeprazole (PRILOSEC) 40 MG delayed release capsule   15. Laryngopharyngeal reflux (LPR)  K21.9 omeprazole (PRILOSEC) 40 MG delayed release capsule   16. Flu vaccine need  Z23 INFLUENZA, QUADV, 3 YRS AND OLDER, IM PF, PREFILL SYR OR SDV, 0.5ML (AFLURIA QUADV, PF)   17. Overweight (BMI 25.0-29. 9)  E66.3        Plan:  Chuy Mercedes was seen today for annual exam, chronic pain, insomnia and hypothyroidism. Diagnoses and all orders for this visit:    Annual physical exam    Acquired hypothyroidism    Episodic tension-type headache, not intractable    Idiopathic insomnia    Mixed hyperlipidemia    Vitamin D deficiency    RLS (restless legs syndrome)  -     gabapentin (NEURONTIN) 300 MG capsule; TAKE 2 CAPSULES PO QHS    Medication management  -     POCT Rapid Drug Screen    Chronic bilateral low back pain with left-sided sciatica  -     gabapentin (NEURONTIN) 300 MG capsule; TAKE 2 CAPSULES PO QHS    Migraine without aura and without status migrainosus, not intractable  -     gabapentin (NEURONTIN) 300 MG capsule; TAKE 2 CAPSULES PO QHS    Chronic daily headache  -     gabapentin (NEURONTIN) 300 MG capsule; TAKE 2 CAPSULES PO QHS    Chronic neck pain  -     gabapentin (NEURONTIN) 300 MG capsule; TAKE 2 CAPSULES PO QHS  -     baclofen (LIORESAL) 10 MG tablet;  Take 1 tablet by mouth 3 times daily as needed (muscle spasms/neck pain)    Muscle spasm  -     baclofen (LIORESAL) 10 MG tablet; Take 1 tablet by mouth 3 times daily as needed (muscle spasms/neck pain)    Nocturnal cough  -     omeprazole (PRILOSEC) 40 MG delayed release capsule; Take 1 capsule by mouth nightly    Laryngopharyngeal reflux (LPR)  -     omeprazole (PRILOSEC) 40 MG delayed release capsule; Take 1 capsule by mouth nightly    Flu vaccine need  -     INFLUENZA, QUADV, 3 YRS AND OLDER, IM PF, PREFILL SYR OR SDV, 0.5ML (AFLURIA QUADV, PF)    Overweight (BMI 25.0-29.9)    1. Recent lab work reviewed with patient  2. Refills provided  3. Acquired hypothyroidism, episodic tension headaches, migraine headaches without aura and without let us migrainosus, and vitamin D deficiency currently well controlled. No medication changes today. Appreciate neurology input. 4.  Chronic bilateral low back pain with left-sided sciatica, chronic neck pain, and restless leg syndrome-currently exacerbated since stopping gabapentin. Will resume gabapentin at 300 mg at bedtime x1 to 2 weeks before increasing back to 600 mg at bedtime. The current medical regimen is effective. Continue present plan and medications. UDS and controlled substance contract updated today. No unusual filling on current [facility] report. Tx continues to be medically necessary and improves patient quality of life. No signs of misuse of controlled medication. Prescription for baclofen for muscle spasms and neck and back pain. 5.  Nocturnal cough and choking spells most likely due to LPR-reflux precautions discussed including avoiding late night eating, caffeine, and other triggers of reflux and will start omeprazole 40 mg once daily for better control. May need upper GI with swallow study if symptoms do not improve with this. 6.  Breast exam normal today. Patient is due to schedule yearly mammogram.  Will request previous records from gynecology on past Pap smears to see if they are located given her history of hysterectomy.   7.  Requested patient schedule her appointment with GI for follow-up colonoscopy due to history of colon polyps. 8. Return in about 3 months (around 12/25/2020) for recheck mood, Controlled med refill, high cholesterol, thyroid. Over 50% of the total visit time of 45 minutes was spent on counseling and/or coordination of care of:   1. Annual physical exam    2. Acquired hypothyroidism    3. Episodic tension-type headache, not intractable    4. Idiopathic insomnia    5. Mixed hyperlipidemia    6. Vitamin D deficiency    7. RLS (restless legs syndrome)    8. Medication management    9. Chronic bilateral low back pain with left-sided sciatica    10. Migraine without aura and without status migrainosus, not intractable    11. Chronic daily headache    12. Chronic neck pain    13. Muscle spasm    14. Nocturnal cough    15. Laryngopharyngeal reflux (LPR)    16. Flu vaccine need    17. Overweight (BMI 25.0-29. 9)         Orders Placed This Encounter   Procedures    INFLUENZA, QUADV, 3 YRS AND OLDER, IM PF, PREFILL SYR OR SDV, 0.5ML (AFLURIA QUADV, PF)    POCT Rapid Drug Screen     Orders Placed This Encounter   Medications    gabapentin (NEURONTIN) 300 MG capsule     Sig: TAKE 2 CAPSULES PO QHS     Dispense:  60 capsule     Refill:  2    baclofen (LIORESAL) 10 MG tablet     Sig: Take 1 tablet by mouth 3 times daily as needed (muscle spasms/neck pain)     Dispense:  90 tablet     Refill:  2    omeprazole (PRILOSEC) 40 MG delayed release capsule     Sig: Take 1 capsule by mouth nightly     Dispense:  30 capsule     Refill:  2     Medications Discontinued During This Encounter   Medication Reason    gabapentin (NEURONTIN) 300 MG capsule REORDER    tiZANidine (ZANAFLEX) 2 MG tablet Side effects     There are no Patient Instructions on file for this visit. Patient voices understanding and agrees to plans along with risks and benefits of plan. Counseling:  Edel Lees's case, medications and options were discussed in detail.  patient was instructed to call the office if she   questions regarding her treatment. Should her conditions worsen, she should return to office to be reassessed by Dr. Jackie Guerin. she  Should to go the closest Emergency Department for any emergency. They verbalized understanding the above instructions.

## 2020-10-06 ENCOUNTER — TELEPHONE (OUTPATIENT)
Dept: FAMILY MEDICINE CLINIC | Age: 55
End: 2020-10-06

## 2020-10-06 PROBLEM — G89.29 CHRONIC NECK PAIN: Status: ACTIVE | Noted: 2020-10-06

## 2020-10-06 PROBLEM — K21.9 LARYNGOPHARYNGEAL REFLUX (LPR): Status: ACTIVE | Noted: 2020-10-06

## 2020-10-06 PROBLEM — M54.2 CHRONIC NECK PAIN: Status: ACTIVE | Noted: 2020-10-06

## 2020-10-06 PROBLEM — R05.8 NOCTURNAL COUGH: Status: ACTIVE | Noted: 2020-10-06

## 2020-10-06 PROBLEM — M62.838 MUSCLE SPASM: Status: ACTIVE | Noted: 2020-10-06

## 2020-10-06 PROBLEM — R51.9 CHRONIC DAILY HEADACHE: Status: ACTIVE | Noted: 2020-10-06

## 2020-10-06 PROBLEM — Z79.899 MEDICATION MANAGEMENT: Status: ACTIVE | Noted: 2020-10-06

## 2020-10-06 ASSESSMENT — ENCOUNTER SYMPTOMS: CHOKING: 1

## 2020-10-06 NOTE — TELEPHONE ENCOUNTER
I received patient's mammogram from last year from her gynecologist (Dr. Jazlyn Lorenzana) and I actually wanted her previous fax records and Pap smears for patient's chart. It does look like she is due to get her mammogram scheduled for this year however because it appears [place] [facility] unless patient has had one this year and I just do not have the copy of the report yet. Can you schedule patient a mammogram if this is not been done in 2020 so that we can get her updated and contact Dr. Joy Patches office for those records?

## 2020-10-06 NOTE — TELEPHONE ENCOUNTER
I don't want the exam from 2020, I know she hasn't been in this year. I need her most recent OV and pap smear.

## 2020-10-06 NOTE — TELEPHONE ENCOUNTER
I called Dr. Matthew Jordan office and asked if the patient had been at their office this year. She hasn't been seen since 4//2019. I called the patient and asked her if we could get her set up for her annual Mammogram at [facility], she agreed. I have the order ready to be faxed to [facility] today.

## 2020-10-12 RX ORDER — BUTALBITAL, ACETAMINOPHEN AND CAFFEINE 50; 325; 40 MG/1; MG/1; MG/1
TABLET ORAL
Qty: 90 TABLET | Refills: 0 | Status: SHIPPED | OUTPATIENT
Start: 2020-10-12 | End: 2020-11-18

## 2020-10-12 NOTE — TELEPHONE ENCOUNTER
Marivel Tello called to request a refill on her medication.       Last office visit : 9/25/2020   Next office visit : 12/28/2020     Requested Prescriptions     Pending Prescriptions Disp Refills    butalbital-acetaminophen-caffeine (FIORICET, ESGIC) -40 MG per tablet [Pharmacy Med Name: Butalbital-APAP-Caffeine -40 MG Oral Tablet] 90 tablet 0     Sig: TAKE 1 TABLET BY MOUTH EVERY 8 HOURS AS NEEDED FOR MIGRAINE HEADACHE            Marivel Kaur MA

## 2020-12-02 RX ORDER — FLUVOXAMINE MALEATE 50 MG/1
50 TABLET, COATED ORAL NIGHTLY
Qty: 90 TABLET | Refills: 1 | Status: SHIPPED | OUTPATIENT
Start: 2020-12-02 | End: 2021-04-29 | Stop reason: DRUGHIGH

## 2020-12-02 NOTE — TELEPHONE ENCOUNTER
Pharmacy sent med refill request below. Last office visit : 8/17/2020 with Wanda BASS  Next office visit : 12/4/2020 with Wanda BASS    Requested Prescriptions     Pending Prescriptions Disp Refills    fluvoxaMINE (LUVOX) 50 MG tablet [Pharmacy Med Name: fluvoxaMINE Maleate 50 MG Oral Tablet] 30 tablet 0     Sig: TAKE 1/2 TABLET BY MOUTH AT NIGHT FOR 7 DAYS, THEN INCREASE TO 1 TABLET AT NIGHT            Lizzeth Santiago RN        8/17/2020 2:31 PM                             Progress Note     IN:  1400  OUT: 26        [place] 1965                         Chief Complaint   Patient presents with    Follow-up    Depression    Anxiety            Subjective:  Patient is a 63 y.o. female diagnosed with MDD and presents today for follow-up. Last seen in clinic on 7/20/20 and prior records were reviewed.     Today patient states, \"I think so (in regards to how she is doing). \" She reports that her anxiety is better. She does report dreams most nights which are vivid, not frightening, just strange. She reports that in the last week or week and a half she has had some episodes of crying. She thinks this is getting better.      Patient reports side effects as follows: vivid dreams, some crying (Fluvoxamine). No evidence of EPS, no cogwheeling or abnormal motor movements.     Absent  suicidal ideation.   Reports compliance with medications as good .      Current Substance Use:  See history     BP: There were no vitals taken for this visit.        Review of Systems - 14 point review:  Negative except being treated for: migraine headaches, muscle tension, HTN, depression, anxiety        Constitutional: (fevers, chills, night sweats, wt loss/gain, change in appetite, fatigue, somnolence)     HEENT: (ear pain or discharge, hearing loss, ear ringing, sinus pressure, nosebleed, nasal discharge, sore throat, oral sores, tooth pain, bleeding gums, hoarse voice, neck pain)      Cardiovascular: (HTN, chest pain, elevated cholesterol/lipids, palpitations, leg swelling, leg pain with walking)     Respiratory: (cough, wheezing, snoring, SOB with activity (dyspnea), SOB while lying flat (orthopnea), awakening with severe SOB (paroxysmal nocturnal dyspnea))     Gastrointestinal: (NVD, constipation, abdominal pain, bright red stools, black tarry stools, stool incontinence)     Genitourinary:  (pelvic pain, burning or frequency of urination, urinary urgency, blood in urine incomplete bladder emptying, urinary incontinence, STD; MEN: testicular pain or swelling, erectile dysfunction; WOMEN: LMP, heavy menstrual bleeding (menorrhagia), irregular periods, postmenopausal bleeding, menstrual pain (dymenorrhea, vaginal discharge)     Musculoskeletal: (bone pain/fracture, joint pain or swelling, musle pain)     Integumentary: (rashes, acne, non-healing sores, itching, breast lumps, breast pain, nipple discharge, hair loss)     Neurologic: (HA, muscle weakness, paresthesias (numbness, coldness, crawling or prickling), memory loss, seizure, dizziness)     Psychiatric:  (anxiety, sadness, irritability/anger, insomnia, suicidality)     Endocrine: (heat or cold intolerance, excessive thirst (polydipsia), excessive hunger (polyphagia))     Immune/Allergic: (hives, seasonal or environmental allergies, HIV exposure)     Hematologic/Lymphatic: (lymph node enlargement, easy bleeding or bruising)     History obtained via chart review and patient     PCP is Joanna Man MD         Current Meds:     Home Medications           Prior to Admission medications    Medication Sig Start Date End Date Taking?  Authorizing Provider   butalbital-acetaminophen-caffeine (FIORICET, ESGIC) -40 MG per tablet TAKE 1 TABLET BY MOUTH EVERY 8 HOURS AS NEEDED FOR HEADACHE OR MIGRAINE 7/31/20     Joanna Man MD   traMADol (ULTRAM) 50 MG tablet TAKE 1 TABLET BY MOUTH EVERY 12 HOURS AS NEEDED FOR PAIN 7/31/20 8/30/20   Joanna Man MD   fluvoxaMINE (LUVOX) 50 MG tablet Take 1/2 tab by mouth nightly for 7 days, then increase to 1 tab nightly.  7/20/20     Jessica Hudson, APRN - NP   propranolol (INDERAL LA) 60 MG extended release capsule Take 1 capsule by mouth daily 7/13/20     Joanna Man MD   gabapentin (NEURONTIN) 300 MG capsule TAKE 2 CAPSULES PO QHS 6/17/20 9/16/20   Joanna Man MD   tiZANidine (ZANAFLEX) 2 MG tablet Take 1 tablet by mouth every 8 hours as needed (muscle spasm/tension) 1/22/20     Joanna Man MD   triamcinolone (NASACORT ALLERGY 24HR) 55 MCG/ACT nasal inhaler 2 sprays by Each Nostril route daily 11/21/19     oJanna Man MD   levothyroxine (SYNTHROID) 88 MCG tablet TAKE 1 TABLET BY MOUTH ONCE DAILY 8/16/19     Joanna Man MD   celecoxib (CELEBREX) 200 MG capsule Take 1 capsule by mouth daily as needed for Pain (arthritis) 5/8/19     Joanna Man MD   valACYclovir (VALTREX) 500 MG tablet   7/24/18     Historical Provider, MD   Potassium 99 MG TABS Take 1 tablet by mouth daily       Historical Provider, MD   BIOTIN PO Take 1 tablet by mouth daily       Historical Provider, MD   TURMERIC PO Take 1 tablet by mouth daily       Historical Provider, MD   vitamin B-12 (CYANOCOBALAMIN) 1000 MCG tablet Take 1,000 mcg by mouth daily       Historical Provider, MD   Multiple Vitamins-Minerals (MULTIVITAMIN ADULT PO) Take by mouth       Historical Provider, MD   Omega 3 1000 MG CAPS Take by mouth       Historical Provider, MD   Cholecalciferol (VITAMIN D3) 3000 units TABS Take by mouth       Historical Provider, MD        Social History   Social History            Socioeconomic History    Marital status:        Spouse name: None    Number of children: 2    Years of education: None    Highest education level: Associate degree: occupational, technical, or vocational program   Occupational History    None   Social Needs    Financial resource strain: Somewhat hard  Food insecurity       Worry: Never true       Inability: Never true    Transportation needs       Medical: No       Non-medical: No   Tobacco Use    Smoking status: Never Smoker    Smokeless tobacco: Never Used   Substance and Sexual Activity    Alcohol use: Yes       Alcohol/week: 1.0 standard drinks       Types: 1 Standard drinks or equivalent per week       Frequency: Monthly or less       Drinks per session: 1 or 2       Comment: rarely    Drug use: Never    Sexual activity: Yes       Partners: Male   Lifestyle    Physical activity       Days per week: None       Minutes per session: None    Stress: None   Relationships    Social connections       Talks on phone: More than three times a week       Gets together: More than three times a week       Attends Yazidi service: More than 4 times per year       Active member of club or organization: Yes       Attends meetings of clubs or organizations: More than 4 times per year       Relationship status:     Intimate partner violence       Fear of current or ex partner: No       Emotionally abused: No       Physically abused: No       Forced sexual activity: No   Other Topics Concern    None   Social History Narrative     PRIOR MEDICATION TRIALS     Desvenlafaxine (ineffective)     Wellbutrin     Temazepam     Lexapro     Sertraline     .     SLEEP STUDY: no,  reports that she snores and has some daytime fatigue     .     negative history of seizures.     .     negative history of head trauma.     .     PREVIOUS PSYCHIATRIC HISTORY     Sister, depression/anxiety     Daughter, depression/anxiety (bipolar ?)     .     FAMILY PSYCHIATRIC HISTORY     Sister, depression/anxiety     Daughter, depression/anxiety (bipolar ?)     Mother, anxiety     .     SOCIAL HISTORY     Born and Raised - [place], 2 parent home with 2 siblings.  Characterizes childhood as happy.      Trauma and/or Abuse - denies     Legal - denies     Substance Use - see history     Work History - see history     Education - see history      status - denies     .     PAST SUICIDE ATTEMPTS:     no     .     INPATIENT HOSPITALIZATIONS:     no     .     DRUG REHABILITATION:     no     .     PSYCHIATRIC REVIEW OF SYSTEMS, 7/20/2020     .     Mood:  positive for low motivation; feeling down, depressed, hopeless, sleep disturbance, trouble concentrating, guilt, denies suicidality       (Depression: sadness, tearfulness, sleep, appetite, energy, concentration, sexual function, guilt, psychomotor agitation or slowing, interest, suicidality)     .     Elisa: negative       (impulsivity, grandiosity, recklessness, excessive energy, decreased need for sleep, increased spending beyond means, hyperverbal, grandiose, racing thoughts, hypersexuality)     .     Other: positive for irritable       (Irritability, lability, anger)     .     Anxiety:  positive for worries about politics, her daughter's marital problems, feeling nervous/anxious/on edge; worrying too much about different things       (Generalized anxiety: where, when, who, how long, how frequent)     .     Panic Disorder symptoms: negative       (Palpitations, racing heart beat, sweating, sense of impending doom, fear of recurrence, shortness of breath)     .     OCD symptoms:  positive for obsessive about organization       (checking, cleaning, organizing, rituals, hang-ups, obsessive thoughts, counting, rational vs. Irrational beliefs)     .     PTSD symptoms:  negative       (nightmares, flashbacks, startle response, avoidance)     .     Social anxiety symptoms:  negative     .     Simple phobias: negative       (heights, planes, spiders, etc.)     .     Psychosis: negative       (hallucinations, auditory, visual, tactile, olfactory)     .     Paranoia: negative     .     Delusions:  negative       (TV, radio, thought broadcasting, mind control, referential thinking)       (persecutory delusion - e.g., believing one is being BILITOT 0.3 06/12/2020     ALKPHOS 112 (H) 06/12/2020     AST 25 06/12/2020     ALT 21 06/12/2020     LABGLOM >60 06/12/2020            Lab Results   Component Value Date      06/12/2020     K 3.8 06/12/2020      06/12/2020     CO2 25 06/12/2020     BUN 7 06/12/2020     CREATININE 0.7 06/12/2020     GLUCOSE 94 06/12/2020     CALCIUM 9.4 06/12/2020            Lab Results   Component Value Date     CHOL 221 (H) 06/12/2020            Lab Results   Component Value Date     TRIG 149 06/12/2020            Lab Results   Component Value Date     HDL 46 (L) 06/12/2020            Lab Results   Component Value Date     LDLCALC 145 06/12/2020      No results found for: LABVLDL, VLDL  No results found for: CHOLHDLRATIO  No results found for: LABA1C  No results found for: EAG        Lab Results   Component Value Date     TSH 1.320 06/12/2020            Lab Results   Component Value Date     VITD25 41.2 06/12/2020         Assessments Administered:     PHQ9: 7, mild  GAD7: 4, normal     Cognition: Can spell \"world\" backwards: Yes                    Can do serial 7's: Yes     Assessment:    1. Mild episode of recurrent major depressive disorder ([facility] [place] [address]    2. HORACE (generalized anxiety disorder)          R/O Obsessive Compulsive Disorder     Plan:  Continue:  Fluvoxamine, 50mg, nightly     Continue therapy with K. Ray     Follow up: Return in about 3 months (around 11/17/2020).     1. The risks, benefits, side effects, indications, contraindications, and adverse effects of the medications have been discussed. Yes.  2. The pt has verbalized understanding and has capacity to give informed consent. 3. The Milind Reas report has been reviewed according to [facility] regulations. 4. Supportive therapy offered. 5. Follow up:    Return in about 3 months (around 11/17/2020). 6. The patient has been advised to call with any problems. 7. Controlled substance Treatment Plan: none.   8. The above listed medications have been continued, modifications in meds and other orders/labs as follows:                 Encounter Medications    No orders of the defined types were placed in this encounter. No orders of the defined types were placed in this encounter.        9. Additional comments: She has responded well to Fluvoxamine. She reports some crying and some vivid dreams. Continued to encourage her to practice relaxation techniques and deep breathing at night to relieve the muscle tension in her neck. She is continuing therapy with Chema Kevan. Will make no medication changes this visit and will follow up in about 3 months. She was encouraged to call should the dreams/crying spells worsen.      10. Over 50% of the total visit time of   25  minutes was spent on counseling and/or coordination of care of:                         1. Mild episode of recurrent major depressive disorder ([facility] [address]    2.  HORACE (generalized anxiety disorder)                        Psychotherapy Topics: mood/medication effectiveness         Bari Flores PMHNP-BC

## 2020-12-03 ENCOUNTER — TELEPHONE (OUTPATIENT)
Dept: PSYCHIATRY | Age: 55
End: 2020-12-03

## 2020-12-04 ENCOUNTER — TELEPHONE (OUTPATIENT)
Dept: PSYCHIATRY | Age: 55
End: 2020-12-04

## 2020-12-04 ENCOUNTER — VIRTUAL VISIT (OUTPATIENT)
Dept: PSYCHIATRY | Age: 55
End: 2020-12-04
Payer: COMMERCIAL

## 2020-12-04 PROCEDURE — 99443 PR PHYS/QHP TELEPHONE EVALUATION 21-30 MIN: CPT | Performed by: NURSE PRACTITIONER

## 2020-12-04 ASSESSMENT — PATIENT HEALTH QUESTIONNAIRE - PHQ9
10. IF YOU CHECKED OFF ANY PROBLEMS, HOW DIFFICULT HAVE THESE PROBLEMS MADE IT FOR YOU TO DO YOUR WORK, TAKE CARE OF THINGS AT HOME, OR GET ALONG WITH OTHER PEOPLE: 1
SUM OF ALL RESPONSES TO PHQ QUESTIONS 1-9: 8
5. POOR APPETITE OR OVEREATING: 1
SUM OF ALL RESPONSES TO PHQ QUESTIONS 1-9: 8
7. TROUBLE CONCENTRATING ON THINGS, SUCH AS READING THE NEWSPAPER OR WATCHING TELEVISION: 2
SUM OF ALL RESPONSES TO PHQ QUESTIONS 1-9: 8
8. MOVING OR SPEAKING SO SLOWLY THAT OTHER PEOPLE COULD HAVE NOTICED. OR THE OPPOSITE, BEING SO FIGETY OR RESTLESS THAT YOU HAVE BEEN MOVING AROUND A LOT MORE THAN USUAL: 0
SUM OF ALL RESPONSES TO PHQ9 QUESTIONS 1 & 2: 2
9. THOUGHTS THAT YOU WOULD BE BETTER OFF DEAD, OR OF HURTING YOURSELF: 0
2. FEELING DOWN, DEPRESSED OR HOPELESS: 1
6. FEELING BAD ABOUT YOURSELF - OR THAT YOU ARE A FAILURE OR HAVE LET YOURSELF OR YOUR FAMILY DOWN: 1
3. TROUBLE FALLING OR STAYING ASLEEP: 1
1. LITTLE INTEREST OR PLEASURE IN DOING THINGS: 1
4. FEELING TIRED OR HAVING LITTLE ENERGY: 1

## 2020-12-04 NOTE — PROGRESS NOTES
12/4/2020  Robert Paredes is a 63 y.o. female evaluated via telephone on 12/4/2020. Consent:  She and/or health care decision maker is aware that that she may receive a bill for this telephone service, depending on her insurance coverage, and has provided verbal consent to proceed: Yes      Documentation:  I communicated with the patient and/or health care decision maker about anxiety, depression. Details of this discussion including any medical advice provided: see below      I affirm this is a Patient Initiated Episode with a Patient who has not had a related appointment within my department in the past 7 days or scheduled within the next 24 hours. Patient identification was verified at the start of the visit: Yes    Total Time: minutes: 21-30 minutes    Note: not billable if this call serves to triage the patient into an appointment for the relevant concern      Sergio Montilla         12/4/2020 10:28 AM   Progress Note    IN: 1005  OUT: [id number]      Chief Complaint   Patient presents with    Follow-up    Anxiety    Depression    Other     nightmares, crying spells    Medication Check         Subjective:  Patient is a 63 y.o. female diagnosed with MDD and presents today for follow-up. Last seen in clinic on 8/17/20 and prior records were reviewed. Today patient states, \"I'm doing pretty good. \" She says that she has no complaints or concerns. She says she is not having dreams or crying episodes. Patient reports side effects as follows: none. No evidence of EPS, no cogwheeling or abnormal motor movements. Absent  suicidal ideation. Reports compliance with medications as good . Current Substance Use:  See history    BP: There were no vitals taken for this visit.       Review of Systems - 14 point review:  Negative except being treated for: migraine headaches, muscle tension, HTN, depression, anxiety, tinnitis in both ears      Constitutional: (fevers, chills, night sweats, wt loss/gain, change in appetite, fatigue, somnolence)    HEENT: (ear pain or discharge, hearing loss, ear ringing, sinus pressure, nosebleed, nasal discharge, sore throat, oral sores, tooth pain, bleeding gums, hoarse voice, neck pain)      Cardiovascular: (HTN, chest pain, elevated cholesterol/lipids, palpitations, leg swelling, leg pain with walking)    Respiratory: (cough, wheezing, snoring, SOB with activity (dyspnea), SOB while lying flat (orthopnea), awakening with severe SOB (paroxysmal nocturnal dyspnea))    Gastrointestinal: (NVD, constipation, abdominal pain, bright red stools, black tarry stools, stool incontinence)     Genitourinary:  (pelvic pain, burning or frequency of urination, urinary urgency, blood in urine incomplete bladder emptying, urinary incontinence, STD; MEN: testicular pain or swelling, erectile dysfunction; WOMEN: LMP, heavy menstrual bleeding (menorrhagia), irregular periods, postmenopausal bleeding, menstrual pain (dymenorrhea, vaginal discharge)    Musculoskeletal: (bone pain/fracture, joint pain or swelling, musle pain)    Integumentary: (rashes, acne, non-healing sores, itching, breast lumps, breast pain, nipple discharge, hair loss)    Neurologic: (HA, muscle weakness, paresthesias (numbness, coldness, crawling or prickling), memory loss, seizure, dizziness)    Psychiatric:  (anxiety, sadness, irritability/anger, insomnia, suicidality)    Endocrine: (heat or cold intolerance, excessive thirst (polydipsia), excessive hunger (polyphagia))    Immune/Allergic: (hives, seasonal or environmental allergies, HIV exposure)    Hematologic/Lymphatic: (lymph node enlargement, easy bleeding or bruising)    History obtained via chart review and patient    PCP is Samia Reed MD       Current Meds:    Prior to Admission medications    Medication Sig Start Date End Date Taking?  Authorizing Provider   fluvoxaMINE (LUVOX) 50 MG tablet Take 1 tablet by mouth nightly 12/2/20   KALI Stiles - NP   propranolol (INDERAL LA) 60 MG extended release capsule Take 1 capsule by mouth once daily 11/18/20   Lowell Daigle MD   butalbital-acetaminophen-caffeine (FIORICET, ESGIC) -52 MG per tablet TAKE 1 TABLET BY MOUTH EVERY 8 HOURS AS NEEDED FOR MIGRAINE HEADACHE 11/18/20   Lowell Daigle MD   gabapentin (NEURONTIN) 300 MG capsule TAKE 2 CAPSULES PO QHS 9/25/20 12/25/20  Lowell Daigle MD   baclofen (LIORESAL) 10 MG tablet Take 1 tablet by mouth 3 times daily as needed (muscle spasms/neck pain) 9/25/20   Lowell Daigle MD   omeprazole (PRILOSEC) 40 MG delayed release capsule Take 1 capsule by mouth nightly 9/25/20   Lowell Daigle MD   levothyroxine (SYNTHROID) 88 MCG tablet Take 1 tablet by mouth once daily 8/26/20   Lowell Daigle MD   triamcinolone (NASACORT ALLERGY 24HR) 55 MCG/ACT nasal inhaler 2 sprays by Each Nostril route daily 11/21/19   Lowell Daigle MD   celecoxib (CELEBREX) 200 MG capsule Take 1 capsule by mouth daily as needed for Pain (arthritis)  Patient not taking: Reported on 9/25/2020 5/8/19   Lowell Daigle MD   valACYclovir (VALTREX) 500 MG tablet  7/24/18   Historical Provider, MD   Potassium 99 MG TABS Take 1 tablet by mouth daily    Historical Provider, MD   BIOTIN PO Take 1 tablet by mouth daily    Historical Provider, MD   TURMERIC PO Take 1 tablet by mouth daily    Historical Provider, MD   vitamin B-12 (CYANOCOBALAMIN) 1000 MCG tablet Take 1,000 mcg by mouth daily    Historical Provider, MD   Multiple Vitamins-Minerals (MULTIVITAMIN ADULT PO) Take by mouth    Historical Provider, MD   Omega 3 1000 MG CAPS Take by mouth    Historical Provider, MD   Cholecalciferol (VITAMIN D3) 3000 units TABS Take by mouth    Historical Provider, MD     Social History     Socioeconomic History    Marital status:      Spouse name: None    Number of children: 2    Years of education: None    Highest education level: Associate degree: occupational, technical, or vocational program   Occupational History    None   Social Needs    Financial resource strain: Somewhat hard    Food insecurity     Worry: Never true     Inability: Never true    Transportation needs     Medical: No     Non-medical: No   Tobacco Use    Smoking status: Never Smoker    Smokeless tobacco: Never Used   Substance and Sexual Activity    Alcohol use: Yes     Alcohol/week: 1.0 standard drinks     Types: 1 Standard drinks or equivalent per week     Frequency: Monthly or less     Drinks per session: 1 or 2     Comment: rarely    Drug use: Never    Sexual activity: Yes     Partners: Male   Lifestyle    Physical activity     Days per week: None     Minutes per session: None    Stress: None   Relationships    Social connections     Talks on phone: More than three times a week     Gets together: More than three times a week     Attends Jew service: More than 4 times per year     Active member of club or organization: Yes     Attends meetings of clubs or organizations: More than 4 times per year     Relationship status:     Intimate partner violence     Fear of current or ex partner: No     Emotionally abused: No     Physically abused: No     Forced sexual activity: No   Other Topics Concern    None   Social History Narrative    PRIOR MEDICATION TRIALS    Desvenlafaxine (ineffective)    Wellbutrin    Temazepam    Lexapro    Sertraline    . SLEEP STUDY: no,  reports that she snores and has some daytime fatigue    . negative history of seizures. .    negative history of head trauma. Mariely Churchill PREVIOUS PSYCHIATRIC HISTORY    Sister, depression/anxiety    Daughter, depression/anxiety (bipolar ?)    . FAMILY PSYCHIATRIC HISTORY    Sister, depression/anxiety    Daughter, depression/anxiety (bipolar ?)    Mother, anxiety    .     SOCIAL HISTORY    Born and Raised - [facility] 2 parent home with 2 siblings. Characterizes childhood as happy. Trauma and/or Abuse - denies    Legal - denies    Substance Use - see history    Work History - see history    Education - see history     status - denies    . PAST SUICIDE ATTEMPTS:    no    .    INPATIENT HOSPITALIZATIONS:    no    .    DRUG REHABILITATION:    no    .    PSYCHIATRIC REVIEW OF SYSTEMS, 7/20/2020    . Mood:  positive for low motivation; feeling down, depressed, hopeless, sleep disturbance, trouble concentrating, guilt, denies suicidality      (Depression: sadness, tearfulness, sleep, appetite, energy, concentration, sexual function, guilt, psychomotor agitation or slowing, interest, suicidality)    . Elisa: negative      (impulsivity, grandiosity, recklessness, excessive energy, decreased need for sleep, increased spending beyond means, hyperverbal, grandiose, racing thoughts, hypersexuality)    . Other: positive for irritable      (Irritability, lability, anger)    . Anxiety:  positive for worries about politics, her daughter's marital problems, feeling nervous/anxious/on edge; worrying too much about different things      (Generalized anxiety: where, when, who, how long, how frequent)    . Panic Disorder symptoms: negative      (Palpitations, racing heart beat, sweating, sense of impending doom, fear of recurrence, shortness of breath)    . OCD symptoms:  positive for obsessive about organization      (checking, cleaning, organizing, rituals, hang-ups, obsessive thoughts, counting, rational vs. Irrational beliefs)    . PTSD symptoms:  negative      (nightmares, flashbacks, startle response, avoidance)    . Social anxiety symptoms:  negative    . Simple phobias: negative      (heights, planes, spiders, etc.)    . Psychosis: negative      (hallucinations, auditory, visual, tactile, olfactory)    . Paranoia: negative    .     Delusions:  negative      (TV, radio, thought broadcasting, mind control, referential thinking)      (persecutory delusion - e.g., believing one is being followed and harassed by gangs)      (grandiose delusion - e.g., believing one is a billionaire  who owns casinos around the world)      (erotomanic delusion - e.g., believing a famous  is in love with them)       (somatic delusion - e.g., believing one's sinuses have been infested by worms)      (delusions of reference - e.g., believing dialogue on a TV program is directed specifically towards the patient)      (delusions of control - e.g., believing one's thoughts and movements are controlled by planetary overlords)    . Patient's perception: negative      (Spiritual or cultural context of symptoms, reality testing)    . ADHD symptoms: negative      (able to focus and concentrate, scattered thoughts, disorganized thoughts)    . Eating Disorder symptoms:  negative      (binging, purging, excessive exercising)       MSE:  Patient is  A & O x 4. Appearance:  TERE. Cognition:  Recent memory intact , remote memory intact , good fund of knowledge, average  intelligence level. Speech:  normal  Language: Naming: intact;  Word Finding: intact  Conversation no evidence of delusions  Behavior:  Cooperative  Mood: \"pretty good\"  Affect: TERE  Thought Content: negative delusions, negative hallucinations, negative obsessions,  negativehomicidal and negative suicidal   Thought Process: linear, goal directed and coherent  Associations: logical connections  Attention Span and concentration: Normal   Judgement Insight:  normal and appropriate  Gait and Station:TERE   Sleep: avg 5-6 hrs    Appetite: ok      Lab Results   Component Value Date     09/18/2020    K 4.2 09/18/2020     09/18/2020    CO2 25 09/18/2020    BUN 7 09/18/2020    CREATININE 0.8 09/18/2020    GLUCOSE 94 06/12/2020    CALCIUM 9.5 09/18/2020    PROT 7.1 09/18/2020    LABALBU 4.6 09/18/2020    BILITOT 0.3 09/18/2020    ALKPHOS 104 09/18/2020    AST 21 09/18/2020 ALT 16 09/18/2020    LABGLOM >60 09/18/2020    GFRAA >59 09/18/2020     Lab Results   Component Value Date     09/18/2020    K 4.2 09/18/2020     09/18/2020    CO2 25 09/18/2020    BUN 7 09/18/2020    CREATININE 0.8 09/18/2020    GLUCOSE 94 06/12/2020    CALCIUM 9.5 09/18/2020      Lab Results   Component Value Date    CHOL 221 (H) 06/12/2020     Lab Results   Component Value Date    TRIG 149 06/12/2020     Lab Results   Component Value Date    HDL 43 (L) 09/18/2020     Lab Results   Component Value Date    LDLCALC 139 09/18/2020     No results found for: LABVLDL, VLDL  No results found for: CHOLHDLRATIO  No results found for: LABA1C  No results found for: EAG  Lab Results   Component Value Date    TSH 1.320 06/12/2020     Lab Results   Component Value Date    VITD25 41.2 06/12/2020       Assessments Administered:    PHQ9: 8, mild  GAD7: 8, mild    Cognition: Can spell \"world\" backwards: Yes                    Can do serial 7's: Yes    Assessment:   1. Mild episode of recurrent major depressive disorder ([facility] [address]    2. HORACE (generalized anxiety disorder)         R/O Obsessive Compulsive Disorder    Plan:  Continue:  Fluvoxamine, 50mg, nightly    Continue therapy with K. Ray    Follow up: Return in about 6 months (around 6/4/2021). 1. The risks, benefits, side effects, indications, contraindications, and adverse effects of the medications have been discussed. Yes.  2. The pt has verbalized understanding and has capacity to give informed consent. 3. The Jalil [facility] Teresa report has been reviewed according to [facility] regulations. 4. Supportive therapy offered. 5. Follow up: Return in about 6 months (around 6/4/2021). 6. The patient has been advised to call with any problems. 7. Controlled substance Treatment Plan: none. 8. The above listed medications have been continued, modifications in meds and other orders/labs as follows: No orders of the defined types were placed in this encounter.      No orders of the

## 2020-12-04 NOTE — PATIENT INSTRUCTIONS
Plan:  Continue:  Fluvoxamine, 50mg, nightly    Continue therapy with K. Ray    Follow up: Return in about 6 months (around 6/4/2021).

## 2020-12-04 NOTE — TELEPHONE ENCOUNTER
Called Pt to check in for appt. Made sure Pt info was all up to date, and that the number I am contacting them on is the best for the appt. Let the Pt know I sent them: Pt right and responsibilities,Benzodiazepine/Stimulant agreement, PPH FORM, CRI. Let them know they need to fill it out and return to office either by mail or dropping it off.

## 2020-12-14 RX ORDER — LEVOTHYROXINE SODIUM 88 UG/1
TABLET ORAL
Qty: 30 TABLET | Refills: 0 | Status: SHIPPED | OUTPATIENT
Start: 2020-12-14 | End: 2021-01-22

## 2020-12-14 NOTE — TELEPHONE ENCOUNTER
Sonja Harding called to request a refill on her medication.       Last office visit : 9/25/2020   Next office visit : 12/28/2020     Requested Prescriptions     Pending Prescriptions Disp Refills    levothyroxine (SYNTHROID) 88 MCG tablet [Pharmacy Med Name: Levothyroxine Sodium 88 MCG Oral Tablet] 30 tablet 0     Sig: Take 1 tablet by mouth once daily            Ton Rosario MA

## 2020-12-21 RX ORDER — TRAMADOL HYDROCHLORIDE 50 MG/1
TABLET ORAL
Qty: 45 TABLET | Refills: 0 | Status: SHIPPED | OUTPATIENT
Start: 2020-12-21 | End: 2021-01-22

## 2020-12-21 RX ORDER — BUTALBITAL, ACETAMINOPHEN AND CAFFEINE 50; 325; 40 MG/1; MG/1; MG/1
TABLET ORAL
Qty: 90 TABLET | Refills: 0 | Status: SHIPPED | OUTPATIENT
Start: 2020-12-21 | End: 2021-01-22

## 2020-12-21 NOTE — TELEPHONE ENCOUNTER
Carmen Ruvalcaba called to request a refill on her medication. Last office visit : 9/25/2020   Next office visit : 12/28/2020     Last UDS:   Amphetamine Screen, Urine   Date Value Ref Range Status   09/25/2020 neg  Final     Barbiturate Screen, Urine   Date Value Ref Range Status   09/25/2020 pos  Final     Benzodiazepine Screen, Urine   Date Value Ref Range Status   09/25/2020 neg  Final     Buprenorphine Urine   Date Value Ref Range Status   09/25/2020 neg  Final     Cocaine Metabolite Screen, Urine   Date Value Ref Range Status   09/25/2020 neg  Final     Gabapentin Screen, Urine   Date Value Ref Range Status   09/25/2020 neg  Final     MDMA, Urine   Date Value Ref Range Status   09/25/2020 neg  Final     Methamphetamine, Urine   Date Value Ref Range Status   09/25/2020 neg  Final     Opiate Scrn, Ur   Date Value Ref Range Status   09/25/2020 neg  Final     Oxycodone Screen, Ur   Date Value Ref Range Status   09/25/2020 neg  Final     PCP Screen, Urine   Date Value Ref Range Status   09/25/2020 neg  Final     Propoxyphene Screen, Urine   Date Value Ref Range Status   09/25/2020 neg  Final     THC Screen, Urine   Date Value Ref Range Status   09/25/2020 neg  Final     Tricyclic Antidepressants, Urine   Date Value Ref Range Status   09/25/2020 neg  Final       Last Kari Romero: 12/3/2020  Medication Contract: 9/25/2020   Last Fill: 11/18/2020    Requested Prescriptions     Pending Prescriptions Disp Refills    traMADol (ULTRAM) 50 MG tablet [Pharmacy Med Name: traMADol HCl 50 MG Oral Tablet] 45 tablet 0     Sig: TAKE 1 TABLET BY MOUTH EVERY 12 HOURS AS NEEDED FOR PAIN    butalbital-acetaminophen-caffeine (FIORICET, ESGIC) -40 MG per tablet [Pharmacy Med Name: Butalbital-APAP-Caffeine -40 MG Oral Tablet] 90 tablet 0     Sig: TAKE 1 TABLET BY MOUTH EVERY 8 HOURS AS NEEDED FOR MIGRAINE HEADACHE         Please approve or refuse this medication.    Raciel Osorio MA

## 2020-12-23 RX ORDER — PROPRANOLOL HCL 60 MG
CAPSULE, EXTENDED RELEASE 24HR ORAL
Qty: 30 CAPSULE | Refills: 0 | Status: SHIPPED | OUTPATIENT
Start: 2020-12-23 | End: 2021-01-22

## 2020-12-23 NOTE — TELEPHONE ENCOUNTER
Lory Butler called to request a refill on her medication.       Last office visit : 9/25/2020   Next office visit : 12/28/2020     Requested Prescriptions     Pending Prescriptions Disp Refills    propranolol (INDERAL LA) 60 MG extended release capsule 30 capsule 0     Sig: Take 1 capsule by mouth once daily            Radha Baig MA

## 2021-01-04 DIAGNOSIS — R05.8 NOCTURNAL COUGH: ICD-10-CM

## 2021-01-04 DIAGNOSIS — M54.2 CHRONIC NECK PAIN: ICD-10-CM

## 2021-01-04 DIAGNOSIS — G43.009 MIGRAINE WITHOUT AURA AND WITHOUT STATUS MIGRAINOSUS, NOT INTRACTABLE: ICD-10-CM

## 2021-01-04 DIAGNOSIS — R51.9 CHRONIC DAILY HEADACHE: ICD-10-CM

## 2021-01-04 DIAGNOSIS — M54.42 CHRONIC BILATERAL LOW BACK PAIN WITH LEFT-SIDED SCIATICA: ICD-10-CM

## 2021-01-04 DIAGNOSIS — G89.29 CHRONIC NECK PAIN: ICD-10-CM

## 2021-01-04 DIAGNOSIS — G89.29 CHRONIC BILATERAL LOW BACK PAIN WITH LEFT-SIDED SCIATICA: ICD-10-CM

## 2021-01-04 DIAGNOSIS — K21.9 LARYNGOPHARYNGEAL REFLUX (LPR): ICD-10-CM

## 2021-01-04 DIAGNOSIS — G25.81 RLS (RESTLESS LEGS SYNDROME): ICD-10-CM

## 2021-01-04 RX ORDER — GABAPENTIN 300 MG/1
CAPSULE ORAL
Qty: 60 CAPSULE | Refills: 2 | Status: SHIPPED | OUTPATIENT
Start: 2021-01-04 | End: 2021-04-17

## 2021-01-04 RX ORDER — OMEPRAZOLE 40 MG/1
40 CAPSULE, DELAYED RELEASE ORAL NIGHTLY
Qty: 30 CAPSULE | Refills: 0 | Status: SHIPPED | OUTPATIENT
Start: 2021-01-04 | End: 2021-01-25 | Stop reason: SDUPTHER

## 2021-01-04 RX ORDER — GABAPENTIN 300 MG/1
CAPSULE ORAL
Qty: 60 CAPSULE | Refills: 0 | OUTPATIENT
Start: 2021-01-04

## 2021-01-04 NOTE — TELEPHONE ENCOUNTER
Sana Mosqueda called to request a refill on her medication. Last office visit : 9/25/2020   Next office visit : 1/25/2021     Last UDS:   Amphetamine Screen, Urine   Date Value Ref Range Status   09/25/2020 neg  Final     Barbiturate Screen, Urine   Date Value Ref Range Status   09/25/2020 pos  Final     Benzodiazepine Screen, Urine   Date Value Ref Range Status   09/25/2020 neg  Final     Buprenorphine Urine   Date Value Ref Range Status   09/25/2020 neg  Final     Cocaine Metabolite Screen, Urine   Date Value Ref Range Status   09/25/2020 neg  Final     Gabapentin Screen, Urine   Date Value Ref Range Status   09/25/2020 neg  Final     MDMA, Urine   Date Value Ref Range Status   09/25/2020 neg  Final     Methamphetamine, Urine   Date Value Ref Range Status   09/25/2020 neg  Final     Opiate Scrn, Ur   Date Value Ref Range Status   09/25/2020 neg  Final     Oxycodone Screen, Ur   Date Value Ref Range Status   09/25/2020 neg  Final     PCP Screen, Urine   Date Value Ref Range Status   09/25/2020 neg  Final     Propoxyphene Screen, Urine   Date Value Ref Range Status   09/25/2020 neg  Final     THC Screen, Urine   Date Value Ref Range Status   09/25/2020 neg  Final     Tricyclic Antidepressants, Urine   Date Value Ref Range Status   09/25/2020 neg  Final       Last Clois Garza: 128/3/20  Medication Contract: 9/25/20   Last Fill: 9/25/20    Requested Prescriptions     Pending Prescriptions Disp Refills    gabapentin (NEURONTIN) 300 MG capsule 60 capsule 2     Sig: TAKE 2 CAPSULES PO QHS         Please approve or refuse this medication.    Prema Sherman, [address] [facility]

## 2021-01-04 NOTE — TELEPHONE ENCOUNTER
Jeanette Freeman called to request a refill on her medication.       Last office visit : 9/25/2020   Next office visit : 1/25/2021     Requested Prescriptions     Pending Prescriptions Disp Refills    omeprazole (PRILOSEC) 40 MG delayed release capsule [Pharmacy Med Name: Omeprazole 40 MG Oral Capsule Delayed Release] 30 capsule 0     Sig: TAKE 1 CAPSULE BY MOUTH NIGHTLY     Refused Prescriptions Disp Refills    gabapentin (NEURONTIN) 300 MG capsule [Pharmacy Med Name: Gabapentin 300 MG Oral Capsule] 60 capsule 0     Sig: TAKE 2 CAPSULES BY MOUTH EVERY DAY AT BEDTIME            Herberth Batista MA

## 2021-01-21 DIAGNOSIS — E03.9 ACQUIRED HYPOTHYROIDISM: ICD-10-CM

## 2021-01-21 DIAGNOSIS — G43.009 MIGRAINE WITHOUT AURA AND WITHOUT STATUS MIGRAINOSUS, NOT INTRACTABLE: ICD-10-CM

## 2021-01-22 RX ORDER — LEVOTHYROXINE SODIUM 88 UG/1
TABLET ORAL
Qty: 30 TABLET | Refills: 0 | Status: SHIPPED | OUTPATIENT
Start: 2021-01-22 | End: 2021-02-08 | Stop reason: DRUGHIGH

## 2021-01-22 RX ORDER — PROPRANOLOL HCL 60 MG
CAPSULE, EXTENDED RELEASE 24HR ORAL
Qty: 30 CAPSULE | Refills: 0 | Status: SHIPPED | OUTPATIENT
Start: 2021-01-22 | End: 2021-01-25 | Stop reason: DRUGHIGH

## 2021-01-25 ENCOUNTER — TELEMEDICINE (OUTPATIENT)
Dept: FAMILY MEDICINE CLINIC | Age: 56
End: 2021-01-25
Payer: COMMERCIAL

## 2021-01-25 DIAGNOSIS — F33.1 MODERATE RECURRENT MAJOR DEPRESSION (HCC): ICD-10-CM

## 2021-01-25 DIAGNOSIS — G43.009 MIGRAINE WITHOUT AURA AND WITHOUT STATUS MIGRAINOSUS, NOT INTRACTABLE: Primary | ICD-10-CM

## 2021-01-25 DIAGNOSIS — E66.3 OVERWEIGHT (BMI 25.0-29.9): ICD-10-CM

## 2021-01-25 DIAGNOSIS — R05.8 NOCTURNAL COUGH: ICD-10-CM

## 2021-01-25 DIAGNOSIS — E03.9 ACQUIRED HYPOTHYROIDISM: ICD-10-CM

## 2021-01-25 DIAGNOSIS — G25.81 RLS (RESTLESS LEGS SYNDROME): ICD-10-CM

## 2021-01-25 DIAGNOSIS — G44.219 EPISODIC TENSION-TYPE HEADACHE, NOT INTRACTABLE: ICD-10-CM

## 2021-01-25 DIAGNOSIS — K21.9 LARYNGOPHARYNGEAL REFLUX (LPR): ICD-10-CM

## 2021-01-25 DIAGNOSIS — J30.89 PERENNIAL ALLERGIC RHINITIS WITH SEASONAL VARIATION: ICD-10-CM

## 2021-01-25 DIAGNOSIS — F51.01 IDIOPATHIC INSOMNIA: ICD-10-CM

## 2021-01-25 DIAGNOSIS — M54.2 CHRONIC NECK PAIN: ICD-10-CM

## 2021-01-25 DIAGNOSIS — D50.9 IRON DEFICIENCY ANEMIA, UNSPECIFIED IRON DEFICIENCY ANEMIA TYPE: ICD-10-CM

## 2021-01-25 DIAGNOSIS — J30.2 PERENNIAL ALLERGIC RHINITIS WITH SEASONAL VARIATION: ICD-10-CM

## 2021-01-25 DIAGNOSIS — G89.29 CHRONIC NECK PAIN: ICD-10-CM

## 2021-01-25 DIAGNOSIS — F41.1 GAD (GENERALIZED ANXIETY DISORDER): ICD-10-CM

## 2021-01-25 DIAGNOSIS — J32.9 RECURRENT SINUSITIS: ICD-10-CM

## 2021-01-25 PROCEDURE — 99214 OFFICE O/P EST MOD 30 MIN: CPT | Performed by: INTERNAL MEDICINE

## 2021-01-25 RX ORDER — PROPRANOLOL HYDROCHLORIDE 80 MG/1
80 CAPSULE, EXTENDED RELEASE ORAL DAILY
Qty: 30 CAPSULE | Refills: 3 | Status: SHIPPED | OUTPATIENT
Start: 2021-01-25 | End: 2021-06-29

## 2021-01-25 RX ORDER — OMEPRAZOLE 40 MG/1
40 CAPSULE, DELAYED RELEASE ORAL NIGHTLY
Qty: 30 CAPSULE | Refills: 5 | Status: SHIPPED | OUTPATIENT
Start: 2021-01-25 | End: 2021-09-17

## 2021-01-25 ASSESSMENT — ENCOUNTER SYMPTOMS
EYE REDNESS: 0
SORE THROAT: 0
VOICE CHANGE: 0
VOMITING: 0
WHEEZING: 0
SINUS PRESSURE: 1
COLOR CHANGE: 0
EYE PAIN: 0
SHORTNESS OF BREATH: 0
BLOOD IN STOOL: 0
COUGH: 0
DIARRHEA: 0
RHINORRHEA: 0
ABDOMINAL PAIN: 0
SINUS PAIN: 1
EYE DISCHARGE: 0
CHEST TIGHTNESS: 0

## 2021-01-25 NOTE — PROGRESS NOTES
2021    TELEHEALTH EVALUATION -- Audio/Visual (During UUTUB-40 public health emergency)    HPI:    Suri Rdz (:  1965) has requested an audio/video evaluation for the following concern(s):  1. Location of patient - Home  2. Location of physician - Office  3. Other individuals on this call - no    62 y/o WF presents for telehealth VV for follow up on recurrent migraine/tension HAs, chronic neck pain with controlled med refills, chronic depression, HORACE, RLS, LPR, and elevated BMI. Patient's weight has been stable since last visit 4 mths ago. Patient is having issues with sinus pain, sinus pressure, and sinus HAs again for the past month. Patient feels like the sinus pain is more in the maxillary sinus area. She has not had any fever but has had to be treated for recurrent sinus infections and prolonged cough over the past year. Patient had normal Head CT on 20 when evaluated for this issue previously. Patient has been taking ibuprofen sinus with sudafed for her sinus pain and HA. Patient says she had allergy testing 8-10 yrs ao with Dr Sofy Li at [facility] but has not had allergy shots. She has been using flonase NS daily but it is not controlling her symptoms. She has never been a smoker but has had significant secondhand smoke exposure in the past.     BMI Readings from Last 3 Encounters:   20 29.31 kg/m²   20 28.91 kg/m²   20 28.34 kg/m²     Wt Readings from Last 3 Encounters:   20 155 lb 2 oz (70.4 kg)   20 153 lb (69.4 kg)   20 150 lb (68 kg)     Weight 153 lbs  BMI 28.9    Hypothyroidism  Patient presents for follow up on thyroid function. Symptoms consist of fatigue, anxiousness, depression. Symptoms have present for 3-6 months. The symptoms are mild to moderate. The problem has been stable. Patient has been compliant with levothyroxine.     20 TSH 1.32 (normal)    Recurrent migraine/tension HAs- patient has been having frequent HAs which she feels Take 1 capsule by mouth daily Yes Jaquan Crowe MD   omeprazole (PRILOSEC) 40 MG delayed release capsule Take 1 capsule by mouth nightly Yes Jaquan Crowe MD   levothyroxine (SYNTHROID) 88 MCG tablet Take 1 tablet by mouth once daily  Jaquan Crowe MD   butalbital-acetaminophen-caffeine (FIORICET, ESGIC) -40 MG per tablet TAKE 1 TABLET BY MOUTH EVERY 8 HOURS AS NEEDED FOR MIGRAINE HEADACHE  Jaquan Crowe MD   traMADol (ULTRAM) 50 MG tablet TAKE 1 TABLET BY MOUTH EVERY 12 HOURS AS NEEDED FOR PAIN  Jaquan Crowe MD   gabapentin (NEURONTIN) 300 MG capsule TAKE 2 CAPSULES PO QHS  Jaquan Crowe MD   fluvoxaMINE (LUVOX) 50 MG tablet Take 1 tablet by mouth nightly  KALI Burr NP   baclofen (LIORESAL) 10 MG tablet Take 1 tablet by mouth 3 times daily as needed (muscle spasms/neck pain)  Jaquan Crowe MD   valACYclovir (VALTREX) 500 MG tablet   Historical Provider, MD   Potassium 99 MG TABS Take 1 tablet by mouth daily  Historical Provider, MD   BIOTIN PO Take 1 tablet by mouth daily  Historical Provider, MD   TURMERIC PO Take 1 tablet by mouth daily  Historical Provider, MD   vitamin B-12 (CYANOCOBALAMIN) 1000 MCG tablet Take 1,000 mcg by mouth daily  Historical Provider, MD   Multiple Vitamins-Minerals (MULTIVITAMIN ADULT PO) Take by mouth  Historical Provider, MD   Omega 3 1000 MG CAPS Take by mouth  Historical Provider, MD   Cholecalciferol (VITAMIN D3) 3000 units TABS Take by mouth  Historical Provider, MD       Social History     Tobacco Use    Smoking status: Never Smoker    Smokeless tobacco: Never Used   Substance Use Topics    Alcohol use:  Yes     Alcohol/week: 1.0 standard drinks     Types: 1 Standard drinks or equivalent per week     Frequency: Monthly or less     Drinks per session: 1 or 2     Comment: rarely    Drug use: Never        Allergies   Allergen Reactions    Augmentin [Amoxicillin-Pot Clavulanate] Diarrhea    Aspirin Other (See Comments)   ,   Past Medical History:   Diagnosis Date    Allergic rhinitis     Chronic bilateral low back pain with left-sided sciatica 2017    Colon polyp     Depression with anxiety     Obesity     Osteoarthritis    ,   Past Surgical History:   Procedure Laterality Date     SECTION      COLONOSCOPY  2015    colon polyp x1, recheck in 5 yrs (Dr Christina Lamb)   [facility].     ,   Social History     Tobacco Use    Smoking status: Never Smoker    Smokeless tobacco: Never Used   Substance Use Topics    Alcohol use:  Yes     Alcohol/week: 1.0 standard drinks     Types: 1 Standard drinks or equivalent per week     Frequency: Monthly or less     Drinks per session: 1 or 2     Comment: rarely    Drug use: Never   ,   Family History   Problem Relation Age of Onset    Heart Disease Mother     High Blood Pressure Mother     Other Mother         skin CA    Osteoporosis Mother     Heart Disease Father     High Blood Pressure Father     Other Father         jaw CA    Heart Disease Sister     Diabetes Sister     Stroke Sister     High Blood Pressure Sister     Other Sister         fibromyalgia, thyroid CA   ,   Immunization History   Administered Date(s) Administered    Influenza, Quadv, IM, PF (6 mo and older Fluzone, Flulaval, Fluarix, and 3 yrs and older Afluria) 2018, 10/04/2019, 2020    Tdap (Boostrix, Adacel) 2016    Zoster Recombinant (Shingrix) 2020, 2020       PHYSICAL EXAMINATION:  [ INSTRUCTIONS:  \"[x]\" Indicates a positive item  \"[]\" Indicates a negative item  -- DELETE ALL ITEMS NOT EXAMINED]  Vital Signs: (As obtained by patient/caregiver or practitioner observation)    Blood pressure- 127/73 Heart rate-    Respiratory rate-    Temperature- 98F Pulse oximetry-   Weight 161 lbs   BMI 5'4\"    Constitutional: [x] Appears well-developed and well-nourished [x] No apparent distress      [] Abnormal- Mental status  [x] Alert and awake  [x] Oriented to person/place/time [x]Able to follow commands      Eyes:  EOM    [x]  Normal  [] Abnormal-  Sclera  [x]  Normal  [] Abnormal -         Discharge [x]  None visible  [] Abnormal -    HENT:   [x] Normocephalic, atraumatic. [] Abnormal   [x] Mouth/Throat: Mucous membranes are moist.     External Ears [x] Normal  [] Abnormal-     Neck: [x] No visualized mass     Pulmonary/Chest: [x] Respiratory effort normal.  [x] No visualized signs of difficulty breathing or respiratory distress        [] Abnormal-      Musculoskeletal:   [x] Normal gait with no signs of ataxia         [x] Normal range of motion of neck        [] Abnormal-       Neurological:        [x] No Facial Asymmetry, MAEW (Cranial nerve 7 motor function) (limited exam to video visit)          [x] No gaze palsy        [] Abnormal-         Skin:        [x] No significant exanthematous lesions or discoloration noted on facial skin         [] Abnormal-            Psychiatric:       [x] Normal Affect [x] No Hallucinations        [] Abnormal-     Other pertinent observable physical exam findings-     Due to this being a TeleHealth encounter, evaluation of the following organ systems is limited: Vitals/Constitutional/EENT/Resp/CV/GI//MS/Neuro/Skin/Heme-Lymph-Imm. ASSESSMENT/PLAN:  Shaan Ramos was seen today for depression, anxiety, hypothyroidism, migraine and chronic pain. Diagnoses and all orders for this visit:    Migraine without aura and without status migrainosus, not intractable  -     propranolol (INDERAL LA) 80 MG extended release capsule; Take 1 capsule by mouth daily    Episodic tension-type headache, not intractable    Chronic neck pain    Idiopathic insomnia    Acquired hypothyroidism  -     TSH without Reflex; Future    Iron deficiency anemia, unspecified iron deficiency anemia type  -     CBC Auto Differential; Future  -     Iron and TIBC;  Future    Moderate recurrent major depression (HCC)    HORACE (generalized anxiety disorder)    RLS (restless legs syndrome)    Laryngopharyngeal reflux (LPR)  -     omeprazole (PRILOSEC) 40 MG delayed release capsule; Take 1 capsule by mouth nightly    Nocturnal cough  -     omeprazole (PRILOSEC) 40 MG delayed release capsule; Take 1 capsule by mouth nightly    Recurrent sinusitis  -     External Referral To Allergy    Perennial allergic rhinitis with seasonal variation  -     External Referral To Allergy    Overweight (BMI 25.0-29.9)    1. Recurrent Migraine HAs-inadequately controlled. Will try increasing propranolol to 80 mg daily for better control. Continue fioricet prn HA. Controlled substance contract and urine drug screen are up-to-date currently. No unusual filling on recent Marrianne Lot report. Treatment continues to be medically necessary and improves patient quality of life. No signs of misuse of controlled medication. 2. Recurrent tension HAs and chronic neck pain-add baclofen for muscle spasms as needed and continue tramadol prn neck pain. Controlled substance contract and urine drug screen are up-to-date currently. No unusual filling on recent Marrianne Lot report. Treatment continues to be medically necessary and improves patient quality of life. No signs of misuse of controlled medication. 3. Recurrent sinusitis and perennial allergic rhinitis-referral to Allergy Immunology for allergy testing. 4. MDD and HORACE-improving. Stressed importance of exercise to help with stress and [facility] 5. Acquired hypothyroidism and iron deficiency anemia-schedule lab work for follow up. 6. Return in about 3 months (around 4/25/2021) for headaches, recheck mood, chronic neck pain. Over 50% of the total visit time of 30 minutes was spent on counseling and/or coordination of care of:   1. Migraine without aura and without status migrainosus, not intractable    2. Episodic tension-type headache, not intractable    3.  Chronic neck pain

## 2021-01-25 NOTE — PATIENT INSTRUCTIONS
worse.     · You need help controlling your allergies.     · You have questions about allergy testing.     · You do not get better as expected. Where can you learn more? Go to https://chpepiceweb."[employer]. org and sign in to your [employer] account. Enter L249 in the [facility] box to learn more about \"Managing Your Allergies: Care Instructions. \"     If you do not have an account, please click on the \"Sign Up Now\" link. Current as of: June 29, 2020               Content Version: 12.6  © 7980-4473 [employer]. Care instructions adapted under license by [facility] ([facility]). If you have questions about a medical condition or this instruction, always ask your healthcare professional. [address] any warranty or liability for your use of this information. Patient Education        Using a Nasal Steroid Spray: Care Instructions  Your Care Instructions     Your doctor may suggest using a corticosteroid nasal spray for your allergy symptoms or sinus problems. These sprays reduce the swelling inside the nose and sinuses. Unlike decongestant nasal sprays, steroid sprays won't lead to more swelling when you stop taking them. These sprays start working in a few days, but it may take several weeks before you get the full effect. Most side effects are minor. The most common complaint is a burning feeling in the nose right after the spray is used. Some people get nosebleeds. Follow-up care is a key part of your treatment and safety. Be sure to make and go to all appointments, and call your doctor if you are having problems. It's also a good idea to know your test results and keep a list of the medicines you take. How can you care for yourself at home? Here are some tips for using these sprays:  · You may need to prime the sprayer before you use it. This means spraying it into the air a few times to make sure you get the right amount of medicine.  Follow the directions on the label. · Blow your nose before you spray. This will help clear out your nostrils. · Gently sniff the medicine into your nose as you spray. Don't snort, or the medicine will go all the way into your throat where it won't do much good. · Aim the nozzle straight toward the outer wall of your nostril. This will help keep the medicine from irritating the inner walls of your nose, especially your septum (the wall that separates your left and right nostrils). · Don't blow your nose for 10 minutes or so after you spray. And try not to sneeze. · Be safe with medicines. Use this medicine exactly as prescribed. Call your doctor if you think you are having a problem with your medicine. · Clean your sprayer once a week. Read the label to learn how. When should you call for help? Watch closely for changes in your health, and be sure to contact your doctor if you have any problems. Where can you learn more? Go to https://betNOW.[employer]. org and sign in to your [employer] account. Enter R294 in the [employer] box to learn more about \"Using a Nasal Steroid Spray: Care Instructions. \"     If you do not have an account, please click on the \"Sign Up Now\" link. Current as of: April 15, 2020               Content Version: 12.6  © 2610-1609 [employer]. Care instructions adapted under license by [facility] ([facility]). If you have questions about a medical condition or this instruction, always ask your healthcare professional. [place] [address] any warranty or liability for your use of this information. Patient Education        Tension Headache: Care Instructions  Your Care Instructions  Most headaches are tension headaches. These headaches tend to happen again, especially if you are under stress. A tension headache may cause pain or a feeling of pressure all over your head. You probably can't pinpoint the center of the pain.  If you keep getting tension headaches, the best thing you can do to limit them is to find out what is causing them and then make changes in those areas. Follow-up care is a key part of your treatment and safety. Be sure to make and go to all appointments, and call your doctor if you are having problems. It's also a good idea to know your test results and keep a list of the medicines you take. How can you care for yourself at home? · Rest in a quiet, dark room with a cool cloth on your forehead until your headache is gone. Close your eyes, and try to relax or go to sleep. Don't watch TV or read. Avoid using the computer. · Use a warm, moist towel or a heating pad set on low to relax tight shoulder and neck muscles. · Have someone gently massage your neck and shoulders. · Take pain medicines exactly as directed. ? If the doctor gave you a prescription medicine for pain, take it as prescribed. ? If you are not taking a prescription pain medicine, ask your doctor if you can take an over-the-counter medicine. · Be careful not to take pain medicine more often than the instructions allow, because you may get worse or more frequent headaches when the medicine wears off. · If you get another tension headache, stop what you are doing and sit quietly for a moment. Close your eyes and breathe slowly. Try to relax your head and neck muscles. · Do not ignore new symptoms that occur with a headache, such as fever, weakness or numbness, vision changes, or confusion. These may be signs of a more serious problem. To help prevent headaches  · Keep a headache diary so you can figure out what triggers your headaches. Avoiding triggers may help you prevent headaches. Record when each headache began, how long it lasted, and what the pain was like (throbbing, aching, stabbing, or dull). List anything that may have triggered the headache, such as being physically or emotionally stressed or being anxious or depressed.  Other possible triggers are hunger, anger, fatigue, poor posture, and muscle strain. · Find healthy ways to deal with stress. Headaches are most common during or right after stressful times. Take time to relax before and after you do something that has caused a headache in the past.  · Exercise daily to relieve stress. Relaxation exercises may help reduce tension. · Get plenty of sleep. · Eat regularly and well. Long periods without food can trigger a headache. · Treat yourself to a massage. Some people find that massages are very helpful in relieving tension. · Try to keep your muscles relaxed by keeping good posture. Check your jaw, face, neck, and shoulder muscles for tension, and try to relax them. When sitting at a desk, change positions often, and stretch for 30 seconds each hour. · Reduce eyestrain from computers by blinking frequently and looking away from the computer screen every so often. Make sure you have proper eyewear and that your monitor is set up properly, about an arm's length away. When should you call for help? Call 911 anytime you think you may need emergency care. For example, call if:    · You have signs of a stroke. These may include:  ? Sudden numbness, paralysis, or weakness in your face, arm, or leg, especially on only one side of your body. ? Sudden vision changes. ? Sudden trouble speaking. ? Sudden confusion or trouble understanding simple statements. ? Sudden problems with walking or balance. ? A sudden, severe headache that is different from past headaches. Call your doctor now or seek immediate medical care if:    · You have new or worse nausea and vomiting.     · You have a new or higher fever.     · Your headache gets much worse. Watch closely for changes in your health, and be sure to contact your doctor if:    · You are not getting better after 2 days (48 hours). Where can you learn more? Go to https://kevin.Sendia. org and sign in to your [employer] account.  Enter [phone] [id number] in the Search Health Information box to learn more about \"Tension Headache: Care Instructions. \"     If you do not have an account, please click on the \"Sign Up Now\" link. Current as of: November 20, 2019               Content Version: 12.6  © 8031-0130 [employer]. Care instructions adapted under license by [facility] ([facility]). If you have questions about a medical condition or this instruction, always ask your healthcare professional. [place] [address] any warranty or liability for your use of this information. Patient Education        Recurring Migraine Headache: Care Instructions  Your Care Instructions  Migraines are painful, throbbing headaches. They often start on one side of the head. They may cause nausea and vomiting and make you sensitive to light, sound, or smell. Some people may have only a few migraines throughout life. Others have them as often as several times a month. The goal of treatment is to reduce the number of migraines you have and relieve your symptoms. Even with treatment, you may continue to have migraines. You play an important role in dealing with your headaches. Work on avoiding things that seem to trigger your migraines. When you feel a headache coming on, act quickly to stop it before it gets worse. Follow-up care is a key part of your treatment and safety. Be sure to make and go to all appointments, and call your doctor if you are having problems. It's also a good idea to know your test results and keep a list of the medicines you take. How can you care for yourself at home? · Do not drive if you have taken a prescription pain medicine. · Rest in a quiet, dark room until your headache is gone. Close your eyes and try to relax or go to sleep. Do not watch TV or read. · Put a cold, moist cloth or cold pack on the painful area for 10 to 20 minutes at a time. Put a thin cloth between the cold pack and your skin.   · Have someone gently massage your neck and regular meals, and avoid foods and drinks that often trigger migraines. These include chocolate and alcohol, especially red wine and port. Chemicals used in food, such as aspartame and monosodium glutamate (MSG), also can trigger migraines. So can some food additives, such as those found in hot dogs, mendoza, cold cuts, aged cheeses, and pickled foods. · Limit caffeine by not drinking too much coffee, tea, or soda. Do not quit caffeine suddenly, because that can also give you migraines. · Do not smoke or allow others to smoke around you. If you need help quitting, talk to your doctor about stop-smoking programs and medicines. These can increase your chances of quitting for good. · If you are taking birth control pills or hormone therapy, talk to your doctor about whether they are triggering your migraines. When should you call for help? Call 911 anytime you think you may need emergency care. For example, call if:    · You have symptoms of a stroke. These may include:  ? Sudden numbness, tingling, weakness, or loss of movement in your face, arm, or leg, especially on only one side of your body. ? Sudden vision changes. ? Sudden trouble speaking. ? Sudden confusion or trouble understanding simple statements. ? Sudden problems with walking or balance. ? A sudden, severe headache that is different from past headaches. Call your doctor now or seek immediate medical care if:    · You develop a fever and a stiff neck.     · You have new nausea and vomiting, or you cannot keep down food or liquids. Watch closely for changes in your health, and be sure to contact your doctor if:    · You have a headache that does not get better within 1 or 2 days.     · Your headaches get worse or happen more often. Where can you learn more? Go to https://kevin.[employer]ine[employer]. org and sign in to your [employer] account.  Enter  in the [employer] box to learn more about \"Recurring Migraine Headache: Care Instructions. \"     If you do not have an account, please click on the \"Sign Up Now\" link. Current as of: November 20, 2019               Content Version: 12.6  © 3749-4899 [employer]. Care instructions adapted under license by [employer] ([facility]). If you have questions about a medical condition or this instruction, always ask your healthcare professional. [address] any warranty or liability for your use of this information. Patient Education         Headaches: Avoiding Triggers (01:35)  Your health professional recommends that you watch this short online health video. Learn some common headache triggers so you can plan to avoid the ones that affect you. How to watch the video    Scan the QR code   OR Visit the website    https://hwi. se/r/Y3xgdo4xaki9i   Current as of: November 20, 2019               Content Version: 12.6  © 0654-3766 [employer]. Care instructions adapted under license by [employer] ([facility]). If you have questions about a medical condition or this instruction, always ask your healthcare professional. [address] any warranty or liability for your use of this information.

## 2021-02-03 DIAGNOSIS — E03.9 ACQUIRED HYPOTHYROIDISM: ICD-10-CM

## 2021-02-03 DIAGNOSIS — D50.9 IRON DEFICIENCY ANEMIA, UNSPECIFIED IRON DEFICIENCY ANEMIA TYPE: ICD-10-CM

## 2021-02-03 LAB
BASOPHILS ABSOLUTE: 0.1 K/UL (ref 0–0.2)
BASOPHILS RELATIVE PERCENT: 0.7 % (ref 0–1)
EOSINOPHILS ABSOLUTE: 0.2 K/UL (ref 0–0.6)
EOSINOPHILS RELATIVE PERCENT: 3.1 % (ref 0–5)
HCT VFR BLD CALC: 35.5 % (ref 37–47)
HEMOGLOBIN: 11.3 G/DL (ref 12–16)
IMMATURE GRANULOCYTES #: 0.1 K/UL
IRON SATURATION: 6 % (ref 14–50)
IRON: 31 UG/DL (ref 37–145)
LYMPHOCYTES ABSOLUTE: 1.6 K/UL (ref 1.1–4.5)
LYMPHOCYTES RELATIVE PERCENT: 23.8 % (ref 20–40)
MCH RBC QN AUTO: 25.8 PG (ref 27–31)
MCHC RBC AUTO-ENTMCNC: 31.8 G/DL (ref 33–37)
MCV RBC AUTO: 81.1 FL (ref 81–99)
MONOCYTES ABSOLUTE: 0.6 K/UL (ref 0–0.9)
MONOCYTES RELATIVE PERCENT: 8.9 % (ref 0–10)
NEUTROPHILS ABSOLUTE: 4.3 K/UL (ref 1.5–7.5)
NEUTROPHILS RELATIVE PERCENT: 62.5 % (ref 50–65)
PDW BLD-RTO: 15.9 % (ref 11.5–14.5)
PLATELET # BLD: 289 K/UL (ref 130–400)
PMV BLD AUTO: 10.5 FL (ref 9.4–12.3)
RBC # BLD: 4.38 M/UL (ref 4.2–5.4)
TOTAL IRON BINDING CAPACITY: 480 UG/DL (ref 250–400)
TSH SERPL DL<=0.05 MIU/L-ACNC: 4.51 UIU/ML (ref 0.27–4.2)
WBC # BLD: 6.8 K/UL (ref 4.8–10.8)

## 2021-02-08 DIAGNOSIS — E03.9 ACQUIRED HYPOTHYROIDISM: Primary | ICD-10-CM

## 2021-02-08 RX ORDER — LEVOTHYROXINE SODIUM 0.1 MG/1
100 TABLET ORAL DAILY
Qty: 30 TABLET | Refills: 3 | Status: SHIPPED | OUTPATIENT
Start: 2021-02-08 | End: 2021-04-29 | Stop reason: DRUGHIGH

## 2021-02-22 DIAGNOSIS — G43.009 MIGRAINE WITHOUT AURA AND WITHOUT STATUS MIGRAINOSUS, NOT INTRACTABLE: ICD-10-CM

## 2021-02-22 DIAGNOSIS — G44.219 EPISODIC TENSION-TYPE HEADACHE, NOT INTRACTABLE: ICD-10-CM

## 2021-02-22 RX ORDER — BUTALBITAL, ACETAMINOPHEN AND CAFFEINE 50; 325; 40 MG/1; MG/1; MG/1
TABLET ORAL
Qty: 90 TABLET | Refills: 0 | Status: SHIPPED | OUTPATIENT
Start: 2021-02-22 | End: 2021-03-24

## 2021-02-22 NOTE — TELEPHONE ENCOUNTER
Pankaj Pretty called to request a refill on her medication.       Last office visit : 1/25/2021   Next office visit : 4/29/2021     Requested Prescriptions     Pending Prescriptions Disp Refills    butalbital-acetaminophen-caffeine (FIORICET, ESGIC) -40 MG per tablet [Pharmacy Med Name: Butalbital-APAP-Caffeine -40 MG Oral Tablet] 90 tablet 0     Sig: TAKE 1 TABLET BY MOUTH EVERY 8 HOURS AS NEEDED FOR MIGRAINE HEADACHE            Samira Cui MA

## 2021-03-23 DIAGNOSIS — G89.29 CHRONIC BILATERAL LOW BACK PAIN WITH LEFT-SIDED SCIATICA: ICD-10-CM

## 2021-03-23 DIAGNOSIS — M54.42 CHRONIC BILATERAL LOW BACK PAIN WITH LEFT-SIDED SCIATICA: ICD-10-CM

## 2021-03-23 DIAGNOSIS — G43.009 MIGRAINE WITHOUT AURA AND WITHOUT STATUS MIGRAINOSUS, NOT INTRACTABLE: ICD-10-CM

## 2021-03-23 DIAGNOSIS — M54.2 CHRONIC NECK PAIN: ICD-10-CM

## 2021-03-23 DIAGNOSIS — G89.29 CHRONIC NECK PAIN: ICD-10-CM

## 2021-03-23 DIAGNOSIS — G44.219 EPISODIC TENSION-TYPE HEADACHE, NOT INTRACTABLE: ICD-10-CM

## 2021-03-24 RX ORDER — TRAMADOL HYDROCHLORIDE 50 MG/1
TABLET ORAL
Qty: 45 TABLET | Refills: 0 | Status: SHIPPED | OUTPATIENT
Start: 2021-03-24 | End: 2021-04-27

## 2021-03-24 RX ORDER — BUTALBITAL, ACETAMINOPHEN AND CAFFEINE 50; 325; 40 MG/1; MG/1; MG/1
TABLET ORAL
Qty: 90 TABLET | Refills: 0 | Status: SHIPPED | OUTPATIENT
Start: 2021-03-24 | End: 2021-04-27

## 2021-03-24 NOTE — TELEPHONE ENCOUNTER
Estelle Ishmael called to request a refill on her medication. Last office visit : 1/25/2021   Next office visit : 4/29/2021     Last UDS:   Amphetamine Screen, Urine   Date Value Ref Range Status   09/25/2020 neg  Final     Barbiturate Screen, Urine   Date Value Ref Range Status   09/25/2020 pos  Final     Benzodiazepine Screen, Urine   Date Value Ref Range Status   09/25/2020 neg  Final     Buprenorphine Urine   Date Value Ref Range Status   09/25/2020 neg  Final     Cocaine Metabolite Screen, Urine   Date Value Ref Range Status   09/25/2020 neg  Final     Gabapentin Screen, Urine   Date Value Ref Range Status   09/25/2020 neg  Final     MDMA, Urine   Date Value Ref Range Status   09/25/2020 neg  Final     Methamphetamine, Urine   Date Value Ref Range Status   09/25/2020 neg  Final     Opiate Scrn, Ur   Date Value Ref Range Status   09/25/2020 neg  Final     Oxycodone Screen, Ur   Date Value Ref Range Status   09/25/2020 neg  Final     PCP Screen, Urine   Date Value Ref Range Status   09/25/2020 neg  Final     Propoxyphene Screen, Urine   Date Value Ref Range Status   09/25/2020 neg  Final     THC Screen, Urine   Date Value Ref Range Status   09/25/2020 neg  Final     Tricyclic Antidepressants, Urine   Date Value Ref Range Status   09/25/2020 neg  Final       Last Oscar Ontiverosleah: 12/3/20  Medication Contract: 9/25/20   Last Fill: 2/22/21    Requested Prescriptions     Pending Prescriptions Disp Refills    traMADol (ULTRAM) 50 MG tablet [Pharmacy Med Name: traMADol HCl 50 MG Oral Tablet] 45 tablet 0     Sig: TAKE 1 TABLET BY MOUTH EVERY 12 HOURS AS NEEDED FOR PAIN    butalbital-acetaminophen-caffeine (FIORICET, ESGIC) -40 MG per tablet [Pharmacy Med Name: Butalbital-APAP-Caffeine -40 MG Oral Tablet] 90 tablet 0     Sig: TAKE 1 TABLET BY MOUTH EVERY 8 HOURS AS NEEDED FOR MIGRAINE HEADACHE         Please approve or refuse this medication.    Kedar Simpson

## 2021-04-26 DIAGNOSIS — G89.29 CHRONIC BILATERAL LOW BACK PAIN WITH LEFT-SIDED SCIATICA: ICD-10-CM

## 2021-04-26 DIAGNOSIS — M54.42 CHRONIC BILATERAL LOW BACK PAIN WITH LEFT-SIDED SCIATICA: ICD-10-CM

## 2021-04-26 DIAGNOSIS — M54.2 CHRONIC NECK PAIN: ICD-10-CM

## 2021-04-26 DIAGNOSIS — G43.009 MIGRAINE WITHOUT AURA AND WITHOUT STATUS MIGRAINOSUS, NOT INTRACTABLE: ICD-10-CM

## 2021-04-26 DIAGNOSIS — G44.219 EPISODIC TENSION-TYPE HEADACHE, NOT INTRACTABLE: ICD-10-CM

## 2021-04-26 DIAGNOSIS — G89.29 CHRONIC NECK PAIN: ICD-10-CM

## 2021-04-27 DIAGNOSIS — E03.9 ACQUIRED HYPOTHYROIDISM: Primary | ICD-10-CM

## 2021-04-27 DIAGNOSIS — D50.9 IRON DEFICIENCY ANEMIA, UNSPECIFIED IRON DEFICIENCY ANEMIA TYPE: ICD-10-CM

## 2021-04-27 RX ORDER — TRAMADOL HYDROCHLORIDE 50 MG/1
TABLET ORAL
Qty: 45 TABLET | Refills: 0 | Status: SHIPPED | OUTPATIENT
Start: 2021-04-27 | End: 2021-05-27

## 2021-04-27 RX ORDER — BUTALBITAL, ACETAMINOPHEN AND CAFFEINE 50; 325; 40 MG/1; MG/1; MG/1
TABLET ORAL
Qty: 90 TABLET | Refills: 0 | Status: SHIPPED | OUTPATIENT
Start: 2021-04-27 | End: 2021-05-27

## 2021-04-27 NOTE — TELEPHONE ENCOUNTER
Margarette Siemens called to request a refill on her medication. Last office visit : 1/25/2021   Next office visit : 4/29/2021     Last UDS:   Amphetamine Screen, Urine   Date Value Ref Range Status   09/25/2020 neg  Final     Barbiturate Screen, Urine   Date Value Ref Range Status   09/25/2020 pos  Final     Benzodiazepine Screen, Urine   Date Value Ref Range Status   09/25/2020 neg  Final     Buprenorphine Urine   Date Value Ref Range Status   09/25/2020 neg  Final     Cocaine Metabolite Screen, Urine   Date Value Ref Range Status   09/25/2020 neg  Final     Gabapentin Screen, Urine   Date Value Ref Range Status   09/25/2020 neg  Final     MDMA, Urine   Date Value Ref Range Status   09/25/2020 neg  Final     Methamphetamine, Urine   Date Value Ref Range Status   09/25/2020 neg  Final     Opiate Scrn, Ur   Date Value Ref Range Status   09/25/2020 neg  Final     Oxycodone Screen, Ur   Date Value Ref Range Status   09/25/2020 neg  Final     PCP Screen, Urine   Date Value Ref Range Status   09/25/2020 neg  Final     Propoxyphene Screen, Urine   Date Value Ref Range Status   09/25/2020 neg  Final     THC Screen, Urine   Date Value Ref Range Status   09/25/2020 neg  Final     Tricyclic Antidepressants, Urine   Date Value Ref Range Status   09/25/2020 neg  Final       Last Rabia Piero: 4/16/21  Medication Contract: 9/25/20   Last Fill: 3/24/21    Requested Prescriptions     Pending Prescriptions Disp Refills    traMADol (ULTRAM) 50 MG tablet [Pharmacy Med Name: traMADol HCl 50 MG Oral Tablet] 45 tablet 0     Sig: TAKE 1 TABLET BY MOUTH EVERY 12 HOURS AS NEEDED FOR PAIN REDUCE  DOSES  TAKEN  AS  PAIN  BECOMES  MANAGEABLE    butalbital-acetaminophen-caffeine (FIORICET, ESGIC) -40 MG per tablet [Pharmacy Med Name: Butalbital-APAP-Caffeine -40 MG Oral Tablet] 90 tablet 0     Sig: TAKE 1 TABLET BY MOUTH EVERY 8 HOURS AS NEEDED FOR MIGRAINE HEADACHE         Please approve or refuse this medication.    Malachi Purdy

## 2021-04-29 ENCOUNTER — OFFICE VISIT (OUTPATIENT)
Dept: FAMILY MEDICINE CLINIC | Age: 56
End: 2021-04-29
Payer: COMMERCIAL

## 2021-04-29 VITALS
HEIGHT: 61 IN | OXYGEN SATURATION: 98 % | HEART RATE: 57 BPM | BODY MASS INDEX: 30.44 KG/M2 | SYSTOLIC BLOOD PRESSURE: 128 MMHG | DIASTOLIC BLOOD PRESSURE: 74 MMHG | WEIGHT: 161.25 LBS | TEMPERATURE: 97.3 F

## 2021-04-29 DIAGNOSIS — E03.9 ACQUIRED HYPOTHYROIDISM: ICD-10-CM

## 2021-04-29 DIAGNOSIS — G25.81 RLS (RESTLESS LEGS SYNDROME): ICD-10-CM

## 2021-04-29 DIAGNOSIS — M54.42 CHRONIC BILATERAL LOW BACK PAIN WITH LEFT-SIDED SCIATICA: ICD-10-CM

## 2021-04-29 DIAGNOSIS — G43.009 MIGRAINE WITHOUT AURA AND WITHOUT STATUS MIGRAINOSUS, NOT INTRACTABLE: ICD-10-CM

## 2021-04-29 DIAGNOSIS — G89.29 CHRONIC NECK PAIN: ICD-10-CM

## 2021-04-29 DIAGNOSIS — D50.9 IRON DEFICIENCY ANEMIA, UNSPECIFIED IRON DEFICIENCY ANEMIA TYPE: ICD-10-CM

## 2021-04-29 DIAGNOSIS — G44.219 EPISODIC TENSION-TYPE HEADACHE, NOT INTRACTABLE: ICD-10-CM

## 2021-04-29 DIAGNOSIS — G89.29 CHRONIC BILATERAL LOW BACK PAIN WITH LEFT-SIDED SCIATICA: ICD-10-CM

## 2021-04-29 DIAGNOSIS — E66.09 CLASS 1 OBESITY DUE TO EXCESS CALORIES WITHOUT SERIOUS COMORBIDITY WITH BODY MASS INDEX (BMI) OF 30.0 TO 30.9 IN ADULT: ICD-10-CM

## 2021-04-29 DIAGNOSIS — F41.1 GAD (GENERALIZED ANXIETY DISORDER): ICD-10-CM

## 2021-04-29 DIAGNOSIS — M54.2 CHRONIC NECK PAIN: ICD-10-CM

## 2021-04-29 DIAGNOSIS — F33.1 MODERATE RECURRENT MAJOR DEPRESSION (HCC): Primary | ICD-10-CM

## 2021-04-29 LAB
BASOPHILS ABSOLUTE: 0.1 K/UL (ref 0–0.2)
BASOPHILS RELATIVE PERCENT: 1 % (ref 0–1)
EOSINOPHILS ABSOLUTE: 0.3 K/UL (ref 0–0.6)
EOSINOPHILS RELATIVE PERCENT: 4.1 % (ref 0–5)
HCT VFR BLD CALC: 36.7 % (ref 37–47)
HEMOGLOBIN: 11.5 G/DL (ref 12–16)
IMMATURE GRANULOCYTES #: 0 K/UL
IRON SATURATION: 12 % (ref 14–50)
IRON: 51 UG/DL (ref 37–145)
LYMPHOCYTES ABSOLUTE: 2.4 K/UL (ref 1.1–4.5)
LYMPHOCYTES RELATIVE PERCENT: 32.5 % (ref 20–40)
MCH RBC QN AUTO: 25.3 PG (ref 27–31)
MCHC RBC AUTO-ENTMCNC: 31.3 G/DL (ref 33–37)
MCV RBC AUTO: 80.8 FL (ref 81–99)
MONOCYTES ABSOLUTE: 0.7 K/UL (ref 0–0.9)
MONOCYTES RELATIVE PERCENT: 8.9 % (ref 0–10)
NEUTROPHILS ABSOLUTE: 3.9 K/UL (ref 1.5–7.5)
NEUTROPHILS RELATIVE PERCENT: 53.2 % (ref 50–65)
PDW BLD-RTO: 16.8 % (ref 11.5–14.5)
PLATELET # BLD: 302 K/UL (ref 130–400)
PMV BLD AUTO: 9.8 FL (ref 9.4–12.3)
RBC # BLD: 4.54 M/UL (ref 4.2–5.4)
TOTAL IRON BINDING CAPACITY: 433 UG/DL (ref 250–400)
TSH SERPL DL<=0.05 MIU/L-ACNC: 3.6 UIU/ML (ref 0.27–4.2)
WBC # BLD: 7.3 K/UL (ref 4.8–10.8)

## 2021-04-29 PROCEDURE — 99215 OFFICE O/P EST HI 40 MIN: CPT | Performed by: INTERNAL MEDICINE

## 2021-04-29 RX ORDER — LEVOTHYROXINE SODIUM 112 UG/1
112 CAPSULE ORAL DAILY
Qty: 30 CAPSULE | Refills: 3 | Status: SHIPPED | OUTPATIENT
Start: 2021-04-29 | End: 2021-09-17

## 2021-04-29 RX ORDER — FLUVOXAMINE MALEATE 50 MG/1
75 TABLET, COATED ORAL NIGHTLY
Qty: 45 TABLET | Refills: 3 | Status: SHIPPED | OUTPATIENT
Start: 2021-04-29 | End: 2021-07-22 | Stop reason: SDUPTHER

## 2021-04-29 ASSESSMENT — ENCOUNTER SYMPTOMS
CHEST TIGHTNESS: 0
DIARRHEA: 0
RHINORRHEA: 0
SINUS PRESSURE: 0
ABDOMINAL PAIN: 0
COLOR CHANGE: 0
EYE DISCHARGE: 0
VOMITING: 0
WHEEZING: 0
COUGH: 0
EYE PAIN: 0
EYE REDNESS: 0
BACK PAIN: 1
VOICE CHANGE: 0
SHORTNESS OF BREATH: 0
BLOOD IN STOOL: 0
SORE THROAT: 0

## 2021-04-29 NOTE — PROGRESS NOTES
Sabrina Britton is a 63 y.o. female who presents today for   Chief Complaint   Patient presents with    Headache    Neck Pain    Depression    Anxiety       HPI  64 y/o WF presents for telehealth VV for follow up on recurrent migraine/tension HAs, chronic neck pain with controlled med refills, chronic depression, HORACE, RLS, LPR, and elevated BMI. She was seeing Teri Sr at [id number] [facility] for major depressive disorder and generalized anxiety disorder with last visit in December 2020 with Teri Sr and she had seen Raford Halsted for counseling but there was a $50 co-pay for each of them which is quite expensive. She was on pristiq 50 mg then 100 mg previously but this was changed to fluvoxamine last July for major depressive disorder and generalized anxiety disorder which were not well controlled. Wellbutrin had stopped working prior pristiq. She does not feel her mood is well controlled however and she feels like she gets overwhelmed and easily irritated and loses her temper easily which has been worsening since the Winter months. She thinks the change to Spring weather has helped her mood some however because she likes to be outside more and mowing the yard. She notes she gets easily distracted and fails to finish tasks. She has helped with her 2 older grandchildren during the week more while her daughter keeps the two younger children since her daughter is in school. She has had to help her grandchildren with their online work and they have been behind in school and they may be held back. BMI Readings from Last 3 Encounters:   04/29/21 30.47 kg/m²   09/25/20 29.31 kg/m²   07/20/20 28.91 kg/m²     Wt Readings from Last 3 Encounters:   04/29/21 161 lb 4 oz (73.1 kg)   09/25/20 155 lb 2 oz (70.4 kg)   07/20/20 153 lb (69.4 kg)     Hypothyroidism  Patient presents for follow up on thyroid function. Symptoms consist of fatigue, weight gain, anxiousness, depression. The symptoms are moderate.   The problem has been gradually worsening. Patient has been compliant with levothyroxine. Lab Results   Component Value Date    TSH 3.600 04/29/2021       Review of Systems   Constitutional: Positive for fatigue. Negative for appetite change, chills, fever and unexpected weight change. HENT: Negative for ear pain, rhinorrhea, sinus pressure, sore throat and voice change. Eyes: Negative for pain, discharge and redness. Respiratory: Negative for cough, chest tightness, shortness of breath and wheezing. Cardiovascular: Negative for chest pain and palpitations. Gastrointestinal: Negative for abdominal pain, blood in stool, diarrhea and vomiting. Endocrine: Negative for cold intolerance, heat intolerance and polydipsia. Genitourinary: Negative for dysuria and hematuria. Musculoskeletal: Positive for back pain, myalgias and neck pain. Negative for arthralgias and neck stiffness. Skin: Negative for color change and rash. Neurological: Positive for headaches. Negative for dizziness, tremors, syncope, speech difficulty, weakness and numbness. Hematological: Negative for adenopathy. Does not bruise/bleed easily. Psychiatric/Behavioral: Positive for agitation, decreased concentration, dysphoric mood and sleep disturbance. Negative for confusion, self-injury and suicidal ideas. The patient is nervous/anxious. All other systems reviewed and are negative.       Past Medical History:   Diagnosis Date    Allergic rhinitis     Chronic bilateral low back pain with left-sided sciatica 12/1/2017    Colon polyp     Depression with anxiety     Obesity     Osteoarthritis        Current Outpatient Medications   Medication Sig Dispense Refill    fluvoxaMINE (LUVOX) 50 MG tablet Take 1.5 tablets by mouth nightly 45 tablet 3    Levothyroxine Sodium 112 MCG CAPS Take 112 mcg by mouth Daily 30 capsule 3    traMADol (ULTRAM) 50 MG tablet TAKE 1 TABLET BY MOUTH EVERY 12 HOURS AS NEEDED FOR PAIN REDUCE  DOSES  TAKEN  AS PAIN  BECOMES  MANAGEABLE 45 tablet 0    butalbital-acetaminophen-caffeine (FIORICET, ESGIC) -40 MG per tablet TAKE 1 TABLET BY MOUTH EVERY 8 HOURS AS NEEDED FOR MIGRAINE HEADACHE 90 tablet 0    gabapentin (NEURONTIN) 300 MG capsule TAKE 2 CAPSULES BY MOUTH EVERY DAY AT BEDTIME 60 capsule 1    propranolol (INDERAL LA) 80 MG extended release capsule Take 1 capsule by mouth daily 30 capsule 3    omeprazole (PRILOSEC) 40 MG delayed release capsule Take 1 capsule by mouth nightly 30 capsule 5    baclofen (LIORESAL) 10 MG tablet Take 1 tablet by mouth 3 times daily as needed (muscle spasms/neck pain) 90 tablet 2    Potassium 99 MG TABS Take 1 tablet by mouth daily      TURMERIC PO Take 1 tablet by mouth daily      vitamin B-12 (CYANOCOBALAMIN) 1000 MCG tablet Take 1,000 mcg by mouth daily      Multiple Vitamins-Minerals (MULTIVITAMIN ADULT PO) Take by mouth      Omega 3 1000 MG CAPS Take by mouth      Cholecalciferol (VITAMIN D3) 3000 units TABS Take by mouth      valACYclovir (VALTREX) 500 MG tablet       BIOTIN PO Take 1 tablet by mouth daily       No current facility-administered medications for this visit. Allergies   Allergen Reactions    Augmentin [Amoxicillin-Pot Clavulanate] Diarrhea    Aspirin Other (See Comments)       Past Surgical History:   Procedure Laterality Date     SECTION      COLONOSCOPY  2015    colon polyp x1, recheck in 5 yrs (Dr Daphnie Finley)   [address]         Social History     Tobacco Use    Smoking status: Never Smoker    Smokeless tobacco: Never Used   Vaping Use    Vaping Use: Never used   Substance Use Topics    Alcohol use:  Yes     Alcohol/week: 1.0 standard drinks     Types: 1 Standard drinks or equivalent per week     Comment: rarely    Drug use: Never       Family History   Problem Relation Age of Onset    Heart Disease Mother     High Blood Pressure Mother     Other Mother         skin CA    Osteoporosis Mother     Heart Disease Father     High Blood Pressure Father     Other Father         jaw CA    Heart Disease Sister     Diabetes Sister     Stroke Sister     High Blood Pressure Sister     Other Sister         fibromyalgia, thyroid CA       /74   Pulse 57   Temp 97.3 °F (36.3 °C)   Ht 5' 1\" (1.549 m)   Wt 161 lb 4 oz (73.1 kg)   SpO2 98%   BMI 30.47 kg/m²     Physical Exam  Vitals reviewed. Constitutional:       General: She is awake. She is not in acute distress. Appearance: Normal appearance. She is well-developed. She is obese. She is not ill-appearing, toxic-appearing or diaphoretic. HENT:      Head: Normocephalic and atraumatic. Right Ear: External ear normal.      Left Ear: External ear normal.      Nose: Nose normal.      Mouth/Throat:      Lips: Pink. Mouth: Mucous membranes are moist.   Eyes:      General:         Right eye: No discharge. Left eye: No discharge. Conjunctiva/sclera: Conjunctivae normal.      Pupils: Pupils are equal, round, and reactive to light. Neck:      Thyroid: No thyroid mass or thyromegaly. Vascular: Normal carotid pulses. No carotid bruit or JVD. Trachea: Trachea and phonation normal. No tracheal tenderness. Cardiovascular:      Rate and Rhythm: Normal rate and regular rhythm. Pulses:           Carotid pulses are 2+ on the right side and 2+ on the left side. Posterior tibial pulses are 2+ on the right side and 2+ on the left side. Heart sounds: Normal heart sounds. No murmur heard. No friction rub. No gallop. Pulmonary:      Effort: Pulmonary effort is normal. No accessory muscle usage. Breath sounds: Normal breath sounds. No decreased breath sounds, wheezing, rhonchi or rales. Abdominal:      General: Abdomen is flat. Bowel sounds are normal. There is no distension. Palpations: Abdomen is soft. There is no hepatomegaly, splenomegaly or mass. Tenderness:  There is no abdominal tenderness. There is no guarding or rebound. Hernia: No hernia is present. Musculoskeletal:         General: No swelling or deformity. Right wrist: Normal.      Left wrist: Normal.      Cervical back: Normal range of motion and neck supple. Spasms and tenderness present. No rigidity. No muscular tenderness. Decreased range of motion. Normal range of motion. Back:       Right lower leg: No edema. Left lower leg: No edema. Right ankle: Normal.      Left ankle: Normal.   Lymphadenopathy:      Cervical: No cervical adenopathy. Upper Body:      Right upper body: No supraclavicular adenopathy. Left upper body: No supraclavicular adenopathy. Skin:     General: Skin is warm. Capillary Refill: Capillary refill takes less than 2 seconds. Coloration: Skin is not cyanotic. Findings: No rash. Nails: There is no clubbing. Neurological:      Mental Status: She is alert and oriented to person, place, and time. Cranial Nerves: No cranial nerve deficit or dysarthria. Motor: No weakness, tremor or abnormal muscle tone. Coordination: Coordination normal.      Gait: Gait is intact. Comments: CN II-XII grossly intact, speech clear, no facial droop, MAEW   Psychiatric:         Attention and Perception: Attention and perception normal.         Mood and Affect: Mood is anxious and depressed. Speech: Speech normal.         Behavior: Behavior normal. Behavior is cooperative. Thought Content:  Thought content normal.         Cognition and Memory: Cognition and memory normal.         Judgment: Judgment normal.         Lab Results   Component Value Date    WBC 7.3 04/29/2021    HGB 11.5 (L) 04/29/2021    HCT 36.7 (L) 04/29/2021    MCV 80.8 (L) 04/29/2021     04/29/2021    LABLYMP 1.34 08/13/2015    LYMPHOPCT 32.5 04/29/2021    RBC 4.54 04/29/2021    MCH 25.3 (L) 04/29/2021    MCHC 31.3 (L) 04/29/2021    RDW 16.8 (H) 04/29/2021 Lab Results   Component Value Date     09/18/2020    K 4.2 09/18/2020     09/18/2020    CO2 25 09/18/2020    BUN 7 09/18/2020    CREATININE 0.8 09/18/2020    GLUCOSE 94 06/12/2020    CALCIUM 9.5 09/18/2020    PROT 7.1 09/18/2020    LABALBU 4.6 09/18/2020    BILITOT 0.3 09/18/2020    ALKPHOS 104 09/18/2020    AST 21 09/18/2020    ALT 16 09/18/2020    LABGLOM >60 09/18/2020    GFRAA >59 09/18/2020     Lab Results   Component Value Date    VITD25 41.2 06/12/2020     Lab Results   Component Value Date    IRON 51 04/29/2021    TIBC 433 (H) 04/29/2021     Lab Results   Component Value Date    TSH 3.600 04/29/2021    T4FREE 1.06 06/12/2020     No results found for this visit on 04/29/21. Assessment:    ICD-10-CM    1. Moderate recurrent major depression (HCC)  F33.1 fluvoxaMINE (LUVOX) 50 MG tablet   2. HORACE (generalized anxiety disorder)  F41.1 fluvoxaMINE (LUVOX) 50 MG tablet   3. Acquired hypothyroidism  E03.9 Levothyroxine Sodium 112 MCG CAPS     TSH WITH REFLEX TO FT4   4. Iron deficiency anemia, unspecified iron deficiency anemia type  D50.9    5. Migraine without aura and without status migrainosus, not intractable  G43.009    6. Chronic bilateral low back pain with left-sided sciatica  M54.42     G89.29    7. Chronic neck pain  M54.2     G89.29    8. Episodic tension-type headache, not intractable  G44.219    9. RLS (restless legs syndrome)  G25.81    10. Class 1 obesity due to excess calories without serious comorbidity with body mass index (BMI) of 30.0 to 30.9 in adult  E66.09     Z68.30        Plan:  Dakota Alexander was seen today for headache, neck pain, depression and anxiety. Diagnoses and all orders for this visit:    Moderate recurrent major depression (HCC)  -     fluvoxaMINE (LUVOX) 50 MG tablet; Take 1.5 tablets by mouth nightly    HORACE (generalized anxiety disorder)  -     fluvoxaMINE (LUVOX) 50 MG tablet;  Take 1.5 tablets by mouth nightly    Acquired hypothyroidism  -     Levothyroxine Acquired hypothyroidism    4. Iron deficiency anemia, unspecified iron deficiency anemia type    5. Migraine without aura and without status migrainosus, not intractable    6. Chronic bilateral low back pain with left-sided sciatica    7. Chronic neck pain    8. Episodic tension-type headache, not intractable    9. RLS (restless legs syndrome)    10. Class 1 obesity due to excess calories without serious comorbidity with body mass index (BMI) of 30.0 to 30.9 in adult         Orders Placed This Encounter   Procedures    TSH WITH REFLEX TO FT4     Standing Status:   Future     Standing Expiration Date:   4/29/2022     Orders Placed This Encounter   Medications    fluvoxaMINE (LUVOX) 50 MG tablet     Sig: Take 1.5 tablets by mouth nightly     Dispense:  45 tablet     Refill:  3    Levothyroxine Sodium 112 MCG CAPS     Sig: Take 112 mcg by mouth Daily     Dispense:  30 capsule     Refill:  3     Medications Discontinued During This Encounter   Medication Reason    fluvoxaMINE (LUVOX) 50 MG tablet DOSE ADJUSTMENT    levothyroxine (SYNTHROID) 100 MCG tablet DOSE ADJUSTMENT     Patient Instructions       Patient Education        Learning About Anxiety Disorders  What are anxiety disorders? Anxiety disorders are a type of medical problem. They cause severe anxiety. When you feel anxious, you feel that something bad is about to happen. This feeling interferes with your life. These disorders include:  · Generalized anxiety disorder. You feel worried and stressed about many everyday events and activities. This goes on for several months and disrupts your life on most days. · Panic disorder. You have repeated panic attacks. A panic attack is a sudden, intense fear or anxiety. It may make you feel short of breath. Your heart may pound. · Social anxiety disorder. You feel very anxious about what you will say or do in front of people. For example, you may be scared to talk or eat in public.  This problem affects your daily life. · Phobias. You are very scared of a specific object, situation, or activity. For example, you may fear spiders, high places, or small spaces. What are the symptoms? Generalized anxiety disorder  Symptoms may include:  · Feeling worried and stressed about many things almost every day. · Feeling tired or irritable. You may have a hard time concentrating. · Having headaches or muscle aches. · Having a hard time getting to sleep or staying asleep. Panic disorder  You may have repeated panic attacks when there is no reason for feeling afraid. You may change your daily activities because you worry that you will have another attack. Symptoms may include:  · Intense fear, terror, or anxiety. · Trouble breathing or very fast breathing. · Chest pain or tightness. · A heartbeat that races or is not regular. Social anxiety disorder  Symptoms may include:  · Fear about a social situation, such as eating in front of others or speaking in public. You may worry a lot. Or you may be afraid that something bad will happen. · Anxiety that can cause you to blush, sweat, and feel shaky. · A heartbeat that is faster than normal.  · A hard time focusing. Phobias  Symptoms may include:  · More fear than most people of being around an object, being in a situation, or doing an activity. You might also be stressed about the chance of being around the thing you fear. · Worry about losing control, panicking, fainting, or having physical symptoms like a faster heartbeat when you are around the situation or object. How are these disorders treated? Anxiety disorders can be treated with medicines or counseling. A combination of both may be used. Medicines may include:  · Antidepressants. These may help your symptoms by keeping chemicals in your brain in balance. · Benzodiazepines. These may give you short-term relief of your symptoms. Some people use cognitive-behavioral therapy.  A therapist helps you learn to change you productive. But if you don't have the skills to express anger in a healthy way, anger can build up. You may hurt othersand yourselfemotionally and even physically. Violent behavior often starts with verbal threats or fairly minor incidents. But over time, it can involve physical harm. It can include physical, verbal, or sexual abuse of an intimate partner (domestic violence), a child (child abuse), or an older adult (elder abuse). It can also make you sick. Anger and constant hostility keep your blood pressure high. They increase your chances of having another health problem, such as depression, a heart attack, or a stroke. Some people with post-traumatic stress disorder (PTSD) feel angry and on alert all the time. It may feel like there are no other ways to react when you are angry. But when you learn to work with anger in appropriate and healthy ways, your anger no longer controls you. How can you manage your anger? The first step to managing anger is to be more aware of it. Note the thoughts, feelings, and emotions that you have when you get angry. Practice noticing these signs of anger when you are calm. If you are more aware of the signs of anger, you can take steps to manage it. Here are a few tips:  · Think before you act. Take time to stop and cool down when you feel yourself getting angry. Count to 10 while you take slow, steady breaths. Practice some other form of mental relaxation. · Learn the feelings that lead to angry outbursts. Anger and hostility may be a symptom of unhappy feelings or depression about your job, your relationship, or other aspects of your personal life. · Avoid situations that trigger your anger. If standing in line bothers you, do errands at less busy times. · Express anger in a healthy way. You might:  ? Go for a short walk or jog.  ? Draw, paint, or listen to music to release the anger. ? Write in a daily journal.  ?  Use \"I\" statements, not \"you\" statements, to more about \"Learning About Managing Anger. \"     If you do not have an account, please click on the \"Sign Up Now\" link. Current as of: August 31, 2020               Content Version: 12.8  © 2006-2021 [employer]wise, [employer]. Care instructions adapted under license by [facility] ([facility]). If you have questions about a medical condition or this instruction, always ask your healthcare professional. [place] [address] any warranty or liability for your use of this information. Patient Education        Recovering From Depression: Care Instructions  Your Care Instructions     Taking good care of yourself is important as you recover from depression. In time, your symptoms will fade as your treatment takes hold. Do not give up. Instead, focus your energy on getting better. Your mood will improve. It just takes some time. Focus on things that can help you feel better, such as being with friends and family, eating well, and getting enough rest. But take things slowly. Do not do too much too soon. You will begin to feel better gradually. Follow-up care is a key part of your treatment and safety. Be sure to make and go to all appointments, and call your doctor if you are having problems. It's also a good idea to know your test results and keep a list of the medicines you take. How can you care for yourself at home? Be realistic  · If you have a large task to do, break it up into smaller steps you can handle, and just do what you can. · You may want to put off important decisions until your depression has lifted. If you have plans that will have a major impact on your life, such as marriage, divorce, or a job change, try to wait a bit. Talk it over with friends and loved ones who can help you look at the overall picture first.  · Reaching out to people for help is important. Do not isolate yourself. Let your family and friends help you.  Find someone you can trust and confide in, and talk to that person. · Be patient, and be kind to yourself. Remember that depression is not your fault and is not something you can overcome with willpower alone. Treatment is important for depression, just like for any other illness. Feeling better takes time, and your mood will improve little by little. Stay active  · Stay busy and get outside. Take a walk, or try some other light exercise. · Talk with your doctor about an exercise program. Exercise can help with mild depression. · Go to a movie or concert. Take part in a Judaism activity or other social gathering. Go to a [employer] game. · Ask a friend to have dinner with you. Take care of yourself  · Eat a balanced diet with plenty of fresh fruits and vegetables, whole grains, and lean protein. If you have lost your appetite, eat small snacks rather than large meals. · Avoid using illegal drugs or marijuana and drinking alcohol. Do not take medicines that have not been prescribed for you. They may interfere with medicines you may be taking for depression, or they may make your depression worse. · Take your medicines exactly as they are prescribed. You may start to feel better within 1 to 3 weeks of taking antidepressant medicine. But it can take as many as 6 to 8 weeks to see more improvement. If you have questions or concerns about your medicines, or if you do not notice any improvement by 3 weeks, talk to your doctor. · Continue to take your medicine after your symptoms improve. Taking your medicine for at least 6 months after you feel better can help keep you from getting depressed again. If this isn't the first time you have been depressed, your doctor may recommend you to take medicine even longer. · If you have any side effects from your medicine, tell your doctor. Many side effects are mild and will go away on their own after you have been taking the medicine for a few weeks. Some may last longer. Talk to your doctor if side effects are bothering you too much.  You might be able to try a different medicine. · Continue counseling. It may help prevent depression from returning, especially if you've had multiple episodes of depression. Talk with your counselor if you are having a hard time attending your sessions or you think the sessions aren't working. Don't just stop going. · Get enough sleep. Talk to your doctor if you are having problems sleeping. · Avoid sleeping pills unless they are prescribed by the doctor treating your depression. Sleeping pills may make you groggy during the day, and they may interact with other medicine you are taking. · If you have any other illnesses, such as diabetes, heart disease, or high blood pressure, make sure to continue with your treatment. Tell your doctor about all of the medicines you take, including those with or without a prescription. · If you or someone you know talks about suicide, self-harm, or feeling hopeless, get help right away. Call the [address] [place] [address] at 5-353-887-MXQW (4-434.287.7531) or text HOME to 323655 to access the Crisis Text Line. Consider saving these numbers in your phone. When should you call for help? Call 255 anytime you think you may need emergency care. For example, call if:    · You feel like hurting yourself or someone else.     · Someone you know has depression and is about to attempt or is attempting suicide. Call your doctor now or seek immediate medical care if:    · You hear voices.     · Someone you know has depression and:  ? Starts to give away his or her possessions. ? Uses illegal drugs or drinks alcohol heavily. ? Talks or writes about death, including writing suicide notes or talking about guns, knives, or pills. ? Starts to spend a lot of time alone. ? Acts very aggressively or suddenly appears calm. Watch closely for changes in your health, and be sure to contact your doctor if:    · You do not get better as expected. Where can you learn more?   Go to [email or web address] and sign in to your [employer] account. Enter E207 in the [facility] box to learn more about \"Recovering From Depression: Care Instructions. \"     If you do not have an account, please click on the \"Sign Up Now\" link. Current as of: September 23, 2020               Content Version: 12.8  © 2006-2021 [employer]. Care instructions adapted under license by [facility] ([facility]). If you have questions about a medical condition or this instruction, always ask your healthcare professional. [place] [address] any warranty or liability for your use of this information. Patient Education        Neck: Exercises  Introduction  Here are some examples of exercises for you to try. The exercises may be suggested for a condition or for rehabilitation. Start each exercise slowly. Ease off the exercises if you start to have pain. You will be told when to start these exercises and which ones will work best for you. How to do the exercises  Neck stretch   1. This stretch works best if you keep your shoulder down as you lean away from it. To help you remember to do this, start by relaxing your shoulders and lightly holding on to your thighs or your chair. 2. Tilt your head toward your shoulder and hold for 15 to 30 seconds. Let the weight of your head stretch your muscles. 3. If you would like a little added stretch, use your hand to gently and steadily pull your head toward your shoulder. For example, keeping your right shoulder down, lean your head to the left. 4. Repeat 2 to 4 times toward each shoulder. Diagonal neck stretch   1. Turn your head slightly toward the direction you will be stretching, and tilt your head diagonally toward your chest and hold for 15 to 30 seconds. 2. If you would like a little added stretch, use your hand to gently and steadily pull your head forward on the diagonal.  3. Repeat 2 to 4 times toward each side. Dorsal glide stretch   The dorsal glide stretches the back of the neck. If you feel pain, do not glide so far back. Some people find this exercise easier to do while lying on their backs with an ice pack on the neck. 1. Sit or stand tall and look straight ahead. 2. Slowly tuck your chin as you glide your head backward over your body  3. Hold for a count of 6, and then relax for up to 10 seconds. 4. Repeat 8 to 12 times. Chest and shoulder stretch   1. Sit or stand tall and glide your head backward as in the dorsal glide stretch. 2. Raise both arms so that your hands are next to your ears. 3. Take a deep breath, and as you breathe out, lower your elbows down and behind your back. You will feel your shoulder blades slide down and together, and at the same time you will feel a stretch across your chest and the front of your shoulders. 4. Hold for about 6 seconds, and then relax for up to 10 seconds. 5. Repeat 8 to 12 times. Strengthening: Hands on head   1. Move your head backward, forward, and side to side against gentle pressure from your hands, holding each position for about 6 seconds. 2. Repeat 8 to 12 times. Follow-up care is a key part of your treatment and safety. Be sure to make and go to all appointments, and call your doctor if you are having problems. It's also a good idea to know your test results and keep a list of the medicines you take. Where can you learn more? Go to https://[employer]. org and sign in to your [employer] account. Enter P975 in the [facility] box to learn more about \"Neck: Exercises. \"     If you do not have an account, please click on the \"Sign Up Now\" link. Current as of: November 16, 2020               Content Version: 12.8  © 4330-6976 Healthwise, Incorporated. Care instructions adapted under license by [facility] ([facility]).  If you have questions about a medical condition or this instruction, always ask your healthcare professional. [employer], [facility] disclaims any warranty or liability for your use of this information. Patient Education        Learning About Obesity  What is obesity? Obesity means having an unhealthy amount of body fat. This puts your health in danger. It can lead to other health problems, such as type 2 diabetes and high blood pressure. How do you know if your weight is in the obesity range? To know if your weight is in the obesity range, your doctor looks at your body mass index (BMI) and waist size. BMI is a number that is calculated from your weight and your height. To figure out your BMI for yourself, you can use an online tool, such as http://www.tamayo.com/ on the [employer]. If your BMI is 30.0 or higher, it falls within the obesity range. Keep in mind that BMI and waist size are only guides. They are not tools to determine your ideal body weight. What causes obesity? When you take in more calories than you burn off, you gain weight. How you eat, how active you are, and other things affect how your body uses calories and whether you gain weight. If you have family members who have too much body fat, you may have inherited a tendency to gain weight. And your family also helps form your eating and lifestyle habits, which can lead to obesity. Also, our busy lives make it harder to plan and cook healthy meals. For many of us, it's easier to reach for prepared foods, go out to eat, or go to the drive-through. But these foods are often high in saturated fat and calories. Portions are often too large. What can you do to reach a healthy weight? Focus on health, not diets. Diets are hard to stay on and don't work in the long run. It is very hard to stay with a diet that includes lots of big changes in your eating habits.   Instead of a diet, focus on lifestyle changes that will improve your health and achieve the right balance of energy and calories. To lose weight, you need to burn more calories than you take in. You can do it by eating healthy foods in reasonable amounts and becoming more active, even a little bit every day. Making small changes over time can add up to a lot. Make a plan for change. Many people have found that naming their reasons for change and staying focused on their plan can make a big difference. Work with your doctor to create a plan that is right for you. · Ask yourself: Minnie Harden are my personal, most powerful reasons for wanting this change? What will my life look like when I've made the change? \"  · Set your long-term goal. Make it specific, such as \"I will lose x pounds. \"  · Break your long-term goal into smaller, short-term goals. Make these small steps specific and within your reach, things you know you can do. These steps are what keep you going from day to day. Talk with your doctor about other weight-loss options. If you have a BMI in a certain range and have not been able to lose weight with diet and exercise, medicine or surgery may be an option for you. Before your doctor will prescribe medicines or surgery, he or she will probably want you to be more active and follow your healthy eating plan for a period of time. These habits are key lifelong changes for managing your weight, with or without other medical treatment. And these changes can help you avoid weight-related health problems. How can you stay on your plan for change? Be ready. Choose to start during a time when there are few events like holidays, social events, and high-stress periods. These events might trigger slip-ups. Decide on your first few steps. Most people have more success when they make small changes, one step at a time. For example, you might switch a daily candy bar to a piece of fruit, walk 10 minutes more, or add more vegetables to a meal.  Line up your support people.  Make sure you're not going to be alone as you make this change. Connect with people who understand how important it is to you. Ask family members and friends for help in keeping with your plan. And think about who could make it harder for you, and how to handle them. Try tracking. People who keep track of what they eat, feel, and do are better at losing weight. Try writing down things like:  · What and how much you eat. · How you feel before and after each meal.  · Details about each meal (like eating out or at home, eating alone, or with friends or family). · What you do to be active. Look and plan. As you track, look for patterns that you may want to change. Take note of:  · When you eat and whether you skip meals. · How often you eat out. · How many fruits and vegetables you eat. · When you eat beyond feeling full. · When and why you eat for reasons other than being hungry. When you stray from your plan, don't get upset. Figure out what made you slip up and how you can fix it. Can you take medicines or have surgery to lose weight? If you have a BMI in a certain range and have not been able to lose weight with diet and exercise, medicine or surgery may be an option for you. If you have a BMI of at least 30.0 (or a BMI of at least 27.0 and another health problem related to your weight), ask your doctor about weight-loss medicines. They work by making you feel less hungry, making you feel full more quickly, or changing how you digest fat. Medicines are used along with diet changes and more physical activity to help you make lasting changes. If you have a BMI of 40.0 or more (or a BMI of 35.0 or more and another health problem related to your weight), your doctor may talk with you about surgery. Weight-loss surgery has risks, and you will need to work with your doctor to compare the risk of having obesity with the risks of surgery. With any option you choose, you will still need to eat a healthy diet and get regular exercise.   Follow-up care is a key part of your treatment and safety. Be sure to make and go to all appointments, and call your doctor if you are having problems. It's also a good idea to know your test results and keep a list of the medicines you take. Where can you learn more? Go to https://chpepiceweb.[employer]. org and sign in to your [employer] account. Enter N111 in the [employer] box to learn more about \"Learning About Obesity. \"     If you do not have an account, please click on the \"Sign Up Now\" link. Current as of: September 23, 2020               Content Version: 12.8  © 2993-3991 Healthwise, [employer]. Care instructions adapted under license by [facility] [place] ([facility]). If you have questions about a medical condition or this instruction, always ask your healthcare professional. [address] any warranty or liability for your use of this information. Patient voices understanding and agrees to plans along with risks and benefits of plan. Counseling:  Jimmy Lees's case, medications and options were discussed in detail. patient was instructed to call the office if she   questions regarding her treatment. Should her conditions worsen, she should return to office to be reassessed by Dr. Erica Hinojosa. she  Should to go the closest Emergency Department for any emergency. They verbalized understanding the above instructions.

## 2021-04-29 NOTE — PATIENT INSTRUCTIONS
Patient Education        Learning About Anxiety Disorders  What are anxiety disorders? Anxiety disorders are a type of medical problem. They cause severe anxiety. When you feel anxious, you feel that something bad is about to happen. This feeling interferes with your life. These disorders include:  · Generalized anxiety disorder. You feel worried and stressed about many everyday events and activities. This goes on for several months and disrupts your life on most days. · Panic disorder. You have repeated panic attacks. A panic attack is a sudden, intense fear or anxiety. It may make you feel short of breath. Your heart may pound. · Social anxiety disorder. You feel very anxious about what you will say or do in front of people. For example, you may be scared to talk or eat in public. This problem affects your daily life. · Phobias. You are very scared of a specific object, situation, or activity. For example, you may fear spiders, high places, or small spaces. What are the symptoms? Generalized anxiety disorder  Symptoms may include:  · Feeling worried and stressed about many things almost every day. · Feeling tired or irritable. You may have a hard time concentrating. · Having headaches or muscle aches. · Having a hard time getting to sleep or staying asleep. Panic disorder  You may have repeated panic attacks when there is no reason for feeling afraid. You may change your daily activities because you worry that you will have another attack. Symptoms may include:  · Intense fear, terror, or anxiety. · Trouble breathing or very fast breathing. · Chest pain or tightness. · A heartbeat that races or is not regular. Social anxiety disorder  Symptoms may include:  · Fear about a social situation, such as eating in front of others or speaking in public. You may worry a lot. Or you may be afraid that something bad will happen. · Anxiety that can cause you to blush, sweat, and feel shaky.   · A signs of anger when you are calm. If you are more aware of the signs of anger, you can take steps to manage it. Here are a few tips:  · Think before you act. Take time to stop and cool down when you feel yourself getting angry. Count to 10 while you take slow, steady breaths. Practice some other form of mental relaxation. · Learn the feelings that lead to angry outbursts. Anger and hostility may be a symptom of unhappy feelings or depression about your job, your relationship, or other aspects of your personal life. · Avoid situations that trigger your anger. If standing in line bothers you, do errands at less busy times. · Express anger in a healthy way. You might:  ? Go for a short walk or jog.  ? Draw, paint, or listen to music to release the anger. ? Write in a daily journal.  ? Use \"I\" statements, not \"you\" statements, to discuss your anger. Say \"I don't feel valued when my needs are not being met\" instead of \"You make me mad when you are so inconsiderate. \"  · Take care of yourself. ? Exercise regularly. ? Eat a balanced diet. Don't skip meals. ? Try to get 8 hours of sleep each night. ? Limit your use of alcohol, and don't use illegal drugs. ? Practice yoga, meditation, or eyad chi to relax. · Explore other resources that may be available through your job or your community. ? Contact your human resources department at work. You might be able to get services through an employee assistance program.  ? Contact your local hospital, mental health facility, or health department. Ask what types of programs or support groups are available in your area. · Do not keep guns in your home. If you must have guns in your home, unload them and lock them up. Lock ammunition in a separate place. Keep guns away from children. Where can you find help? If anger or stress starts to harm your work or personal relationships, you might seek help. You can learn ways to control your feelings and actions.   · Talk to someone you trust, or find a counselor. · There are groups in your area that can connect you with people to talk to. ? [address] . This service from the national Substance Abuse and [place] [address] can help you find local counselors. Search online at [place]. [place].gov or call 2-842-303-HELP (748 738 301), or TDD 7-198.921.1327.  ? Parents Anonymous. Self-help groups that serve parents under stress, as well as children who have been abused, are available throughout the [place] [facility], [place] [facility] and [facility]. To find a group in your area, search online or in your phone book under Parents Anonymous or call (861) 059-0000. Where can you learn more? Go to https://MyBeautyCompareeleeb.Recurious. org and sign in to your "[employer]" account. Enter R733 in the PanÃ¨ve box to learn more about \"Learning About Managing Anger. \"     If you do not have an account, please click on the \"Sign Up Now\" link. Current as of: August 31, 2020               Content Version: 12.8  © 2577-0506 [employer]. Care instructions adapted under license by [facility] ([facility]). If you have questions about a medical condition or this instruction, always ask your healthcare professional. [place] [address] any warranty or liability for your use of this information. Patient Education        Recovering From Depression: Care Instructions  Your Care Instructions     Taking good care of yourself is important as you recover from depression. In time, your symptoms will fade as your treatment takes hold. Do not give up. Instead, focus your energy on getting better. Your mood will improve. It just takes some time. Focus on things that can help you feel better, such as being with friends and family, eating well, and getting enough rest. But take things slowly. Do not do too much too soon. You will begin to feel better gradually.   Follow-up care is a key part of your treatment and safety. Be sure to make and go to all appointments, and call your doctor if you are having problems. It's also a good idea to know your test results and keep a list of the medicines you take. How can you care for yourself at home? Be realistic  · If you have a large task to do, break it up into smaller steps you can handle, and just do what you can. · You may want to put off important decisions until your depression has lifted. If you have plans that will have a major impact on your life, such as marriage, divorce, or a job change, try to wait a bit. Talk it over with friends and loved ones who can help you look at the overall picture first.  · Reaching out to people for help is important. Do not isolate yourself. Let your family and friends help you. Find someone you can trust and confide in, and talk to that person. · Be patient, and be kind to yourself. Remember that depression is not your fault and is not something you can overcome with willpower alone. Treatment is important for depression, just like for any other illness. Feeling better takes time, and your mood will improve little by little. Stay active  · Stay busy and get outside. Take a walk, or try some other light exercise. · Talk with your doctor about an exercise program. Exercise can help with mild depression. · Go to a movie or concert. Take part in a Congregational activity or other social gathering. Go to a ball game. · Ask a friend to have dinner with you. Take care of yourself  · Eat a balanced diet with plenty of fresh fruits and vegetables, whole grains, and lean protein. If you have lost your appetite, eat small snacks rather than large meals. · Avoid using illegal drugs or marijuana and drinking alcohol. Do not take medicines that have not been prescribed for you. They may interfere with medicines you may be taking for depression, or they may make your depression worse. · Take your medicines exactly as they are prescribed. You may start to feel better within 1 to 3 weeks of taking antidepressant medicine. But it can take as many as 6 to 8 weeks to see more improvement. If you have questions or concerns about your medicines, or if you do not notice any improvement by 3 weeks, talk to your doctor. · Continue to take your medicine after your symptoms improve. Taking your medicine for at least 6 months after you feel better can help keep you from getting depressed again. If this isn't the first time you have been depressed, your doctor may recommend you to take medicine even longer. · If you have any side effects from your medicine, tell your doctor. Many side effects are mild and will go away on their own after you have been taking the medicine for a few weeks. Some may last longer. Talk to your doctor if side effects are bothering you too much. You might be able to try a different medicine. · Continue counseling. It may help prevent depression from returning, especially if you've had multiple episodes of depression. Talk with your counselor if you are having a hard time attending your sessions or you think the sessions aren't working. Don't just stop going. · Get enough sleep. Talk to your doctor if you are having problems sleeping. · Avoid sleeping pills unless they are prescribed by the doctor treating your depression. Sleeping pills may make you groggy during the day, and they may interact with other medicine you are taking. · If you have any other illnesses, such as diabetes, heart disease, or high blood pressure, make sure to continue with your treatment. Tell your doctor about all of the medicines you take, including those with or without a prescription. · If you or someone you know talks about suicide, self-harm, or feeling hopeless, get help right away. Call the [facility] at 1-800-273-talk (3-470.356.4541) or text HOME to 566265 to access the Crisis Text Line.  Consider saving these numbers in from it. To help you remember to do this, start by relaxing your shoulders and lightly holding on to your thighs or your chair. 2. Tilt your head toward your shoulder and hold for 15 to 30 seconds. Let the weight of your head stretch your muscles. 3. If you would like a little added stretch, use your hand to gently and steadily pull your head toward your shoulder. For example, keeping your right shoulder down, lean your head to the left. 4. Repeat 2 to 4 times toward each shoulder. Diagonal neck stretch   1. Turn your head slightly toward the direction you will be stretching, and tilt your head diagonally toward your chest and hold for 15 to 30 seconds. 2. If you would like a little added stretch, use your hand to gently and steadily pull your head forward on the diagonal.  3. Repeat 2 to 4 times toward each side. Dorsal glide stretch   The dorsal glide stretches the back of the neck. If you feel pain, do not glide so far back. Some people find this exercise easier to do while lying on their backs with an ice pack on the neck. 1. Sit or stand tall and look straight ahead. 2. Slowly tuck your chin as you glide your head backward over your body  3. Hold for a count of 6, and then relax for up to 10 seconds. 4. Repeat 8 to 12 times. Chest and shoulder stretch   1. Sit or stand tall and glide your head backward as in the dorsal glide stretch. 2. Raise both arms so that your hands are next to your ears. 3. Take a deep breath, and as you breathe out, lower your elbows down and behind your back. You will feel your shoulder blades slide down and together, and at the same time you will feel a stretch across your chest and the front of your shoulders. 4. Hold for about 6 seconds, and then relax for up to 10 seconds. 5. Repeat 8 to 12 times. Strengthening: Hands on head   1.  Move your head backward, forward, and side to side against gentle pressure from your hands, holding each position for about 6 seconds. 2. Repeat 8 to 12 times. Follow-up care is a key part of your treatment and safety. Be sure to make and go to all appointments, and call your doctor if you are having problems. It's also a good idea to know your test results and keep a list of the medicines you take. Where can you learn more? Go to https://chpepiceweb.Shop 9 Seven. org and sign in to your [employer] account. Enter P975 in the PanÃ¨ve box to learn more about \"Neck: Exercises. \"     If you do not have an account, please click on the \"Sign Up Now\" link. Current as of: November 16, 2020               Content Version: 12.8  © 2006-2021 Healthwise, [employer]. Care instructions adapted under license by [facility] [place] ([facility]). If you have questions about a medical condition or this instruction, always ask your healthcare professional. [place] [address] any warranty or liability for your use of this information. Patient Education        Learning About Obesity  What is obesity? Obesity means having an unhealthy amount of body fat. This puts your health in danger. It can lead to other health problems, such as type 2 diabetes and high blood pressure. How do you know if your weight is in the obesity range? To know if your weight is in the obesity range, your doctor looks at your body mass index (BMI) and waist size. BMI is a number that is calculated from your weight and your height. To figure out your BMI for yourself, you can use an online tool, such as http://www.tamayo.com/ on the [employer]. If your BMI is 30.0 or higher, it falls within the obesity range. Keep in mind that BMI and waist size are only guides. They are not tools to determine your ideal body weight. What causes obesity? When you take in more calories than you burn off, you gain weight.  How you eat, how active you are, and other things affect how your body uses calories and whether you gain weight. If you have family members who have too much body fat, you may have inherited a tendency to gain weight. And your family also helps form your eating and lifestyle habits, which can lead to obesity. Also, our busy lives make it harder to plan and cook healthy meals. For many of us, it's easier to reach for prepared foods, go out to eat, or go to the drive-through. But these foods are often high in saturated fat and calories. Portions are often too large. What can you do to reach a healthy weight? Focus on health, not diets. Diets are hard to stay on and don't work in the long run. It is very hard to stay with a diet that includes lots of big changes in your eating habits. Instead of a diet, focus on lifestyle changes that will improve your health and achieve the right balance of energy and calories. To lose weight, you need to burn more calories than you take in. You can do it by eating healthy foods in reasonable amounts and becoming more active, even a little bit every day. Making small changes over time can add up to a lot. Make a plan for change. Many people have found that naming their reasons for change and staying focused on their plan can make a big difference. Work with your doctor to create a plan that is right for you. · Ask yourself: Delfina Cantu are my personal, most powerful reasons for wanting this change? What will my life look like when I've made the change? \"  · Set your long-term goal. Make it specific, such as \"I will lose x pounds. \"  · Break your long-term goal into smaller, short-term goals. Make these small steps specific and within your reach, things you know you can do. These steps are what keep you going from day to day. Talk with your doctor about other weight-loss options. If you have a BMI in a certain range and have not been able to lose weight with diet and exercise, medicine or surgery may be an option for you.  Before your doctor will prescribe medicines or surgery, he or she will probably want you to be more active and follow your healthy eating plan for a period of time. These habits are key lifelong changes for managing your weight, with or without other medical treatment. And these changes can help you avoid weight-related health problems. How can you stay on your plan for change? Be ready. Choose to start during a time when there are few events like holidays, social events, and high-stress periods. These events might trigger slip-ups. Decide on your first few steps. Most people have more success when they make small changes, one step at a time. For example, you might switch a daily candy bar to a piece of fruit, walk 10 minutes more, or add more vegetables to a meal.  Line up your support people. Make sure you're not going to be alone as you make this change. Connect with people who understand how important it is to you. Ask family members and friends for help in keeping with your plan. And think about who could make it harder for you, and how to handle them. Try tracking. People who keep track of what they eat, feel, and do are better at losing weight. Try writing down things like:  · What and how much you eat. · How you feel before and after each meal.  · Details about each meal (like eating out or at home, eating alone, or with friends or family). · What you do to be active. Look and plan. As you track, look for patterns that you may want to change. Take note of:  · When you eat and whether you skip meals. · How often you eat out. · How many fruits and vegetables you eat. · When you eat beyond feeling full. · When and why you eat for reasons other than being hungry. When you stray from your plan, don't get upset. Figure out what made you slip up and how you can fix it. Can you take medicines or have surgery to lose weight?   If you have a BMI in a certain range and have not been able to lose weight with diet and exercise, medicine or surgery may be an option for you. If you have a BMI of at least 30.0 (or a BMI of at least 27.0 and another health problem related to your weight), ask your doctor about weight-loss medicines. They work by making you feel less hungry, making you feel full more quickly, or changing how you digest fat. Medicines are used along with diet changes and more physical activity to help you make lasting changes. If you have a BMI of 40.0 or more (or a BMI of 35.0 or more and another health problem related to your weight), your doctor may talk with you about surgery. Weight-loss surgery has risks, and you will need to work with your doctor to compare the risk of having obesity with the risks of surgery. With any option you choose, you will still need to eat a healthy diet and get regular exercise. Follow-up care is a key part of your treatment and safety. Be sure to make and go to all appointments, and call your doctor if you are having problems. It's also a good idea to know your test results and keep a list of the medicines you take. Where can you learn more? Go to https://Manta MediapeUASC PHYSICIANS.Implanet. org and sign in to your [employer] account. Enter N111 in the Primocare box to learn more about \"Learning About Obesity. \"     If you do not have an account, please click on the \"Sign Up Now\" link. Current as of: September 23, 2020               Content Version: 12.8  © 1528-0040 Healthwise, Incorporated. Care instructions adapted under license by [facility] ([facility]). If you have questions about a medical condition or this instruction, always ask your healthcare professional. [address] any warranty or liability for your use of this information.

## 2021-05-17 ENCOUNTER — TELEPHONE (OUTPATIENT)
Dept: FAMILY MEDICINE CLINIC | Age: 56
End: 2021-05-17

## 2021-05-17 PROBLEM — D50.9 IRON DEFICIENCY ANEMIA: Status: ACTIVE | Noted: 2021-05-17

## 2021-05-17 PROBLEM — E66.811 CLASS 1 OBESITY DUE TO EXCESS CALORIES WITHOUT SERIOUS COMORBIDITY WITH BODY MASS INDEX (BMI) OF 30.0 TO 30.9 IN ADULT: Status: ACTIVE | Noted: 2017-08-07

## 2021-05-17 PROBLEM — F41.1 GAD (GENERALIZED ANXIETY DISORDER): Status: ACTIVE | Noted: 2021-05-17

## 2021-05-17 PROBLEM — F33.1 MODERATE RECURRENT MAJOR DEPRESSION (HCC): Status: ACTIVE | Noted: 2021-05-17

## 2021-05-18 ENCOUNTER — OFFICE VISIT (OUTPATIENT)
Dept: FAMILY MEDICINE CLINIC | Age: 56
End: 2021-05-18
Payer: COMMERCIAL

## 2021-05-18 ENCOUNTER — PATIENT MESSAGE (OUTPATIENT)
Dept: FAMILY MEDICINE CLINIC | Age: 56
End: 2021-05-18

## 2021-05-18 VITALS
BODY MASS INDEX: 30.42 KG/M2 | OXYGEN SATURATION: 98 % | RESPIRATION RATE: 18 BRPM | HEART RATE: 81 BPM | WEIGHT: 161 LBS | TEMPERATURE: 97.8 F | SYSTOLIC BLOOD PRESSURE: 118 MMHG | DIASTOLIC BLOOD PRESSURE: 80 MMHG

## 2021-05-18 DIAGNOSIS — J01.40 ACUTE NON-RECURRENT PANSINUSITIS: Primary | ICD-10-CM

## 2021-05-18 DIAGNOSIS — R05.8 COUGH WITH CONGESTION OF PARANASAL SINUS: ICD-10-CM

## 2021-05-18 DIAGNOSIS — R09.81 COUGH WITH CONGESTION OF PARANASAL SINUS: ICD-10-CM

## 2021-05-18 PROCEDURE — 99213 OFFICE O/P EST LOW 20 MIN: CPT | Performed by: NURSE PRACTITIONER

## 2021-05-18 PROCEDURE — 96372 THER/PROPH/DIAG INJ SC/IM: CPT | Performed by: NURSE PRACTITIONER

## 2021-05-18 RX ORDER — HYDROCODONE POLISTIREX AND CHLORPHENIRAMINE POLISTIREX 10; 8 MG/5ML; MG/5ML
5 SUSPENSION, EXTENDED RELEASE ORAL EVERY 12 HOURS PRN
Qty: 100 ML | Refills: 0 | Status: SHIPPED | OUTPATIENT
Start: 2021-05-18 | End: 2021-05-18 | Stop reason: SDUPTHER

## 2021-05-18 RX ORDER — DEXAMETHASONE SODIUM PHOSPHATE 100 MG/10ML
10 INJECTION INTRAMUSCULAR; INTRAVENOUS ONCE
Status: COMPLETED | OUTPATIENT
Start: 2021-05-18 | End: 2021-05-18

## 2021-05-18 RX ORDER — HYDROCODONE POLISTIREX AND CHLORPHENIRAMINE POLISTIREX 10; 8 MG/5ML; MG/5ML
5 SUSPENSION, EXTENDED RELEASE ORAL EVERY 12 HOURS PRN
Qty: 100 ML | Refills: 0 | Status: SHIPPED | OUTPATIENT
Start: 2021-05-18 | End: 2021-05-28

## 2021-05-18 RX ORDER — METHYLPREDNISOLONE 4 MG/1
TABLET ORAL
Qty: 1 KIT | Refills: 0 | Status: SHIPPED | OUTPATIENT
Start: 2021-05-18 | End: 2021-05-24

## 2021-05-18 RX ORDER — CEFDINIR 300 MG/1
300 CAPSULE ORAL 2 TIMES DAILY
Qty: 20 CAPSULE | Refills: 0 | Status: SHIPPED | OUTPATIENT
Start: 2021-05-18 | End: 2021-05-28

## 2021-05-18 RX ADMIN — DEXAMETHASONE SODIUM PHOSPHATE 10 MG: 100 INJECTION INTRAMUSCULAR; INTRAVENOUS at 11:43

## 2021-05-18 ASSESSMENT — ENCOUNTER SYMPTOMS
ABDOMINAL PAIN: 0
DIARRHEA: 0
SORE THROAT: 0
SHORTNESS OF BREATH: 0
CHEST TIGHTNESS: 0
NAUSEA: 0
COUGH: 0
WHEEZING: 0

## 2021-05-18 NOTE — TELEPHONE ENCOUNTER
From: Ishan Coles  To: KALI Cadet  Sent: 5/18/2021 2:41 PM CDT  Subject: Prescription Question    [facility] [phone] has the cough medicine. Tried to leave a message and it felt like I was just rambling.  Sorry for the trouble!!

## 2021-05-18 NOTE — TELEPHONE ENCOUNTER
Sent to [facility].

## 2021-05-18 NOTE — PROGRESS NOTES
Ayde Perrin is a 63 y.o. female who presents today for  Chief Complaint   Patient presents with    Cough    Congestion       HPI:  She has had progressive sinus pressure, nasal congestion, and cough for the past 3-4 weeks. No fever or chills. No sob or wheeze. She has had purulent nasal mucus and postnasal drainage, frequent cough. No h/o asthma. She has tried otc meds with no improvement - mucinex dm, antihistamine, nyquil. She states she has a feeling of pins and needles in her chest and back at times with the cough. She has completed her covid vaccines (moderna); 2nd dose 5/3. Review of Systems   HENT: Negative for congestion, ear pain and sore throat. Respiratory: Negative for cough, chest tightness, shortness of breath and wheezing. Cardiovascular: Negative for chest pain. Gastrointestinal: Negative for abdominal pain, diarrhea and nausea. Musculoskeletal: Negative for arthralgias and myalgias. Skin: Negative for rash. Past Medical History:   Diagnosis Date    Allergic rhinitis     Chronic bilateral low back pain with left-sided sciatica 12/1/2017    Colon polyp     Depression with anxiety     Obesity     Osteoarthritis        Current Outpatient Medications   Medication Sig Dispense Refill    cefdinir (OMNICEF) 300 MG capsule Take 1 capsule by mouth 2 times daily for 10 days 20 capsule 0    methylPREDNISolone (MEDROL DOSEPACK) 4 MG tablet Take by mouth. 1 kit 0    HYDROcodone-chlorpheniramine (TUSSIONEX PENNKINETIC ER) 10-8 MG/5ML SUER Take 5 mLs by mouth every 12 hours as needed (cough) for up to 10 days.  100 mL 0    fluvoxaMINE (LUVOX) 50 MG tablet Take 1.5 tablets by mouth nightly 45 tablet 3    Levothyroxine Sodium 112 MCG CAPS Take 112 mcg by mouth Daily 30 capsule 3    traMADol (ULTRAM) 50 MG tablet TAKE 1 TABLET BY MOUTH EVERY 12 HOURS AS NEEDED FOR PAIN REDUCE  DOSES  TAKEN  AS  PAIN  BECOMES  MANAGEABLE 45 tablet 0    butalbital-acetaminophen-caffeine (FIORICET, ESGIC) -40 MG per tablet TAKE 1 TABLET BY MOUTH EVERY 8 HOURS AS NEEDED FOR MIGRAINE HEADACHE 90 tablet 0    propranolol (INDERAL LA) 80 MG extended release capsule Take 1 capsule by mouth daily 30 capsule 3    omeprazole (PRILOSEC) 40 MG delayed release capsule Take 1 capsule by mouth nightly 30 capsule 5    baclofen (LIORESAL) 10 MG tablet Take 1 tablet by mouth 3 times daily as needed (muscle spasms/neck pain) 90 tablet 2    valACYclovir (VALTREX) 500 MG tablet       Potassium 99 MG TABS Take 1 tablet by mouth daily      BIOTIN PO Take 1 tablet by mouth daily      TURMERIC PO Take 1 tablet by mouth daily      vitamin B-12 (CYANOCOBALAMIN) 1000 MCG tablet Take 1,000 mcg by mouth daily      Multiple Vitamins-Minerals (MULTIVITAMIN ADULT PO) Take by mouth      Omega 3 1000 MG CAPS Take by mouth      Cholecalciferol (VITAMIN D3) 3000 units TABS Take by mouth      gabapentin (NEURONTIN) 300 MG capsule TAKE 2 CAPSULES BY MOUTH EVERY DAY AT BEDTIME 60 capsule 1     Current Facility-Administered Medications   Medication Dose Route Frequency Provider Last Rate Last Admin    dexamethasone (DECADRON) injection 10 mg  10 mg Intramuscular Once Ozie Lipa, APRN           Allergies   Allergen Reactions    Augmentin [Amoxicillin-Pot Clavulanate] Diarrhea    Aspirin Other (See Comments)       Past Surgical History:   Procedure Laterality Date     SECTION      COLONOSCOPY  2015    colon polyp x1, recheck in 5 yrs (Dr Adri Venegas)   [facility].         Social History     Tobacco Use    Smoking status: Never Smoker    Smokeless tobacco: Never Used   Vaping Use    Vaping Use: Never used   Substance Use Topics    Alcohol use:  Yes     Alcohol/week: 1.0 standard drinks     Types: 1 Standard drinks or equivalent per week     Comment: rarely    Drug use: Never       Family History   Problem Relation Age of Onset    Heart Disease Mother     High Blood Pressure Mother     Other Mother         skin CA    Osteoporosis Mother     Heart Disease Father     High Blood Pressure Father     Other Father         jaw CA    Heart Disease Sister     Diabetes Sister     Stroke Sister     High Blood Pressure Sister     Other Sister         fibromyalgia, thyroid CA       /80   Pulse 81   Temp 97.8 °F (36.6 °C) (Temporal)   Resp 18   Wt 161 lb (73 kg)   SpO2 98%   BMI 30.42 kg/m²     Physical Exam  Vitals reviewed. Constitutional:       General: She is not in acute distress. Appearance: Normal appearance. She is well-developed. HENT:      Head: Normocephalic. Right Ear: Tympanic membrane and external ear normal.      Left Ear: Tympanic membrane and external ear normal.      Nose: Congestion present. Mouth/Throat:      Mouth: Mucous membranes are moist.      Pharynx: Posterior oropharyngeal erythema (mild postpharyngeal erythema, no exudate) present. No oropharyngeal exudate. Eyes:      Conjunctiva/sclera: Conjunctivae normal.      Pupils: Pupils are equal, round, and reactive to light. Neck:      Thyroid: No thyromegaly. Vascular: No carotid bruit or JVD. Trachea: No tracheal deviation. Cardiovascular:      Rate and Rhythm: Normal rate and regular rhythm. Heart sounds: Normal heart sounds. No murmur heard. Pulmonary:      Effort: Pulmonary effort is normal. No respiratory distress. Breath sounds: Normal breath sounds. No wheezing or rhonchi. Musculoskeletal:         General: Normal range of motion. Cervical back: Normal range of motion and neck supple. Lymphadenopathy:      Cervical: No cervical adenopathy. Skin:     General: Skin is warm and dry. Findings: No rash. Neurological:      Mental Status: She is alert. Psychiatric:         Mood and Affect: Mood normal.         Behavior: Behavior normal.         Thought Content:  Thought content normal. and options were discussed in detail. Patient was instructed to call the office if she questionsregarding her treatment. Should her conditions worsen, she should return to office to be reassessed by KALI Fuentes. she Should to go the closest Emergency Department for any emergency. They verbalizedunderstanding the above instructions. Return for as scheduled.

## 2021-05-18 NOTE — TELEPHONE ENCOUNTER
Patient called and LM requesting that Tussinex be sent to [facility].  Also, the pharmacy called and said they were out

## 2021-05-27 DIAGNOSIS — G43.009 MIGRAINE WITHOUT AURA AND WITHOUT STATUS MIGRAINOSUS, NOT INTRACTABLE: ICD-10-CM

## 2021-05-27 DIAGNOSIS — G89.29 CHRONIC NECK PAIN: ICD-10-CM

## 2021-05-27 DIAGNOSIS — M54.2 CHRONIC NECK PAIN: ICD-10-CM

## 2021-05-27 DIAGNOSIS — M54.42 CHRONIC BILATERAL LOW BACK PAIN WITH LEFT-SIDED SCIATICA: ICD-10-CM

## 2021-05-27 DIAGNOSIS — G89.29 CHRONIC BILATERAL LOW BACK PAIN WITH LEFT-SIDED SCIATICA: ICD-10-CM

## 2021-05-27 DIAGNOSIS — G44.219 EPISODIC TENSION-TYPE HEADACHE, NOT INTRACTABLE: ICD-10-CM

## 2021-05-27 RX ORDER — BUTALBITAL, ACETAMINOPHEN AND CAFFEINE 50; 325; 40 MG/1; MG/1; MG/1
TABLET ORAL
Qty: 90 TABLET | Refills: 0 | Status: SHIPPED | OUTPATIENT
Start: 2021-05-27 | End: 2021-06-29

## 2021-05-27 RX ORDER — TRAMADOL HYDROCHLORIDE 50 MG/1
TABLET ORAL
Qty: 45 TABLET | Refills: 0 | Status: SHIPPED | OUTPATIENT
Start: 2021-05-27 | End: 2021-06-29

## 2021-05-27 NOTE — TELEPHONE ENCOUNTER
Akshat Velarde called to request a refill on her medication. Last office visit : 5/18/2021   Next office visit : 7/6/2021     Last UDS:   Amphetamine Screen, Urine   Date Value Ref Range Status   09/25/2020 neg  Final     Barbiturate Screen, Urine   Date Value Ref Range Status   09/25/2020 pos  Final     Benzodiazepine Screen, Urine   Date Value Ref Range Status   09/25/2020 neg  Final     Buprenorphine Urine   Date Value Ref Range Status   09/25/2020 neg  Final     Cocaine Metabolite Screen, Urine   Date Value Ref Range Status   09/25/2020 neg  Final     Gabapentin Screen, Urine   Date Value Ref Range Status   09/25/2020 neg  Final     MDMA, Urine   Date Value Ref Range Status   09/25/2020 neg  Final     Methamphetamine, Urine   Date Value Ref Range Status   09/25/2020 neg  Final     Opiate Scrn, Ur   Date Value Ref Range Status   09/25/2020 neg  Final     Oxycodone Screen, Ur   Date Value Ref Range Status   09/25/2020 neg  Final     PCP Screen, Urine   Date Value Ref Range Status   09/25/2020 neg  Final     Propoxyphene Screen, Urine   Date Value Ref Range Status   09/25/2020 neg  Final     THC Screen, Urine   Date Value Ref Range Status   09/25/2020 neg  Final     Tricyclic Antidepressants, Urine   Date Value Ref Range Status   09/25/2020 neg  Final       Last Vernelle Boards: 4/16/21  Medication Contract: 9/25/20   Last Fill: 4/27/21    Requested Prescriptions     Pending Prescriptions Disp Refills    butalbital-acetaminophen-caffeine (FIORICET, ESGIC) -40 MG per tablet [Pharmacy Med Name: Butalbital-APAP-Caffeine -40 MG Oral Tablet] 90 tablet 0     Sig: TAKE 1 TABLET BY MOUTH EVERY 8 HOURS AS NEEDED FOR MIGRAINE HEADACHE    traMADol (ULTRAM) 50 MG tablet [Pharmacy Med Name: traMADol HCl 50 MG Oral Tablet] 45 tablet 0     Sig: TAKE 1 TABLET BY MOUTH EVERY 12 HOURS AS NEEDED FOR PAIN REDUCE  DOSES  TAKEN  AS  PAIN  BECOMES  MANAGEABLE         Please approve or refuse this medication.    Margarita Whitman [place], [place]

## 2021-06-04 ENCOUNTER — TELEPHONE (OUTPATIENT)
Dept: PSYCHIATRY | Age: 56
End: 2021-06-04

## 2021-06-04 NOTE — TELEPHONE ENCOUNTER
Called pt for appointment reminder.   -left voicemail, requesting a return call    Electronically signed by Kwabena Rush MA on 6/4/2021 at 11:47 AM

## 2021-06-07 ENCOUNTER — TELEPHONE (OUTPATIENT)
Dept: PSYCHIATRY | Age: 56
End: 2021-06-07

## 2021-06-11 ENCOUNTER — TELEPHONE (OUTPATIENT)
Dept: PSYCHIATRY | Age: 56
End: 2021-06-11

## 2021-06-11 NOTE — TELEPHONE ENCOUNTER
Called pt for appointment reminder.   -left voicemail, requesting a return call      Electronically signed by Sarah Vazquez MA on 6/11/2021 at 10:20 AM

## 2021-06-14 ENCOUNTER — VIRTUAL VISIT (OUTPATIENT)
Dept: PSYCHIATRY | Age: 56
End: 2021-06-14
Payer: COMMERCIAL

## 2021-06-14 DIAGNOSIS — F42.3 HOARDING BEHAVIOR: ICD-10-CM

## 2021-06-14 DIAGNOSIS — F33.0 MILD EPISODE OF RECURRENT MAJOR DEPRESSIVE DISORDER (HCC): Primary | ICD-10-CM

## 2021-06-14 DIAGNOSIS — F41.1 GAD (GENERALIZED ANXIETY DISORDER): ICD-10-CM

## 2021-06-14 PROCEDURE — 99442 PR PHYS/QHP TELEPHONE EVALUATION 11-20 MIN: CPT | Performed by: NURSE PRACTITIONER

## 2021-06-14 ASSESSMENT — ANXIETY QUESTIONNAIRES
GAD7 TOTAL SCORE: 8
2. NOT BEING ABLE TO STOP OR CONTROL WORRYING: 2-OVER HALF THE DAYS
4. TROUBLE RELAXING: 1-SEVERAL DAYS
7. FEELING AFRAID AS IF SOMETHING AWFUL MIGHT HAPPEN: 1-SEVERAL DAYS
5. BEING SO RESTLESS THAT IT IS HARD TO SIT STILL: 0-NOT AT ALL
1. FEELING NERVOUS, ANXIOUS, OR ON EDGE: 1-SEVERAL DAYS
6. BECOMING EASILY ANNOYED OR IRRITABLE: 1-SEVERAL DAYS
3. WORRYING TOO MUCH ABOUT DIFFERENT THINGS: 2-OVER HALF THE DAYS

## 2021-06-14 ASSESSMENT — PATIENT HEALTH QUESTIONNAIRE - PHQ9
9. THOUGHTS THAT YOU WOULD BE BETTER OFF DEAD, OR OF HURTING YOURSELF: 0
SUM OF ALL RESPONSES TO PHQ QUESTIONS 1-9: 7
4. FEELING TIRED OR HAVING LITTLE ENERGY: 1
SUM OF ALL RESPONSES TO PHQ9 QUESTIONS 1 & 2: 1
7. TROUBLE CONCENTRATING ON THINGS, SUCH AS READING THE NEWSPAPER OR WATCHING TELEVISION: 1
1. LITTLE INTEREST OR PLEASURE IN DOING THINGS: 0
SUM OF ALL RESPONSES TO PHQ QUESTIONS 1-9: 7
SUM OF ALL RESPONSES TO PHQ QUESTIONS 1-9: 7
8. MOVING OR SPEAKING SO SLOWLY THAT OTHER PEOPLE COULD HAVE NOTICED. OR THE OPPOSITE, BEING SO FIGETY OR RESTLESS THAT YOU HAVE BEEN MOVING AROUND A LOT MORE THAN USUAL: 0
3. TROUBLE FALLING OR STAYING ASLEEP: 1
6. FEELING BAD ABOUT YOURSELF - OR THAT YOU ARE A FAILURE OR HAVE LET YOURSELF OR YOUR FAMILY DOWN: 1
5. POOR APPETITE OR OVEREATING: 2
2. FEELING DOWN, DEPRESSED OR HOPELESS: 1

## 2021-06-14 NOTE — PATIENT INSTRUCTIONS
Plan:  Continue:  Fluvoxamine, 50mg, take 1.5 tabs nightly (increased by Dr. Latoya Lafleur)    Continue therapy with Lillian Toussaint    Follow up: No follow-ups on file.

## 2021-07-07 ENCOUNTER — TELEPHONE (OUTPATIENT)
Dept: FAMILY MEDICINE CLINIC | Age: 56
End: 2021-07-07

## 2021-07-07 NOTE — TELEPHONE ENCOUNTER
The patient called to reschedule her appointment. She is having some teeth pulled on 7/26/21 and wants to get a medication contract for pain medication with Dr. Sruthi Catalan. She will be leaving her HA medication off for three days during this time so that she can take pain medication.

## 2021-07-07 NOTE — TELEPHONE ENCOUNTER
I called both numbers in the chart and was unable to get an answer. I left a VM on each number asking the patient to call the office back.

## 2021-07-22 ENCOUNTER — OFFICE VISIT (OUTPATIENT)
Dept: FAMILY MEDICINE CLINIC | Age: 56
End: 2021-07-22
Payer: COMMERCIAL

## 2021-07-22 VITALS
OXYGEN SATURATION: 98 % | DIASTOLIC BLOOD PRESSURE: 74 MMHG | HEART RATE: 80 BPM | WEIGHT: 156.25 LBS | BODY MASS INDEX: 29.5 KG/M2 | TEMPERATURE: 97.2 F | HEIGHT: 61 IN | SYSTOLIC BLOOD PRESSURE: 128 MMHG

## 2021-07-22 DIAGNOSIS — G89.29 CHRONIC NECK PAIN: ICD-10-CM

## 2021-07-22 DIAGNOSIS — F33.1 MODERATE RECURRENT MAJOR DEPRESSION (HCC): Primary | ICD-10-CM

## 2021-07-22 DIAGNOSIS — E78.2 MIXED HYPERLIPIDEMIA: ICD-10-CM

## 2021-07-22 DIAGNOSIS — D50.9 IRON DEFICIENCY ANEMIA, UNSPECIFIED IRON DEFICIENCY ANEMIA TYPE: ICD-10-CM

## 2021-07-22 DIAGNOSIS — R51.9 CHRONIC DAILY HEADACHE: ICD-10-CM

## 2021-07-22 DIAGNOSIS — M54.2 CHRONIC NECK PAIN: ICD-10-CM

## 2021-07-22 DIAGNOSIS — E53.8 B12 DEFICIENCY: ICD-10-CM

## 2021-07-22 DIAGNOSIS — E66.3 OVERWEIGHT (BMI 25.0-29.9): ICD-10-CM

## 2021-07-22 DIAGNOSIS — E03.9 ACQUIRED HYPOTHYROIDISM: ICD-10-CM

## 2021-07-22 DIAGNOSIS — F41.1 GAD (GENERALIZED ANXIETY DISORDER): ICD-10-CM

## 2021-07-22 PROBLEM — E66.09 CLASS 1 OBESITY DUE TO EXCESS CALORIES WITHOUT SERIOUS COMORBIDITY WITH BODY MASS INDEX (BMI) OF 30.0 TO 30.9 IN ADULT: Status: RESOLVED | Noted: 2017-08-07 | Resolved: 2021-07-22

## 2021-07-22 PROBLEM — E66.811 CLASS 1 OBESITY DUE TO EXCESS CALORIES WITHOUT SERIOUS COMORBIDITY WITH BODY MASS INDEX (BMI) OF 30.0 TO 30.9 IN ADULT: Status: RESOLVED | Noted: 2017-08-07 | Resolved: 2021-07-22

## 2021-07-22 PROCEDURE — 99214 OFFICE O/P EST MOD 30 MIN: CPT | Performed by: INTERNAL MEDICINE

## 2021-07-22 RX ORDER — FLUVOXAMINE MALEATE 50 MG/1
75 TABLET, COATED ORAL NIGHTLY
Qty: 45 TABLET | Refills: 5 | Status: SHIPPED | OUTPATIENT
Start: 2021-07-22 | End: 2022-05-09 | Stop reason: SDUPTHER

## 2021-07-22 ASSESSMENT — ENCOUNTER SYMPTOMS
BACK PAIN: 1
SINUS PRESSURE: 0
RHINORRHEA: 0
SHORTNESS OF BREATH: 0
COLOR CHANGE: 0
VOMITING: 0
COUGH: 0
EYE REDNESS: 0
DIARRHEA: 0
SORE THROAT: 0
EYE DISCHARGE: 0
WHEEZING: 0
BLOOD IN STOOL: 0
VOICE CHANGE: 0
ABDOMINAL PAIN: 0
CHEST TIGHTNESS: 0
EYE PAIN: 0

## 2021-07-22 NOTE — PROGRESS NOTES
Lorena Sneed is a 61 y.o. female who presents today for   Chief Complaint   Patient presents with    Depression    Anxiety    Headache       HPI  64 y/o WF presents for follow up on recurrent migraine/tension HAs, chronic neck pain with controlled med refills, chronic depression, HORACE, and elevated BMI after medication change 2 months ago. The fluvoxamine dose of 75 mg daily seems to have helped her depression and anxiety with the higher dose. She is still having HAs most days with sinus pressure and pain most days. She is having dental surgery soon to have teeth pulled by oral surgeon for severe dental caries and she will have dentures initially but she may get something different later. She does have tension HAs also however which are aggravated by having to watch her 4 grandchildren at the same time while her daughter works at new job. She is glad to help but it makes her tired and is somewhat stressful. She has to take tramadol and fioricet for her HAs nightly right now but she hopes treating her dental disease will help. She does not feel like the chronic neck pain is worse right now but it is just not improving over time. BMI Readings from Last 3 Encounters:   07/22/21 29.52 kg/m²   05/18/21 30.42 kg/m²   04/29/21 30.47 kg/m²     Wt Readings from Last 3 Encounters:   07/22/21 156 lb 4 oz (70.9 kg)   05/18/21 161 lb (73 kg)   04/29/21 161 lb 4 oz (73.1 kg)     Review of Systems   Constitutional: Negative for appetite change, chills, fatigue and fever. HENT: Negative for ear pain, rhinorrhea, sinus pressure, sore throat and voice change. Eyes: Negative for pain, discharge and redness. Respiratory: Negative for cough, chest tightness, shortness of breath and wheezing. Cardiovascular: Negative for chest pain and palpitations. Gastrointestinal: Negative for abdominal pain, blood in stool, diarrhea and vomiting. Endocrine: Negative for cold intolerance, heat intolerance and polydipsia. Genitourinary: Negative for dysuria and hematuria. Musculoskeletal: Positive for back pain, myalgias and neck pain. Negative for arthralgias and neck stiffness. Skin: Negative for color change and rash. Neurological: Negative for dizziness, tremors, syncope, speech difficulty, weakness, numbness and headaches. Hematological: Negative for adenopathy. Does not bruise/bleed easily. Psychiatric/Behavioral: Positive for dysphoric mood and sleep disturbance. Negative for confusion, self-injury and suicidal ideas. The patient is nervous/anxious. All other systems reviewed and are negative.       Past Medical History:   Diagnosis Date    Allergic rhinitis     Chronic bilateral low back pain with left-sided sciatica 12/1/2017    Colon polyp     Depression with anxiety     Obesity     Osteoarthritis        Current Outpatient Medications   Medication Sig Dispense Refill    fluvoxaMINE (LUVOX) 50 MG tablet Take 1.5 tablets by mouth nightly 45 tablet 5    gabapentin (NEURONTIN) 300 MG capsule TAKE 2 CAPSULES BY MOUTH EVERY DAY AT BEDTIME 60 capsule 1    propranolol (INDERAL LA) 80 MG extended release capsule Take 1 capsule by mouth once daily 30 capsule 5    Levothyroxine Sodium 112 MCG CAPS Take 112 mcg by mouth Daily 30 capsule 3    omeprazole (PRILOSEC) 40 MG delayed release capsule Take 1 capsule by mouth nightly 30 capsule 5    baclofen (LIORESAL) 10 MG tablet Take 1 tablet by mouth 3 times daily as needed (muscle spasms/neck pain) 90 tablet 2    Potassium 99 MG TABS Take 1 tablet by mouth daily      TURMERIC PO Take 1 tablet by mouth daily      vitamin B-12 (CYANOCOBALAMIN) 1000 MCG tablet Take 1,000 mcg by mouth daily      Multiple Vitamins-Minerals (MULTIVITAMIN ADULT PO) Take by mouth      Omega 3 1000 MG CAPS Take by mouth      Cholecalciferol (VITAMIN D3) 3000 units TABS Take by mouth      butalbital-acetaminophen-caffeine (FIORICET, ESGIC) -40 MG per tablet TAKE 1 TABLET BY light.   Neck:      Thyroid: No thyromegaly. Vascular: Normal carotid pulses. No carotid bruit or JVD. Trachea: Trachea and phonation normal. No tracheal tenderness. Comments: +mild bilateral trapezius muscle spasm  Cardiovascular:      Rate and Rhythm: Normal rate and regular rhythm. Pulses:           Carotid pulses are 2+ on the right side and 2+ on the left side. Posterior tibial pulses are 2+ on the right side and 2+ on the left side. Heart sounds: Normal heart sounds. No murmur heard. No friction rub. No gallop. Pulmonary:      Effort: Pulmonary effort is normal. No accessory muscle usage. Breath sounds: Normal breath sounds. No decreased breath sounds, wheezing, rhonchi or rales. Abdominal:      General: Bowel sounds are normal. There is no distension. Palpations: Abdomen is soft. There is no mass. Tenderness: There is no abdominal tenderness. There is no guarding or rebound. Hernia: No hernia is present. Musculoskeletal:         General: No swelling, tenderness or deformity. Right wrist: Normal.      Left wrist: Normal.      Cervical back: Normal range of motion and neck supple. No edema, erythema or rigidity. Muscular tenderness present. No pain with movement. Normal range of motion. Right lower leg: No edema. Left lower leg: No edema. Right ankle: Normal.      Left ankle: Normal.   Lymphadenopathy:      Cervical: No cervical adenopathy. Upper Body:      Right upper body: No supraclavicular adenopathy. Left upper body: No supraclavicular adenopathy. Skin:     General: Skin is warm. Capillary Refill: Capillary refill takes less than 2 seconds. Coloration: Skin is not cyanotic. Findings: No rash. Nails: There is no clubbing. Neurological:      Mental Status: She is alert and oriented to person, place, and time. Cranial Nerves: No cranial nerve deficit or dysarthria.       Motor: No weakness, tremor or abnormal muscle tone. Coordination: Coordination normal.      Gait: Gait is intact. Comments: CN II-XII grossly intact, speech clear, no facial droop, MAEW   Psychiatric:         Attention and Perception: Attention and perception normal.         Mood and Affect: Mood and affect normal.         Speech: Speech normal.         Behavior: Behavior normal. Behavior is cooperative. Thought Content: Thought content normal.         Cognition and Memory: Cognition and memory normal.         Judgment: Judgment normal.         Lab Results   Component Value Date    TSH 3.600 04/29/2021    T4FREE 1.06 06/12/2020       No results found for this visit on 07/22/21. Assessment:    ICD-10-CM    1. Moderate recurrent major depression (HCC)  F33.1 fluvoxaMINE (LUVOX) 50 MG tablet   2. HORACE (generalized anxiety disorder)  F41.1 fluvoxaMINE (LUVOX) 50 MG tablet   3. Chronic neck pain  M54.2     G89.29    4. Chronic daily headache  R51.9    5. Acquired hypothyroidism  E03.9 TSH WITH REFLEX TO FT4   6. Mixed hyperlipidemia  E78.2 Comprehensive Metabolic Panel     Lipid Panel   7. Iron deficiency anemia, unspecified iron deficiency anemia type  D50.9 CBC Auto Differential     Iron and TIBC   8. B12 deficiency  E53.8 CBC Auto Differential     Vitamin B12   9. Overweight (BMI 25.0-29. 9)  E66.3        Plan:  Angela Fitzgerald was seen today for depression, anxiety and headache. Diagnoses and all orders for this visit:    Moderate recurrent major depression (HCC)  -     fluvoxaMINE (LUVOX) 50 MG tablet; Take 1.5 tablets by mouth nightly    HORACE (generalized anxiety disorder)  -     fluvoxaMINE (LUVOX) 50 MG tablet; Take 1.5 tablets by mouth nightly    Chronic neck pain    Chronic daily headache    Acquired hypothyroidism  -     TSH WITH REFLEX TO FT4; Future    Mixed hyperlipidemia  -     Comprehensive Metabolic Panel; Future  -     Lipid Panel;  Future    Iron deficiency anemia, unspecified iron deficiency anemia type  - CBC Auto Differential; Future  -     Iron and TIBC; Future    B12 deficiency  -     CBC Auto Differential; Future  -     Vitamin B12; Future    Overweight (BMI 25.0-29. 9)      No new labs today. Previous labs reviewed. Refills Provided. -major depressive disorder and generalized anxiety disorder improving with higher dose of fluovaxamine which will be continued, exercise and relaxation techniques encouraged to help with mood also  -chronic neck pain and chronic daily headache(s) are stable to somewhat controlled but recommended stretching exercises to help with neck pain, massage therapy and moist heat to neck as needed and continue tramadol prn moderate to severe pain; Controlled substance contract and urine drug screen are up-to-date currently. No unusual filling on recent Colby Schaumann report. Treatment continues to be medically necessary and improves patient quality of life. No signs of misuse of controlled medication.   -suspect stress still causing some of patient's neck pain due to muscle spasms and stress also triggers tension HAs as discussed today  -labs ordered to be obtained prior to next follow up to recheck chronic medical problems as documented in above orders  -Return in about 3 months (around 10/22/2021) for ECPE, headaches, recheck mood, Controlled med refill. Over 50% of the total visit time of 30 minutes was spent on counseling and/or coordination of care of:   1. Moderate recurrent major depression ([address]    2. HORACE (generalized anxiety disorder)    3. Chronic neck pain    4. Chronic daily headache    5. Acquired hypothyroidism    6. Mixed hyperlipidemia    7. Iron deficiency anemia, unspecified iron deficiency anemia type    8. B12 deficiency    9. Overweight (BMI 25.0-29. 9)         Orders Placed This Encounter   Procedures    Comprehensive Metabolic Panel     Standing Status:   Future     Standing Expiration Date:   7/22/2022    Lipid Panel     Standing Status:   Future     Standing Expiration Date:   7/22/2022     Order Specific Question:   Is Patient Fasting?/# of Hours     Answer:   yes/8 hrs    TSH WITH REFLEX TO FT4     Standing Status:   Future     Standing Expiration Date:   7/22/2022    CBC Auto Differential     Standing Status:   Future     Standing Expiration Date:   7/22/2022    Vitamin B12     Standing Status:   Future     Standing Expiration Date:   7/22/2022    Iron and TIBC     Standing Status:   Future     Standing Expiration Date:   7/22/2022     Order Specific Question:   Is Patient Fasting? Answer:   yes     Order Specific Question:   No of Hours? Answer:   8     Orders Placed This Encounter   Medications    fluvoxaMINE (LUVOX) 50 MG tablet     Sig: Take 1.5 tablets by mouth nightly     Dispense:  45 tablet     Refill:  5     Medications Discontinued During This Encounter   Medication Reason    fluvoxaMINE (LUVOX) 50 MG tablet REORDER    valACYclovir (VALTREX) 500 MG tablet LIST CLEANUP    BIOTIN PO LIST CLEANUP     Patient Instructions       Patient Education        Learning About Anxiety Disorders  What are anxiety disorders? Anxiety disorders are a type of medical problem. They cause severe anxiety. When you feel anxious, you feel that something bad is about to happen. This feeling interferes with your life. These disorders include:  · Generalized anxiety disorder. You feel worried and stressed about many everyday events and activities. This goes on for several months and disrupts your life on most days. · Panic disorder. You have repeated panic attacks. A panic attack is a sudden, intense fear or anxiety. It may make you feel short of breath. Your heart may pound. · Social anxiety disorder. You feel very anxious about what you will say or do in front of people. For example, you may be scared to talk or eat in public. This problem affects your daily life. · Phobias. You are very scared of a specific object, situation, or activity.  For example, you may fear better. · Get at least 30 minutes of exercise on most days of the week. Walking is a good choice. · Eat a healthy diet. Include fruits, vegetables, lean proteins, and whole grains in your diet each day. · Try to go to bed at the same time every night. Try for 8 hours of sleep a night. · Find ways to manage stress. Try relaxation exercises. · Avoid alcohol and illegal drugs. Follow-up care is a key part of your treatment and safety. Be sure to make and go to all appointments, and call your doctor if you are having problems. It's also a good idea to know your test results and keep a list of the medicines you take. Where can you learn more? Go to https://The Community FoundationpeEuro Freelancerseb.Kamego. org and sign in to your [email or web address] account. Enter Q072 in the Daixe box to learn more about \"Learning About Anxiety Disorders. \"     If you do not have an account, please click on the \"Sign Up Now\" link. Current as of: September 23, 2020               Content Version: 12.9  © 2006-2021 [employer]. Care instructions adapted under license by [facility] ([facility]). If you have questions about a medical condition or this instruction, always ask your healthcare professional. [place] [address] any warranty or liability for your use of this information. Patient Education        Back Stretches: Exercises  Introduction  Here are some examples of exercises for stretching your back. Start each exercise slowly. Ease off the exercise if you start to have pain. Your doctor or physical therapist will tell you when you can start these exercises and which ones will work best for you. How to do the exercises  Overhead stretch   1. Stand comfortably with your feet shoulder-width apart. 2. Looking straight ahead, raise both arms over your head and reach toward the ceiling. Do not allow your head to tilt back. 3. Hold for 15 to 30 seconds, then lower your arms to your sides. 4. Repeat 2 to 4 times. Side stretch   1. Stand comfortably with your feet shoulder-width apart. 2. Raise one arm over your head, and then lean to the other side. 3. Slide your hand down your leg as you let the weight of your arm gently stretch your side muscles. Hold for 15 to 30 seconds. 4. Repeat 2 to 4 times on each side. Press-up   1. Lie on your stomach, supporting your body with your forearms. 2. Press your elbows down into the floor to raise your upper back. As you do this, relax your stomach muscles and allow your back to arch without using your back muscles. As your press up, do not let your hips or pelvis come off the floor. 3. Hold for 15 to 30 seconds, then relax. 4. Repeat 2 to 4 times. Relax and rest   1. Lie on your back with a rolled towel under your neck and a pillow under your knees. Extend your arms comfortably to your sides. 2. Relax and breathe normally. 3. Remain in this position for about 10 minutes. 4. If you can, do this 2 or 3 times each day. Follow-up care is a key part of your treatment and safety. Be sure to make and go to all appointments, and call your doctor if you are having problems. It's also a good idea to know your test results and keep a list of the medicines you take. Where can you learn more? Go to https://InferpeCitrix Online.Neonode. org and sign in to your Revo Round account. Enter E446 in the We R Interactive box to learn more about \"Back Stretches: Exercises. \"     If you do not have an account, please click on the \"Sign Up Now\" link. Current as of: November 16, 2020               Content Version: 12.9  © 1443-1192 Healthwise, In[employer]. Care instructions adapted under license by [facility] ([facility]). If you have questions about a medical condition or this instruction, always ask your healthcare professional. [address]  any warranty or liability for your use of this information.          Patient Education        Neck: Exercises  Introduction  Here are some examples of exercises for you to try. The exercises may be suggested for a condition or for rehabilitation. Start each exercise slowly. Ease off the exercises if you start to have pain. You will be told when to start these exercises and which ones will work best for you. How to do the exercises  Neck stretch   1. This stretch works best if you keep your shoulder down as you lean away from it. To help you remember to do this, start by relaxing your shoulders and lightly holding on to your thighs or your chair. 2. Tilt your head toward your shoulder and hold for 15 to 30 seconds. Let the weight of your head stretch your muscles. 3. If you would like a little added stretch, use your hand to gently and steadily pull your head toward your shoulder. For example, keeping your right shoulder down, lean your head to the left. 4. Repeat 2 to 4 times toward each shoulder. Diagonal neck stretch   1. Turn your head slightly toward the direction you will be stretching, and tilt your head diagonally toward your chest and hold for 15 to 30 seconds. 2. If you would like a little added stretch, use your hand to gently and steadily pull your head forward on the diagonal.  3. Repeat 2 to 4 times toward each side. Dorsal glide stretch   The dorsal glide stretches the back of the neck. If you feel pain, do not glide so far back. Some people find this exercise easier to do while lying on their backs with an ice pack on the neck. 1. Sit or stand tall and look straight ahead. 2. Slowly tuck your chin as you glide your head backward over your body  3. Hold for a count of 6, and then relax for up to 10 seconds. 4. Repeat 8 to 12 times. Chest and shoulder stretch   1. Sit or stand tall and glide your head backward as in the dorsal glide stretch. 2. Raise both arms so that your hands are next to your ears. 3. Take a deep breath, and as you breathe out, lower your elbows down and behind your back.  You will feel your safety. Be sure to make and go to all appointments, and call your doctor if you are having problems. It's also a good idea to know your test results and keep a list of the medicines you take. How can you care for yourself at home? Be realistic  · If you have a large task to do, break it up into smaller steps you can handle, and just do what you can. · You may want to put off important decisions until your depression has lifted. If you have plans that will have a major impact on your life, such as marriage, divorce, or a job change, try to wait a bit. Talk it over with friends and loved ones who can help you look at the overall picture first.  · Reaching out to people for help is important. Do not isolate yourself. Let your family and friends help you. Find someone you can trust and confide in, and talk to that person. · Be patient, and be kind to yourself. Remember that depression is not your fault and is not something you can overcome with willpower alone. Treatment is important for depression, just like for any other illness. Feeling better takes time, and your mood will improve little by little. Stay active  · Stay busy and get outside. Take a walk, or try some other light exercise. · Talk with your doctor about an exercise program. Exercise can help with mild depression. · Go to a movie or concert. Take part in a [employer] activity or other social gathering. Go to a ball game. · Ask a friend to have dinner with you. Take care of yourself  · Eat a balanced diet with plenty of fresh fruits and vegetables, whole grains, and lean protein. If you have lost your appetite, eat small snacks rather than large meals. · Avoid using illegal drugs or marijuana and drinking alcohol. Do not take medicines that have not been prescribed for you. They may interfere with medicines you may be taking for depression, or they may make your depression worse. · Take your medicines exactly as they are prescribed.  You may start to feel better within 1 to 3 weeks of taking antidepressant medicine. But it can take as many as 6 to 8 weeks to see more improvement. If you have questions or concerns about your medicines, or if you do not notice any improvement by 3 weeks, talk to your doctor. · Continue to take your medicine after your symptoms improve. Taking your medicine for at least 6 months after you feel better can help keep you from getting depressed again. If this isn't the first time you have been depressed, your doctor may recommend you to take medicine even longer. · If you have any side effects from your medicine, tell your doctor. Many side effects are mild and will go away on their own after you have been taking the medicine for a few weeks. Some may last longer. Talk to your doctor if side effects are bothering you too much. You might be able to try a different medicine. · Continue counseling. It may help prevent depression from returning, especially if you've had multiple episodes of depression. Talk with your counselor if you are having a hard time attending your sessions or you think the sessions aren't working. Don't just stop going. · Get enough sleep. Talk to your doctor if you are having problems sleeping. · Avoid sleeping pills unless they are prescribed by the doctor treating your depression. Sleeping pills may make you groggy during the day, and they may interact with other medicine you are taking. · If you have any other illnesses, such as diabetes, heart disease, or high blood pressure, make sure to continue with your treatment. Tell your doctor about all of the medicines you take, including those with or without a prescription. · If you or someone you know talks about suicide, self-harm, or feeling hopeless, get help right away. Call the [address] [place] [address] at 8-048-818-JLXI ([phone]) or text HOME to 790914 to access the Crisis Text Line. Consider saving these numbers in your phone.   When should you call for help? Call 911 anytime you think you may need emergency care. For example, call if:    · You feel like hurting yourself or someone else.     · Someone you know has depression and is about to attempt or is attempting suicide. Call your doctor now or seek immediate medical care if:    · You hear voices.     · Someone you know has depression and:  ? Starts to give away his or her possessions. ? Uses illegal drugs or drinks alcohol heavily. ? Talks or writes about death, including writing suicide notes or talking about guns, knives, or pills. ? Starts to spend a lot of time alone. ? Acts very aggressively or suddenly appears calm. Watch closely for changes in your health, and be sure to contact your doctor if:    · You do not get better as expected. Where can you learn more? Go to https://K2 Intelligencepethieneb.Oakmonkey. org and sign in to your Mass Relevance account. Enter E063 in the Infused Industries box to learn more about \"Recovering From Depression: Care Instructions. \"     If you do not have an account, please click on the \"Sign Up Now\" link. Current as of: September 23, 2020               Content Version: 12.9  © 8689-7645 everbill. Care instructions adapted under license by [facility] ([facility]). If you have questions about a medical condition or this instruction, always ask your healthcare professional. [address] any warranty or liability for your use of this information. Patient Education        When You Are Overweight: Care Instructions  Your Care Instructions     If you're overweight, your doctor may recommend that you make changes in your eating and exercise habits. Being overweight can lead to serious health problems, such as high blood pressure, heart disease, type 2 diabetes, and arthritis, or it can make these problems worse. Eating a healthy diet and being more active can help you reach and stay at a healthy weight.   You don't have to make huge changes all at once. Start by making small changes in your eating and exercise habits. To lose weight, you need to burn more calories than you take in. You can do this by eating healthy foods in reasonable amounts and becoming more active every day. Follow-up care is a key part of your treatment and safety. Be sure to make and go to all appointments, and call your doctor if you are having problems. It's also a good idea to know your test results and keep a list of the medicines you take. How can you care for yourself at home? · Improve your eating habits. You'll be more successful if you work on changing one eating habit at a time. All foods, if eaten in moderation, can be part of healthy eating. Remember to:  ? Eat a variety of foods from each food group. Include grains, vegetables, fruits, dairy, and protein foods. ? Limit foods high in fat, sugar, and calories. ? Eat slowly. And don't do anything else, such as watch TV, while you are eating. ? Pay attention to portion sizes. Put your food on a smaller plate. ? Plan your meals ahead of time. You'll be less likely to grab something that's not as healthy. · Get active. Regular activity can help you feel better, have more energy, and burn more calories. If you haven't been active, start slowly. Start with at least 30 minutes of moderate activity on most days of the week. Then gradually increase the amount of activity. Try for 60 or 90 minutes a day, at least 5 days a week. There are a lot of ways to fit activity into your life. You can:  ? Walk or bike to the store. Or walk with a friend, or walk the dog.  ? Mow the lawn, rake leaves, shovel snow, or do some gardening. ? Use the stairs instead of the elevator, at least for a few floors. · Change your thinking. Your thoughts have a lot to do with how you feel and what you do. When you're trying to reach a healthy weight, changing how you think about certain things may help.  Here are some ideas:  ? Don't compare yourself to others. Healthy bodies come in all shapes and sizes. ? Pay attention to how hungry or full you feel. When you eat, be aware of why you're eating and how much you're eating. ? Focus on improving your health instead of dieting. Dieting almost never works over the long term. · Ask your doctor about other health professionals who can help you reach a healthy weight. ? A dietitian can help you make healthy changes in your diet. ? An exercise specialist or  can help you develop a safe and effective exercise program.  ? A counselor or psychiatrist can help you cope with issues such as depression, anxiety, or family problems that can make it hard to focus on reaching a healthy weight. · Get support from your family, your doctor, your friends, a support groupand support yourself. Where can you learn more? Go to https://IunikapeCypress Envirosystems.Citelighter. org and sign in to your [employer] account. Enter A311 in the Xyleme box to learn more about \"When You Are Overweight: Care Instructions. \"     If you do not have an account, please click on the \"Sign Up Now\" link. Current as of: March 17, 2021               Content Version: 12.9  © 2006-2021 Healthwise, FABPulous. Care instructions adapted under license by [facility] ([facility]). If you have questions about a medical condition or this instruction, always ask your healthcare professional. [place] [address] any warranty or liability for your use of this information. Patient voices understanding and agrees to plans along with risks and benefits of plan. Counseling:  Kaushal Lees's case, medications and options were discussed in detail. patient was instructed to call the office if she   questions regarding her treatment. Should her conditions worsen, she should return to office to be reassessed by Dr. Savannah Colin.  she  Should to go the closest Emergency Department for any emergency. They verbalized understanding the above instructions.

## 2021-07-22 NOTE — PATIENT INSTRUCTIONS
Patient Education        Learning About Anxiety Disorders  What are anxiety disorders? Anxiety disorders are a type of medical problem. They cause severe anxiety. When you feel anxious, you feel that something bad is about to happen. This feeling interferes with your life. These disorders include:  · Generalized anxiety disorder. You feel worried and stressed about many everyday events and activities. This goes on for several months and disrupts your life on most days. · Panic disorder. You have repeated panic attacks. A panic attack is a sudden, intense fear or anxiety. It may make you feel short of breath. Your heart may pound. · Social anxiety disorder. You feel very anxious about what you will say or do in front of people. For example, you may be scared to talk or eat in public. This problem affects your daily life. · Phobias. You are very scared of a specific object, situation, or activity. For example, you may fear spiders, high places, or small spaces. What are the symptoms? Generalized anxiety disorder  Symptoms may include:  · Feeling worried and stressed about many things almost every day. · Feeling tired or irritable. You may have a hard time concentrating. · Having headaches or muscle aches. · Having a hard time getting to sleep or staying asleep. Panic disorder  You may have repeated panic attacks when there is no reason for feeling afraid. You may change your daily activities because you worry that you will have another attack. Symptoms may include:  · Intense fear, terror, or anxiety. · Trouble breathing or very fast breathing. · Chest pain or tightness. · A heartbeat that races or is not regular. Social anxiety disorder  Symptoms may include:  · Fear about a social situation, such as eating in front of others or speaking in public. You may worry a lot. Or you may be afraid that something bad will happen. · Anxiety that can cause you to blush, sweat, and feel shaky.   · A [id number] Healthwise, Incorporated. Care instructions adapted under license by [facility] ([facility]). If you have questions about a medical condition or this instruction, always ask your healthcare professional. [address] any warranty or liability for your use of this information. Patient Education        Back Stretches: Exercises  Introduction  Here are some examples of exercises for stretching your back. Start each exercise slowly. Ease off the exercise if you start to have pain. Your doctor or physical therapist will tell you when you can start these exercises and which ones will work best for you. How to do the exercises  Overhead stretch   1. Stand comfortably with your feet shoulder-width apart. 2. Looking straight ahead, raise both arms over your head and reach toward the ceiling. Do not allow your head to tilt back. 3. Hold for 15 to 30 seconds, then lower your arms to your sides. 4. Repeat 2 to 4 times. Side stretch   1. Stand comfortably with your feet shoulder-width apart. 2. Raise one arm over your head, and then lean to the other side. 3. Slide your hand down your leg as you let the weight of your arm gently stretch your side muscles. Hold for 15 to 30 seconds. 4. Repeat 2 to 4 times on each side. Press-up   1. Lie on your stomach, supporting your body with your forearms. 2. Press your elbows down into the floor to raise your upper back. As you do this, relax your stomach muscles and allow your back to arch without using your back muscles. As your press up, do not let your hips or pelvis come off the floor. 3. Hold for 15 to 30 seconds, then relax. 4. Repeat 2 to 4 times. Relax and rest   1. Lie on your back with a rolled towel under your neck and a pillow under your knees. Extend your arms comfortably to your sides. 2. Relax and breathe normally. 3. Remain in this position for about 10 minutes. 4. If you can, do this 2 or 3 times each day.     Follow-up care is a key part of your treatment and safety. Be sure to make and go to all appointments, and call your doctor if you are having problems. It's also a good idea to know your test results and keep a list of the medicines you take. Where can you learn more? Go to https://chpepiceweb.Packet Digital. org and sign in to your [employer] account. Enter H420 in the Learn It Systems box to learn more about \"Back Stretches: Exercises. \"     If you do not have an account, please click on the \"Sign Up Now\" link. Current as of: November 16, 2020               Content Version: 12.9  © 2006-2021 Healthwise, Nvidia. Care instructions adapted under license by [facility] ([facility]). If you have questions about a medical condition or this instruction, always ask your healthcare professional. [address] any warranty or liability for your use of this information. Patient Education        Neck: Exercises  Introduction  Here are some examples of exercises for you to try. The exercises may be suggested for a condition or for rehabilitation. Start each exercise slowly. Ease off the exercises if you start to have pain. You will be told when to start these exercises and which ones will work best for you. How to do the exercises  Neck stretch   1. This stretch works best if you keep your shoulder down as you lean away from it. To help you remember to do this, start by relaxing your shoulders and lightly holding on to your thighs or your chair. 2. Tilt your head toward your shoulder and hold for 15 to 30 seconds. Let the weight of your head stretch your muscles. 3. If you would like a little added stretch, use your hand to gently and steadily pull your head toward your shoulder. For example, keeping your right shoulder down, lean your head to the left. 4. Repeat 2 to 4 times toward each shoulder. Diagonal neck stretch   1.  Turn your head slightly toward the direction you will be stretching, and tilt your head diagonally toward your chest and hold for 15 to 30 seconds. 2. If you would like a little added stretch, use your hand to gently and steadily pull your head forward on the diagonal.  3. Repeat 2 to 4 times toward each side. Dorsal glide stretch   The dorsal glide stretches the back of the neck. If you feel pain, do not glide so far back. Some people find this exercise easier to do while lying on their backs with an ice pack on the neck. 1. Sit or stand tall and look straight ahead. 2. Slowly tuck your chin as you glide your head backward over your body  3. Hold for a count of 6, and then relax for up to 10 seconds. 4. Repeat 8 to 12 times. Chest and shoulder stretch   1. Sit or stand tall and glide your head backward as in the dorsal glide stretch. 2. Raise both arms so that your hands are next to your ears. 3. Take a deep breath, and as you breathe out, lower your elbows down and behind your back. You will feel your shoulder blades slide down and together, and at the same time you will feel a stretch across your chest and the front of your shoulders. 4. Hold for about 6 seconds, and then relax for up to 10 seconds. 5. Repeat 8 to 12 times. Strengthening: Hands on head   1. Move your head backward, forward, and side to side against gentle pressure from your hands, holding each position for about 6 seconds. 2. Repeat 8 to 12 times. Follow-up care is a key part of your treatment and safety. Be sure to make and go to all appointments, and call your doctor if you are having problems. It's also a good idea to know your test results and keep a list of the medicines you take. Where can you learn more? Go to https://CovaritypeTransmedia Corporation.Abiogenix. org and sign in to your Germin8 account. Enter P975 in the Horsehead Holding box to learn more about \"Neck: Exercises. \"     If you do not have an account, please click on the \"Sign Up Now\" link.   Current as of: November 16, 2020               Content Version: 12.9  © 2006-2021 Healthwise, Incorporated. Care instructions adapted under license by [facility] ([facility]). If you have questions about a medical condition or this instruction, always ask your healthcare professional. [address] any warranty or liability for your use of this information. Patient Education        Recovering From Depression: Care Instructions  Your Care Instructions     Taking good care of yourself is important as you recover from depression. In time, your symptoms will fade as your treatment takes hold. Do not give up. Instead, focus your energy on getting better. Your mood will improve. It just takes some time. Focus on things that can help you feel better, such as being with friends and family, eating well, and getting enough rest. But take things slowly. Do not do too much too soon. You will begin to feel better gradually. Follow-up care is a key part of your treatment and safety. Be sure to make and go to all appointments, and call your doctor if you are having problems. It's also a good idea to know your test results and keep a list of the medicines you take. How can you care for yourself at home? Be realistic  · If you have a large task to do, break it up into smaller steps you can handle, and just do what you can. · You may want to put off important decisions until your depression has lifted. If you have plans that will have a major impact on your life, such as marriage, divorce, or a job change, try to wait a bit. Talk it over with friends and loved ones who can help you look at the overall picture first.  · Reaching out to people for help is important. Do not isolate yourself. Let your family and friends help you. Find someone you can trust and confide in, and talk to that person. · Be patient, and be kind to yourself. Remember that depression is not your fault and is not something you can overcome with willpower alone. Treatment is important for depression, just like for any other illness. Feeling better takes time, and your mood will improve little by little. Stay active  · Stay busy and get outside. Take a walk, or try some other light exercise. · Talk with your doctor about an exercise program. Exercise can help with mild depression. · Go to a movie or concert. Take part in a Mandaen activity or other social gathering. Go to a ball game. · Ask a friend to have dinner with you. Take care of yourself  · Eat a balanced diet with plenty of fresh fruits and vegetables, whole grains, and lean protein. If you have lost your appetite, eat small snacks rather than large meals. · Avoid using illegal drugs or marijuana and drinking alcohol. Do not take medicines that have not been prescribed for you. They may interfere with medicines you may be taking for depression, or they may make your depression worse. · Take your medicines exactly as they are prescribed. You may start to feel better within 1 to 3 weeks of taking antidepressant medicine. But it can take as many as 6 to 8 weeks to see more improvement. If you have questions or concerns about your medicines, or if you do not notice any improvement by 3 weeks, talk to your doctor. · Continue to take your medicine after your symptoms improve. Taking your medicine for at least 6 months after you feel better can help keep you from getting depressed again. If this isn't the first time you have been depressed, your doctor may recommend you to take medicine even longer. · If you have any side effects from your medicine, tell your doctor. Many side effects are mild and will go away on their own after you have been taking the medicine for a few weeks. Some may last longer. Talk to your doctor if side effects are bothering you too much. You might be able to try a different medicine. · Continue counseling.  It may help prevent depression from returning, especially if you've had multiple episodes of depression. Talk with your counselor if you are having a hard time attending your sessions or you think the sessions aren't working. Don't just stop going. · Get enough sleep. Talk to your doctor if you are having problems sleeping. · Avoid sleeping pills unless they are prescribed by the doctor treating your depression. Sleeping pills may make you groggy during the day, and they may interact with other medicine you are taking. · If you have any other illnesses, such as diabetes, heart disease, or high blood pressure, make sure to continue with your treatment. Tell your doctor about all of the medicines you take, including those with or without a prescription. · If you or someone you know talks about suicide, self-harm, or feeling hopeless, get help right away. Call the [facility] at 1-247-826-IZPR ([phone]) or text HOME to 392093 to access the Cibiem Text Line. Consider saving these numbers in your phone. When should you call for help? Call 911 anytime you think you may need emergency care. For example, call if:    · You feel like hurting yourself or someone else.     · Someone you know has depression and is about to attempt or is attempting suicide. Call your doctor now or seek immediate medical care if:    · You hear voices.     · Someone you know has depression and:  ? Starts to give away his or her possessions. ? Uses illegal drugs or drinks alcohol heavily. ? Talks or writes about death, including writing suicide notes or talking about guns, knives, or pills. ? Starts to spend a lot of time alone. ? Acts very aggressively or suddenly appears calm. Watch closely for changes in your health, and be sure to contact your doctor if:    · You do not get better as expected. Where can you learn more? Go to https://kevin.Snapcious. org and sign in to your Qi[employer] account.  Enter H750 in the TextMaster box to learn more about \"Recovering From Depression: Care Instructions. \"     If you do not have an account, please click on the \"Sign Up Now\" link. Current as of: September 23, 2020               Content Version: 12.9  © 2006-2021 Healthwise, PermissionTV. Care instructions adapted under license by [facility] ([facility]). If you have questions about a medical condition or this instruction, always ask your healthcare professional. [address] any warranty or liability for your use of this information. Patient Education        When You Are Overweight: Care Instructions  Your Care Instructions     If you're overweight, your doctor may recommend that you make changes in your eating and exercise habits. Being overweight can lead to serious health problems, such as high blood pressure, heart disease, type 2 diabetes, and arthritis, or it can make these problems worse. Eating a healthy diet and being more active can help you reach and stay at a healthy weight. You don't have to make huge changes all at once. Start by making small changes in your eating and exercise habits. To lose weight, you need to burn more calories than you take in. You can do this by eating healthy foods in reasonable amounts and becoming more active every day. Follow-up care is a key part of your treatment and safety. Be sure to make and go to all appointments, and call your doctor if you are having problems. It's also a good idea to know your test results and keep a list of the medicines you take. How can you care for yourself at home? · Improve your eating habits. You'll be more successful if you work on changing one eating habit at a time. All foods, if eaten in moderation, can be part of healthy eating. Remember to:  ? Eat a variety of foods from each food group. Include grains, vegetables, fruits, dairy, and protein foods. ? Limit foods high in fat, sugar, and calories. ? Eat slowly.  And don't do anything else, such as watch TV, while you are eating. ? Pay attention to portion sizes. Put your food on a smaller plate. ? Plan your meals ahead of time. You'll be less likely to grab something that's not as healthy. · Get active. Regular activity can help you feel better, have more energy, and burn more calories. If you haven't been active, start slowly. Start with at least 30 minutes of moderate activity on most days of the week. Then gradually increase the amount of activity. Try for 60 or 90 minutes a day, at least 5 days a week. There are a lot of ways to fit activity into your life. You can:  ? Walk or bike to the store. Or walk with a friend, or walk the dog.  ? Mow the lawn, rake leaves, shovel snow, or do some gardening. ? Use the stairs instead of the elevator, at least for a few floors. · Change your thinking. Your thoughts have a lot to do with how you feel and what you do. When you're trying to reach a healthy weight, changing how you think about certain things may help. Here are some ideas:  ? Don't compare yourself to others. Healthy bodies come in all shapes and sizes. ? Pay attention to how hungry or full you feel. When you eat, be aware of why you're eating and how much you're eating. ? Focus on improving your health instead of dieting. Dieting almost never works over the long term. · Ask your doctor about other health professionals who can help you reach a healthy weight. ? A dietitian can help you make healthy changes in your diet. ? An exercise specialist or  can help you develop a safe and effective exercise program.  ? A counselor or psychiatrist can help you cope with issues such as depression, anxiety, or family problems that can make it hard to focus on reaching a healthy weight. · Get support from your family, your doctor, your friends, a support groupand support yourself. Where can you learn more? Go to https://kevin.healthRelavance Software. org and sign in to your Bapul account.  Enter O058 in the Search Health Information box to learn more about \"When You Are Overweight: Care Instructions. \"     If you do not have an account, please click on the \"Sign Up Now\" link. Current as of: March 17, 2021               Content Version: 12.9  © 5700-9520 Healthwise, Incorporated. Care instructions adapted under license by [facility] ([facility]). If you have questions about a medical condition or this instruction, always ask your healthcare professional. [address] any warranty or liability for your use of this information.

## 2021-07-30 DIAGNOSIS — G44.219 EPISODIC TENSION-TYPE HEADACHE, NOT INTRACTABLE: ICD-10-CM

## 2021-07-30 DIAGNOSIS — G43.009 MIGRAINE WITHOUT AURA AND WITHOUT STATUS MIGRAINOSUS, NOT INTRACTABLE: ICD-10-CM

## 2021-07-30 RX ORDER — BUTALBITAL, ACETAMINOPHEN AND CAFFEINE 50; 325; 40 MG/1; MG/1; MG/1
TABLET ORAL
Qty: 90 TABLET | Refills: 0 | Status: SHIPPED | OUTPATIENT
Start: 2021-07-30 | End: 2021-08-30

## 2021-07-30 NOTE — TELEPHONE ENCOUNTER
Carmen Ruvalcaba called to request a refill on her medication. Last office visit : 7/22/2021   Next office visit : 10/28/2021     Last UDS:   Amphetamine Screen, Urine   Date Value Ref Range Status   09/25/2020 neg  Final     Barbiturate Screen, Urine   Date Value Ref Range Status   09/25/2020 pos  Final     Benzodiazepine Screen, Urine   Date Value Ref Range Status   09/25/2020 neg  Final     Buprenorphine Urine   Date Value Ref Range Status   09/25/2020 neg  Final     Cocaine Metabolite Screen, Urine   Date Value Ref Range Status   09/25/2020 neg  Final     Gabapentin Screen, Urine   Date Value Ref Range Status   09/25/2020 neg  Final     MDMA, Urine   Date Value Ref Range Status   09/25/2020 neg  Final     Methamphetamine, Urine   Date Value Ref Range Status   09/25/2020 neg  Final     Opiate Scrn, Ur   Date Value Ref Range Status   09/25/2020 neg  Final     Oxycodone Screen, Ur   Date Value Ref Range Status   09/25/2020 neg  Final     PCP Screen, Urine   Date Value Ref Range Status   09/25/2020 neg  Final     Propoxyphene Screen, Urine   Date Value Ref Range Status   09/25/2020 neg  Final     THC Screen, Urine   Date Value Ref Range Status   09/25/2020 neg  Final     Tricyclic Antidepressants, Urine   Date Value Ref Range Status   09/25/2020 neg  Final       Last Kari Rockers: 6/11/21  Medication Contract: 9/25/20   Last Fill: 6/29/21    Requested Prescriptions     Pending Prescriptions Disp Refills    butalbital-acetaminophen-caffeine (FIORICET, ESGIC) -40 MG per tablet [Pharmacy Med Name: Butalbital-APAP-Caffeine -40 MG Oral Tablet] 90 tablet 0     Sig: TAKE 1 TABLET BY MOUTH EVERY 8 HOURS AS NEEDED FOR MIGRAINE HEADACHE         Please approve or refuse this medication.    Raciel Osorio MA

## 2021-08-29 DIAGNOSIS — G43.009 MIGRAINE WITHOUT AURA AND WITHOUT STATUS MIGRAINOSUS, NOT INTRACTABLE: ICD-10-CM

## 2021-08-29 DIAGNOSIS — G44.219 EPISODIC TENSION-TYPE HEADACHE, NOT INTRACTABLE: ICD-10-CM

## 2021-08-29 NOTE — TELEPHONE ENCOUNTER
Jarad Alanna called to request a refill on her medication.       Last office visit : 7/22/2021   Next office visit : 10/28/2021     Requested Prescriptions     Pending Prescriptions Disp Refills    butalbital-acetaminophen-caffeine (FIORICET, ESGIC) -40 MG per tablet [Pharmacy Med Name: BUTAL/ACETAMN/CF -40 TAB] 90 tablet 0     Sig: TAKE 1 TABLET BY MOUTH EVERY 8 HOURS AS NEEDED FOR MIGRAINE HEADACHE            Jacobo Herzog MA

## 2021-08-30 DIAGNOSIS — M54.2 CHRONIC NECK PAIN: ICD-10-CM

## 2021-08-30 DIAGNOSIS — M62.838 MUSCLE SPASM: ICD-10-CM

## 2021-08-30 DIAGNOSIS — G89.29 CHRONIC NECK PAIN: ICD-10-CM

## 2021-08-30 RX ORDER — BUTALBITAL, ACETAMINOPHEN AND CAFFEINE 50; 325; 40 MG/1; MG/1; MG/1
TABLET ORAL
Qty: 90 TABLET | Refills: 0 | Status: SHIPPED | OUTPATIENT
Start: 2021-08-30 | End: 2021-10-01

## 2021-08-30 RX ORDER — BACLOFEN 10 MG/1
TABLET ORAL
Qty: 90 TABLET | Refills: 0 | Status: SHIPPED | OUTPATIENT
Start: 2021-08-30 | End: 2021-12-29

## 2021-09-01 DIAGNOSIS — G43.009 MIGRAINE WITHOUT AURA AND WITHOUT STATUS MIGRAINOSUS, NOT INTRACTABLE: ICD-10-CM

## 2021-09-01 DIAGNOSIS — G89.29 CHRONIC NECK PAIN: ICD-10-CM

## 2021-09-01 DIAGNOSIS — M54.42 CHRONIC BILATERAL LOW BACK PAIN WITH LEFT-SIDED SCIATICA: ICD-10-CM

## 2021-09-01 DIAGNOSIS — G89.29 CHRONIC BILATERAL LOW BACK PAIN WITH LEFT-SIDED SCIATICA: ICD-10-CM

## 2021-09-01 DIAGNOSIS — M54.2 CHRONIC NECK PAIN: ICD-10-CM

## 2021-09-03 RX ORDER — TRAMADOL HYDROCHLORIDE 50 MG/1
TABLET ORAL
Qty: 45 TABLET | Refills: 0 | Status: SHIPPED | OUTPATIENT
Start: 2021-09-03 | End: 2021-10-01

## 2021-09-17 DIAGNOSIS — K21.9 LARYNGOPHARYNGEAL REFLUX (LPR): ICD-10-CM

## 2021-09-17 DIAGNOSIS — R05.8 NOCTURNAL COUGH: ICD-10-CM

## 2021-09-17 DIAGNOSIS — E03.9 ACQUIRED HYPOTHYROIDISM: ICD-10-CM

## 2021-09-17 RX ORDER — OMEPRAZOLE 40 MG/1
40 CAPSULE, DELAYED RELEASE ORAL NIGHTLY
Qty: 30 CAPSULE | Refills: 0 | Status: SHIPPED | OUTPATIENT
Start: 2021-09-17 | End: 2021-10-01 | Stop reason: SDUPTHER

## 2021-09-17 RX ORDER — LEVOTHYROXINE SODIUM 112 UG/1
TABLET ORAL
Qty: 30 TABLET | Refills: 0 | Status: SHIPPED | OUTPATIENT
Start: 2021-09-17 | End: 2021-10-29

## 2021-09-17 NOTE — TELEPHONE ENCOUNTER
Rajat Mcdonald called to request a refill on her medication.       Last office visit : 7/22/2021   Next office visit : 10/28/2021     Requested Prescriptions     Pending Prescriptions Disp Refills    omeprazole (PRILOSEC) 40 MG delayed release capsule [Pharmacy Med Name: Omeprazole 40 MG Oral Capsule Delayed Release] 30 capsule 0     Sig: TAKE 1 CAPSULE BY MOUTH NIGHTLY    EUTHYROX 112 MCG tablet [Pharmacy Med Name: Euthyrox 112 MCG Oral Tablet] 30 tablet 0     Sig: Take 1 tablet by mouth once daily            Carol Barfield MA

## 2021-10-01 DIAGNOSIS — G43.009 MIGRAINE WITHOUT AURA AND WITHOUT STATUS MIGRAINOSUS, NOT INTRACTABLE: ICD-10-CM

## 2021-10-01 DIAGNOSIS — R05.8 NOCTURNAL COUGH: ICD-10-CM

## 2021-10-01 DIAGNOSIS — M54.2 CHRONIC NECK PAIN: ICD-10-CM

## 2021-10-01 DIAGNOSIS — K21.9 LARYNGOPHARYNGEAL REFLUX (LPR): ICD-10-CM

## 2021-10-01 DIAGNOSIS — M54.42 CHRONIC BILATERAL LOW BACK PAIN WITH LEFT-SIDED SCIATICA: ICD-10-CM

## 2021-10-01 DIAGNOSIS — G89.29 CHRONIC NECK PAIN: ICD-10-CM

## 2021-10-01 DIAGNOSIS — G44.219 EPISODIC TENSION-TYPE HEADACHE, NOT INTRACTABLE: ICD-10-CM

## 2021-10-01 DIAGNOSIS — G89.29 CHRONIC BILATERAL LOW BACK PAIN WITH LEFT-SIDED SCIATICA: ICD-10-CM

## 2021-10-01 RX ORDER — BUTALBITAL, ACETAMINOPHEN AND CAFFEINE 50; 325; 40 MG/1; MG/1; MG/1
TABLET ORAL
Qty: 90 TABLET | Refills: 0 | Status: SHIPPED | OUTPATIENT
Start: 2021-10-01 | End: 2021-10-05 | Stop reason: SDUPTHER

## 2021-10-01 RX ORDER — OMEPRAZOLE 40 MG/1
40 CAPSULE, DELAYED RELEASE ORAL NIGHTLY
Qty: 30 CAPSULE | Refills: 0 | OUTPATIENT
Start: 2021-10-01

## 2021-10-01 RX ORDER — OMEPRAZOLE 40 MG/1
40 CAPSULE, DELAYED RELEASE ORAL NIGHTLY
Qty: 30 CAPSULE | Refills: 0 | Status: SHIPPED | OUTPATIENT
Start: 2021-10-01 | End: 2021-12-29

## 2021-10-01 RX ORDER — TRAMADOL HYDROCHLORIDE 50 MG/1
TABLET ORAL
Qty: 45 TABLET | Refills: 0 | Status: SHIPPED | OUTPATIENT
Start: 2021-10-01 | End: 2021-10-29

## 2021-10-01 NOTE — TELEPHONE ENCOUNTER
Refilled medication and e-scribed to pharmacy. Confirm prescription was due. Make sure BAUTISTA and urine drug screen is up to date.

## 2021-10-01 NOTE — TELEPHONE ENCOUNTER
Dottie Aixa called to request a refill on her medication.       Last office visit : 7/22/2021   Next office visit : 10/1/2021     Requested Prescriptions     Pending Prescriptions Disp Refills    omeprazole (PRILOSEC) 40 MG delayed release capsule 30 capsule 0     Sig: Take 1 capsule by mouth nightly            Glo Avina MA

## 2021-10-01 NOTE — TELEPHONE ENCOUNTER
Amanda Alonso called to request a refill on her medication.       Last office visit : 7/22/2021   Next office visit : 10/28/2021     Last UDS:   Amphetamine Screen, Urine   Date Value Ref Range Status   09/25/2020 neg  Final     Barbiturate Screen, Urine   Date Value Ref Range Status   09/25/2020 pos  Final     Benzodiazepine Screen, Urine   Date Value Ref Range Status   09/25/2020 neg  Final     Buprenorphine Urine   Date Value Ref Range Status   09/25/2020 neg  Final     Cocaine Metabolite Screen, Urine   Date Value Ref Range Status   09/25/2020 neg  Final     Gabapentin Screen, Urine   Date Value Ref Range Status   09/25/2020 neg  Final     MDMA, Urine   Date Value Ref Range Status   09/25/2020 neg  Final     Methamphetamine, Urine   Date Value Ref Range Status   09/25/2020 neg  Final     Opiate Scrn, Ur   Date Value Ref Range Status   09/25/2020 neg  Final     Oxycodone Screen, Ur   Date Value Ref Range Status   09/25/2020 neg  Final     PCP Screen, Urine   Date Value Ref Range Status   09/25/2020 neg  Final     Propoxyphene Screen, Urine   Date Value Ref Range Status   09/25/2020 neg  Final     THC Screen, Urine   Date Value Ref Range Status   09/25/2020 neg  Final     Tricyclic Antidepressants, Urine   Date Value Ref Range Status   09/25/2020 neg  Final       Last Karon Reyesannah: 6/11/21  Medication Contract: 9/28/20   Last Fill: 9/3/21    Requested Prescriptions     Pending Prescriptions Disp Refills    traMADol (ULTRAM) 50 MG tablet [Pharmacy Med Name: traMADol HCl 50 MG Oral Tablet] 45 tablet 0     Sig: TAKE 1 TABLET BY MOUTH EVERY 12 HOURS AS NEEDED FOR PAIN (REDUCE  DOSES  AS  PAIN  BECOMES  MANAGEABLE)    butalbital-acetaminophen-caffeine (FIORICET, ESGIC) -40 MG per tablet [Pharmacy Med Name: Butalbital-APAP-Caffeine -40 MG Oral Tablet] 90 tablet 0     Sig: TAKE 1 TABLET BY MOUTH EVERY 8 HOURS AS NEEDED FOR MIGRAINE HEADACHE     Refused Prescriptions Disp Refills    omeprazole (PRILOSEC) 40 MG delayed release capsule [Pharmacy Med Name: Omeprazole 40 MG Oral Capsule Delayed Release] 30 capsule 0     Sig: TAKE 1 CAPSULE BY MOUTH NIGHTLY         Please approve or refuse this medication.    Loni Go, [address] [facility] Nelly Yates

## 2021-10-01 NOTE — TELEPHONE ENCOUNTER
Yris Hercules called to request a refill on her medication.       Last office visit : 7/22/2021   Next office visit : 10/28/2021     Last UDS:   Amphetamine Screen, Urine   Date Value Ref Range Status   09/25/2020 neg  Final     Barbiturate Screen, Urine   Date Value Ref Range Status   09/25/2020 pos  Final     Benzodiazepine Screen, Urine   Date Value Ref Range Status   09/25/2020 neg  Final     Buprenorphine Urine   Date Value Ref Range Status   09/25/2020 neg  Final     Cocaine Metabolite Screen, Urine   Date Value Ref Range Status   09/25/2020 neg  Final     Gabapentin Screen, Urine   Date Value Ref Range Status   09/25/2020 neg  Final     MDMA, Urine   Date Value Ref Range Status   09/25/2020 neg  Final     Methamphetamine, Urine   Date Value Ref Range Status   09/25/2020 neg  Final     Opiate Scrn, Ur   Date Value Ref Range Status   09/25/2020 neg  Final     Oxycodone Screen, Ur   Date Value Ref Range Status   09/25/2020 neg  Final     PCP Screen, Urine   Date Value Ref Range Status   09/25/2020 neg  Final     Propoxyphene Screen, Urine   Date Value Ref Range Status   09/25/2020 neg  Final     THC Screen, Urine   Date Value Ref Range Status   09/25/2020 neg  Final     Tricyclic Antidepressants, Urine   Date Value Ref Range Status   09/25/2020 neg  Final       Last Callejas Benedict: 6/11/21  Medication Contract: 9/25/20   Last Fill: 8/30/21    Requested Prescriptions     Pending Prescriptions Disp Refills    traMADol (ULTRAM) 50 MG tablet [Pharmacy Med Name: traMADol HCl 50 MG Oral Tablet] 45 tablet 0     Sig: TAKE 1 TABLET BY MOUTH EVERY 12 HOURS AS NEEDED FOR PAIN (REDUCE  DOSES  AS  PAIN  BECOMES  MANAGEABLE)    butalbital-acetaminophen-caffeine (FIORICET, ESGIC) -40 MG per tablet [Pharmacy Med Name: Butalbital-APAP-Caffeine -40 MG Oral Tablet] 90 tablet 0     Sig: TAKE 1 TABLET BY MOUTH EVERY 8 HOURS AS NEEDED FOR MIGRAINE HEADACHE     Refused Prescriptions Disp Refills    omeprazole (PRILOSEC) 40 MG delayed release capsule [Pharmacy Med Name: Omeprazole 40 MG Oral Capsule Delayed Release] 30 capsule 0     Sig: TAKE 1 CAPSULE BY MOUTH NIGHTLY         Please approve or refuse this medication.    Kedar Haines

## 2021-10-04 ENCOUNTER — TELEPHONE (OUTPATIENT)
Dept: FAMILY MEDICINE CLINIC | Age: 56
End: 2021-10-04

## 2021-10-04 ENCOUNTER — NURSE TRIAGE (OUTPATIENT)
Dept: OTHER | Facility: CLINIC | Age: 56
End: 2021-10-04

## 2021-10-04 RX ORDER — BENZONATATE 200 MG/1
200 CAPSULE ORAL 3 TIMES DAILY PRN
Qty: 30 CAPSULE | Refills: 0 | Status: SHIPPED | OUTPATIENT
Start: 2021-10-04 | End: 2021-10-11

## 2021-10-04 NOTE — TELEPHONE ENCOUNTER
I called the patient and she has agreed to go and have a covid test at [facility]. Miss Rodney Rhodes asked if something could be sent in for her cough. She is willing to do a video visit anytime if needed.

## 2021-10-04 NOTE — TELEPHONE ENCOUNTER
Reason for Disposition   SEVERE coughing spells (e.g., whooping sound after coughing, vomiting after coughing)    Answer Assessment - Initial Assessment Questions  1. ONSET: \"When did the cough begin? \"       Saturday    2. SEVERITY: \"How bad is the cough today? \"      Better than  Yesterday     3. RESPIRATORY DISTRESS: \"Describe your breathing. \"      No concern    4. FEVER: \"Do you have a fever? \" If so, ask: \"What is your temperature, how was it measured, and when did it start? \"  No     5. HEMOPTYSIS: \"Are you coughing up any blood? \" If so ask: \"How much? \" (flecks, streaks, tablespoons, etc.)    No     6. TREATMENT: \"What have you done so far to treat the cough? \" (e.g., meds, fluids, humidifier)  OTC cough medication did not help    7. CARDIAC HISTORY: \"Do you have any history of heart disease? \" (e.g., heart attack, congestive heart failure)       8. LUNG HISTORY: \"Do you have any history of lung disease? \"  (e.g., pulmonary embolus, asthma, emphysema)   No     9. PE RISK FACTORS: \"Do you have a history of blood clots? \" (or: recent major surgery, recent prolonged travel, bedridden)  No     10. OTHER SYMPTOMS: \"Do you have any other symptoms? (e.g., runny nose, wheezing, chest pain)  Runny nose      11. PREGNANCY: \"Is there any chance you are pregnant? \" \"When was your last menstrual period? \"  NA    12. TRAVEL: \"Have you traveled out of the country in the last month? \" (e.g., travel history, exposures)       No    Protocols used: COUGH-ADULT-OH  Received call from [facility]  at [facility] "[employer]  [facility]  with Red Flag Complaint. Brief description of triage: Please see notes above. Triage indicates for patient to see PCP in office today. Care advice provided, patient verbalizes understanding; denies any other questions or concerns; instructed to call back for any new or worsening symptoms. Writer provided warm transfer to [place]  at [facility] AND [facility]  for appointment scheduling. Attention Provider:   Thank you for allowing me to participate in the care of your patient. The patient was connected to triage in response to information provided to the ECC/PSC. Please do not respond through this encounter as the response is not directed to a shared pool.

## 2021-10-04 NOTE — TELEPHONE ENCOUNTER
The patient called and said since Saturday she has had a low grade fever, body aches, runny nose, chest congestion,terrible cough and a  SCHWAB. Miss Nitin Felix also had a sore throat over the weekend but that seems to be better. The patient is reporting tingling across her chest and down her arm when she is coughing really bad.  Please advise

## 2021-10-04 NOTE — TELEPHONE ENCOUNTER
She can get covid testing at [facility] which [place] be in the mall but I believe they moved to the area on [address] near [employer]. If she needs to be seen today she can to to urgent care. I have some openings tomorrow afternoon for a virtual visit but I don't have any today.

## 2021-10-04 NOTE — TELEPHONE ENCOUNTER
I sent in tessalon for cough. You can add her to my schedule tomorrow for VV if she would like. Just use an open slot and not a provider fill only.

## 2021-10-05 ENCOUNTER — TELEMEDICINE (OUTPATIENT)
Dept: FAMILY MEDICINE CLINIC | Age: 56
End: 2021-10-05
Payer: COMMERCIAL

## 2021-10-05 DIAGNOSIS — R05.9 COUGH: Primary | ICD-10-CM

## 2021-10-05 DIAGNOSIS — R51.9 ACUTE NONINTRACTABLE HEADACHE, UNSPECIFIED HEADACHE TYPE: ICD-10-CM

## 2021-10-05 PROCEDURE — 99442 PR PHYS/QHP TELEPHONE EVALUATION 11-20 MIN: CPT | Performed by: NURSE PRACTITIONER

## 2021-10-05 RX ORDER — BUTALBITAL, ACETAMINOPHEN AND CAFFEINE 50; 325; 40 MG/1; MG/1; MG/1
TABLET ORAL
Qty: 90 TABLET | Refills: 0 | Status: SHIPPED | OUTPATIENT
Start: 2021-10-05 | End: 2021-11-29

## 2021-10-05 RX ORDER — HYDROCODONE POLISTIREX AND CHLORPHENIRAMINE POLISTIREX 10; 8 MG/5ML; MG/5ML
5 SUSPENSION, EXTENDED RELEASE ORAL EVERY 12 HOURS PRN
Qty: 100 ML | Refills: 0 | Status: SHIPPED | OUTPATIENT
Start: 2021-10-05 | End: 2021-10-15

## 2021-10-05 ASSESSMENT — ENCOUNTER SYMPTOMS
DIARRHEA: 0
WHEEZING: 0
CHEST TIGHTNESS: 0
NAUSEA: 0
COUGH: 1
SHORTNESS OF BREATH: 0
ABDOMINAL PAIN: 0
SORE THROAT: 0

## 2021-10-05 NOTE — PROGRESS NOTES
[address] [address]            Phone:  [phone]  Fax:  [phone]    Kain Owens is a 61 y.o. female evaluated via telephone on 10/5/2021. Consent:    She and/or health care decision maker is aware that that she may receive a bill for this telephone service, depending on her insurance coverage, and has provided verbal consent to proceed: Yes      HPI  Chief Complaint   Patient presents with    Cough       I communicated with the patient and/or health care decision maker about:    She developed a sore throat and cough 3 days ago. Possible low grade fever. She has had increased sinus pressure and HA. Cough is fairly frequent and has some chest soreness related to the coughing. No sob. Tessalon has not helped. She states tussionex is typically the only thing that has helped in the past.      She is fully vaccinated. No known covid exposure. Review of Systems   Constitutional: Positive for fever (possible low grade). Negative for chills. HENT: Negative for congestion, ear pain and sore throat. Respiratory: Positive for cough. Negative for chest tightness, shortness of breath and wheezing. Cardiovascular: Negative for chest pain. Gastrointestinal: Negative for abdominal pain, diarrhea and nausea. Musculoskeletal: Negative for arthralgias and myalgias. Skin: Negative for rash. Neurological: Positive for headaches. Patient location: home     PLAN    Details of this discussion including any medical advice provided:     1. Cough  -Covid swab today. Advised to quarantine pending results.    -Tussionex 5 ml bid prn cough  - COVID-19; Future  - HYDROcodone-chlorpheniramine (TUSSIONEX PENNKINETIC ER) 10-8 MG/5ML SUER; Take 5 mLs by mouth every 12 hours as needed (cough) for up to 10 days. Dispense: 100 mL; Refill: 0  - COVID-19    2. Acute nonintractable headache, unspecified headache type  -Secondary to acute illness.   Advised tylenol or ibuprofen as needed.  -She requested refill of fioricet which she takes prn for migraines. Will re-send as it looks like it was filled on 10/1 but she states pharmacy did not have it. - COVID-19; Future  - COVID-19       I affirm this is a Patient Initiated Episode with an Established Patient who has not had a related appointment within my department in the past 7 days or scheduled within the next 24 hours. Total Time: minutes: 11-20 minutes - 12 minutes      Note: not billable if this call serves to triage the patient into an appointment for the relevant concern      KALI Segovia         Pursuant to the emergency declaration under the [facility] waiver authority and the [employer] Act, this Virtual  Visit was conducted, with patient's consent, to reduce the patient's risk of exposure to COVID-19 and provide continuity of care for an established patient.

## 2021-10-05 NOTE — TELEPHONE ENCOUNTER
Patient has been added to the 130 appt for [facility] as a VV on 10/5/21. Patient agreed to date and time.

## 2021-10-06 DIAGNOSIS — J20.9 ACUTE BRONCHITIS, UNSPECIFIED ORGANISM: Primary | ICD-10-CM

## 2021-10-06 LAB — SARS-COV-2, PCR: NOT DETECTED

## 2021-10-06 RX ORDER — METHYLPREDNISOLONE 4 MG/1
TABLET ORAL
Qty: 1 KIT | Refills: 0 | Status: SHIPPED | OUTPATIENT
Start: 2021-10-06 | End: 2021-10-12

## 2021-10-06 RX ORDER — AZITHROMYCIN 250 MG/1
250 TABLET, FILM COATED ORAL SEE ADMIN INSTRUCTIONS
Qty: 6 TABLET | Refills: 0 | Status: SHIPPED | OUTPATIENT
Start: 2021-10-06 | End: 2021-10-11

## 2021-10-07 ENCOUNTER — TELEPHONE (OUTPATIENT)
Dept: FAMILY MEDICINE CLINIC | Age: 56
End: 2021-10-07

## 2021-10-07 NOTE — TELEPHONE ENCOUNTER
----- Message from Beena Rea sent at 10/6/2021  4:55 PM CDT -----  Subject: Message to Provider    QUESTIONS  Information for Provider? patient called in about the prescriptions dr. Marycruz Juan was going to prescribe for her she said normally she would get them   from [address] but [facility], [address] ?   [phone] would be more convenient for her to get them from there   please call patient when they are sent to [facility]   ---------------------------------------------------------------------------  --------------  [address]  What is the best way for the office to contact you? OK to leave message on   voicemail  Preferred Call Back Phone Number? [phone]  ---------------------------------------------------------------------------  --------------  SCRIPT ANSWERS  Relationship to Patient?  Self

## 2021-10-26 ENCOUNTER — TELEPHONE (OUTPATIENT)
Dept: FAMILY MEDICINE CLINIC | Age: 56
End: 2021-10-26

## 2021-10-26 NOTE — TELEPHONE ENCOUNTER
----- Message from Joceline Cheke sent at 10/26/2021  3:13 PM CDT -----  Subject: Message to Provider    QUESTIONS  Information for Provider? pt needs clarification on lab work before appt   ---------------------------------------------------------------------------  --------------  [address]  What is the best way for the office to contact you? OK to leave message on   voicemail  Preferred Call Back Phone Number? [phone]  ---------------------------------------------------------------------------  --------------  SCRIPT ANSWERS  Relationship to Patient?  Self

## 2021-10-29 DIAGNOSIS — E03.9 ACQUIRED HYPOTHYROIDISM: ICD-10-CM

## 2021-10-29 RX ORDER — LEVOTHYROXINE SODIUM 112 UG/1
TABLET ORAL
Qty: 30 TABLET | Refills: 0 | Status: SHIPPED | OUTPATIENT
Start: 2021-10-29 | End: 2021-11-24 | Stop reason: DRUGHIGH

## 2021-10-29 NOTE — TELEPHONE ENCOUNTER
vincent Bri called to request a refill on her medication.       Last office visit : 10/5/2021   Next office visit : 11/24/2021     Requested Prescriptions     Signed Prescriptions Disp Refills    EUTHYROX 112 MCG tablet 30 tablet 0     Sig: Take 1 tablet by mouth once daily     Authorizing Provider: Kaye Srinivasan     Ordering User: Radha Holly

## 2021-11-17 DIAGNOSIS — E78.2 MIXED HYPERLIPIDEMIA: ICD-10-CM

## 2021-11-17 DIAGNOSIS — D50.9 IRON DEFICIENCY ANEMIA, UNSPECIFIED IRON DEFICIENCY ANEMIA TYPE: ICD-10-CM

## 2021-11-17 DIAGNOSIS — E03.9 ACQUIRED HYPOTHYROIDISM: ICD-10-CM

## 2021-11-17 DIAGNOSIS — E53.8 B12 DEFICIENCY: ICD-10-CM

## 2021-11-17 LAB
ALBUMIN SERPL-MCNC: 4.4 G/DL (ref 3.5–5.2)
ALP BLD-CCNC: 118 U/L (ref 35–104)
ALT SERPL-CCNC: 14 U/L (ref 5–33)
ANION GAP SERPL CALCULATED.3IONS-SCNC: 14 MMOL/L (ref 7–19)
AST SERPL-CCNC: 21 U/L (ref 5–32)
BASOPHILS ABSOLUTE: 0.1 K/UL (ref 0–0.2)
BASOPHILS RELATIVE PERCENT: 0.9 % (ref 0–1)
BILIRUB SERPL-MCNC: <0.2 MG/DL (ref 0.2–1.2)
BUN BLDV-MCNC: 7 MG/DL (ref 6–20)
CALCIUM SERPL-MCNC: 9.2 MG/DL (ref 8.6–10)
CHLORIDE BLD-SCNC: 104 MMOL/L (ref 98–111)
CHOLESTEROL, TOTAL: 226 MG/DL (ref 160–199)
CO2: 23 MMOL/L (ref 22–29)
CREAT SERPL-MCNC: 0.8 MG/DL (ref 0.5–0.9)
EOSINOPHILS ABSOLUTE: 0.3 K/UL (ref 0–0.6)
EOSINOPHILS RELATIVE PERCENT: 4 % (ref 0–5)
GFR AFRICAN AMERICAN: >59
GFR NON-AFRICAN AMERICAN: >60
GLUCOSE BLD-MCNC: 94 MG/DL (ref 74–109)
HCT VFR BLD CALC: 37 % (ref 37–47)
HDLC SERPL-MCNC: 41 MG/DL (ref 65–121)
HEMOGLOBIN: 11 G/DL (ref 12–16)
IMMATURE GRANULOCYTES #: 0 K/UL
IRON SATURATION: 8 % (ref 14–50)
IRON: 37 UG/DL (ref 37–145)
LDL CHOLESTEROL CALCULATED: 149 MG/DL
LYMPHOCYTES ABSOLUTE: 1.9 K/UL (ref 1.1–4.5)
LYMPHOCYTES RELATIVE PERCENT: 29.7 % (ref 20–40)
MCH RBC QN AUTO: 23.4 PG (ref 27–31)
MCHC RBC AUTO-ENTMCNC: 29.7 G/DL (ref 33–37)
MCV RBC AUTO: 78.7 FL (ref 81–99)
MONOCYTES ABSOLUTE: 0.5 K/UL (ref 0–0.9)
MONOCYTES RELATIVE PERCENT: 7.8 % (ref 0–10)
NEUTROPHILS ABSOLUTE: 3.6 K/UL (ref 1.5–7.5)
NEUTROPHILS RELATIVE PERCENT: 57.4 % (ref 50–65)
PDW BLD-RTO: 16.7 % (ref 11.5–14.5)
PLATELET # BLD: 306 K/UL (ref 130–400)
PMV BLD AUTO: 10.4 FL (ref 9.4–12.3)
POTASSIUM SERPL-SCNC: 3.9 MMOL/L (ref 3.5–5)
RBC # BLD: 4.7 M/UL (ref 4.2–5.4)
SODIUM BLD-SCNC: 141 MMOL/L (ref 136–145)
TOTAL IRON BINDING CAPACITY: 448 UG/DL (ref 250–400)
TOTAL PROTEIN: 6.8 G/DL (ref 6.6–8.7)
TRIGL SERPL-MCNC: 179 MG/DL (ref 0–149)
TSH REFLEX FT4: 3.9 UIU/ML (ref 0.35–5.5)
VITAMIN B-12: 391 PG/ML (ref 211–946)
WBC # BLD: 6.3 K/UL (ref 4.8–10.8)

## 2021-11-22 ENCOUNTER — OFFICE VISIT (OUTPATIENT)
Dept: URGENT CARE | Age: 56
End: 2021-11-22
Payer: COMMERCIAL

## 2021-11-22 VITALS
OXYGEN SATURATION: 99 % | SYSTOLIC BLOOD PRESSURE: 123 MMHG | RESPIRATION RATE: 18 BRPM | HEART RATE: 86 BPM | BODY MASS INDEX: 27.94 KG/M2 | HEIGHT: 61 IN | TEMPERATURE: 98.3 F | WEIGHT: 148 LBS | DIASTOLIC BLOOD PRESSURE: 86 MMHG

## 2021-11-22 DIAGNOSIS — R05.9 COUGH: Primary | ICD-10-CM

## 2021-11-22 DIAGNOSIS — J06.9 UPPER RESPIRATORY TRACT INFECTION, UNSPECIFIED TYPE: ICD-10-CM

## 2021-11-22 DIAGNOSIS — Z11.59 SCREENING FOR VIRAL DISEASE: ICD-10-CM

## 2021-11-22 DIAGNOSIS — R50.9 FEVER, UNSPECIFIED FEVER CAUSE: ICD-10-CM

## 2021-11-22 DIAGNOSIS — J02.9 SORE THROAT: ICD-10-CM

## 2021-11-22 LAB — S PYO AG THROAT QL: NORMAL

## 2021-11-22 PROCEDURE — 96372 THER/PROPH/DIAG INJ SC/IM: CPT | Performed by: NURSE PRACTITIONER

## 2021-11-22 PROCEDURE — 99214 OFFICE O/P EST MOD 30 MIN: CPT | Performed by: NURSE PRACTITIONER

## 2021-11-22 PROCEDURE — 87880 STREP A ASSAY W/OPTIC: CPT | Performed by: NURSE PRACTITIONER

## 2021-11-22 RX ORDER — DEXAMETHASONE SODIUM PHOSPHATE 10 MG/ML
10 INJECTION INTRAMUSCULAR; INTRAVENOUS ONCE
Status: COMPLETED | OUTPATIENT
Start: 2021-11-22 | End: 2021-11-22

## 2021-11-22 RX ORDER — DEXTROMETHORPHAN HYDROBROMIDE AND PROMETHAZINE HYDROCHLORIDE 15; 6.25 MG/5ML; MG/5ML
SYRUP ORAL
Qty: 120 ML | Refills: 0 | Status: SHIPPED | OUTPATIENT
Start: 2021-11-22 | End: 2021-11-24 | Stop reason: SINTOL

## 2021-11-22 RX ADMIN — DEXAMETHASONE SODIUM PHOSPHATE 10 MG: 10 INJECTION INTRAMUSCULAR; INTRAVENOUS at 18:48

## 2021-11-22 ASSESSMENT — VISUAL ACUITY: OU: 1

## 2021-11-22 ASSESSMENT — ENCOUNTER SYMPTOMS
SINUS PRESSURE: 0
VOMITING: 0
SORE THROAT: 1
ABDOMINAL PAIN: 0
NAUSEA: 0
DIARRHEA: 0
RHINORRHEA: 1
COUGH: 1
SHORTNESS OF BREATH: 0

## 2021-11-23 LAB — SARS-COV-2, PCR: NOT DETECTED

## 2021-11-23 NOTE — PROGRESS NOTES
[facility]  [address] [place] [address] [id number]  Dept: [phone]  Dept Fax: [phone]  Loc: [phone]    Gordy Burton is a 61 y.o. female who presents today for her medical conditions/complaintsas noted below. Gordy Burton is c/o of Cough and Pharyngitis        HPI:     Cough  This is a new problem. The current episode started in the past 7 days (since Thurs). The problem has been unchanged. The problem occurs every few minutes. The cough is non-productive. Associated symptoms include chills, a fever, myalgias, rhinorrhea and a sore throat. Pertinent negatives include no headaches, rash or shortness of breath. Nothing aggravates the symptoms. She has tried body position changes, cool air and OTC cough suppressant (Nyquil, Robitussin, Sudafed) for the symptoms. The treatment provided mild relief. Her past medical history is significant for environmental allergies. Pharyngitis  This is a new problem. The current episode started in the past 7 days (Thurs). The problem occurs constantly. The problem has been unchanged. Associated symptoms include anorexia, chills, congestion, coughing, a fever, myalgias and a sore throat. Pertinent negatives include no abdominal pain, arthralgias, headaches, nausea, rash or vomiting. Nothing aggravates the symptoms. She has tried rest and drinking for the symptoms. The treatment provided mild relief. She began with sore throat on Thursday. She had similar symptoms about a month ago and took a Z pack and medrol dose pack. She says the cough is non-stop and she is not sleeping. She did have fever several days ago.    Past Medical History:   Diagnosis Date    Allergic rhinitis     Chronic bilateral low back pain with left-sided sciatica 2017    Colon polyp     Depression with anxiety     Obesity     Osteoarthritis      Past Surgical History:   Procedure Laterality Date     SECTION      COLONOSCOPY 03/04/2015    colon polyp x1, recheck in 5 yrs (Dr Jaquelin Cesar)   [address]         Family History   Problem Relation Age of Onset    Heart Disease Mother     High Blood Pressure Mother     Other Mother         skin CA    Osteoporosis Mother     Heart Disease Father     High Blood Pressure Father     Other Father         jaw CA    Heart Disease Sister     Diabetes Sister     Stroke Sister     High Blood Pressure Sister     Other Sister         fibromyalgia, thyroid CA       Social History     Tobacco Use    Smoking status: Never Smoker    Smokeless tobacco: Never Used   Substance Use Topics    Alcohol use: Yes     Alcohol/week: 1.0 standard drink     Types: 1 Standard drinks or equivalent per week     Comment: rarely      Current Outpatient Medications   Medication Sig Dispense Refill    promethazine-dextromethorphan (PROMETHAZINE-DM) 6.25-15 MG/5ML syrup Take 5 ml po at bedtime, may repeat in 2 hrs.  120 mL 0    traMADol (ULTRAM) 50 MG tablet TAKE 1 TABLET BY MOUTH EVERY 12 HOURS AS NEEDED FOR PAIN 45 tablet 1    EUTHYROX 112 MCG tablet Take 1 tablet by mouth once daily 30 tablet 0    butalbital-acetaminophen-caffeine (FIORICET, ESGIC) -40 MG per tablet TAKE 1 TABLET BY MOUTH EVERY 8 HOURS AS NEEDED FOR MIGRAINE HEADACHE 90 tablet 0    omeprazole (PRILOSEC) 40 MG delayed release capsule Take 1 capsule by mouth nightly 30 capsule 0    gabapentin (NEURONTIN) 300 MG capsule TAKE 2 CAPSULES BY MOUTH EVERY DAY AT BEDTIME 60 capsule 2    baclofen (LIORESAL) 10 MG tablet TAKE 1 TABLET BY MOUTH THREE TIMES DAILY AS NEEDED (MUSCLE  SPASMS/  NECK  PAIN) 90 tablet 0    fluvoxaMINE (LUVOX) 50 MG tablet Take 1.5 tablets by mouth nightly 45 tablet 5    propranolol (INDERAL LA) 80 MG extended release capsule Take 1 capsule by mouth once daily 30 capsule 5    Potassium 99 MG TABS Take 1 tablet by mouth daily      TURMERIC PO Take 1 tablet by mouth daily      vitamin B-12 (CYANOCOBALAMIN) 1000 MCG tablet Take 1,000 mcg by mouth daily      Multiple Vitamins-Minerals (MULTIVITAMIN ADULT PO) Take by mouth      Omega 3 1000 MG CAPS Take by mouth      Cholecalciferol (VITAMIN D3) 3000 units TABS Take by mouth       No current facility-administered medications for this visit. Allergies   Allergen Reactions    Augmentin [Amoxicillin-Pot Clavulanate] Diarrhea    Aspirin Other (See Comments)       Health Maintenance   Topic Date Due    Colon cancer screen colonoscopy  03/04/2020    Flu vaccine (1) 09/01/2021    COVID-19 Vaccine (3 - Booster for Moderna series) 11/03/2021    Breast cancer screen  10/01/2022    TSH testing  11/17/2022    DTaP/Tdap/Td vaccine (2 - Td or Tdap) 08/01/2026    Lipid screen  11/17/2026    Shingles Vaccine  Completed    Hepatitis C screen  Completed    Hepatitis A vaccine  Aged Out    Hepatitis B vaccine  Aged Out    Hib vaccine  Aged Out    Meningococcal (ACWY) vaccine  Aged Out    Pneumococcal 0-64 years Vaccine  Aged Out    HIV screen  Discontinued       Subjective:     Review of Systems   Constitutional: Positive for appetite change, chills and fever. Negative for activity change. HENT: Positive for congestion, rhinorrhea and sore throat. Negative for ear discharge and sinus pressure. Respiratory: Positive for cough. Negative for shortness of breath. Gastrointestinal: Positive for anorexia. Negative for abdominal pain, diarrhea, nausea and vomiting. Musculoskeletal: Positive for myalgias. Negative for arthralgias. Skin: Negative for rash. Allergic/Immunologic: Positive for environmental allergies. Neurological: Negative for headaches. Hematological: Negative.        :Objective      Physical Exam  Vitals and nursing note reviewed. Constitutional:       General: She is awake. She is not in acute distress. Appearance: She is well-developed and well-groomed. She is ill-appearing.    HENT:      Head: Normocephalic. Right Ear: Hearing, tympanic membrane, ear canal and external ear normal.      Left Ear: Hearing, tympanic membrane, ear canal and external ear normal.      Nose: Congestion present. Right Sinus: No frontal sinus tenderness. Left Sinus: No frontal sinus tenderness. Mouth/Throat:      Lips: Pink. Mouth: Mucous membranes are moist.      Pharynx: Oropharynx is clear. Uvula midline. Posterior oropharyngeal erythema present. Tonsils: 0 on the right. 0 on the left. Eyes:      General: Vision grossly intact. Conjunctiva/sclera: Conjunctivae normal.   Neck:      Trachea: Phonation normal.   Cardiovascular:      Rate and Rhythm: Normal rate and regular rhythm. Heart sounds: Normal heart sounds, S1 normal and S2 normal. No murmur heard. No friction rub. No gallop. Pulmonary:      Effort: Pulmonary effort is normal. No respiratory distress. Breath sounds: Normal breath sounds and air entry. No wheezing, rhonchi or rales. Comments: Hacky cough  Abdominal:      General: Abdomen is flat. Palpations: Abdomen is soft. Musculoskeletal:         General: No tenderness or deformity. Normal range of motion. Cervical back: Full passive range of motion without pain and neck supple. Lymphadenopathy:      Head:      Right side of head: No tonsillar adenopathy. Left side of head: No tonsillar adenopathy. Skin:     General: Skin is warm and dry. Capillary Refill: Capillary refill takes less than 2 seconds. Neurological:      General: No focal deficit present. Mental Status: She is alert, oriented to person, place, and time and easily aroused. Psychiatric:         Attention and Perception: Attention normal.         Mood and Affect: Mood normal.         Speech: Speech normal.         Behavior: Behavior normal. Behavior is cooperative.        /86   Pulse 86   Temp 98.3 °F (36.8 °C)   Resp 18   Ht 5' 1\" (1.549 m)   Wt 148 lb (67.1 kg)   SpO2 99%   BMI 27.96 kg/m²     :Assessment       Diagnosis Orders   1. Cough  POCT rapid strep A   2. Sore throat     3. Fever, unspecified fever cause     4. Screening for viral disease  COVID-19   5. Upper respiratory tract infection, unspecified type         :Plan      Orders Placed This Encounter   Procedures    COVID-19     Scheduling Instructions:      1) Due to current limited availability of the COVID-19 test, tests will be prioritized based on responses to questions above. Testing may be delayed due to volume. 2) Print and instruct patient to adhere to CDC home isolation program. (Link Above)              3) Set up or refer patient for a monitoring program.              4) Have patient sign up for and leverage MyChart (if not previously done). Order Specific Question:   Is this test for diagnosis or screening? Answer:   Screening     Order Specific Question:   Symptomatic for COVID-19 as defined by CDC? Answer:   No     Order Specific Question:   Date of Symptom Onset     Answer:   N/A     Order Specific Question:   Hospitalized for COVID-19? Answer:   No     Order Specific Question:   Admitted to ICU for COVID-19? Answer:   No     Order Specific Question:   Employed in healthcare setting? Answer:   Unknown     Order Specific Question:   Resident in a congregate (group) care setting? Answer:   Unknown     Order Specific Question:   Pregnant? Answer:   No     Order Specific Question:   Previously tested for COVID-19? Answer: Yes    COVID-19    COVID-19    POCT rapid strep A     Results for orders placed or performed in visit on 11/22/21   POCT rapid strep A   Result Value Ref Range    Strep A Ag None Detected None Detected       No follow-ups on file. Orders Placed This Encounter   Medications    promethazine-dextromethorphan (PROMETHAZINE-DM) 6.25-15 MG/5ML syrup     Sig: Take 5 ml po at bedtime, may repeat in 2 hrs.      Dispense:  120 mL Refill:  0    dexamethasone (DECADRON) injection 10 mg        Patient Instructions     Plenty of fluids  Rest  OTC Tylenol or Motrin as needed  Dexamethasone 10 mg IM   Phenergan DM at bedtime as directed  Stay home and stay in until we call with COVID results  Follow up with PCP or return to Urgent Care for worsening or unresolved symptoms. Patient Education        Learning About Coronavirus [phone])  What is coronavirus (COVID-19)? COVID-19 is a disease caused by a type of coronavirus. This illness was first found in December 2019. It has since spread worldwide. Coronaviruses are a large group of viruses. They cause the common cold. They also cause more serious illnesses like [place] respiratory syndrome (MERS) and severe acute respiratory syndrome (SARS). COVID-19 is caused by a novel coronavirus. That means it's a new type that has not been seen in people before. What are the symptoms? COVID-19 symptoms may include:  · Fever. · Cough. · Trouble breathing. · Chills or repeated shaking with chills. · Muscle and body aches. · Headache. · Sore throat. · New loss of taste or smell. · Vomiting. · Diarrhea. In severe cases, COVID-19 can cause pneumonia and make it hard to breathe without help from a machine. It can cause death. How is it diagnosed? COVID-19 is diagnosed with a viral test. This may also be called a PCR test or antigen test. It looks for evidence of the virus in your breathing passages or lungs (respiratory system). The test is most often done on a sample from the nose, throat, or lungs. It's sometimes done on a sample of saliva. One way a sample is collected is by putting a long swab into the back of your nose. How is it treated? Mild cases of COVID-19 can be treated at home. Serious cases need treatment in the hospital. Treatment may include medicines to reduce symptoms, plus breathing support such as oxygen therapy or a ventilator.  Some people may be placed on their bathroom. ? Clean and disinfect your home every day. Use household  and disinfectant wipes or sprays. Take special care to clean things that you touch with your hands. How can you self-isolate when you have COVID-19? If you have COVID-19, there are things you can do to help avoid spreading the virus to others. · Limit contact with people in your home. If possible, stay in a separate bedroom and use a separate bathroom. · Wear a mask when you are around other people. · If you have to leave home, avoid crowds and try to stay at least 6 feet away from other people. · Avoid contact with pets and other animals. · Cover your mouth and nose with a tissue when you cough or sneeze. Then throw it in the trash right away. · Wash your hands often, especially after you cough or sneeze. Use soap and water, and scrub for at least 20 seconds. If soap and water aren't available, use an alcohol-based hand . · Don't share personal household items. These include bedding, towels, cups and glasses, and eating utensils. · [address] in the warmest water allowed for the fabric type, and dry it completely. It's okay to wash other people's laundry with yours. · Clean and disinfect your home. Use household  and disinfectant wipes or sprays. When should you call for help? Call 911 anytime you think you may need emergency care. For example, call if you have life-threatening symptoms, such as:    · You have severe trouble breathing. (You can't talk at all.)     · You have constant chest pain or pressure.     · You are severely dizzy or lightheaded.     · You are confused or can't think clearly.     · You have pale, gray, or blue-colored skin or lips.     · You pass out (lose consciousness) or are very hard to wake up. Call your doctor now or seek immediate medical care if:    · You have moderate trouble breathing.  (You can't speak a full sentence.)     · You are coughing up blood (more than about 1 test results and keep a list of the medicines you take. How can you care for yourself at home? · Get extra rest. It can help you feel better. · Drink plenty of fluids. This helps replace fluids lost from fever. Fluids may also help ease a scratchy throat. · You can take acetaminophen (Tylenol) or ibuprofen (Advil, Motrin) to reduce a fever. It may also help with muscle and body aches. Read and follow all instructions on the label. · Use petroleum jelly on sore skin. This can help if the skin around your nose and lips becomes sore from rubbing a lot with tissues. If you use oxygen, use a water-based product instead of petroleum jelly. · Keep track of symptoms such as fever and shortness of breath. This can help you know if you need to call your doctor. It can also help you know when it's safe to be around other people. · In some cases, your doctor might suggest that you get a pulse oximeter. How can you self-isolate when you have COVID-19? If you have COVID-19, there are things you can do to help avoid spreading the virus to others. · Limit contact with people in your home. If possible, stay in a separate bedroom and use a separate bathroom. · Wear a mask when you are around other people. · If you have to leave home, avoid crowds and try to stay at least 6 feet away from other people. · Avoid contact with pets and other animals. · Cover your mouth and nose with a tissue when you cough or sneeze. Then throw it in the trash right away. · Wash your hands often, especially after you cough or sneeze. Use soap and water, and scrub for at least 20 seconds. If soap and water aren't available, use an alcohol-based hand . · Don't share personal household items. These include bedding, towels, cups and glasses, and eating utensils. · [address] in the warmest water allowed for the fabric type, and dry it completely. It's okay to wash other people's laundry with yours. · Clean and disinfect your home. Use household  and disinfectant wipes or sprays. When can you end self-isolation for COVID-19? If you know or think that you have the virus, you will need to self-isolate. You can be around others after:  · It's been at least 10 days since your symptoms started and  · You haven't had a fever for 24 hours without taking medicines to lower the fever and  · Your symptoms are improving. If you tested positive but have no symptoms, you can end isolation after 10 days. But if you start to have symptoms, follow the steps above. Ask your doctor if you need to be tested before you end isolation. This is especially important if you have a weakened immune system. When should you call for help? Call 911 anytime you think you may need emergency care. For example, call if you have life-threatening symptoms, such as:    · You have severe trouble breathing. (You can't talk at all.)     · You have constant chest pain or pressure.     · You are severely dizzy or lightheaded.     · You are confused or can't think clearly.     · You have pale, gray, or blue-colored skin or lips.     · You pass out (lose consciousness) or are very hard to wake up. Call your doctor now or seek immediate medical care if:    · You have moderate trouble breathing. (You can't speak a full sentence.)     · You are coughing up blood (more than about 1 teaspoon).     · You have signs of low blood pressure. These include feeling lightheaded; being too weak to stand; and having cold, pale, clammy skin. Watch closely for changes in your health, and be sure to contact your doctor if:    · Your symptoms get worse.     · You are not getting better as expected.     · You have new or worse symptoms of anxiety, depression, nightmares, or flashbacks. Call before you go to the doctor's office. Follow their instructions. And wear a mask. Current as of: July 1, 2021               Content Version: 13.0  © 0302-1299 Healthwise, [facility].    Care instructions adapted under license by [facility] ([facility]). If you have questions about a medical condition or this instruction, always ask your healthcare professional. [address] any warranty or liability for your use of this information. Patient Education        Cough: Care Instructions  Your Care Instructions     A cough is your body's response to something that bothers your throat or airways. Many things can cause a cough. You might cough because of a cold or the flu, bronchitis, or asthma. Smoking, postnasal drip, allergies, and stomach acid that backs up into your throat also can cause coughs. A cough is a symptom, not a disease. Most coughs stop when the cause, such as a cold, goes away. You can take a few steps at home to cough less and feel better. Follow-up care is a key part of your treatment and safety. Be sure to make and go to all appointments, and call your doctor if you are having problems. It's also a good idea to know your test results and keep a list of the medicines you take. How can you care for yourself at home? · Drink lots of water and other fluids. This helps thin the mucus and soothes a dry or sore throat. Honey or lemon juice in hot water or tea may ease a dry cough. · Take cough medicine as directed by your doctor. · Prop up your head on pillows to help you breathe and ease a dry cough. · Try cough drops to soothe a dry or sore throat. Cough drops don't stop a cough. Medicine-flavored cough drops are no better than candy-flavored drops or hard candy. · Do not smoke. Avoid secondhand smoke. If you need help quitting, talk to your doctor about stop-smoking programs and medicines. These can increase your chances of quitting for good. When should you call for help? Call 911 anytime you think you may need emergency care. For example, call if:    · You have severe trouble breathing.    Call your doctor now or seek immediate medical care if:    · You cough up blood.     · You have new or worse trouble breathing.     · You have a new or higher fever.     · You have a new rash. Watch closely for changes in your health, and be sure to contact your doctor if:    · You cough more deeply or more often, especially if you notice more mucus or a change in the color of your mucus.     · You have new symptoms, such as a sore throat, an earache, or sinus pain.     · You do not get better as expected. Where can you learn more? Go to https://[employer]. org and sign in to your D2[employer] account. Enter D279 in the cWyze box to learn more about \"Cough: Care Instructions. \"     If you do not have an account, please click on the \"Sign Up Now\" link. Current as of: July 6, 2021               Content Version: 13.0  © 2006-2021 Healthwise, [employer]. Care instructions adapted under license by [facility] ([facility]). If you have questions about a medical condition or this instruction, always ask your healthcare professional. [place] [address] any warranty or liability for your use of this information. Patient given educational materials- see patient instructions. Discussed use, benefit, and side effects of prescribedmedications. All patient questions answered. Pt voiced understanding.        Electronically signed by KALI Chavez CNP on 11/22/2021 at 6:54 PM

## 2021-11-23 NOTE — PATIENT INSTRUCTIONS
Plenty of fluids  Rest  OTC Tylenol or Motrin as needed  Dexamethasone 10 mg IM   Phenergan DM at bedtime as directed  Stay home and stay in until we call with COVID results  Follow up with PCP or return to Urgent Care for worsening or unresolved symptoms. Patient Education        Learning About Coronavirus [phone]  What is coronavirus (COVID-19)? COVID-19 is a disease caused by a type of coronavirus. This illness was first found in December 2019. It has since spread worldwide. Coronaviruses are a large group of viruses. They cause the common cold. They also cause more serious illnesses like [place] respiratory syndrome (MERS) and severe acute respiratory syndrome (SARS). COVID-19 is caused by a novel coronavirus. That means it's a new type that has not been seen in people before. What are the symptoms? COVID-19 symptoms may include:  · Fever. · Cough. · Trouble breathing. · Chills or repeated shaking with chills. · Muscle and body aches. · Headache. · Sore throat. · New loss of taste or smell. · Vomiting. · Diarrhea. In severe cases, COVID-19 can cause pneumonia and make it hard to breathe without help from a machine. It can cause death. How is it diagnosed? COVID-19 is diagnosed with a viral test. This may also be called a PCR test or antigen test. It looks for evidence of the virus in your breathing passages or lungs (respiratory system). The test is most often done on a sample from the nose, throat, or lungs. It's sometimes done on a sample of saliva. One way a sample is collected is by putting a long swab into the back of your nose. How is it treated? Mild cases of COVID-19 can be treated at home. Serious cases need treatment in the hospital. Treatment may include medicines to reduce symptoms, plus breathing support such as oxygen therapy or a ventilator. Some people may be placed on their belly to help their oxygen levels.   Treatments that may help people who have COVID-19 include:  Antiviral medicines. These medicines treat viral infections. Remdesivir is an example. Immune-based therapy. These medicines help the immune system fight COVID-19. Examples include monoclonal antibodies. Blood thinners. These medicines help prevent blood clots. People with severe illness are at risk for blood clots. How can you protect yourself and others? The best way to protect yourself from getting sick is to:  · Get vaccinated. · Avoid sick people. · If you are not fully vaccinated:  ? Wear a mask if you have to go to public areas. ? Avoid crowds and try to stay at least 6 feet away from other people. · Cover your mouth with a tissue when you cough or sneeze. · Wash your hands often, especially after you cough or sneeze. Use soap and water, and scrub for at least 20 seconds. If soap and water aren't available, use an alcohol-based hand . · Avoid touching your mouth, nose, and eyes. To help avoid spreading the virus to others:  · Get vaccinated. · Cover your mouth with a tissue when you cough or sneeze. · Wash your hands often, especially after you cough or sneeze. Use soap and water, and scrub for at least 20 seconds. If soap and water aren't available, use an alcohol-based hand . · If you have been exposed to the virus and are not fully vaccinated:  ? Stay home. Don't go to school, work, or public areas. And don't use public transportation, ride-shares, or taxis unless you have no choice. ? Wear a mask if you have to go to public areas, like the pharmacy. · If you're sick:  ? Leave your home only if you need to get medical care. But call the doctor's office first so they know you're coming. And wear a mask. ? Wear a mask whenever you're around other people. ? Limit contact with pets and people in your home. If possible, stay in a separate bedroom and use a separate bathroom. ? Clean and disinfect your home every day.  Use household  and disinfectant wipes or sprays. Take special care to clean things that you touch with your hands. How can you self-isolate when you have COVID-19? If you have COVID-19, there are things you can do to help avoid spreading the virus to others. · Limit contact with people in your home. If possible, stay in a separate bedroom and use a separate bathroom. · Wear a mask when you are around other people. · If you have to leave home, avoid crowds and try to stay at least 6 feet away from other people. · Avoid contact with pets and other animals. · Cover your mouth and nose with a tissue when you cough or sneeze. Then throw it in the trash right away. · Wash your hands often, especially after you cough or sneeze. Use soap and water, and scrub for at least 20 seconds. If soap and water aren't available, use an alcohol-based hand . · Don't share personal household items. These include bedding, towels, cups and glasses, and eating utensils. · [address] in the warmest water allowed for the fabric type, and dry it completely. It's okay to wash other people's laundry with yours. · Clean and disinfect your home. Use household  and disinfectant wipes or sprays. When should you call for help? Call 911 anytime you think you may need emergency care. For example, call if you have life-threatening symptoms, such as:    · You have severe trouble breathing. (You can't talk at all.)     · You have constant chest pain or pressure.     · You are severely dizzy or lightheaded.     · You are confused or can't think clearly.     · You have pale, gray, or blue-colored skin or lips.     · You pass out (lose consciousness) or are very hard to wake up. Call your doctor now or seek immediate medical care if:    · You have moderate trouble breathing. (You can't speak a full sentence.)     · You are coughing up blood (more than about 1 teaspoon).     · You have signs of low blood pressure.  These include feeling lightheaded; being too weak to stand; and having cold, pale, clammy skin. Watch closely for changes in your health, and be sure to contact your doctor if:    · Your symptoms get worse.     · You are not getting better as expected.     · You have new or worse symptoms of anxiety, depression, nightmares, or flashbacks. Call before you go to the doctor's office. Follow their instructions. And wear a mask. Current as of: July 1, 2021               Content Version: 13.0  © 2006-2021 [employer]. Care instructions adapted under license by [facility] ([facility]). If you have questions about a medical condition or this instruction, always ask your healthcare professional. [place] [address] any warranty or liability for your use of this information. Patient Education        Coronavirus ([id number]): Care Instructions  Overview  The coronavirus disease (COVID-19) is caused by a virus. Symptoms may include a fever, a cough, and shortness of breath. It can spread through droplets from coughing and sneezing, breathing, and singing. The virus also can spread when people are in close contact with someone who is infected. Most people have mild symptoms and can take care of themselves at home. If their symptoms get worse, they may need care in a hospital. Treatment may include medicines to reduce symptoms, plus breathing support such as oxygen therapy or a ventilator. It's important to not spread the virus to others. If you have COVID-19, wear a mask anytime you are around other people. It can help stop the spread of the virus. You need to isolate yourself while you are sick. Leave your home only if you need to get medical care or testing. Follow-up care is a key part of your treatment and safety. Be sure to make and go to all appointments, and call your doctor if you are having problems. It's also a good idea to know your test results and keep a list of the medicines you take.   How can you care for yourself at home?  · Get extra rest. It can help you feel better. · Drink plenty of fluids. This helps replace fluids lost from fever. Fluids may also help ease a scratchy throat. · You can take acetaminophen (Tylenol) or ibuprofen (Advil, Motrin) to reduce a fever. It may also help with muscle and body aches. Read and follow all instructions on the label. · Use petroleum jelly on sore skin. This can help if the skin around your nose and lips becomes sore from rubbing a lot with tissues. If you use oxygen, use a water-based product instead of petroleum jelly. · Keep track of symptoms such as fever and shortness of breath. This can help you know if you need to call your doctor. It can also help you know when it's safe to be around other people. · In some cases, your doctor might suggest that you get a pulse oximeter. How can you self-isolate when you have COVID-19? If you have COVID-19, there are things you can do to help avoid spreading the virus to others. · Limit contact with people in your home. If possible, stay in a separate bedroom and use a separate bathroom. · Wear a mask when you are around other people. · If you have to leave home, avoid crowds and try to stay at least 6 feet away from other people. · Avoid contact with pets and other animals. · Cover your mouth and nose with a tissue when you cough or sneeze. Then throw it in the trash right away. · Wash your hands often, especially after you cough or sneeze. Use soap and water, and scrub for at least 20 seconds. If soap and water aren't available, use an alcohol-based hand . · Don't share personal household items. These include bedding, towels, cups and glasses, and eating utensils. · [address] in the warmest water allowed for the fabric type, and dry it completely. It's okay to wash other people's laundry with yours. · Clean and disinfect your home. Use household  and disinfectant wipes or sprays.   When can you end self-isolation for COVID-19? If you know or think that you have the virus, you will need to self-isolate. You can be around others after:  · It's been at least 10 days since your symptoms started and  · You haven't had a fever for 24 hours without taking medicines to lower the fever and  · Your symptoms are improving. If you tested positive but have no symptoms, you can end isolation after 10 days. But if you start to have symptoms, follow the steps above. Ask your doctor if you need to be tested before you end isolation. This is especially important if you have a weakened immune system. When should you call for help? Call 911 anytime you think you may need emergency care. For example, call if you have life-threatening symptoms, such as:    · You have severe trouble breathing. (You can't talk at all.)     · You have constant chest pain or pressure.     · You are severely dizzy or lightheaded.     · You are confused or can't think clearly.     · You have pale, gray, or blue-colored skin or lips.     · You pass out (lose consciousness) or are very hard to wake up. Call your doctor now or seek immediate medical care if:    · You have moderate trouble breathing. (You can't speak a full sentence.)     · You are coughing up blood (more than about 1 teaspoon).     · You have signs of low blood pressure. These include feeling lightheaded; being too weak to stand; and having cold, pale, clammy skin. Watch closely for changes in your health, and be sure to contact your doctor if:    · Your symptoms get worse.     · You are not getting better as expected.     · You have new or worse symptoms of anxiety, depression, nightmares, or flashbacks. Call before you go to the doctor's office. Follow their instructions. And wear a mask. Current as of: July 1, 2021               Content Version: 13.0  © 2006-2021 [employer]. Care instructions adapted under license by [facility] ([facility]).  If you have questions about a medical condition or this instruction, always ask your healthcare professional. [place] [address] any warranty or liability for your use of this information. Patient Education        Cough: Care Instructions  Your Care Instructions     A cough is your body's response to something that bothers your throat or airways. Many things can cause a cough. You might cough because of a cold or the flu, bronchitis, or asthma. Smoking, postnasal drip, allergies, and stomach acid that backs up into your throat also can cause coughs. A cough is a symptom, not a disease. Most coughs stop when the cause, such as a cold, goes away. You can take a few steps at home to cough less and feel better. Follow-up care is a key part of your treatment and safety. Be sure to make and go to all appointments, and call your doctor if you are having problems. It's also a good idea to know your test results and keep a list of the medicines you take. How can you care for yourself at home? · Drink lots of water and other fluids. This helps thin the mucus and soothes a dry or sore throat. Honey or lemon juice in hot water or tea may ease a dry cough. · Take cough medicine as directed by your doctor. · Prop up your head on pillows to help you breathe and ease a dry cough. · Try cough drops to soothe a dry or sore throat. Cough drops don't stop a cough. Medicine-flavored cough drops are no better than candy-flavored drops or hard candy. · Do not smoke. Avoid secondhand smoke. If you need help quitting, talk to your doctor about stop-smoking programs and medicines. These can increase your chances of quitting for good. When should you call for help? Call 911 anytime you think you may need emergency care. For example, call if:    · You have severe trouble breathing. Call your doctor now or seek immediate medical care if:    · You cough up blood.     · You have new or worse trouble breathing.     · You have a new or higher fever.   · You have a new rash. Watch closely for changes in your health, and be sure to contact your doctor if:    · You cough more deeply or more often, especially if you notice more mucus or a change in the color of your mucus.     · You have new symptoms, such as a sore throat, an earache, or sinus pain.     · You do not get better as expected. Where can you learn more? Go to https://[employer] First. org and sign in to your [employer] account. Enter D279 in the [employer] box to learn more about \"Cough: Care Instructions. \"     If you do not have an account, please click on the \"Sign Up Now\" link. Current as of: July 6, 2021               Content Version: 13.0  © 9970-0271 [employer]. Care instructions adapted under license by [facility] ([facility]). If you have questions about a medical condition or this instruction, always ask your healthcare professional. [place] [address] any warranty or liability for your use of this information.

## 2021-11-24 ENCOUNTER — TELEPHONE (OUTPATIENT)
Dept: FAMILY MEDICINE CLINIC | Age: 56
End: 2021-11-24

## 2021-11-24 ENCOUNTER — OFFICE VISIT (OUTPATIENT)
Dept: FAMILY MEDICINE CLINIC | Age: 56
End: 2021-11-24
Payer: COMMERCIAL

## 2021-11-24 VITALS
HEART RATE: 70 BPM | WEIGHT: 150 LBS | TEMPERATURE: 97.4 F | HEIGHT: 61 IN | SYSTOLIC BLOOD PRESSURE: 126 MMHG | BODY MASS INDEX: 28.32 KG/M2 | OXYGEN SATURATION: 97 % | DIASTOLIC BLOOD PRESSURE: 72 MMHG

## 2021-11-24 DIAGNOSIS — E53.8 B12 DEFICIENCY: ICD-10-CM

## 2021-11-24 DIAGNOSIS — D50.9 IRON DEFICIENCY ANEMIA, UNSPECIFIED IRON DEFICIENCY ANEMIA TYPE: ICD-10-CM

## 2021-11-24 DIAGNOSIS — J20.9 ACUTE BRONCHITIS, UNSPECIFIED ORGANISM: ICD-10-CM

## 2021-11-24 DIAGNOSIS — G89.29 CHRONIC NECK PAIN: ICD-10-CM

## 2021-11-24 DIAGNOSIS — E66.3 OVERWEIGHT (BMI 25.0-29.9): ICD-10-CM

## 2021-11-24 DIAGNOSIS — G43.009 MIGRAINE WITHOUT AURA AND WITHOUT STATUS MIGRAINOSUS, NOT INTRACTABLE: ICD-10-CM

## 2021-11-24 DIAGNOSIS — M54.2 CHRONIC NECK PAIN: ICD-10-CM

## 2021-11-24 DIAGNOSIS — F33.1 MODERATE RECURRENT MAJOR DEPRESSION (HCC): Primary | ICD-10-CM

## 2021-11-24 DIAGNOSIS — Z51.81 THERAPEUTIC DRUG MONITORING: ICD-10-CM

## 2021-11-24 DIAGNOSIS — E55.9 VITAMIN D DEFICIENCY: ICD-10-CM

## 2021-11-24 DIAGNOSIS — E78.2 MIXED HYPERLIPIDEMIA: ICD-10-CM

## 2021-11-24 DIAGNOSIS — Z12.31 ENCOUNTER FOR SCREENING MAMMOGRAM FOR MALIGNANT NEOPLASM OF BREAST: ICD-10-CM

## 2021-11-24 DIAGNOSIS — F41.1 GAD (GENERALIZED ANXIETY DISORDER): ICD-10-CM

## 2021-11-24 DIAGNOSIS — E03.9 ACQUIRED HYPOTHYROIDISM: ICD-10-CM

## 2021-11-24 PROCEDURE — 96372 THER/PROPH/DIAG INJ SC/IM: CPT | Performed by: INTERNAL MEDICINE

## 2021-11-24 PROCEDURE — 80305 DRUG TEST PRSMV DIR OPT OBS: CPT | Performed by: INTERNAL MEDICINE

## 2021-11-24 PROCEDURE — 99215 OFFICE O/P EST HI 40 MIN: CPT | Performed by: INTERNAL MEDICINE

## 2021-11-24 RX ORDER — HYDROCODONE POLISTIREX AND CHLORPHENIRAMINE POLISTIREX 10; 8 MG/5ML; MG/5ML
5 SUSPENSION, EXTENDED RELEASE ORAL EVERY 12 HOURS PRN
Qty: 100 ML | Refills: 0 | Status: SHIPPED | OUTPATIENT
Start: 2021-11-24 | End: 2021-11-29 | Stop reason: SDUPTHER

## 2021-11-24 RX ORDER — METHYLPREDNISOLONE 4 MG/1
TABLET ORAL
Qty: 1 KIT | Refills: 0 | Status: SHIPPED | OUTPATIENT
Start: 2021-11-24 | End: 2021-11-30

## 2021-11-24 RX ORDER — CYANOCOBALAMIN 1000 UG/ML
1000 INJECTION INTRAMUSCULAR; SUBCUTANEOUS ONCE
Status: COMPLETED | OUTPATIENT
Start: 2021-11-24 | End: 2021-11-24

## 2021-11-24 RX ORDER — LEVOFLOXACIN 500 MG/1
500 TABLET, FILM COATED ORAL DAILY
Qty: 10 TABLET | Refills: 0 | Status: SHIPPED | OUTPATIENT
Start: 2021-11-24 | End: 2021-12-04

## 2021-11-24 RX ORDER — LEVOTHYROXINE SODIUM 0.12 MG/1
125 TABLET ORAL DAILY
Qty: 30 TABLET | Refills: 5 | Status: SHIPPED | OUTPATIENT
Start: 2021-11-24 | End: 2022-05-09 | Stop reason: DRUGHIGH

## 2021-11-24 RX ADMIN — CYANOCOBALAMIN 1000 MCG: 1000 INJECTION INTRAMUSCULAR; SUBCUTANEOUS at 12:55

## 2021-11-24 ASSESSMENT — ENCOUNTER SYMPTOMS
VOMITING: 0
SINUS PAIN: 0
COLOR CHANGE: 0
BACK PAIN: 1
STRIDOR: 0
EYE DISCHARGE: 0
COUGH: 1
EYE PAIN: 0
SHORTNESS OF BREATH: 0
VOICE CHANGE: 0
SINUS PRESSURE: 0
SORE THROAT: 0
DIARRHEA: 0
RHINORRHEA: 1
EYE REDNESS: 0
BLOOD IN STOOL: 0
WHEEZING: 0
CHEST TIGHTNESS: 0
ABDOMINAL PAIN: 0

## 2021-11-24 NOTE — PATIENT INSTRUCTIONS
Patient Education        Iron Deficiency Anemia: Care Instructions  Your Care Instructions     Anemia means that you don't have enough red blood cells. Red blood cells carry oxygen around your body. When you have anemia, it can make you pale, weak, and tired. Many things can cause anemia. The most common cause is loss of blood. This can happen if you have heavy menstrual periods. It can also happen if you have bleeding in your stomach or bowel. You can also get anemia if you don't have enough iron in your diet or if it's hard for your body to absorb iron. In some cases, pregnancy causes anemia. That's because a pregnant woman needs more iron. Your doctor may do more tests to find the cause of your anemia. If a disease or other health problem is causing it, your doctor will treat that problem. It's important to follow up with your doctor to make sure that your iron level returns to normal.  Follow-up care is a key part of your treatment and safety. Be sure to make and go to all appointments, and call your doctor if you are having problems. It's also a good idea to know your test results and keep a list of the medicines you take. How can you care for yourself at home? · If your doctor recommended iron pills, take them as directed. ? Try to take the pills on an empty stomach. You can do this about 1 hour before or 2 hours after meals. But you may need to take iron with food to avoid an upset stomach. ? Do not take antacids or drink milk or anything with caffeine within 2 hours of when you take your iron. They can keep your body from absorbing the iron well. ? Vitamin C helps your body absorb iron. You may want to take iron pills with a glass of orange juice or some other food high in vitamin C.  ? Iron pills may cause stomach problems. These include heartburn, nausea, diarrhea, constipation, and cramps. It can help to drink plenty of fluids and include fruits, vegetables, and fiber in your diet.   ? It's normal for iron pills to make your stool a greenish or grayish black. But internal bleeding can also cause dark stool. So it's important to tell your doctor about any color changes. ? Call your doctor if you think you are having a problem with your iron pills. Even after you start to feel better, it will take several months for your body to build up its supply of iron. ? If you miss a pill, don't take a double dose. ? Keep iron pills out of the reach of small children. Too much iron can be very dangerous. · Eat foods with a lot of iron. These include red meat, shellfish, poultry, and eggs. They also include beans, raisins, whole-grain bread, and leafy green vegetables. · Steam your vegetables. This is the best way to prepare them if you want to get as much iron as possible. · Be safe with medicines. Do not take nonsteroidal anti-inflammatory pain relievers unless your doctor tells you to. These include aspirin, naproxen (Aleve), and ibuprofen (Advil, Motrin). · Liquid iron can stain your teeth. But you can mix it with water or juice and drink it with a straw. Then it won't get on your teeth. When should you call for help? Call 911 anytime you think you may need emergency care. For example, call if:    · You passed out (lost consciousness). Call your doctor now or seek immediate medical care if:    · You are short of breath.     · You are dizzy or light-headed, or you feel like you may faint.     · You have new or worse bleeding. Watch closely for changes in your health, and be sure to contact your doctor if:    · You feel weaker or more tired than usual.     · You do not get better as expected. Where can you learn more? Go to https://CYTIMMUNE SCIENCESmichael.[employer]. org and sign in to your Emcore account. Enter W223 in the Stockleap box to learn more about \"Iron Deficiency Anemia: Care Instructions. \"     If you do not have an account, please click on the \"Sign Up Now\" link.   Current as of: April 29, 2021               Content Version: 13.0  © 2006-2021 [employer]. Care instructions adapted under license by [facility] ([facility]). If you have questions about a medical condition or this instruction, always ask your healthcare professional. [address] any warranty or liability for your use of this information. Patient Education        Vitamin B12 Deficiency: Care Instructions  Overview    A vitamin B12 deficiency means that your body doesn't have enough of this vitamin. You need vitamin B12 to keep red blood cells and nerve cells healthy. Not enough B12 can cause anemia. It can also damage nerves and cause trouble with memory and thinking. Many things can cause low levels of vitamin B12. They include:  · Not getting enough of this vitamin through food. · An autoimmune problem, like pernicious anemia. · Weight-loss surgery, like gastric bypass. · Long-term use of heartburn medicines. Low levels of B12 may not cause symptoms. But symptoms may include fatigue, depression, and thinking or memory problems. You may have tingling in your hands or feet and changes in the way you walk. Treatment depends on the reason for low vitamin B12. Eating more foods rich in B12 may be enough. Or you might take the vitamin as a pill, as shots, or as nasal spray. How can you care for yourself? · Take vitamin B12 as your doctor recommends. · Go to your appointments if you are getting B12 shots. · Eat more foods rich in vitamin B12. Examples are:  ? Animal products. These include meat, seafood, milk products, poultry, and eggs. ? Foods that have B12 added. These are called fortified foods. They include soy products, nutritional yeast, and dry cereals. · Work with a nutritionist or dietitian if you need help getting more vitamin B12 from food. · Talk to your doctor about stopping medicines if they are adding to your B12 deficiency. When should you call for help?    Call [phone] anytime you think you may need emergency care. For example, call if:    · You passed out (lost consciousness). Call your doctor now or seek immediate medical care if:    · You are dizzy or lightheaded, or you feel like you may faint. Watch closely for changes in your health. Be sure to call your doctor if:    · You are confused or can't think clearly.     · You don't get better as expected. Where can you learn more? Go to https://ComCrowdpeimagoo.Heliae. org and sign in to your [employer] account. Enter [phone] in the [facility] box to learn more about \"Vitamin B12 Deficiency: Care Instructions. \"     If you do not have an account, please click on the \"Sign Up Now\" link. Current as of: April 29, 2021               Content Version: 13.0  © 2149-3348 [employer]. Care instructions adapted under license by [facility] ([facility]). If you have questions about a medical condition or this instruction, always ask your healthcare professional. [place] [address] any warranty or liability for your use of this information. Patient Education        Bronchitis: Care Instructions  Your Care Instructions     Bronchitis is inflammation of the bronchial tubes, which carry air to the lungs. The tubes swell and produce mucus, or phlegm. The mucus and inflamed bronchial tubes make you cough. You may have trouble breathing. Most cases of bronchitis are caused by viruses like those that cause colds. Antibiotics usually do not help and they may be harmful. Bronchitis usually develops rapidly and lasts about 2 to 3 weeks in otherwise healthy people. Follow-up care is a key part of your treatment and safety. Be sure to make and go to all appointments, and call your doctor if you are having problems. It's also a good idea to know your test results and keep a list of the medicines you take. How can you care for yourself at home? · Take all medicines exactly as prescribed.  Call your doctor if you think you are having a problem with your medicine. · Get some extra rest.  · Take an over-the-counter pain medicine, such as acetaminophen (Tylenol), ibuprofen (Advil, Motrin), or naproxen (Aleve) to reduce fever and relieve body aches. Read and follow all instructions on the label. · Do not take two or more pain medicines at the same time unless the doctor told you to. Many pain medicines have acetaminophen, which is Tylenol. Too much acetaminophen (Tylenol) can be harmful. · Take an over-the-counter cough medicine to help quiet a dry, hacking cough so that you can sleep. Avoid cough medicines that have more than one active ingredient. Read and follow all instructions on the label. · Do not smoke. Smoking can make bronchitis worse. If you need help quitting, talk to your doctor about stop-smoking programs and medicines. These can increase your chances of quitting for good. When should you call for help? Call 911 anytime you think you may need emergency care. For example, call if:    · You have severe trouble breathing. Call your doctor now or seek immediate medical care if:    · You have new or worse trouble breathing.     · You cough up dark brown or bloody mucus (sputum).     · You have a new or higher fever.     · You have a new rash. Watch closely for changes in your health, and be sure to contact your doctor if:    · You cough more deeply or more often, especially if you notice more mucus or a change in the color of your mucus.     · You are not getting better as expected. Where can you learn more? Go to https://Portfolia.[employer]. org and sign in to your Hapara account. Enter H333 in the Ecrebo box to learn more about \"Bronchitis: Care Instructions. \"     If you do not have an account, please click on the \"Sign Up Now\" link. Current as of: July 6, 2021               Content Version: 13.0  © 0828-1718 Healthwise, Incorporated.    Care instructions adapted under license by [facility] If you have questions about a medical condition or this instruction, always ask your healthcare professional. [place] [address] any warranty or liability for your use of this information. Patient Education        Recovering From Depression: Care Instructions  Your Care Instructions     Taking good care of yourself is important as you recover from depression. In time, your symptoms will fade as your treatment takes hold. Do not give up. Instead, focus your energy on getting better. Your mood will improve. It just takes some time. Focus on things that can help you feel better, such as being with friends and family, eating well, and getting enough rest. But take things slowly. Do not do too much too soon. You will begin to feel better gradually. Follow-up care is a key part of your treatment and safety. Be sure to make and go to all appointments, and call your doctor if you are having problems. It's also a good idea to know your test results and keep a list of the medicines you take. How can you care for yourself at home? Be realistic  · If you have a large task to do, break it up into smaller steps you can handle, and just do what you can. · You may want to put off important decisions until your depression has lifted. If you have plans that will have a major impact on your life, such as marriage, divorce, or a job change, try to wait a bit. Talk it over with friends and loved ones who can help you look at the overall picture first.  · Reaching out to people for help is important. Do not isolate yourself. Let your family and friends help you. Find someone you can trust and confide in, and talk to that person. · Be patient, and be kind to yourself. Remember that depression is not your fault and is not something you can overcome with willpower alone. Treatment is important for depression, just like for any other illness.  Feeling better takes time, and your mood will improve little by little. Stay active  · Stay busy and get outside. Take a walk, or try some other light exercise. · Talk with your doctor about an exercise program. Exercise can help with mild depression. · Go to a movie or concert. Take part in a Mu-ism activity or other social gathering. Go to a ball game. · Ask a friend to have dinner with you. Take care of yourself  · Eat a balanced diet with plenty of fresh fruits and vegetables, whole grains, and lean protein. If you have lost your appetite, eat small snacks rather than large meals. · Avoid using illegal drugs or marijuana and drinking alcohol. Do not take medicines that have not been prescribed for you. They may interfere with medicines you may be taking for depression, or they may make your depression worse. · Take your medicines exactly as they are prescribed. You may start to feel better within 1 to 3 weeks of taking antidepressant medicine. But it can take as many as 6 to 8 weeks to see more improvement. If you have questions or concerns about your medicines, or if you do not notice any improvement by 3 weeks, talk to your doctor. · Continue to take your medicine after your symptoms improve. Taking your medicine for at least 6 months after you feel better can help keep you from getting depressed again. If this isn't the first time you have been depressed, your doctor may recommend you to take medicine even longer. · If you have any side effects from your medicine, tell your doctor. Many side effects are mild and will go away on their own after you have been taking the medicine for a few weeks. Some may last longer. Talk to your doctor if side effects are bothering you too much. You might be able to try a different medicine. · Continue counseling. It may help prevent depression from returning, especially if you've had multiple episodes of depression.  Talk with your counselor if you are having a hard time attending your sessions or you think the sessions aren't working. Don't just stop going. · Get enough sleep. Talk to your doctor if you are having problems sleeping. · Avoid sleeping pills unless they are prescribed by the doctor treating your depression. Sleeping pills may make you groggy during the day, and they may interact with other medicine you are taking. · If you have any other illnesses, such as diabetes, heart disease, or high blood pressure, make sure to continue with your treatment. Tell your doctor about all of the medicines you take, including those with or without a prescription. · If you or someone you know talks about suicide, self-harm, or feeling hopeless, get help right away. Call the [facility] at 8-413-427-ZJLC ([phone]) or text HOME to 241126 to access the "[employer] Text Line. Consider saving these numbers in your phone. When should you call for help? Call 911 anytime you think you may need emergency care. For example, call if:    · You feel like hurting yourself or someone else.     · Someone you know has depression and is about to attempt or is attempting suicide. Call your doctor now or seek immediate medical care if:    · You hear voices.     · Someone you know has depression and:  ? Starts to give away his or her possessions. ? Uses illegal drugs or drinks alcohol heavily. ? Talks or writes about death, including writing suicide notes or talking about guns, knives, or pills. ? Starts to spend a lot of time alone. ? Acts very aggressively or suddenly appears calm. Watch closely for changes in your health, and be sure to contact your doctor if:    · You do not get better as expected. Where can you learn more? Go to https://Pascal Metricsmichael.Cleeng. org and sign in to your [employer]ota account. Enter C589 in the [employer] box to learn more about \"Recovering From Depression: Care Instructions. \"     If you do not have an account, please click on the \"Sign Up Now\" link.  Current as of: June 16, 2021               Content Version: 13.0  © 2006-2021 Healthwise, [employer]. Care instructions adapted under license by [facility] ([facility]). If you have questions about a medical condition or this instruction, always ask your healthcare professional. [address] any warranty or liability for your use of this information. Patient Education        Learning About High Cholesterol  What is high cholesterol? High cholesterol means that you have too much cholesterol in your blood. Cholesterol is a type of fat. It's needed for many body functions, such as making new cells. Cholesterol is made by your body. It also comes from food you eat. Having high cholesterol can lead to the buildup of plaque in artery walls. This can increase your risk of heart attack and stroke. When your doctor talks about high cholesterol levels, your doctor is talking about your total cholesterol and LDL cholesterol (the \"bad\" cholesterol) levels. Your doctor may also speak about HDL (the \"good\" cholesterol) levels. High HDL is linked with a lower risk for coronary artery disease, heart attack, and stroke. Your cholesterol levels help your doctor find out your risk for having a heart attack or stroke. How can you help prevent high cholesterol? A heart-healthy lifestyle can help you prevent high cholesterol and lower your risk for a heart attack and stroke. · Eat heart-healthy foods. ? Eat fruits, vegetables, whole grains, beans, and other high-fiber foods. ? Eat lean proteins, such as seafood, lean meats, beans, nuts, and soy products. ? Eat healthy fats, such as canola and olive oil. ? Choose foods that are low in saturated fat. ? Limit sodium and alcohol. ? Limit drinks and foods with added sugar. · Be active. Try to do moderate activity at least 2½ hours a week. Or try vigorous activity at least 1¼ hours a week.  You may want to walk or try other activities, such as running, swimming, cycling, or playing tennis or team sports. · Stay at a healthy weight. Lose weight if you need to. · Don't smoke. If you need help quitting, talk to your doctor about stop-smoking programs and medicines. These can increase your chances of quitting for good. How is high cholesterol treated? The goal of treatment is to reduce your chances of having a heart attack or stroke. The goal is not to lower your cholesterol numbers only. · Have a heart-healthy lifestyle. This includes eating healthy foods, not smoking, losing weight, and being more active. · You may choose to take medicine. Follow-up care is a key part of your treatment and safety. Be sure to make and go to all appointments, and call your doctor if you are having problems. It's also a good idea to know your test results and keep a list of the medicines you take. Where can you learn more? Go to https://MedTel24pekoko.DocLanding. org and sign in to your [employer] account. Enter P726 in the Runa box to learn more about \"Learning About High Cholesterol. \"     If you do not have an account, please click on the \"Sign Up Now\" link. Current as of: April 29, 2021               Content Version: 13.0  © 2006-2021 Healthwise, [employer]ik. Care instructions adapted under license by [facility] ([facility]). If you have questions about a medical condition or this instruction, always ask your healthcare professional. [place] [address] any warranty or liability for your use of this information. Patient Education        Recurring Migraine Headache: Care Instructions  Overview  Migraines are painful, throbbing headaches. They often start on one side of the head. They may cause nausea and vomiting and make you sensitive to light, sound, or smell. Some people may have only a few migraines throughout life. Others have them as often as several times a month.   The goal of treatment is to reduce the number of migraines you have and relieve your symptoms. Even with treatment, you may continue to have migraines. You play an important role in dealing with your headaches. Work on avoiding things that seem to trigger your migraines. When you feel a headache coming on, act quickly to stop it before it gets worse. Follow-up care is a key part of your treatment and safety. Be sure to make and go to all appointments, and call your doctor if you are having problems. It's also a good idea to know your test results and keep a list of the medicines you take. How can you care for yourself at home? · Do not drive if you have taken a prescription pain medicine. · Rest in a quiet, dark room until your headache is gone. Close your eyes and try to relax or go to sleep. Do not watch TV or read. · Put a cold, moist cloth or cold pack on the painful area for 10 to 20 minutes at a time. Put a thin cloth between the cold pack and your skin. · Have someone gently massage your neck and shoulders. · Take your medicines exactly as prescribed. Call your doctor if you think you are having a problem with your medicine. You will get more details on the specific medicines your doctor prescribes. · Don't take medicine for headache pain too often. Talk to your doctor if you are taking medicine more than 2 days a week to stop a headache. Taking too much pain medicine can lead to more headaches. These are called medicine-overuse headaches. To prevent migraines  · Keep a headache diary so you can figure out what triggers your headaches. Avoiding triggers may help you prevent headaches. Record when each headache began, how long it lasted, and what the pain was like. Write down any other symptoms you had with the headache. These may include nausea, flashing lights or dark spots, or sensitivity to bright light or loud noise. Note if the headache occurred near your period. List anything that might have triggered the headache.  Triggers may include certain foods (chocolate, cheese, wine) or odors, smoke, bright light, stress, or lack of sleep. · If your doctor has prescribed medicine for your migraines, take it as directed. You may have medicine that you take only when you get a migraine and medicine that you take all the time to help prevent migraines. ? If your doctor has prescribed medicine for when you get a headache, take it at the first sign of a migraine, unless your doctor has given you other instructions. ? If your doctor has prescribed medicine to prevent migraines, take it exactly as prescribed. Call your doctor if you think you are having a problem with your medicine. · Find healthy ways to deal with stress. Migraines are most common during or right after stressful times. Try finding ways to reduce stress like practicing mindfulness or deep breathing exercises. · Get regular sleep and exercise. But be careful to not push yourself too hard during exercise. It may trigger a headache. · Eat regular meals, and avoid foods and drinks that often trigger migraines. These include chocolate and alcohol, especially red wine and port. Chemicals used in food, such as aspartame and monosodium glutamate (MSG), also can trigger migraines. So can some food additives, such as those found in hot dogs, mendoza, cold cuts, aged cheeses, and pickled foods. · Limit caffeine by not drinking too much coffee, tea, or soda. Do not quit caffeine suddenly, because that can also give you migraines. · Do not smoke or allow others to smoke around you. If you need help quitting, talk to your doctor about stop-smoking programs and medicines. These can increase your chances of quitting for good. · If you are taking birth control pills or hormone therapy, talk to your doctor about whether they are triggering your migraines. When should you call for help? Call 911 anytime you think you may need emergency care. For example, call if:    · You have symptoms of a stroke.  These may

## 2021-11-24 NOTE — TELEPHONE ENCOUNTER
I ordered it in chart and printed it to fax if needed.  Please let patient know but I would like her to finish treatment for bronchitis first.

## 2021-11-24 NOTE — PROGRESS NOTES
After obtaining consent, and per orders of Dr. Daryl Rivera, injection of Cyanocobalamin 1000mcg given in Right deltoid by Domo Lyons MA. Patient instructed to remain in clinic for 20 minutes afterwards, and to report any adverse reaction to me immediately.

## 2021-11-24 NOTE — TELEPHONE ENCOUNTER
I spoke to [address] They said the 500 MG Venofer has been causing reactions in patient's lately so they don't recommend this dose. They have had no problems with the 200 MG Venofer and the insurance usually pays for this dose as well. They also have 750 Mg injectofer but they don't have as good of luck getting that approved. The infusion Center said to put the order in and fax it to them. They will get the authorization from the insurance company.

## 2021-11-24 NOTE — PROGRESS NOTES
Ivan Luu is a 61 y.o. female who presents today for   Chief Complaint   Patient presents with    Headache     recurrent HAs    Depression     recheck mood    Anxiety       Headache   Associated symptoms include back pain, coughing, neck pain and rhinorrhea. Pertinent negatives include no abdominal pain, dizziness, ear pain, eye pain, eye redness, fever, numbness, sinus pressure, sore throat, vomiting or weakness. Other  Associated symptoms include congestion, coughing, headaches, myalgias and neck pain. Pertinent negatives include no abdominal pain, arthralgias, chest pain, chills, fatigue, fever, numbness, rash, sore throat, vomiting or weakness. 62 y/o WF presents for follow up on recurrent migraine/tension HAs, chronic neck pain with controlled med refills, chronic depression, HORACE, and elevated BMI. Patient has been more tired recently and has been sick for the past month. She does not tolerate oral iron replacement well and has not been on her B12 supplement so her microcytic anemia and iron levels are more low. She has tried Flinstone multi-vitamin with iron but not taking it as much recently. [facility] recommended a different iron replacement but she is worried about it hurting her stomach. She has been coughing and congested which has improved  since she had a steroid shot 2 days ago at urgent care where she was negative for Strep and Covid-19. She is coughing up some green mucous now and has chest congestion more than sinus pain and pressure now. Promethazine DM made her way too sleepy/difficult to wake up. Since patient's daughter has been working recently, patient has been keeping all 4 granchildren most days of the week which has been causing more frequent HAs. She feels like her depression and anxiety are controlled overall with fluvoxamine despite this added stress but HAs have been most days of the week recently.  She did get all of her teeth pulled for severe dental disease since last visit and she has upper and lower dentures that she is still adjusting to and she feels like they are a little too big for her mouth so far but her dentist assured her this should improve in time. It has made it harder to eat a lot of foods however. BMI Readings from Last 3 Encounters:   11/24/21 28.34 kg/m²   11/22/21 27.96 kg/m²   07/22/21 29.52 kg/m²     Wt Readings from Last 3 Encounters:   11/24/21 150 lb (68 kg)   11/22/21 148 lb (67.1 kg)   07/22/21 156 lb 4 oz (70.9 kg)     Review of Systems   Constitutional: Negative for appetite change, chills, fatigue and fever. HENT: Positive for congestion, postnasal drip, rhinorrhea and sneezing. Negative for ear pain, sinus pressure, sinus pain, sore throat and voice change. Eyes: Negative for pain, discharge and redness. Respiratory: Positive for cough. Negative for chest tightness, shortness of breath, wheezing and stridor. Cardiovascular: Negative for chest pain and palpitations. Gastrointestinal: Negative for abdominal pain, blood in stool, diarrhea and vomiting. Endocrine: Negative for cold intolerance, heat intolerance and polydipsia. Genitourinary: Negative for dysuria and hematuria. Musculoskeletal: Positive for back pain, myalgias and neck pain. Negative for arthralgias and neck stiffness. Skin: Negative for color change and rash. Neurological: Positive for headaches. Negative for dizziness, tremors, syncope, speech difficulty, weakness and numbness. Hematological: Negative for adenopathy. Does not bruise/bleed easily. Psychiatric/Behavioral: Positive for dysphoric mood and sleep disturbance. Negative for confusion, self-injury and suicidal ideas. The patient is nervous/anxious. All other systems reviewed and are negative.       Past Medical History:   Diagnosis Date    Allergic rhinitis     Chronic bilateral low back pain with left-sided sciatica 12/1/2017    Colon polyp     Depression with anxiety     Obesity     Osteoarthritis        Current Outpatient Medications   Medication Sig Dispense Refill    levothyroxine (EUTHYROX) 125 MCG tablet Take 1 tablet by mouth Daily 30 tablet 5    iron sucrose (VENOFER) 20 MG/ML injection 200 mg infused over 1 hour once weekly x3 weeks 30 mL 0    omeprazole (PRILOSEC) 40 MG delayed release capsule Take 1 capsule by mouth nightly 30 capsule 0    gabapentin (NEURONTIN) 300 MG capsule TAKE 2 CAPSULES BY MOUTH EVERY DAY AT BEDTIME 60 capsule 2    baclofen (LIORESAL) 10 MG tablet TAKE 1 TABLET BY MOUTH THREE TIMES DAILY AS NEEDED (MUSCLE  SPASMS/  NECK  PAIN) 90 tablet 0    fluvoxaMINE (LUVOX) 50 MG tablet Take 1.5 tablets by mouth nightly 45 tablet 5    propranolol (INDERAL LA) 80 MG extended release capsule Take 1 capsule by mouth once daily 30 capsule 5    Potassium 99 MG TABS Take 1 tablet by mouth daily      TURMERIC PO Take 1 tablet by mouth daily      vitamin B-12 (CYANOCOBALAMIN) 1000 MCG tablet Take 1,000 mcg by mouth daily      Multiple Vitamins-Minerals (MULTIVITAMIN ADULT PO) Take by mouth      Omega 3 1000 MG CAPS Take by mouth      Cholecalciferol (VITAMIN D3) 3000 units TABS Take by mouth      butalbital-acetaminophen-caffeine (FIORICET, ESGIC) -40 MG per tablet TAKE 1 TABLET BY MOUTH EVERY 8 HOURS AS NEEDED FOR MIGRAINE HEADACHE 90 tablet 0     No current facility-administered medications for this visit. Allergies   Allergen Reactions    Augmentin [Amoxicillin-Pot Clavulanate] Diarrhea    Aspirin Other (See Comments)       Past Surgical History:   Procedure Laterality Date     SECTION      COLONOSCOPY  2015    colon polyp x1, recheck in 5 yrs (Dr Leia Butcher)   [address]         Social History     Tobacco Use    Smoking status: Never Smoker    Smokeless tobacco: Never Used   Vaping Use    Vaping Use: Never used   Substance Use Topics    Alcohol use:  Yes     Alcohol/week: 1.0 standard drink     Types: 1 Standard drinks or equivalent per week     Comment: rarely    Drug use: Never       Family History   Problem Relation Age of Onset    Heart Disease Mother     High Blood Pressure Mother     Other Mother         skin CA    Osteoporosis Mother     Heart Disease Father     High Blood Pressure Father     Other Father         jaw CA    Heart Disease Sister     Diabetes Sister     Stroke Sister     High Blood Pressure Sister     Other Sister         fibromyalgia, thyroid CA       /72   Pulse 70   Temp 97.4 °F (36.3 °C)   Ht 5' 1\" (1.549 m)   Wt 150 lb (68 kg)   SpO2 97%   BMI 28.34 kg/m²     Physical Exam  Vitals reviewed. Constitutional:       General: She is not in acute distress. Appearance: Normal appearance. She is well-developed, well-groomed and overweight. She is not ill-appearing or toxic-appearing. HENT:      Head: Normocephalic and atraumatic. Right Ear: Tympanic membrane, ear canal and external ear normal.      Left Ear: Tympanic membrane, ear canal and external ear normal.      Nose: Congestion present. No nasal tenderness or rhinorrhea. Right Turbinates: Swollen. Left Turbinates: Swollen. Right Sinus: No maxillary sinus tenderness or frontal sinus tenderness. Left Sinus: No maxillary sinus tenderness or frontal sinus tenderness. Mouth/Throat:      Lips: Pink. Mouth: Mucous membranes are moist.   Eyes:      General:         Right eye: No discharge. Left eye: No discharge. Conjunctiva/sclera: Conjunctivae normal.      Pupils: Pupils are equal, round, and reactive to light. Neck:      Thyroid: No thyromegaly. Vascular: Normal carotid pulses. No carotid bruit or JVD. Trachea: Trachea and phonation normal. No tracheal tenderness. Comments: +mild bilateral trapezius muscle spasm  Cardiovascular:      Rate and Rhythm: Normal rate and regular rhythm.       Pulses:           Carotid pulses are 2+ on the right side and 2+ on the left side. Posterior tibial pulses are 2+ on the right side and 2+ on the left side. Heart sounds: Normal heart sounds. No murmur heard. No friction rub. No gallop. Pulmonary:      Effort: Pulmonary effort is normal. No accessory muscle usage. Breath sounds: Normal breath sounds. No decreased breath sounds, wheezing, rhonchi or rales. Chest:   Breasts:      Right: No supraclavicular adenopathy. Left: No supraclavicular adenopathy. Abdominal:      General: Bowel sounds are normal. There is no distension. Palpations: Abdomen is soft. There is no mass. Tenderness: There is no abdominal tenderness. There is no guarding or rebound. Hernia: No hernia is present. Musculoskeletal:         General: No swelling, tenderness or deformity. Right wrist: Normal.      Left wrist: Normal.      Cervical back: Normal range of motion and neck supple. No edema, erythema or rigidity. Muscular tenderness present. No pain with movement. Normal range of motion. Right lower leg: No edema. Left lower leg: No edema. Right ankle: Normal.      Left ankle: Normal.   Lymphadenopathy:      Cervical: No cervical adenopathy. Upper Body:      Right upper body: No supraclavicular adenopathy. Left upper body: No supraclavicular adenopathy. Skin:     General: Skin is warm. Capillary Refill: Capillary refill takes less than 2 seconds. Coloration: Skin is not cyanotic. Findings: No rash. Nails: There is no clubbing. Neurological:      Mental Status: She is alert and oriented to person, place, and time. Cranial Nerves: No cranial nerve deficit or dysarthria. Motor: No weakness, tremor or abnormal muscle tone. Coordination: Coordination normal.      Gait: Gait is intact.       Comments: CN II-XII grossly intact, speech clear, no facial droop, MAEW   Psychiatric:         Attention and Perception: Attention and perception normal.         Mood and Affect: Mood and affect normal.         Speech: Speech normal.         Behavior: Behavior normal. Behavior is cooperative. Thought Content:  Thought content normal.         Cognition and Memory: Cognition and memory normal.         Judgment: Judgment normal.       Lab Results   Component Value Date    WBC 6.3 11/17/2021    HGB 11.0 (L) 11/17/2021    HCT 37.0 11/17/2021    MCV 78.7 (L) 11/17/2021     11/17/2021    LABLYMP 1.34 08/13/2015    LYMPHOPCT 29.7 11/17/2021    RBC 4.70 11/17/2021    MCH 23.4 (L) 11/17/2021    MCHC 29.7 (L) 11/17/2021    RDW 16.7 (H) 11/17/2021     Lab Results   Component Value Date    IRON 37 11/17/2021    TIBC 448 (H) 11/17/2021       Lab Results   Component Value Date     11/17/2021    K 3.9 11/17/2021     11/17/2021    CO2 23 11/17/2021    BUN 7 11/17/2021    CREATININE 0.8 11/17/2021    GLUCOSE 94 11/17/2021    CALCIUM 9.2 11/17/2021    PROT 6.8 11/17/2021    LABALBU 4.4 11/17/2021    BILITOT <0.2 11/17/2021    ALKPHOS 118 (H) 11/17/2021    AST 21 11/17/2021    ALT 14 11/17/2021    LABGLOM >60 11/17/2021    GFRAA >59 11/17/2021     Lab Results   Component Value Date    TSHFT4 3.90 11/17/2021    TSH 3.600 04/29/2021     Lab Results   Component Value Date    CHOL 226 (H) 11/17/2021    CHOL 221 (H) 06/12/2020    CHOL 226 (H) 11/20/2019     Lab Results   Component Value Date    TRIG 179 (H) 11/17/2021    TRIG 149 06/12/2020    TRIG 166 (H) 11/20/2019     Lab Results   Component Value Date    HDL 41 (L) 11/17/2021    HDL 43 (L) 09/18/2020    HDL 46 (L) 06/12/2020     Lab Results   Component Value Date    LDLCALC 149 11/17/2021    LDLCALC 139 09/18/2020    LDLCALC 145 06/12/2020     Lab Results   Component Value Date    TSHFT4 3.90 11/17/2021    TSH 3.600 04/29/2021     Lab Results   Component Value Date    VITD25 41.2 06/12/2020         Results for orders placed or performed in visit on 11/24/21   POCT Rapid Drug Screen   Result Value Ref Range    Alcohol, Urine neg     Amphetamine Screen, Urine neg     Barbiturate Screen, Urine pos     Benzodiazepine Screen, Urine neg     Buprenorphine Urine neg     Cocaine Metabolite Screen, Urine neg     FENTANYL SCREEN, URINE neg     Gabapentin Screen, Urine neg     MDMA, Urine neg     Methadone Screen, Urine neg     Methamphetamine, Urine neg     Opiate Scrn, Ur neg     Oxycodone Screen, Ur neg     PCP Screen, Urine neg     Propoxyphene Screen, Urine neg     Synthetic Cannabinoids (K2) Screen, Urine neg     THC Screen, Urine neg     Tramadol Scrn, Ur neg     Tricyclic Antidepressants, Urine neg        Assessment:    ICD-10-CM    1. Moderate recurrent major depression ([facility] [address]  F33.1    2. HORACE (generalized anxiety disorder)  F41.1    3. Migraine without aura and without status migrainosus, not intractable  G43.009    4. Chronic neck pain  M54.2     G89.29    5. Acute bronchitis, unspecified organism  J20.9 levoFLOXacin (LEVAQUIN) 500 MG tablet     methylPREDNISolone (MEDROL DOSEPACK) 4 MG tablet     DISCONTINUED: HYDROcodone-chlorpheniramine (TUSSIONEX PENNKINETIC ER) 10-8 MG/5ML SUER   6. Acquired hypothyroidism  E03.9 levothyroxine (EUTHYROX) 125 MCG tablet     TSH without Reflex     CANCELED: TSH without Reflex   7. Mixed hyperlipidemia  E78.2 Comprehensive Metabolic Panel     Lipid Panel     CANCELED: Comprehensive Metabolic Panel     CANCELED: Lipid Panel   8. Iron deficiency anemia, unspecified iron deficiency anemia type  D50.9 iron sucrose (VENOFER) 20 MG/ML injection     CBC Auto Differential     Iron and TIBC     CANCELED: CBC Auto Differential     CANCELED: Iron and TIBC   9. B12 deficiency  E53.8 cyanocobalamin injection 1,000 mcg     Vitamin B12     CBC Auto Differential     CANCELED: CBC Auto Differential   10. Vitamin D deficiency  E55.9 Vitamin D 25 Hydroxy     CANCELED: Vitamin D 25 Hydroxy   11.  Encounter for screening mammogram for malignant neoplasm of breast  Z12.31 JAKUB DIGITAL SCREEN W OR WO CAD BILATERAL   12. Therapeutic drug monitoring  Z51.81 POCT Rapid Drug Screen   13. Overweight (BMI 25.0-29. 9)  E66.3        Plan:  Rohit Hawkins was seen today for headache, depression and anxiety. Diagnoses and all orders for this visit:    Moderate recurrent major depression (HCC)    HORACE (generalized anxiety disorder)    Migraine without aura and without status migrainosus, not intractable    Chronic neck pain    Acute bronchitis, unspecified organism  -     levoFLOXacin (LEVAQUIN) 500 MG tablet; Take 1 tablet by mouth daily for 10 days  -     methylPREDNISolone (MEDROL DOSEPACK) 4 MG tablet; Take by mouth as directed  -     Discontinue: HYDROcodone-chlorpheniramine (TUSSIONEX PENNKINETIC ER) 10-8 MG/5ML SUER; Take 5 mLs by mouth every 12 hours as needed (cough) for up to 10 days. Acquired hypothyroidism  -     levothyroxine (EUTHYROX) 125 MCG tablet; Take 1 tablet by mouth Daily  -     Cancel: TSH without Reflex; Future  -     TSH without Reflex; Future    Mixed hyperlipidemia  -     Cancel: Comprehensive Metabolic Panel; Future  -     Cancel: Lipid Panel; Future  -     Comprehensive Metabolic Panel; Future  -     Lipid Panel; Future    Iron deficiency anemia, unspecified iron deficiency anemia type  -     Cancel: CBC Auto Differential; Future  -     Cancel: Iron and TIBC; Future  -     iron sucrose (VENOFER) 20 MG/ML injection; 200 mg infused over 1 hour once weekly x3 weeks  -     CBC Auto Differential; Future  -     Iron and TIBC; Future    B12 deficiency  -     cyanocobalamin injection 1,000 mcg  -     Cancel: CBC Auto Differential; Future  -     Vitamin B12; Future  -     CBC Auto Differential; Future    Vitamin D deficiency  -     Cancel: Vitamin D 25 Hydroxy; Future  -     Vitamin D 25 Hydroxy; Future    Encounter for screening mammogram for malignant neoplasm of breast  -     JAKUB DIGITAL SCREEN W OR WO CAD BILATERAL;  Future    Therapeutic drug monitoring  -     POCT Rapid Drug Screen    Overweight (BMI 25.0-29. 9)      Labs reviewed with patient. Refills Provided. -Mixed Hyperlipidemia not well controlled. Patient prefers to increase efforts at low cholesterol diet, exercise, and weight loss versus starting new medication.   -Hypothyroidism well controlled. Continue current dose of levothyroxine. Lab work reviewed with patient today. -Major depressive disorder and generalized anxiety disorder overall controlled with fluovaxamine which will be continued, exercise and relaxation techniques encouraged to help with mood also.  -chronic neck pain and chronic daily headache(s) are stable to somewhat controlled but recommended stretching exercises to help with neck pain, massage therapy and moist heat to neck as needed and continue tramadol prn moderate to severe pain; Controlled substance contract and urine drug screen are up-to-date currently. No unusual filling on recent Michael Lopez report. Treatment continues to be medically necessary and improves patient quality of life. No signs of misuse of controlled medication.   -acute bronchitis-treatment as documented above in plan  -iron deficiency anemia, symptomatic-given intolerance of oral iron, will try IV iron replacement to see if this helps with iron deficiency and fatigue  -Return in about 3 months (around 2/24/2022) for ECPE, recheck mood, high cholesterol, thyroid. Over 50% of the total visit time of 40 min was spent on counseling and/or coordination of care of:   1. Moderate recurrent major depression ([address]    2. HORACE (generalized anxiety disorder)    3. Migraine without aura and without status migrainosus, not intractable    4. Chronic neck pain    5. Acute bronchitis, unspecified organism    6. Acquired hypothyroidism    7. Mixed hyperlipidemia    8. Iron deficiency anemia, unspecified iron deficiency anemia type    9. B12 deficiency    10. Vitamin D deficiency    11. Encounter for screening mammogram for malignant neoplasm of breast    12.  Therapeutic drug monitoring    13. Overweight (BMI 25.0-29. 9)         Orders Placed This Encounter   Procedures    JAKUB DIGITAL SCREEN W OR WO CAD BILATERAL     Standing Status:   Future     Standing Expiration Date:   2023    Vitamin B12     Standing Status:   Future     Standing Expiration Date:   2022    CBC Auto Differential     Standing Status:   Future     Standing Expiration Date:   2022    Comprehensive Metabolic Panel     Standing Status:   Future     Standing Expiration Date:   2022    Lipid Panel     Standing Status:   Future     Standing Expiration Date:   2022     Order Specific Question:   Is Patient Fasting?/# of Hours     Answer:   yes/8 hrs    TSH without Reflex     Standing Status:   Future     Standing Expiration Date:   2022    Iron and TIBC     Standing Status:   Future     Standing Expiration Date:   2022     Order Specific Question:   Is Patient Fasting? Answer:   yes     Order Specific Question:   No of Hours? Answer:   8    Vitamin D 25 Hydroxy     Standing Status:   Future     Standing Expiration Date:   2022    POCT Rapid Drug Screen     Orders Placed This Encounter   Medications    levothyroxine (EUTHYROX) 125 MCG tablet     Sig: Take 1 tablet by mouth Daily     Dispense:  30 tablet     Refill:  5    cyanocobalamin injection 1,000 mcg    levoFLOXacin (LEVAQUIN) 500 MG tablet     Sig: Take 1 tablet by mouth daily for 10 days     Dispense:  10 tablet     Refill:  0    methylPREDNISolone (MEDROL DOSEPACK) 4 MG tablet     Sig: Take by mouth as directed     Dispense:  1 kit     Refill:  0    DISCONTD: HYDROcodone-chlorpheniramine (TUSSIONEX PENNKINETIC ER) 10-8 MG/5ML SUER     Sig: Take 5 mLs by mouth every 12 hours as needed (cough) for up to 10 days.      Dispense:  100 mL     Refill:  0     Reduce doses taken as pain becomes manageable    iron sucrose (VENOFER) 20 MG/ML injection     Si mg infused over 1 hour once weekly x3 weeks Dispense:  30 mL     Refill:  0     Medications Discontinued During This Encounter   Medication Reason    promethazine-dextromethorphan (PROMETHAZINE-DM) 6.25-15 MG/5ML syrup Side effects    EUTHYROX 112 MCG tablet DOSE ADJUSTMENT     Patient Instructions       Patient Education        Iron Deficiency Anemia: Care Instructions  Your Care Instructions     Anemia means that you don't have enough red blood cells. Red blood cells carry oxygen around your body. When you have anemia, it can make you pale, weak, and tired. Many things can cause anemia. The most common cause is loss of blood. This can happen if you have heavy menstrual periods. It can also happen if you have bleeding in your stomach or bowel. You can also get anemia if you don't have enough iron in your diet or if it's hard for your body to absorb iron. In some cases, pregnancy causes anemia. That's because a pregnant woman needs more iron. Your doctor may do more tests to find the cause of your anemia. If a disease or other health problem is causing it, your doctor will treat that problem. It's important to follow up with your doctor to make sure that your iron level returns to normal.  Follow-up care is a key part of your treatment and safety. Be sure to make and go to all appointments, and call your doctor if you are having problems. It's also a good idea to know your test results and keep a list of the medicines you take. How can you care for yourself at home? · If your doctor recommended iron pills, take them as directed. ? Try to take the pills on an empty stomach. You can do this about 1 hour before or 2 hours after meals. But you may need to take iron with food to avoid an upset stomach. ? Do not take antacids or drink milk or anything with caffeine within 2 hours of when you take your iron. They can keep your body from absorbing the iron well. ? Vitamin C helps your body absorb iron.  You may want to take iron pills with a glass of [email or web address] and sign in to your [employer] account. Enter I897 in the [employer] box to learn more about \"Iron Deficiency Anemia: Care Instructions. \"     If you do not have an account, please click on the \"Sign Up Now\" link. Current as of: April 29, 2021               Content Version: 13.0  © 2006-2021 [employer]. Care instructions adapted under license by [facility] ([facility]). If you have questions about a medical condition or this instruction, always ask your healthcare professional. [place] [address] any warranty or liability for your use of this information. Patient Education        Vitamin B12 Deficiency: Care Instructions  Overview    A vitamin B12 deficiency means that your body doesn't have enough of this vitamin. You need vitamin B12 to keep red blood cells and nerve cells healthy. Not enough B12 can cause anemia. It can also damage nerves and cause trouble with memory and thinking. Many things can cause low levels of vitamin B12. They include:  · Not getting enough of this vitamin through food. · An autoimmune problem, like pernicious anemia. · Weight-loss surgery, like gastric bypass. · Long-term use of heartburn medicines. Low levels of B12 may not cause symptoms. But symptoms may include fatigue, depression, and thinking or memory problems. You may have tingling in your hands or feet and changes in the way you walk. Treatment depends on the reason for low vitamin B12. Eating more foods rich in B12 may be enough. Or you might take the vitamin as a pill, as shots, or as nasal spray. How can you care for yourself? · Take vitamin B12 as your doctor recommends. · Go to your appointments if you are getting B12 shots. · Eat more foods rich in vitamin B12. Examples are:  ? Animal products. These include meat, seafood, milk products, poultry, and eggs. ? Foods that have B12 added. These are called fortified foods.  They include soy products, nutritional yeast, and dry cereals. · Work with a nutritionist or dietitian if you need help getting more vitamin B12 from food. · Talk to your doctor about stopping medicines if they are adding to your B12 deficiency. When should you call for help? Call 911  anytime you think you may need emergency care. For example, call if:    · You passed out (lost consciousness). Call your doctor now or seek immediate medical care if:    · You are dizzy or lightheaded, or you feel like you may faint. Watch closely for changes in your health. Be sure to call your doctor if:    · You are confused or can't think clearly.     · You don't get better as expected. Where can you learn more? Go to [email or web address]://[email or web address].[email or web address] org and sign in to your modu account. Enter [phone] in the [facility] box to learn more about \"Vitamin B12 Deficiency: Care Instructions. \"     If you do not have an account, please click on the \"Sign Up Now\" link. Current as of: April 29, 2021               Content Version: 13.0  © 5169-8381 Doctolib. Care instructions adapted under license by [facility] ([facility]). If you have questions about a medical condition or this instruction, always ask your healthcare professional. [place] [address] any warranty or liability for your use of this information. Patient Education        Bronchitis: Care Instructions  Your Care Instructions     Bronchitis is inflammation of the bronchial tubes, which carry air to the lungs. The tubes swell and produce mucus, or phlegm. The mucus and inflamed bronchial tubes make you cough. You may have trouble breathing. Most cases of bronchitis are caused by viruses like those that cause colds. Antibiotics usually do not help and they may be harmful. Bronchitis usually develops rapidly and lasts about 2 to 3 weeks in otherwise healthy people. Follow-up care is a key part of your treatment and safety.  Be sure to make and go to all appointments, and call your doctor if you are having problems. It's also a good idea to know your test results and keep a list of the medicines you take. How can you care for yourself at home? · Take all medicines exactly as prescribed. Call your doctor if you think you are having a problem with your medicine. · Get some extra rest.  · Take an over-the-counter pain medicine, such as acetaminophen (Tylenol), ibuprofen (Advil, Motrin), or naproxen (Aleve) to reduce fever and relieve body aches. Read and follow all instructions on the label. · Do not take two or more pain medicines at the same time unless the doctor told you to. Many pain medicines have acetaminophen, which is Tylenol. Too much acetaminophen (Tylenol) can be harmful. · Take an over-the-counter cough medicine to help quiet a dry, hacking cough so that you can sleep. Avoid cough medicines that have more than one active ingredient. Read and follow all instructions on the label. · Do not smoke. Smoking can make bronchitis worse. If you need help quitting, talk to your doctor about stop-smoking programs and medicines. These can increase your chances of quitting for good. When should you call for help? Call 911 anytime you think you may need emergency care. For example, call if:    · You have severe trouble breathing. Call your doctor now or seek immediate medical care if:    · You have new or worse trouble breathing.     · You cough up dark brown or bloody mucus (sputum).     · You have a new or higher fever.     · You have a new rash. Watch closely for changes in your health, and be sure to contact your doctor if:    · You cough more deeply or more often, especially if you notice more mucus or a change in the color of your mucus.     · You are not getting better as expected. Where can you learn more? Go to https://chpepiceweb.Soum. org and sign in to your [employer] account.  Enter H333 in the [address] Information box to learn more about \"Bronchitis: Care Instructions. \"     If you do not have an account, please click on the \"Sign Up Now\" link. Current as of: July 6, 2021               Content Version: 13.0  © 8605-1912 Healthwise, Incorporated. Care instructions adapted under license by [facility] ([facility]). If you have questions about a medical condition or this instruction, always ask your healthcare professional. [address] any warranty or liability for your use of this information. Patient Education        Recovering From Depression: Care Instructions  Your Care Instructions     Taking good care of yourself is important as you recover from depression. In time, your symptoms will fade as your treatment takes hold. Do not give up. Instead, focus your energy on getting better. Your mood will improve. It just takes some time. Focus on things that can help you feel better, such as being with friends and family, eating well, and getting enough rest. But take things slowly. Do not do too much too soon. You will begin to feel better gradually. Follow-up care is a key part of your treatment and safety. Be sure to make and go to all appointments, and call your doctor if you are having problems. It's also a good idea to know your test results and keep a list of the medicines you take. How can you care for yourself at home? Be realistic  · If you have a large task to do, break it up into smaller steps you can handle, and just do what you can. · You may want to put off important decisions until your depression has lifted. If you have plans that will have a major impact on your life, such as marriage, divorce, or a job change, try to wait a bit. Talk it over with friends and loved ones who can help you look at the overall picture first.  · Reaching out to people for help is important. Do not isolate yourself. Let your family and friends help you.  Find someone you can trust and confide in, and talk to that person. · Be patient, and be kind to yourself. Remember that depression is not your fault and is not something you can overcome with willpower alone. Treatment is important for depression, just like for any other illness. Feeling better takes time, and your mood will improve little by little. Stay active  · Stay busy and get outside. Take a walk, or try some other light exercise. · Talk with your doctor about an exercise program. Exercise can help with mild depression. · Go to a movie or concert. Take part in a [employer] activity or other social gathering. Go to a [employer] game. · Ask a friend to have dinner with you. Take care of yourself  · Eat a balanced diet with plenty of fresh fruits and vegetables, whole grains, and lean protein. If you have lost your appetite, eat small snacks rather than large meals. · Avoid using illegal drugs or marijuana and drinking alcohol. Do not take medicines that have not been prescribed for you. They may interfere with medicines you may be taking for depression, or they may make your depression worse. · Take your medicines exactly as they are prescribed. You may start to feel better within 1 to 3 weeks of taking antidepressant medicine. But it can take as many as 6 to 8 weeks to see more improvement. If you have questions or concerns about your medicines, or if you do not notice any improvement by 3 weeks, talk to your doctor. · Continue to take your medicine after your symptoms improve. Taking your medicine for at least 6 months after you feel better can help keep you from getting depressed again. If this isn't the first time you have been depressed, your doctor may recommend you to take medicine even longer. · If you have any side effects from your medicine, tell your doctor. Many side effects are mild and will go away on their own after you have been taking the medicine for a few weeks. Some may last longer.  Talk to your doctor if side effects are bothering you too much. You might be able to try a different medicine. · Continue counseling. It may help prevent depression from returning, especially if you've had multiple episodes of depression. Talk with your counselor if you are having a hard time attending your sessions or you think the sessions aren't working. Don't just stop going. · Get enough sleep. Talk to your doctor if you are having problems sleeping. · Avoid sleeping pills unless they are prescribed by the doctor treating your depression. Sleeping pills may make you groggy during the day, and they may interact with other medicine you are taking. · If you have any other illnesses, such as diabetes, heart disease, or high blood pressure, make sure to continue with your treatment. Tell your doctor about all of the medicines you take, including those with or without a prescription. · If you or someone you know talks about suicide, self-harm, or feeling hopeless, get help right away. Call the [address] [place] [address] at 0-765-101-YCWZ ([phone]) or text HOME to 561688 to access the Crisis Text Line. Consider saving these numbers in your phone. When should you call for help? Call 330 anytime you think you may need emergency care. For example, call if:    · You feel like hurting yourself or someone else.     · Someone you know has depression and is about to attempt or is attempting suicide. Call your doctor now or seek immediate medical care if:    · You hear voices.     · Someone you know has depression and:  ? Starts to give away his or her possessions. ? Uses illegal drugs or drinks alcohol heavily. ? Talks or writes about death, including writing suicide notes or talking about guns, knives, or pills. ? Starts to spend a lot of time alone. ? Acts very aggressively or suddenly appears calm. Watch closely for changes in your health, and be sure to contact your doctor if:    · You do not get better as expected.    Where can you learn more?  Go to https://chpepiceweb.healthInsync [email or web address] org and sign in to your Roll20 account. Enter W366 in the [facility] box to learn more about \"Recovering From Depression: Care Instructions. \"     If you do not have an account, please click on the \"Sign Up Now\" link. Current as of: June 16, 2021               Content Version: 13.0  © 2006-2021 Hotelogix. Care instructions adapted under license by [facility] ([facility]). If you have questions about a medical condition or this instruction, always ask your healthcare professional. [place] [address] any warranty or liability for your use of this information. Patient Education        Learning About High Cholesterol  What is high cholesterol? High cholesterol means that you have too much cholesterol in your blood. Cholesterol is a type of fat. It's needed for many body functions, such as making new cells. Cholesterol is made by your body. It also comes from food you eat. Having high cholesterol can lead to the buildup of plaque in artery walls. This can increase your risk of heart attack and stroke. When your doctor talks about high cholesterol levels, your doctor is talking about your total cholesterol and LDL cholesterol (the \"bad\" cholesterol) levels. Your doctor may also speak about HDL (the \"good\" cholesterol) levels. High HDL is linked with a lower risk for coronary artery disease, heart attack, and stroke. Your cholesterol levels help your doctor find out your risk for having a heart attack or stroke. How can you help prevent high cholesterol? A heart-healthy lifestyle can help you prevent high cholesterol and lower your risk for a heart attack and stroke. · Eat heart-healthy foods. ? Eat fruits, vegetables, whole grains, beans, and other high-fiber foods. ? Eat lean proteins, such as seafood, lean meats, beans, nuts, and soy products. ? Eat healthy fats, such as canola and olive oil. ?  Choose foods that are low in saturated fat. ? Limit sodium and alcohol. ? Limit drinks and foods with added sugar. · Be active. Try to do moderate activity at least 2½ hours a week. Or try vigorous activity at least 1¼ hours a week. You may want to walk or try other activities, such as running, swimming, cycling, or playing tennis or team sports. · Stay at a healthy weight. Lose weight if you need to. · Don't smoke. If you need help quitting, talk to your doctor about stop-smoking programs and medicines. These can increase your chances of quitting for good. How is high cholesterol treated? The goal of treatment is to reduce your chances of having a heart attack or stroke. The goal is not to lower your cholesterol numbers only. · Have a heart-healthy lifestyle. This includes eating healthy foods, not smoking, losing weight, and being more active. · You may choose to take medicine. Follow-up care is a key part of your treatment and safety. Be sure to make and go to all appointments, and call your doctor if you are having problems. It's also a good idea to know your test results and keep a list of the medicines you take. Where can you learn more? Go to https://ZayantepeCyber Kiosk Solutions.Modenus. org and sign in to your DewMobile account. Enter A693 in the [facility] [facility] box to learn more about \"Learning About High Cholesterol. \"     If you do not have an account, please click on the \"Sign Up Now\" link. Current as of: April 29, 2021               Content Version: 13.0  © 2006-2021 Healthwise, Incorporated. Care instructions adapted under license by [facility] ([facility]). If you have questions about a medical condition or this instruction, always ask your healthcare professional. [place] [address] any warranty or liability for your use of this information. Patient Education        Recurring Migraine Headache: Care Instructions  Overview  Migraines are painful, throbbing headaches.  They often start on one side of the head. They may cause nausea and vomiting and make you sensitive to light, sound, or smell. Some people may have only a few migraines throughout life. Others have them as often as several times a month. The goal of treatment is to reduce the number of migraines you have and relieve your symptoms. Even with treatment, you may continue to have migraines. You play an important role in dealing with your headaches. Work on avoiding things that seem to trigger your migraines. When you feel a headache coming on, act quickly to stop it before it gets worse. Follow-up care is a key part of your treatment and safety. Be sure to make and go to all appointments, and call your doctor if you are having problems. It's also a good idea to know your test results and keep a list of the medicines you take. How can you care for yourself at home? · Do not drive if you have taken a prescription pain medicine. · Rest in a quiet, dark room until your headache is gone. Close your eyes and try to relax or go to sleep. Do not watch TV or read. · Put a cold, moist cloth or cold pack on the painful area for 10 to 20 minutes at a time. Put a thin cloth between the cold pack and your skin. · Have someone gently massage your neck and shoulders. · Take your medicines exactly as prescribed. Call your doctor if you think you are having a problem with your medicine. You will get more details on the specific medicines your doctor prescribes. · Don't take medicine for headache pain too often. Talk to your doctor if you are taking medicine more than 2 days a week to stop a headache. Taking too much pain medicine can lead to more headaches. These are called medicine-overuse headaches. To prevent migraines  · Keep a headache diary so you can figure out what triggers your headaches. Avoiding triggers may help you prevent headaches. Record when each headache began, how long it lasted, and what the pain was like.  Write down any other symptoms you had with the headache. These may include nausea, flashing lights or dark spots, or sensitivity to bright light or loud noise. Note if the headache occurred near your period. List anything that might have triggered the headache. Triggers may include certain foods (chocolate, cheese, wine) or odors, smoke, bright light, stress, or lack of sleep. · If your doctor has prescribed medicine for your migraines, take it as directed. You may have medicine that you take only when you get a migraine and medicine that you take all the time to help prevent migraines. ? If your doctor has prescribed medicine for when you get a headache, take it at the first sign of a migraine, unless your doctor has given you other instructions. ? If your doctor has prescribed medicine to prevent migraines, take it exactly as prescribed. Call your doctor if you think you are having a problem with your medicine. · Find healthy ways to deal with stress. Migraines are most common during or right after stressful times. Try finding ways to reduce stress like practicing mindfulness or deep breathing exercises. · Get regular sleep and exercise. But be careful to not push yourself too hard during exercise. It may trigger a headache. · Eat regular meals, and avoid foods and drinks that often trigger migraines. These include chocolate and alcohol, especially red wine and port. Chemicals used in food, such as aspartame and monosodium glutamate (MSG), also can trigger migraines. So can some food additives, such as those found in hot dogs, mendoza, cold cuts, aged cheeses, and pickled foods. · Limit caffeine by not drinking too much coffee, tea, or soda. Do not quit caffeine suddenly, because that can also give you migraines. · Do not smoke or allow others to smoke around you. If you need help quitting, talk to your doctor about stop-smoking programs and medicines. These can increase your chances of quitting for good.   · If you are taking birth control pills or hormone therapy, talk to your doctor about whether they are triggering your migraines. When should you call for help? Call 911 anytime you think you may need emergency care. For example, call if:    · You have symptoms of a stroke. These may include:  ? Sudden numbness, tingling, weakness, or loss of movement in your face, arm, or leg, especially on only one side of your body. ? Sudden vision changes. ? Sudden trouble speaking. ? Sudden confusion or trouble understanding simple statements. ? Sudden problems with walking or balance. ? A sudden, severe headache that is different from past headaches. Call your doctor now or seek immediate medical care if:    · You develop a fever and a stiff neck.     · You have new nausea and vomiting, or you cannot keep down food or liquids. Watch closely for changes in your health, and be sure to contact your doctor if:    · You have a headache that does not get better within 1 or 2 days.     · Your headaches get worse or happen more often. Where can you learn more? Go to https://Drive.SG.Innovatient Solutions. org and sign in to your [employer] account. Enter  in the [facility] box to learn more about \"Recurring Migraine Headache: Care Instructions. \"     If you do not have an account, please click on the \"Sign Up Now\" link. Current as of: April 8, 2021               Content Version: 13.0  © 2094-3509 Healthwise, Incorporated. Care instructions adapted under license by [facility] ([facility]). If you have questions about a medical condition or this instruction, always ask your healthcare professional. [place] [address] any warranty or liability for your use of this information. Patient voices understanding and agrees to plans along with risks and benefits of plan. Counseling:  Pushpa Lees's case, medications and options were discussed in detail.  patient was instructed to call the office if she   questions regarding her treatment. Should her conditions worsen, she should return to office to be reassessed by Dr. Angel Hdez. she  Should to go the closest Emergency Department for any emergency. They verbalized understanding the above instructions.

## 2021-11-29 DIAGNOSIS — G43.009 MIGRAINE WITHOUT AURA AND WITHOUT STATUS MIGRAINOSUS, NOT INTRACTABLE: Primary | ICD-10-CM

## 2021-11-29 DIAGNOSIS — J20.9 ACUTE BRONCHITIS, UNSPECIFIED ORGANISM: ICD-10-CM

## 2021-11-29 RX ORDER — HYDROCODONE POLISTIREX AND CHLORPHENIRAMINE POLISTIREX 10; 8 MG/5ML; MG/5ML
5 SUSPENSION, EXTENDED RELEASE ORAL EVERY 12 HOURS PRN
Qty: 100 ML | Refills: 0 | Status: SHIPPED | OUTPATIENT
Start: 2021-11-29 | End: 2021-12-09

## 2021-11-29 RX ORDER — BUTALBITAL, ACETAMINOPHEN AND CAFFEINE 50; 325; 40 MG/1; MG/1; MG/1
TABLET ORAL
Qty: 90 TABLET | Refills: 0 | Status: SHIPPED | OUTPATIENT
Start: 2021-11-29 | End: 2021-12-29

## 2021-11-29 NOTE — TELEPHONE ENCOUNTER
Evansigor Melgoza called to request a refill on her medication. Last office visit : 11/24/2021   Next office visit : 2/25/2022     Last UDS:   Amphetamine Screen, Urine   Date Value Ref Range Status   11/24/2021 neg  Final     Barbiturate Screen, Urine   Date Value Ref Range Status   11/24/2021 pos  Final     Benzodiazepine Screen, Urine   Date Value Ref Range Status   11/24/2021 neg  Final     Buprenorphine Urine   Date Value Ref Range Status   11/24/2021 neg  Final     Cocaine Metabolite Screen, Urine   Date Value Ref Range Status   11/24/2021 neg  Final     Gabapentin Screen, Urine   Date Value Ref Range Status   11/24/2021 neg  Final     MDMA, Urine   Date Value Ref Range Status   11/24/2021 neg  Final     Methamphetamine, Urine   Date Value Ref Range Status   11/24/2021 neg  Final     Opiate Scrn, Ur   Date Value Ref Range Status   11/24/2021 neg  Final     Oxycodone Screen, Ur   Date Value Ref Range Status   11/24/2021 neg  Final     PCP Screen, Urine   Date Value Ref Range Status   11/24/2021 neg  Final     Propoxyphene Screen, Urine   Date Value Ref Range Status   11/24/2021 neg  Final     THC Screen, Urine   Date Value Ref Range Status   11/24/2021 neg  Final     Tricyclic Antidepressants, Urine   Date Value Ref Range Status   11/24/2021 neg  Final       Last Theta Jose: 11/24/21  Medication Contract: 11/24/21   Last Fill: 10/5/21    Requested Prescriptions     Pending Prescriptions Disp Refills    butalbital-acetaminophen-caffeine (FIORICET, ESGIC) -40 MG per tablet [Pharmacy Med Name: Butalbital-APAP-Caffeine -40 MG Oral Tablet] 90 tablet 0     Sig: TAKE 1 TABLET BY MOUTH EVERY 8 HOURS AS NEEDED FOR MIGRAINE HEADACHE         Please approve or refuse this medication.    Lissett Lutz

## 2021-11-29 NOTE — TELEPHONE ENCOUNTER
----- Message from Suki Vicente sent at 11/26/2021 12:37 PM CST -----  Subject: Medication Problem    QUESTIONS  Name of Medication? HYDROcodone-chlorpheniramine (TUSSIONEX PENNKINETIC   ER) 10-8 MG/5ML SUER  Patient-reported dosage and instructions? one teaspoon 5 ml every 12 hours  What question or problem do you have with the medication? Jay does not   have this medication, [facility] Preferred Pharmacy? [id number], [phone] [facility]  Pharmacy phone number (if available)? [phone]  Additional Information for Provider?   ---------------------------------------------------------------------------  --------------  [address]  What is the best way for the office to contact you? OK to leave message on   voicemail  Preferred Call Back Phone Number? [phone]  ---------------------------------------------------------------------------  --------------  SCRIPT ANSWERS  Relationship to Patient?  Self

## 2021-12-01 DIAGNOSIS — D50.9 IRON DEFICIENCY ANEMIA, UNSPECIFIED IRON DEFICIENCY ANEMIA TYPE: Primary | ICD-10-CM

## 2021-12-01 RX ORDER — MEPERIDINE HYDROCHLORIDE 25 MG/ML
12.5 INJECTION INTRAMUSCULAR; INTRAVENOUS; SUBCUTANEOUS PRN
Status: CANCELLED | OUTPATIENT
Start: 2021-12-01

## 2021-12-01 RX ORDER — SODIUM CHLORIDE 9 MG/ML
INJECTION, SOLUTION INTRAVENOUS CONTINUOUS
Status: CANCELLED | OUTPATIENT
Start: 2021-12-01

## 2021-12-01 RX ORDER — ONDANSETRON 2 MG/ML
8 INJECTION INTRAMUSCULAR; INTRAVENOUS
Status: CANCELLED | OUTPATIENT
Start: 2021-12-01

## 2021-12-01 RX ORDER — ALBUTEROL SULFATE 90 UG/1
4 AEROSOL, METERED RESPIRATORY (INHALATION) PRN
Status: CANCELLED | OUTPATIENT
Start: 2021-12-01

## 2021-12-01 RX ORDER — SODIUM CHLORIDE 0.9 % (FLUSH) 0.9 %
5-40 SYRINGE (ML) INJECTION PRN
Status: CANCELLED | OUTPATIENT
Start: 2021-12-01

## 2021-12-01 RX ORDER — ACETAMINOPHEN 325 MG/1
650 TABLET ORAL
Status: CANCELLED | OUTPATIENT
Start: 2021-12-01

## 2021-12-01 RX ORDER — DIPHENHYDRAMINE HYDROCHLORIDE 50 MG/ML
50 INJECTION INTRAMUSCULAR; INTRAVENOUS
Status: CANCELLED | OUTPATIENT
Start: 2021-12-01

## 2021-12-01 RX ORDER — EPINEPHRINE 1 MG/ML
0.3 INJECTION, SOLUTION, CONCENTRATE INTRAVENOUS PRN
Status: CANCELLED | OUTPATIENT
Start: 2021-12-01

## 2021-12-13 PROBLEM — E61.1 IRON DEFICIENCY: Status: RESOLVED | Noted: 2019-12-03 | Resolved: 2021-12-13

## 2021-12-30 ENCOUNTER — TELEPHONE (OUTPATIENT)
Dept: FAMILY MEDICINE CLINIC | Age: 56
End: 2021-12-30

## 2021-12-30 DIAGNOSIS — G43.009 MIGRAINE WITHOUT AURA AND WITHOUT STATUS MIGRAINOSUS, NOT INTRACTABLE: ICD-10-CM

## 2021-12-30 RX ORDER — BUTALBITAL, ACETAMINOPHEN AND CAFFEINE 50; 325; 40 MG/1; MG/1; MG/1
TABLET ORAL
Qty: 90 TABLET | Refills: 0 | Status: SHIPPED | OUTPATIENT
Start: 2021-12-30 | End: 2022-01-31

## 2021-12-30 NOTE — TELEPHONE ENCOUNTER
Pt called jose Marti sent to [facility]. The pharmacy it was sent to is out of this medication and has it on back order.

## 2022-01-07 ENCOUNTER — APPOINTMENT (OUTPATIENT)
Dept: INFUSION THERAPY | Age: 57
End: 2022-01-07
Payer: COMMERCIAL

## 2022-01-14 ENCOUNTER — HOSPITAL ENCOUNTER (OUTPATIENT)
Dept: INFUSION THERAPY | Age: 57
Setting detail: INFUSION SERIES
Discharge: HOME | End: 2022-01-14
Payer: COMMERCIAL

## 2022-01-14 VITALS
DIASTOLIC BLOOD PRESSURE: 72 MMHG | SYSTOLIC BLOOD PRESSURE: 124 MMHG | OXYGEN SATURATION: 97 % | HEART RATE: 72 BPM | RESPIRATION RATE: 18 BRPM | TEMPERATURE: 98.2 F

## 2022-01-14 DIAGNOSIS — D50.9 IRON DEFICIENCY ANEMIA, UNSPECIFIED IRON DEFICIENCY ANEMIA TYPE: Primary | ICD-10-CM

## 2022-01-14 PROCEDURE — 2580000003 HC RX 258: Performed by: INTERNAL MEDICINE

## 2022-01-14 PROCEDURE — 96365 THER/PROPH/DIAG IV INF INIT: CPT

## 2022-01-14 PROCEDURE — 6360000002 HC RX W HCPCS: Performed by: INTERNAL MEDICINE

## 2022-01-14 RX ORDER — ONDANSETRON 2 MG/ML
8 INJECTION INTRAMUSCULAR; INTRAVENOUS
Status: CANCELLED | OUTPATIENT
Start: 2022-01-21

## 2022-01-14 RX ORDER — SODIUM CHLORIDE 9 MG/ML
INJECTION, SOLUTION INTRAVENOUS CONTINUOUS
Status: DISCONTINUED | OUTPATIENT
Start: 2022-01-14 | End: 2022-01-15 | Stop reason: HOSPADM

## 2022-01-14 RX ORDER — SODIUM CHLORIDE 9 MG/ML
INJECTION, SOLUTION INTRAVENOUS CONTINUOUS
Status: CANCELLED | OUTPATIENT
Start: 2022-01-21

## 2022-01-14 RX ORDER — DIPHENHYDRAMINE HYDROCHLORIDE 50 MG/ML
50 INJECTION INTRAMUSCULAR; INTRAVENOUS
Status: CANCELLED | OUTPATIENT
Start: 2022-01-21

## 2022-01-14 RX ORDER — ACETAMINOPHEN 325 MG/1
650 TABLET ORAL
Status: CANCELLED | OUTPATIENT
Start: 2022-01-21

## 2022-01-14 RX ORDER — SODIUM CHLORIDE 0.9 % (FLUSH) 0.9 %
5-40 SYRINGE (ML) INJECTION PRN
Status: DISCONTINUED | OUTPATIENT
Start: 2022-01-14 | End: 2022-01-15 | Stop reason: HOSPADM

## 2022-01-14 RX ORDER — MEPERIDINE HYDROCHLORIDE 25 MG/ML
12.5 INJECTION INTRAMUSCULAR; INTRAVENOUS; SUBCUTANEOUS PRN
Status: CANCELLED | OUTPATIENT
Start: 2022-01-21

## 2022-01-14 RX ORDER — SODIUM CHLORIDE 0.9 % (FLUSH) 0.9 %
5-40 SYRINGE (ML) INJECTION PRN
Status: CANCELLED | OUTPATIENT
Start: 2022-01-21

## 2022-01-14 RX ORDER — ALBUTEROL SULFATE 90 UG/1
4 AEROSOL, METERED RESPIRATORY (INHALATION) PRN
Status: CANCELLED | OUTPATIENT
Start: 2022-01-21

## 2022-01-14 RX ORDER — EPINEPHRINE 1 MG/ML
0.3 INJECTION, SOLUTION, CONCENTRATE INTRAVENOUS PRN
Status: CANCELLED | OUTPATIENT
Start: 2022-01-21

## 2022-01-14 RX ADMIN — IRON SUCROSE 200 MG: 20 INJECTION, SOLUTION INTRAVENOUS at 15:16

## 2022-01-14 RX ADMIN — SODIUM CHLORIDE: 9 INJECTION, SOLUTION INTRAVENOUS at 15:16

## 2022-01-21 ENCOUNTER — HOSPITAL ENCOUNTER (OUTPATIENT)
Dept: INFUSION THERAPY | Age: 57
Setting detail: INFUSION SERIES
Discharge: HOME | End: 2022-01-21
Payer: COMMERCIAL

## 2022-01-21 VITALS
SYSTOLIC BLOOD PRESSURE: 133 MMHG | HEART RATE: 60 BPM | OXYGEN SATURATION: 100 % | RESPIRATION RATE: 18 BRPM | DIASTOLIC BLOOD PRESSURE: 79 MMHG | TEMPERATURE: 98.7 F

## 2022-01-21 DIAGNOSIS — D50.9 IRON DEFICIENCY ANEMIA, UNSPECIFIED IRON DEFICIENCY ANEMIA TYPE: Primary | ICD-10-CM

## 2022-01-21 PROCEDURE — 6360000002 HC RX W HCPCS: Performed by: INTERNAL MEDICINE

## 2022-01-21 PROCEDURE — 96374 THER/PROPH/DIAG INJ IV PUSH: CPT

## 2022-01-21 RX ORDER — DIPHENHYDRAMINE HYDROCHLORIDE 50 MG/ML
50 INJECTION INTRAMUSCULAR; INTRAVENOUS
Status: CANCELLED | OUTPATIENT
Start: 2022-01-28

## 2022-01-21 RX ORDER — SODIUM CHLORIDE 9 MG/ML
INJECTION, SOLUTION INTRAVENOUS CONTINUOUS
Status: DISCONTINUED | OUTPATIENT
Start: 2022-01-21 | End: 2022-01-22 | Stop reason: HOSPADM

## 2022-01-21 RX ORDER — ALBUTEROL SULFATE 90 UG/1
4 AEROSOL, METERED RESPIRATORY (INHALATION) PRN
Status: CANCELLED | OUTPATIENT
Start: 2022-01-28

## 2022-01-21 RX ORDER — EPINEPHRINE 1 MG/ML
0.3 INJECTION, SOLUTION, CONCENTRATE INTRAVENOUS PRN
Status: CANCELLED | OUTPATIENT
Start: 2022-01-28

## 2022-01-21 RX ORDER — SODIUM CHLORIDE 0.9 % (FLUSH) 0.9 %
5-40 SYRINGE (ML) INJECTION PRN
Status: CANCELLED | OUTPATIENT
Start: 2022-01-28

## 2022-01-21 RX ORDER — SODIUM CHLORIDE 9 MG/ML
INJECTION, SOLUTION INTRAVENOUS CONTINUOUS
Status: CANCELLED | OUTPATIENT
Start: 2022-01-28

## 2022-01-21 RX ORDER — ACETAMINOPHEN 325 MG/1
650 TABLET ORAL
Status: CANCELLED | OUTPATIENT
Start: 2022-01-28

## 2022-01-21 RX ORDER — ONDANSETRON 2 MG/ML
8 INJECTION INTRAMUSCULAR; INTRAVENOUS
Status: CANCELLED | OUTPATIENT
Start: 2022-01-28

## 2022-01-21 RX ORDER — MEPERIDINE HYDROCHLORIDE 25 MG/ML
12.5 INJECTION INTRAMUSCULAR; INTRAVENOUS; SUBCUTANEOUS PRN
Status: CANCELLED | OUTPATIENT
Start: 2022-01-28

## 2022-01-21 RX ADMIN — IRON SUCROSE 200 MG: 20 INJECTION, SOLUTION INTRAVENOUS at 16:56

## 2022-01-30 DIAGNOSIS — G43.009 MIGRAINE WITHOUT AURA AND WITHOUT STATUS MIGRAINOSUS, NOT INTRACTABLE: ICD-10-CM

## 2022-01-31 RX ORDER — BUTALBITAL, ACETAMINOPHEN AND CAFFEINE 50; 325; 40 MG/1; MG/1; MG/1
TABLET ORAL
Qty: 90 TABLET | Refills: 0 | Status: SHIPPED | OUTPATIENT
Start: 2022-01-31 | End: 2022-01-31 | Stop reason: SDUPTHER

## 2022-02-01 DIAGNOSIS — G43.009 MIGRAINE WITHOUT AURA AND WITHOUT STATUS MIGRAINOSUS, NOT INTRACTABLE: ICD-10-CM

## 2022-02-01 RX ORDER — BUTALBITAL, ACETAMINOPHEN AND CAFFEINE 50; 325; 40 MG/1; MG/1; MG/1
TABLET ORAL
Qty: 90 TABLET | Refills: 0 | Status: SHIPPED | OUTPATIENT
Start: 2022-02-01 | End: 2022-03-06 | Stop reason: SDUPTHER

## 2022-02-01 NOTE — TELEPHONE ENCOUNTER
The patient called and said [facility] was out of the butalbital-acetaminophen-caffeine. She asked that ir be sent to [facility] on [address]. I called [facility] and spoke to [address] [place] [address]. I asked her to cancel the prescription that was sent to them on 1/31/22 so that it could be sent to another pharmacy.

## 2022-02-01 NOTE — TELEPHONE ENCOUNTER
Morro Lamarenthal called to request a refill on her medication. Last office visit : 11/24/2021   Next office visit : 2/25/2022     Last UDS:   Amphetamine Screen, Urine   Date Value Ref Range Status   11/24/2021 neg  Final     Barbiturate Screen, Urine   Date Value Ref Range Status   11/24/2021 pos  Final     Benzodiazepine Screen, Urine   Date Value Ref Range Status   11/24/2021 neg  Final     Buprenorphine Urine   Date Value Ref Range Status   11/24/2021 neg  Final     Cocaine Metabolite Screen, Urine   Date Value Ref Range Status   11/24/2021 neg  Final     Gabapentin Screen, Urine   Date Value Ref Range Status   11/24/2021 neg  Final     MDMA, Urine   Date Value Ref Range Status   11/24/2021 neg  Final     Methamphetamine, Urine   Date Value Ref Range Status   11/24/2021 neg  Final     Opiate Scrn, Ur   Date Value Ref Range Status   11/24/2021 neg  Final     Oxycodone Screen, Ur   Date Value Ref Range Status   11/24/2021 neg  Final     PCP Screen, Urine   Date Value Ref Range Status   11/24/2021 neg  Final     Propoxyphene Screen, Urine   Date Value Ref Range Status   11/24/2021 neg  Final     THC Screen, Urine   Date Value Ref Range Status   11/24/2021 neg  Final     Tricyclic Antidepressants, Urine   Date Value Ref Range Status   11/24/2021 neg  Final       Last [place] Lulas: 12/30/2021  Medication Contract: 11/24/2021   Last Fill: 12/30/2021    Requested Prescriptions     Pending Prescriptions Disp Refills    butalbital-acetaminophen-caffeine (FIORICET, ESGIC) -40 MG per tablet 90 tablet 0     Sig: TAKE 1 TABLET BY MOUTH EVERY 8 HOURS AS NEEDED FOR MIGRAINE HEADACHE         Please approve or refuse this medication.    Cary Anthony MA

## 2022-02-07 ENCOUNTER — TELEPHONE (OUTPATIENT)
Dept: INFUSION THERAPY | Age: 57
End: 2022-02-07

## 2022-02-07 NOTE — TELEPHONE ENCOUNTER
February 4 appointment cancelled by 2050 [facility] staff due to icy road conditions. Attempted to call pt to reschedule. Left voicemail for patient to call [facility] and reschedule [place] appointment.

## 2022-02-08 ENCOUNTER — TELEPHONE (OUTPATIENT)
Dept: INFUSION THERAPY | Age: 57
End: 2022-02-08

## 2022-02-11 ENCOUNTER — HOSPITAL ENCOUNTER (OUTPATIENT)
Dept: INFUSION THERAPY | Age: 57
Setting detail: INFUSION SERIES
Discharge: HOME | End: 2022-02-11
Payer: COMMERCIAL

## 2022-02-11 VITALS
RESPIRATION RATE: 20 BRPM | TEMPERATURE: 97.1 F | DIASTOLIC BLOOD PRESSURE: 73 MMHG | SYSTOLIC BLOOD PRESSURE: 127 MMHG | HEART RATE: 44 BPM | OXYGEN SATURATION: 100 %

## 2022-02-11 DIAGNOSIS — D50.9 IRON DEFICIENCY ANEMIA, UNSPECIFIED IRON DEFICIENCY ANEMIA TYPE: Primary | ICD-10-CM

## 2022-02-11 PROCEDURE — 2580000003 HC RX 258: Performed by: INTERNAL MEDICINE

## 2022-02-11 PROCEDURE — 6360000002 HC RX W HCPCS: Performed by: INTERNAL MEDICINE

## 2022-02-11 PROCEDURE — 96375 TX/PRO/DX INJ NEW DRUG ADDON: CPT

## 2022-02-11 PROCEDURE — 96374 THER/PROPH/DIAG INJ IV PUSH: CPT

## 2022-02-11 RX ORDER — MEPERIDINE HYDROCHLORIDE 25 MG/ML
12.5 INJECTION INTRAMUSCULAR; INTRAVENOUS; SUBCUTANEOUS PRN
Status: CANCELLED | OUTPATIENT
Start: 2022-02-11

## 2022-02-11 RX ORDER — SODIUM CHLORIDE 9 MG/ML
INJECTION, SOLUTION INTRAVENOUS CONTINUOUS
Status: DISCONTINUED | OUTPATIENT
Start: 2022-02-11 | End: 2022-02-12 | Stop reason: HOSPADM

## 2022-02-11 RX ORDER — EPINEPHRINE 1 MG/ML
0.3 INJECTION, SOLUTION, CONCENTRATE INTRAVENOUS PRN
Status: CANCELLED | OUTPATIENT
Start: 2022-02-11

## 2022-02-11 RX ORDER — SODIUM CHLORIDE 0.9 % (FLUSH) 0.9 %
5-40 SYRINGE (ML) INJECTION PRN
Status: CANCELLED | OUTPATIENT
Start: 2022-02-11

## 2022-02-11 RX ORDER — SODIUM CHLORIDE 9 MG/ML
INJECTION, SOLUTION INTRAVENOUS CONTINUOUS
Status: CANCELLED | OUTPATIENT
Start: 2022-02-11

## 2022-02-11 RX ORDER — ONDANSETRON 2 MG/ML
8 INJECTION INTRAMUSCULAR; INTRAVENOUS
Status: CANCELLED | OUTPATIENT
Start: 2022-02-11

## 2022-02-11 RX ORDER — ACETAMINOPHEN 325 MG/1
650 TABLET ORAL
Status: CANCELLED | OUTPATIENT
Start: 2022-02-11

## 2022-02-11 RX ORDER — ALBUTEROL SULFATE 90 UG/1
4 AEROSOL, METERED RESPIRATORY (INHALATION) PRN
Status: CANCELLED | OUTPATIENT
Start: 2022-02-11

## 2022-02-11 RX ORDER — DIPHENHYDRAMINE HYDROCHLORIDE 50 MG/ML
50 INJECTION INTRAMUSCULAR; INTRAVENOUS
Status: CANCELLED | OUTPATIENT
Start: 2022-02-11

## 2022-02-11 RX ADMIN — SODIUM CHLORIDE: 9 INJECTION, SOLUTION INTRAVENOUS at 15:33

## 2022-02-11 RX ADMIN — IRON SUCROSE 200 MG: 20 INJECTION, SOLUTION INTRAVENOUS at 15:36

## 2022-02-18 DIAGNOSIS — E03.9 ACQUIRED HYPOTHYROIDISM: ICD-10-CM

## 2022-02-18 DIAGNOSIS — E53.8 B12 DEFICIENCY: ICD-10-CM

## 2022-02-18 DIAGNOSIS — E55.9 VITAMIN D DEFICIENCY: ICD-10-CM

## 2022-02-18 DIAGNOSIS — D50.9 IRON DEFICIENCY ANEMIA, UNSPECIFIED IRON DEFICIENCY ANEMIA TYPE: ICD-10-CM

## 2022-02-18 DIAGNOSIS — E78.2 MIXED HYPERLIPIDEMIA: ICD-10-CM

## 2022-02-18 LAB
ALBUMIN SERPL-MCNC: 4.4 G/DL (ref 3.5–5.2)
ALP BLD-CCNC: 128 U/L (ref 35–104)
ALT SERPL-CCNC: 24 U/L (ref 5–33)
ANION GAP SERPL CALCULATED.3IONS-SCNC: 12 MMOL/L (ref 7–19)
AST SERPL-CCNC: 27 U/L (ref 5–32)
BASOPHILS ABSOLUTE: 0 K/UL (ref 0–0.2)
BASOPHILS RELATIVE PERCENT: 0.6 % (ref 0–1)
BILIRUB SERPL-MCNC: 0.4 MG/DL (ref 0.2–1.2)
BUN BLDV-MCNC: 6 MG/DL (ref 6–20)
CALCIUM SERPL-MCNC: 9.7 MG/DL (ref 8.6–10)
CHLORIDE BLD-SCNC: 105 MMOL/L (ref 98–111)
CHOLESTEROL, TOTAL: 194 MG/DL (ref 160–199)
CO2: 25 MMOL/L (ref 22–29)
CREAT SERPL-MCNC: 0.6 MG/DL (ref 0.5–0.9)
EOSINOPHILS ABSOLUTE: 0.2 K/UL (ref 0–0.6)
EOSINOPHILS RELATIVE PERCENT: 4.5 % (ref 0–5)
GFR AFRICAN AMERICAN: >59
GFR NON-AFRICAN AMERICAN: >60
GLUCOSE BLD-MCNC: 100 MG/DL (ref 74–109)
HCT VFR BLD CALC: 44.6 % (ref 37–47)
HDLC SERPL-MCNC: 40 MG/DL (ref 65–121)
HEMOGLOBIN: 14.3 G/DL (ref 12–16)
IMMATURE GRANULOCYTES #: 0 K/UL
IRON SATURATION: 45 % (ref 14–50)
IRON: 138 UG/DL (ref 37–145)
LDL CHOLESTEROL CALCULATED: 113 MG/DL
LYMPHOCYTES ABSOLUTE: 1.1 K/UL (ref 1.1–4.5)
LYMPHOCYTES RELATIVE PERCENT: 21.4 % (ref 20–40)
MCH RBC QN AUTO: 26.8 PG (ref 27–31)
MCHC RBC AUTO-ENTMCNC: 32.1 G/DL (ref 33–37)
MCV RBC AUTO: 83.5 FL (ref 81–99)
MONOCYTES ABSOLUTE: 0.4 K/UL (ref 0–0.9)
MONOCYTES RELATIVE PERCENT: 8 % (ref 0–10)
NEUTROPHILS ABSOLUTE: 3.4 K/UL (ref 1.5–7.5)
NEUTROPHILS RELATIVE PERCENT: 65.1 % (ref 50–65)
PDW BLD-RTO: 18.1 % (ref 11.5–14.5)
PLATELET # BLD: 275 K/UL (ref 130–400)
PMV BLD AUTO: 10.1 FL (ref 9.4–12.3)
POTASSIUM SERPL-SCNC: 4.3 MMOL/L (ref 3.5–5)
RBC # BLD: 5.34 M/UL (ref 4.2–5.4)
SODIUM BLD-SCNC: 142 MMOL/L (ref 136–145)
TOTAL IRON BINDING CAPACITY: 308 UG/DL (ref 250–400)
TOTAL PROTEIN: 6.8 G/DL (ref 6.6–8.7)
TRIGL SERPL-MCNC: 205 MG/DL (ref 0–149)
TSH SERPL DL<=0.05 MIU/L-ACNC: 0.03 UIU/ML (ref 0.27–4.2)
VITAMIN B-12: 437 PG/ML (ref 211–946)
VITAMIN D 25-HYDROXY: 41.9 NG/ML
WBC # BLD: 5.3 K/UL (ref 4.8–10.8)

## 2022-03-04 DIAGNOSIS — M54.42 CHRONIC BILATERAL LOW BACK PAIN WITH LEFT-SIDED SCIATICA: ICD-10-CM

## 2022-03-04 DIAGNOSIS — G89.29 CHRONIC NECK PAIN: ICD-10-CM

## 2022-03-04 DIAGNOSIS — G43.009 MIGRAINE WITHOUT AURA AND WITHOUT STATUS MIGRAINOSUS, NOT INTRACTABLE: ICD-10-CM

## 2022-03-04 DIAGNOSIS — M54.2 CHRONIC NECK PAIN: ICD-10-CM

## 2022-03-04 DIAGNOSIS — G89.29 CHRONIC BILATERAL LOW BACK PAIN WITH LEFT-SIDED SCIATICA: ICD-10-CM

## 2022-03-04 NOTE — TELEPHONE ENCOUNTER
[facility] called to request a refill on her medication. Last office visit : 11/24/2021   Next office visit : 4/19/2022     Last UDS:   Amphetamine Screen, Urine   Date Value Ref Range Status   11/24/2021 neg  Final     Barbiturate Screen, Urine   Date Value Ref Range Status   11/24/2021 pos  Final     Benzodiazepine Screen, Urine   Date Value Ref Range Status   11/24/2021 neg  Final     Buprenorphine Urine   Date Value Ref Range Status   11/24/2021 neg  Final     Cocaine Metabolite Screen, Urine   Date Value Ref Range Status   11/24/2021 neg  Final     Gabapentin Screen, Urine   Date Value Ref Range Status   11/24/2021 neg  Final     MDMA, Urine   Date Value Ref Range Status   11/24/2021 neg  Final     Methamphetamine, Urine   Date Value Ref Range Status   11/24/2021 neg  Final     Opiate Scrn, Ur   Date Value Ref Range Status   11/24/2021 neg  Final     Oxycodone Screen, Ur   Date Value Ref Range Status   11/24/2021 neg  Final     PCP Screen, Urine   Date Value Ref Range Status   11/24/2021 neg  Final     Propoxyphene Screen, Urine   Date Value Ref Range Status   11/24/2021 neg  Final     THC Screen, Urine   Date Value Ref Range Status   11/24/2021 neg  Final     Tricyclic Antidepressants, Urine   Date Value Ref Range Status   11/24/2021 neg  Final       Last Aye Castillo: 3/4/2022  Medication Contract: 11/24/2022   Last Fill: 1/31/2022    Requested Prescriptions     Pending Prescriptions Disp Refills    butalbital-acetaminophen-caffeine (FIORICET, ESGIC) -40 MG per tablet 90 tablet 0     Sig: TAKE 1 TABLET BY MOUTH EVERY 8 HOURS AS NEEDED FOR MIGRAINE HEADACHE    traMADol (ULTRAM) 50 MG tablet 45 tablet 0     Sig: Take 1 tablet by mouth every 12 hours as needed for Pain for up to 30 days. Please approve or refuse this medication.    Alex Jordan MA

## 2022-03-05 DIAGNOSIS — G43.009 MIGRAINE WITHOUT AURA AND WITHOUT STATUS MIGRAINOSUS, NOT INTRACTABLE: ICD-10-CM

## 2022-03-06 RX ORDER — TRAMADOL HYDROCHLORIDE 50 MG/1
50 TABLET ORAL EVERY 12 HOURS PRN
Qty: 45 TABLET | Refills: 0 | Status: SHIPPED | OUTPATIENT
Start: 2022-03-06 | End: 2022-04-05 | Stop reason: SDUPTHER

## 2022-03-06 RX ORDER — BUTALBITAL, ACETAMINOPHEN AND CAFFEINE 50; 325; 40 MG/1; MG/1; MG/1
TABLET ORAL
Qty: 90 TABLET | Refills: 0 | Status: SHIPPED | OUTPATIENT
Start: 2022-03-06 | End: 2022-04-05 | Stop reason: SDUPTHER

## 2022-03-07 RX ORDER — PROPRANOLOL HYDROCHLORIDE 80 MG/1
CAPSULE, EXTENDED RELEASE ORAL
Qty: 30 CAPSULE | Refills: 2 | Status: SHIPPED | OUTPATIENT
Start: 2022-03-07 | End: 2022-06-21

## 2022-04-05 DIAGNOSIS — G43.009 MIGRAINE WITHOUT AURA AND WITHOUT STATUS MIGRAINOSUS, NOT INTRACTABLE: ICD-10-CM

## 2022-04-05 DIAGNOSIS — G89.29 CHRONIC NECK PAIN: ICD-10-CM

## 2022-04-05 DIAGNOSIS — G89.29 CHRONIC BILATERAL LOW BACK PAIN WITH LEFT-SIDED SCIATICA: ICD-10-CM

## 2022-04-05 DIAGNOSIS — M54.42 CHRONIC BILATERAL LOW BACK PAIN WITH LEFT-SIDED SCIATICA: ICD-10-CM

## 2022-04-05 DIAGNOSIS — M54.2 CHRONIC NECK PAIN: ICD-10-CM

## 2022-04-05 RX ORDER — TRAMADOL HYDROCHLORIDE 50 MG/1
50 TABLET ORAL EVERY 12 HOURS PRN
Qty: 45 TABLET | Refills: 0 | Status: SHIPPED | OUTPATIENT
Start: 2022-04-05 | End: 2022-05-06

## 2022-04-05 RX ORDER — BUTALBITAL, ACETAMINOPHEN AND CAFFEINE 50; 325; 40 MG/1; MG/1; MG/1
TABLET ORAL
Qty: 90 TABLET | Refills: 0 | Status: SHIPPED | OUTPATIENT
Start: 2022-04-05 | End: 2022-05-06

## 2022-04-05 NOTE — TELEPHONE ENCOUNTER
Marivel Tello called to request a refill on her medication. Last office visit : 11/24/2021   Next office visit : 4/19/2022     Last UDS:   Amphetamine Screen, Urine   Date Value Ref Range Status   11/24/2021 neg  Final     Barbiturate Screen, Urine   Date Value Ref Range Status   11/24/2021 pos  Final     Benzodiazepine Screen, Urine   Date Value Ref Range Status   11/24/2021 neg  Final     Buprenorphine Urine   Date Value Ref Range Status   11/24/2021 neg  Final     Cocaine Metabolite Screen, Urine   Date Value Ref Range Status   11/24/2021 neg  Final     Gabapentin Screen, Urine   Date Value Ref Range Status   11/24/2021 neg  Final     MDMA, Urine   Date Value Ref Range Status   11/24/2021 neg  Final     Methamphetamine, Urine   Date Value Ref Range Status   11/24/2021 neg  Final     Opiate Scrn, Ur   Date Value Ref Range Status   11/24/2021 neg  Final     Oxycodone Screen, Ur   Date Value Ref Range Status   11/24/2021 neg  Final     PCP Screen, Urine   Date Value Ref Range Status   11/24/2021 neg  Final     Propoxyphene Screen, Urine   Date Value Ref Range Status   11/24/2021 neg  Final     THC Screen, Urine   Date Value Ref Range Status   11/24/2021 neg  Final     Tricyclic Antidepressants, Urine   Date Value Ref Range Status   11/24/2021 neg  Final       Last Bennetta Maria Teresa: 3/7/22  Medication Contract: 11/24/22   Last Fill: 3/6/22    Requested Prescriptions     Pending Prescriptions Disp Refills    butalbital-acetaminophen-caffeine (FIORICET, ESGIC) -40 MG per tablet 90 tablet 0     Sig: TAKE 1 TABLET BY MOUTH EVERY 8 HOURS AS NEEDED FOR MIGRAINE HEADACHE    traMADol (ULTRAM) 50 MG tablet 45 tablet 0     Sig: Take 1 tablet by mouth every 12 hours as needed for Pain for up to 30 days. Please approve or refuse this medication.    Marivel Kaur MA

## 2022-04-05 NOTE — TELEPHONE ENCOUNTER
The current medical regimen is effective. Continue present plan and medications. UDS and controlled substance contract up to date. No unusual filling on current BAUTISTA report. Tx continues to be medically necessary.     Jon Pérez MD

## 2022-04-15 ENCOUNTER — TELEPHONE (OUTPATIENT)
Dept: FAMILY MEDICINE CLINIC | Age: 57
End: 2022-04-15

## 2022-04-15 NOTE — TELEPHONE ENCOUNTER
----- Message from Gloria Amaral sent at 4/15/2022  3:24 PM CDT -----  Subject: Message to Provider    QUESTIONS  Information for Provider? pt return call to office  ---------------------------------------------------------------------------  --------------  [address]  What is the best way for the office to contact you? OK to leave message on   voicemail  Preferred Call Back Phone Number?  [phone]  ---------------------------------------------------------------------------  --------------  SCRIPT ANSWERS  undefined

## 2022-05-01 DIAGNOSIS — G89.29 CHRONIC BILATERAL LOW BACK PAIN WITH LEFT-SIDED SCIATICA: ICD-10-CM

## 2022-05-01 DIAGNOSIS — G43.009 MIGRAINE WITHOUT AURA AND WITHOUT STATUS MIGRAINOSUS, NOT INTRACTABLE: ICD-10-CM

## 2022-05-01 DIAGNOSIS — G89.29 CHRONIC NECK PAIN: ICD-10-CM

## 2022-05-01 DIAGNOSIS — M54.2 CHRONIC NECK PAIN: ICD-10-CM

## 2022-05-01 DIAGNOSIS — M54.42 CHRONIC BILATERAL LOW BACK PAIN WITH LEFT-SIDED SCIATICA: ICD-10-CM

## 2022-05-05 NOTE — TELEPHONE ENCOUNTER
Rajat Mcdonald called to request a refill on her medication. Last office visit : 11/24/2021   Next office visit : 5/9/2022     Last UDS:   Amphetamine Screen, Urine   Date Value Ref Range Status   11/24/2021 neg  Final     Barbiturate Screen, Urine   Date Value Ref Range Status   11/24/2021 pos  Final     Benzodiazepine Screen, Urine   Date Value Ref Range Status   11/24/2021 neg  Final     Buprenorphine Urine   Date Value Ref Range Status   11/24/2021 neg  Final     Cocaine Metabolite Screen, Urine   Date Value Ref Range Status   11/24/2021 neg  Final     Gabapentin Screen, Urine   Date Value Ref Range Status   11/24/2021 neg  Final     MDMA, Urine   Date Value Ref Range Status   11/24/2021 neg  Final     Methamphetamine, Urine   Date Value Ref Range Status   11/24/2021 neg  Final     Opiate Scrn, Ur   Date Value Ref Range Status   11/24/2021 neg  Final     Oxycodone Screen, Ur   Date Value Ref Range Status   11/24/2021 neg  Final     PCP Screen, Urine   Date Value Ref Range Status   11/24/2021 neg  Final     Propoxyphene Screen, Urine   Date Value Ref Range Status   11/24/2021 neg  Final     THC Screen, Urine   Date Value Ref Range Status   11/24/2021 neg  Final     Tricyclic Antidepressants, Urine   Date Value Ref Range Status   11/24/2021 neg  Final       Last Jered Collinsnalini: 03/07/22  Medication Contract: 11/24/21   Last Fill: 04/05/22    Requested Prescriptions     Pending Prescriptions Disp Refills    butalbital-acetaminophen-caffeine (FIORICET, ESGIC) -40 MG per tablet [Pharmacy Med Name: BUTALBITAL/ACETAMINOPHEN/CAFF TABS] 90 tablet      Sig: TAKE 1 TABLET BY MOUTH EVERY 8 HOURS AS NEEDED FOR MIGRAINE HEADACHE    traMADol (ULTRAM) 50 MG tablet [Pharmacy Med Name: TRAMADOL 50MG TABLETS] 45 tablet      Sig: TAKE 1 TABLET BY MOUTH EVERY 12 HOURS AS NEEDED FOR PAIN         Please approve or refuse this medication.    Omar Patel LPN

## 2022-05-06 RX ORDER — TRAMADOL HYDROCHLORIDE 50 MG/1
TABLET ORAL
Qty: 45 TABLET | Refills: 0 | Status: SHIPPED | OUTPATIENT
Start: 2022-05-06 | End: 2022-06-10

## 2022-05-06 RX ORDER — BUTALBITAL, ACETAMINOPHEN AND CAFFEINE 50; 325; 40 MG/1; MG/1; MG/1
TABLET ORAL
Qty: 90 TABLET | Refills: 0 | Status: SHIPPED | OUTPATIENT
Start: 2022-05-06 | End: 2022-06-10

## 2022-05-09 ENCOUNTER — OFFICE VISIT (OUTPATIENT)
Dept: FAMILY MEDICINE CLINIC | Age: 57
End: 2022-05-09
Payer: COMMERCIAL

## 2022-05-09 VITALS
SYSTOLIC BLOOD PRESSURE: 128 MMHG | TEMPERATURE: 98.3 F | HEART RATE: 56 BPM | OXYGEN SATURATION: 99 % | HEIGHT: 61 IN | DIASTOLIC BLOOD PRESSURE: 74 MMHG | BODY MASS INDEX: 25.96 KG/M2 | WEIGHT: 137.5 LBS

## 2022-05-09 DIAGNOSIS — M54.2 CHRONIC NECK PAIN: ICD-10-CM

## 2022-05-09 DIAGNOSIS — E78.2 MIXED HYPERLIPIDEMIA: ICD-10-CM

## 2022-05-09 DIAGNOSIS — F41.1 GAD (GENERALIZED ANXIETY DISORDER): ICD-10-CM

## 2022-05-09 DIAGNOSIS — G25.81 RLS (RESTLESS LEGS SYNDROME): ICD-10-CM

## 2022-05-09 DIAGNOSIS — F33.1 MODERATE RECURRENT MAJOR DEPRESSION (HCC): ICD-10-CM

## 2022-05-09 DIAGNOSIS — G43.009 MIGRAINE WITHOUT AURA AND WITHOUT STATUS MIGRAINOSUS, NOT INTRACTABLE: ICD-10-CM

## 2022-05-09 DIAGNOSIS — M62.838 MUSCLE SPASM: ICD-10-CM

## 2022-05-09 DIAGNOSIS — M54.42 CHRONIC BILATERAL LOW BACK PAIN WITH LEFT-SIDED SCIATICA: ICD-10-CM

## 2022-05-09 DIAGNOSIS — D50.9 IRON DEFICIENCY ANEMIA, UNSPECIFIED IRON DEFICIENCY ANEMIA TYPE: ICD-10-CM

## 2022-05-09 DIAGNOSIS — E53.8 B12 DEFICIENCY: ICD-10-CM

## 2022-05-09 DIAGNOSIS — G44.219 EPISODIC TENSION-TYPE HEADACHE, NOT INTRACTABLE: ICD-10-CM

## 2022-05-09 DIAGNOSIS — E03.9 ACQUIRED HYPOTHYROIDISM: ICD-10-CM

## 2022-05-09 DIAGNOSIS — E55.9 VITAMIN D DEFICIENCY: ICD-10-CM

## 2022-05-09 DIAGNOSIS — R51.9 CHRONIC DAILY HEADACHE: ICD-10-CM

## 2022-05-09 DIAGNOSIS — E66.3 OVERWEIGHT (BMI 25.0-29.9): ICD-10-CM

## 2022-05-09 DIAGNOSIS — G89.29 CHRONIC BILATERAL LOW BACK PAIN WITH LEFT-SIDED SCIATICA: ICD-10-CM

## 2022-05-09 DIAGNOSIS — G89.29 CHRONIC NECK PAIN: ICD-10-CM

## 2022-05-09 DIAGNOSIS — E03.9 ACQUIRED HYPOTHYROIDISM: Primary | ICD-10-CM

## 2022-05-09 LAB
BASOPHILS ABSOLUTE: 0.1 K/UL (ref 0–0.2)
BASOPHILS RELATIVE PERCENT: 0.7 % (ref 0–1)
EOSINOPHILS ABSOLUTE: 0.4 K/UL (ref 0–0.6)
EOSINOPHILS RELATIVE PERCENT: 6.2 % (ref 0–5)
HCT VFR BLD CALC: 45.6 % (ref 37–47)
HEMOGLOBIN: 15.2 G/DL (ref 12–16)
IMMATURE GRANULOCYTES #: 0 K/UL
LYMPHOCYTES ABSOLUTE: 2.1 K/UL (ref 1.1–4.5)
LYMPHOCYTES RELATIVE PERCENT: 30.3 % (ref 20–40)
MCH RBC QN AUTO: 29.6 PG (ref 27–31)
MCHC RBC AUTO-ENTMCNC: 33.3 G/DL (ref 33–37)
MCV RBC AUTO: 88.7 FL (ref 81–99)
MONOCYTES ABSOLUTE: 0.6 K/UL (ref 0–0.9)
MONOCYTES RELATIVE PERCENT: 9.1 % (ref 0–10)
NEUTROPHILS ABSOLUTE: 3.7 K/UL (ref 1.5–7.5)
NEUTROPHILS RELATIVE PERCENT: 53.6 % (ref 50–65)
PDW BLD-RTO: 14.4 % (ref 11.5–14.5)
PLATELET # BLD: 284 K/UL (ref 130–400)
PMV BLD AUTO: 10.2 FL (ref 9.4–12.3)
RBC # BLD: 5.14 M/UL (ref 4.2–5.4)
T4 FREE: 2.15 NG/DL (ref 0.93–1.7)
TSH REFLEX FT4: 0.05 UIU/ML (ref 0.35–5.5)
WBC # BLD: 7 K/UL (ref 4.8–10.8)

## 2022-05-09 PROCEDURE — 99214 OFFICE O/P EST MOD 30 MIN: CPT | Performed by: INTERNAL MEDICINE

## 2022-05-09 RX ORDER — LEVOTHYROXINE SODIUM 112 UG/1
112 TABLET ORAL DAILY
Qty: 30 TABLET | Refills: 3 | Status: SHIPPED | OUTPATIENT
Start: 2022-05-09 | End: 2022-08-15

## 2022-05-09 RX ORDER — BACLOFEN 10 MG/1
TABLET ORAL
Qty: 90 TABLET | Refills: 2 | Status: SHIPPED | OUTPATIENT
Start: 2022-05-09

## 2022-05-09 RX ORDER — FLUVOXAMINE MALEATE 50 MG/1
75 TABLET, COATED ORAL NIGHTLY
Qty: 45 TABLET | Refills: 5 | Status: SHIPPED | OUTPATIENT
Start: 2022-05-09

## 2022-05-09 RX ORDER — GABAPENTIN 300 MG/1
CAPSULE ORAL
Qty: 60 CAPSULE | Refills: 2 | Status: SHIPPED | OUTPATIENT
Start: 2022-05-09 | End: 2022-08-16

## 2022-05-09 ASSESSMENT — ENCOUNTER SYMPTOMS
BLOOD IN STOOL: 0
EYE DISCHARGE: 0
DIARRHEA: 0
SINUS PRESSURE: 0
EYE REDNESS: 0
COUGH: 0
EYE PAIN: 0
SHORTNESS OF BREATH: 0
COLOR CHANGE: 0
ABDOMINAL PAIN: 0
VOMITING: 0
RHINORRHEA: 0
BACK PAIN: 1
SORE THROAT: 0
VOICE CHANGE: 0
WHEEZING: 0
PHOTOPHOBIA: 1
CHEST TIGHTNESS: 0

## 2022-05-09 NOTE — TELEPHONE ENCOUNTER
Anuj Jaimes called to request a refill on her medication.       Last office visit : 5/9/2022   Next office visit : Visit date not found     Requested Prescriptions     Pending Prescriptions Disp Refills    fluvoxaMINE (LUVOX) 50 MG tablet 45 tablet 5     Sig: Take 1.5 tablets by mouth nightly            Marie Degroot MA

## 2022-05-09 NOTE — TELEPHONE ENCOUNTER
Chema Malloy called to request a refill on her medication. Last office visit : 5/9/2022   Next office visit : Visit date not found     Last UDS:   Amphetamine Screen, Urine   Date Value Ref Range Status   11/24/2021 neg  Final     Barbiturate Screen, Urine   Date Value Ref Range Status   11/24/2021 pos  Final     Benzodiazepine Screen, Urine   Date Value Ref Range Status   11/24/2021 neg  Final     Buprenorphine Urine   Date Value Ref Range Status   11/24/2021 neg  Final     Cocaine Metabolite Screen, Urine   Date Value Ref Range Status   11/24/2021 neg  Final     Gabapentin Screen, Urine   Date Value Ref Range Status   11/24/2021 neg  Final     MDMA, Urine   Date Value Ref Range Status   11/24/2021 neg  Final     Methamphetamine, Urine   Date Value Ref Range Status   11/24/2021 neg  Final     Opiate Scrn, Ur   Date Value Ref Range Status   11/24/2021 neg  Final     Oxycodone Screen, Ur   Date Value Ref Range Status   11/24/2021 neg  Final     PCP Screen, Urine   Date Value Ref Range Status   11/24/2021 neg  Final     Propoxyphene Screen, Urine   Date Value Ref Range Status   11/24/2021 neg  Final     THC Screen, Urine   Date Value Ref Range Status   11/24/2021 neg  Final     Tricyclic Antidepressants, Urine   Date Value Ref Range Status   11/24/2021 neg  Final       Last Ayde Jonesaleah: 3/7/22  Medication Contract: 11/24/21   Last Fill: 1/31/22    Requested Prescriptions     Pending Prescriptions Disp Refills    gabapentin (NEURONTIN) 300 MG capsule 60 capsule 2     Signed Prescriptions Disp Refills    fluvoxaMINE (LUVOX) 50 MG tablet 45 tablet 5     Sig: Take 1.5 tablets by mouth nightly     Authorizing Provider: Nabil Nuñez     Ordering User: Kwesi Turner         Please approve or refuse this medication.    Kedar Nunn

## 2022-05-09 NOTE — PROGRESS NOTES
Cheryle Harpin is a 61 y.o. female who presents today for   Chief Complaint   Patient presents with    3 Month Follow-Up    Hypothyroidism    Hyperlipidemia    Migraine     Recurrent HAs/controlled med refills       Headache   Associated symptoms include back pain, neck pain and photophobia. Pertinent negatives include no abdominal pain, coughing, dizziness, ear pain, eye pain, eye redness, fever, numbness, rhinorrhea, sinus pressure, sore throat, vomiting or weakness. Other  Associated symptoms include arthralgias, headaches, myalgias and neck pain. Pertinent negatives include no abdominal pain, chest pain, chills, coughing, fatigue, fever, numbness, rash, sore throat, vomiting or weakness. 60 y/o WF presents for follow up on recurrent migraine/tension HAs, chronic neck pain with controlled med refills, chronic depression, HORACE, and elevated BMI. Patient has lost 13 lbs in the past 6 months since last visit but patient says she has not been able to eat a lot because she had her teeth pulled and she got full dentures which feel too big for her mouth. Patient says that when she tries to eat or bite, the dentures \"just fall out\". She has gone back multiple times for fitting at [facility]" with Dr Nieves Rivas but patient does not feel like the dentist is listening to her. Patient's iron deficiency anemia was treated with IV iron which had resolved her anemia as of labs 3 months ago. She is not sure if her fatigue is better though given she is still keeping her grandchildren Sun-Thurs. Each week and her difficulty eating due to her issues with her dentures. Patient's daughter is still working so patient still keeps all 4 granchildren most days of the week which causes more frequent HAs (tension and migraine). Patient feels like her depression and anxiety are somewhat exacerbated but fluvoxamine has been overall effective for these issues otherwise.        BMI Readings from Last 3 Encounters:   05/09/22 25.98 kg/m²   11/24/21 28.34 kg/m²   11/22/21 27.96 kg/m²     Wt Readings from Last 3 Encounters:   05/09/22 137 lb 8 oz (62.4 kg)   11/24/21 150 lb (68 kg)   11/22/21 148 lb (67.1 kg)     Hypothyroidism  Patient presents for follow up on thyroid function. Symptoms consist of fatigue, anxiousness, depression. The problem has been gradually worsening. Patient has been compliant with levothyroxine. Mixed Hyperlipidemia/Pure hyperlipidemia/Essential Hypertriglyceridemia: Compliance with treatment has been fair. The patient exercises rarely. Patient denies muscle pain associated with their medications which include Omega 3 FA. Review of Systems   Constitutional: Negative for appetite change, chills, fatigue, fever and unexpected weight change. HENT: Negative for ear pain, rhinorrhea, sinus pressure, sore throat and voice change. Eyes: Positive for photophobia. Negative for pain, discharge and redness. Respiratory: Negative for cough, chest tightness, shortness of breath and wheezing. Cardiovascular: Negative for chest pain and palpitations. Gastrointestinal: Negative for abdominal pain, blood in stool, diarrhea and vomiting. Endocrine: Negative for cold intolerance, heat intolerance and polydipsia. Genitourinary: Negative for dysuria and hematuria. Musculoskeletal: Positive for arthralgias, back pain, myalgias and neck pain. Negative for neck stiffness. Skin: Negative for color change and rash. Neurological: Positive for headaches. Negative for dizziness, tremors, syncope, speech difficulty, weakness and numbness. Hematological: Negative for adenopathy. Does not bruise/bleed easily. Psychiatric/Behavioral: Negative for confusion, dysphoric mood and sleep disturbance. The patient is not nervous/anxious. All other systems reviewed and are negative.       Past Medical History:   Diagnosis Date    Allergic rhinitis     Chronic bilateral low back pain with left-sided sciatica 12/1/2017  Colon polyp     Depression with anxiety     Obesity     Osteoarthritis        Current Outpatient Medications   Medication Sig Dispense Refill    baclofen (LIORESAL) 10 MG tablet TAKE 1 TABLET BY MOUTH THREE TIMES DAILY AS NEEDED FOR MUSCLE SPASM /  NECK  PAIN 90 tablet 2    levothyroxine (SYNTHROID) 112 MCG tablet Take 1 tablet by mouth daily 30 tablet 3    butalbital-acetaminophen-caffeine (FIORICET, ESGIC) -40 MG per tablet TAKE 1 TABLET BY MOUTH EVERY 8 HOURS AS NEEDED FOR MIGRAINE HEADACHE 90 tablet 0    traMADol (ULTRAM) 50 MG tablet TAKE 1 TABLET BY MOUTH EVERY 12 HOURS AS NEEDED FOR PAIN 45 tablet 0    propranolol (INDERAL LA) 80 MG extended release capsule Take 1 capsule by mouth once daily 30 capsule 2    iron sucrose (VENOFER) 20 MG/ML injection 200 mg infused over 1 hour once weekly x3 weeks 30 mL 0    Potassium 99 MG TABS Take 1 tablet by mouth daily      TURMERIC PO Take 1 tablet by mouth daily      vitamin B-12 (CYANOCOBALAMIN) 1000 MCG tablet Take 1,000 mcg by mouth daily      Multiple Vitamins-Minerals (MULTIVITAMIN ADULT PO) Take by mouth      Omega 3 1000 MG CAPS Take by mouth      Cholecalciferol (VITAMIN D3) 3000 units TABS Take by mouth      fluvoxaMINE (LUVOX) 50 MG tablet Take 1.5 tablets by mouth nightly 45 tablet 5    gabapentin (NEURONTIN) 300 MG capsule TAKE 2 CAPSULES BY MOUTH EVERY DAY AT BEDTIME 60 capsule 2     No current facility-administered medications for this visit.        Allergies   Allergen Reactions    Augmentin [Amoxicillin-Pot Clavulanate] Diarrhea    Aspirin Other (See Comments)       Past Surgical History:   Procedure Laterality Date     SECTION      COLONOSCOPY  2015    colon polyp x1, recheck in 5 yrs (Dr Wen Jurado)   [facility].         Social History     Tobacco Use    Smoking status: Never Smoker    Smokeless tobacco: Never Used   Vaping Use    Vaping Use: Never used   Substance Use regular rhythm. Pulses:           Carotid pulses are 2+ on the right side and 2+ on the left side. Posterior tibial pulses are 2+ on the right side and 2+ on the left side. Heart sounds: Normal heart sounds. No murmur heard. No friction rub. No gallop. Pulmonary:      Effort: Pulmonary effort is normal. No accessory muscle usage. Breath sounds: Normal breath sounds. No decreased breath sounds, wheezing, rhonchi or rales. Chest:   Breasts:      Right: No supraclavicular adenopathy. Left: No supraclavicular adenopathy. Abdominal:      General: Bowel sounds are normal. There is no distension. Palpations: Abdomen is soft. There is no mass. Tenderness: There is no abdominal tenderness. There is no guarding or rebound. Hernia: No hernia is present. Musculoskeletal:         General: No swelling, tenderness or deformity. Right wrist: Normal.      Left wrist: Normal.      Cervical back: Normal range of motion and neck supple. No edema, erythema or rigidity. Muscular tenderness present. No pain with movement. Normal range of motion. Right lower leg: No edema. Left lower leg: No edema. Right ankle: Normal.      Left ankle: Normal.   Lymphadenopathy:      Cervical: No cervical adenopathy. Upper Body:      Right upper body: No supraclavicular adenopathy. Left upper body: No supraclavicular adenopathy. Skin:     General: Skin is warm. Capillary Refill: Capillary refill takes less than 2 seconds. Coloration: Skin is not cyanotic. Findings: No rash. Nails: There is no clubbing. Neurological:      Mental Status: She is alert and oriented to person, place, and time. Cranial Nerves: No cranial nerve deficit or dysarthria. Motor: No weakness, tremor or abnormal muscle tone. Coordination: Coordination normal.      Gait: Gait is intact.       Comments: CN II-XII grossly intact, speech clear, no facial droop, MAEW Psychiatric:         Attention and Perception: Attention and perception normal.         Mood and Affect: Mood and affect normal.         Speech: Speech normal.         Behavior: Behavior normal. Behavior is cooperative. Thought Content:  Thought content normal.         Cognition and Memory: Cognition and memory normal.         Judgment: Judgment normal.       Lab Results   Component Value Date    WBC 7.0 05/09/2022    HGB 15.2 05/09/2022    HCT 45.6 05/09/2022    MCV 88.7 05/09/2022     05/09/2022    LABLYMP 1.34 08/13/2015    LYMPHOPCT 30.3 05/09/2022    RBC 5.14 05/09/2022    MCH 29.6 05/09/2022    MCHC 33.3 05/09/2022    RDW 14.4 05/09/2022     Lab Results   Component Value Date    IRON 138 02/18/2022    TIBC 308 02/18/2022     Lab Results   Component Value Date    IRON 138 02/18/2022    TIBC 308 02/18/2022       Lab Results   Component Value Date     02/18/2022    K 4.3 02/18/2022     02/18/2022    CO2 25 02/18/2022    BUN 6 02/18/2022    CREATININE 0.6 02/18/2022    GLUCOSE 100 02/18/2022    CALCIUM 9.7 02/18/2022    PROT 6.8 02/18/2022    LABALBU 4.4 02/18/2022    BILITOT 0.4 02/18/2022    ALKPHOS 128 (H) 02/18/2022    AST 27 02/18/2022    ALT 24 02/18/2022    LABGLOM >60 02/18/2022    GFRAA >59 02/18/2022     Lab Results   Component Value Date    TSHFT4 0.05 (L) 05/09/2022    TSH 0.025 (L) 02/18/2022     Lab Results   Component Value Date    CHOL 194 02/18/2022    CHOL 226 (H) 11/17/2021    CHOL 221 (H) 06/12/2020     Lab Results   Component Value Date    TRIG 205 (H) 02/18/2022    TRIG 179 (H) 11/17/2021    TRIG 149 06/12/2020     Lab Results   Component Value Date    HDL 40 (L) 02/18/2022    HDL 41 (L) 11/17/2021    HDL 43 (L) 09/18/2020     Lab Results   Component Value Date    LDLCALC 113 02/18/2022    LDLCALC 149 11/17/2021    LDLCALC 139 09/18/2020     Lab Results   Component Value Date    TSHFT4 0.05 (L) 05/09/2022    TSH 0.025 (L) 02/18/2022     Lab Results   Component Value Date    VITD25 41.9 02/18/2022         No results found for this visit on 05/09/22. Assessment:    ICD-10-CM    1. Acquired hypothyroidism  E03.9 TSH with Reflex to FT4     levothyroxine (SYNTHROID) 112 MCG tablet     TSH with Reflex to FT4   2. Iron deficiency anemia, unspecified iron deficiency anemia type  D50.9 CBC with Auto Differential   3. B12 deficiency  E53.8 CBC with Auto Differential   4. Chronic neck pain  M54.2 baclofen (LIORESAL) 10 MG tablet    G89.29 [facility]   5. Muscle spasm  M62.838 baclofen (LIORESAL) 10 MG tablet     [facility]   6. Episodic tension-type headache, not intractable  G44.219 [facility]   7. Migraine without aura and without status migrainosus, not intractable  G43.009 [facility]   8. Mixed hyperlipidemia  E78.2    9. Vitamin D deficiency  E55.9    10. RLS (restless legs syndrome)  G25.81    11. Moderate recurrent major depression (HCC)  F33.1    12. HORACE (generalized anxiety disorder)  F41.1    13. Overweight (BMI 25.0-29. 9)  E66.3        Plan:  Elissa Jo was seen today for 3 month follow-up, hypothyroidism, hyperlipidemia and migraine. Diagnoses and all orders for this visit:    Acquired hypothyroidism  -     TSH with Reflex to FT4; Future  -     levothyroxine (SYNTHROID) 112 MCG tablet;  Take 1 tablet by mouth daily  -     TSH with Reflex to FT4; Future    Iron deficiency anemia, unspecified iron deficiency anemia type  -     CBC with Auto Differential; Future    B12 deficiency  -     CBC with Auto Differential; Future    Chronic neck pain  -     baclofen (LIORESAL) 10 MG tablet; TAKE 1 TABLET BY MOUTH THREE TIMES DAILY AS NEEDED FOR MUSCLE SPASM /  NECK  PAIN  -     [facility]    Muscle spasm  -     baclofen (LIORESAL) 10 MG tablet; TAKE 1 TABLET BY MOUTH THREE TIMES DAILY AS NEEDED FOR MUSCLE SPASM /  NECK  PAIN  -     [facility]    Episodic tension-type headache, not intractable  -     [facility]    Migraine without aura and without status migrainosus, not intractable  -     [facility]    Mixed hyperlipidemia    Vitamin D deficiency    RLS (restless legs syndrome)    Moderate recurrent major depression (HCC)    HORACE (generalized anxiety disorder)    Overweight (BMI 25.0-29. 9)      Labs reviewed with patient. Refills Provided. -Mixed Hyperlipidemia-improving, continue current medication. Patient prefers to increase efforts at low cholesterol diet, exercise, and weight loss versus starting new medication.   -Acquired Hypothyroidism-not well controlled with recent TSH still suppressed so levothyroxine dose lowered. -Major depressive disorder and generalized anxiety disorder overall controlled with fluovaxamine which will be continued, exercise and relaxation techniques encouraged to help with mood also and will refer to Pain Management to help with HAs and chronic neck pain.  -Chronic neck pain and chronic daily headache(s) are inadequately controlled despite stretching exercises to help with neck pain, massage therapy and moist heat to neck as needed. She has had to increase use of tramadol and fioricet prn moderate to severe pain so will refer to [address] [place] [address] Pain Management for help in treatment. Controlled substance contract and urine drug screen are up-to-date currently. No unusual filling on recent [facility] report. Treatment continues to be medically necessary and improves patient quality of life. No signs of misuse of controlled medication. -Iron deficiency anemia-well controlled s/p treatment with IV iron replacement to see if this helps with iron deficiency and fatigue  -Return in about 3 months (around 8/9/2022) for ECPE, headaches, recheck mood, high cholesterol, Controlled med refill, thyroid. Over 50% of the total visit time of 40 min was spent on counseling and/or coordination of care of:   1. Acquired hypothyroidism    2.  Iron deficiency anemia, unspecified iron deficiency anemia type    3. B12 deficiency    4. Chronic neck pain    5. Muscle spasm    6. Episodic tension-type headache, not intractable    7. Migraine without aura and without status migrainosus, not intractable    8. Mixed hyperlipidemia    9. Vitamin D deficiency    10. RLS (restless legs syndrome)    11. Moderate recurrent major depression ([facility] [address]    12. HORACE (generalized anxiety disorder)    13. Overweight (BMI 25.0-29. 9)         Orders Placed This Encounter   Procedures    TSH with Reflex to FT4     Standing Status:   Future     Number of Occurrences:   1     Standing Expiration Date:   6/8/2023    CBC with Auto Differential     Standing Status:   Future     Number of Occurrences:   1     Standing Expiration Date:   6/8/2023    TSH with Reflex to FT4     Standing Status:   Future     Standing Expiration Date:   6/8/2023   [facility]     Referral Priority:   Routine     Referral Type:   Eval and Treat     Referral Reason:   Specialty Services Required     Number of Visits Requested:   1     Orders Placed This Encounter   Medications    baclofen (LIORESAL) 10 MG tablet     Sig: TAKE 1 TABLET BY MOUTH THREE TIMES DAILY AS NEEDED FOR MUSCLE SPASM /  NECK  PAIN     Dispense:  90 tablet     Refill:  2    levothyroxine (SYNTHROID) 112 MCG tablet     Sig: Take 1 tablet by mouth daily     Dispense:  30 tablet     Refill:  3     Medications Discontinued During This Encounter   Medication Reason    omeprazole (PRILOSEC) 40 MG delayed release capsule LIST CLEANUP    baclofen (LIORESAL) 10 MG tablet REORDER    levothyroxine (EUTHYROX) 125 MCG tablet DOSE ADJUSTMENT     There are no Patient Instructions on file for this visit. Patient voices understanding and agrees to plans along with risks and benefits of plan. Counseling:  Raul Lees's case, medications and options were discussed in detail.  patient was instructed to call the office if she questions regarding her treatment. Should her conditions worsen, she should return to office to be reassessed by Dr. Lenin Jung. she  Should to go the closest Emergency Department for any emergency. They verbalized understanding the above instructions.

## 2022-06-09 DIAGNOSIS — G43.009 MIGRAINE WITHOUT AURA AND WITHOUT STATUS MIGRAINOSUS, NOT INTRACTABLE: ICD-10-CM

## 2022-06-09 DIAGNOSIS — G89.29 CHRONIC BILATERAL LOW BACK PAIN WITH LEFT-SIDED SCIATICA: ICD-10-CM

## 2022-06-09 DIAGNOSIS — G89.29 CHRONIC NECK PAIN: ICD-10-CM

## 2022-06-09 DIAGNOSIS — M54.2 CHRONIC NECK PAIN: ICD-10-CM

## 2022-06-09 DIAGNOSIS — M54.42 CHRONIC BILATERAL LOW BACK PAIN WITH LEFT-SIDED SCIATICA: ICD-10-CM

## 2022-06-10 RX ORDER — BUTALBITAL, ACETAMINOPHEN AND CAFFEINE 50; 325; 40 MG/1; MG/1; MG/1
TABLET ORAL
Qty: 90 TABLET | Refills: 0 | Status: SHIPPED | OUTPATIENT
Start: 2022-06-10 | End: 2022-07-11

## 2022-06-10 RX ORDER — TRAMADOL HYDROCHLORIDE 50 MG/1
TABLET ORAL
Qty: 45 TABLET | Refills: 0 | Status: SHIPPED | OUTPATIENT
Start: 2022-06-10 | End: 2022-07-11

## 2022-06-10 NOTE — TELEPHONE ENCOUNTER
Rajat Mcdonald called to request a refill on her medication. Last office visit : 5/9/2022   Next office visit : 8/12/2022     Last UDS:   Amphetamine Screen, Urine   Date Value Ref Range Status   11/24/2021 neg  Final     Barbiturate Screen, Urine   Date Value Ref Range Status   11/24/2021 pos  Final     Benzodiazepine Screen, Urine   Date Value Ref Range Status   11/24/2021 neg  Final     Buprenorphine Urine   Date Value Ref Range Status   11/24/2021 neg  Final     Cocaine Metabolite Screen, Urine   Date Value Ref Range Status   11/24/2021 neg  Final     Gabapentin Screen, Urine   Date Value Ref Range Status   11/24/2021 neg  Final     MDMA, Urine   Date Value Ref Range Status   11/24/2021 neg  Final     Methamphetamine, Urine   Date Value Ref Range Status   11/24/2021 neg  Final     Opiate Scrn, Ur   Date Value Ref Range Status   11/24/2021 neg  Final     Oxycodone Screen, Ur   Date Value Ref Range Status   11/24/2021 neg  Final     PCP Screen, Urine   Date Value Ref Range Status   11/24/2021 neg  Final     Propoxyphene Screen, Urine   Date Value Ref Range Status   11/24/2021 neg  Final     THC Screen, Urine   Date Value Ref Range Status   11/24/2021 neg  Final     Tricyclic Antidepressants, Urine   Date Value Ref Range Status   11/24/2021 neg  Final       Last Jered Cliff: 3/7/22  Medication Contract: 11/24/21   Last Fill: 5/6/22    Requested Prescriptions     Pending Prescriptions Disp Refills    traMADol (ULTRAM) 50 MG tablet [Pharmacy Med Name: TRAMADOL 50MG TABLETS] 45 tablet      Sig: TAKE 1 TABLET BY MOUTH EVERY 12 HOURS AS NEEDED FOR PAIN    butalbital-acetaminophen-caffeine (FIORICET, ESGIC) -40 MG per tablet [Pharmacy Med Name: BUTALBITAL/ACETAMINOPHEN/CAFF TABS] 90 tablet      Sig: TAKE 1 TABLET BY MOUTH EVERY 8 HOURS AS NEEDED FOR MIGRAINE HEADACHE         Please approve or refuse this medication.    Ernesto Vale, [place]

## 2022-06-21 DIAGNOSIS — G43.009 MIGRAINE WITHOUT AURA AND WITHOUT STATUS MIGRAINOSUS, NOT INTRACTABLE: ICD-10-CM

## 2022-06-21 RX ORDER — PROPRANOLOL HYDROCHLORIDE 80 MG/1
CAPSULE, EXTENDED RELEASE ORAL
Qty: 30 CAPSULE | Refills: 0 | Status: SHIPPED | OUTPATIENT
Start: 2022-06-21 | End: 2022-08-02

## 2022-07-08 DIAGNOSIS — M54.2 CHRONIC NECK PAIN: ICD-10-CM

## 2022-07-08 DIAGNOSIS — M54.42 CHRONIC BILATERAL LOW BACK PAIN WITH LEFT-SIDED SCIATICA: ICD-10-CM

## 2022-07-08 DIAGNOSIS — G89.29 CHRONIC BILATERAL LOW BACK PAIN WITH LEFT-SIDED SCIATICA: ICD-10-CM

## 2022-07-08 DIAGNOSIS — G89.29 CHRONIC NECK PAIN: ICD-10-CM

## 2022-07-08 DIAGNOSIS — G43.009 MIGRAINE WITHOUT AURA AND WITHOUT STATUS MIGRAINOSUS, NOT INTRACTABLE: ICD-10-CM

## 2022-07-11 RX ORDER — BUTALBITAL, ACETAMINOPHEN AND CAFFEINE 50; 325; 40 MG/1; MG/1; MG/1
TABLET ORAL
Qty: 90 TABLET | Refills: 0 | Status: SHIPPED | OUTPATIENT
Start: 2022-07-11 | End: 2022-08-10

## 2022-07-11 RX ORDER — TRAMADOL HYDROCHLORIDE 50 MG/1
TABLET ORAL
Qty: 45 TABLET | Refills: 1 | Status: SHIPPED | OUTPATIENT
Start: 2022-07-11 | End: 2022-09-09

## 2022-07-11 NOTE — TELEPHONE ENCOUNTER
Teo Hart called to request a refill on her medication. Last office visit : 5/9/2022   Next office visit : 8/12/2022     Last UDS:   Amphetamine Screen, Urine   Date Value Ref Range Status   11/24/2021 neg  Final     Barbiturate Screen, Urine   Date Value Ref Range Status   11/24/2021 pos  Final     Benzodiazepine Screen, Urine   Date Value Ref Range Status   11/24/2021 neg  Final     Buprenorphine Urine   Date Value Ref Range Status   11/24/2021 neg  Final     Cocaine Metabolite Screen, Urine   Date Value Ref Range Status   11/24/2021 neg  Final     Gabapentin Screen, Urine   Date Value Ref Range Status   11/24/2021 neg  Final     MDMA, Urine   Date Value Ref Range Status   11/24/2021 neg  Final     Methamphetamine, Urine   Date Value Ref Range Status   11/24/2021 neg  Final     Opiate Scrn, Ur   Date Value Ref Range Status   11/24/2021 neg  Final     Oxycodone Screen, Ur   Date Value Ref Range Status   11/24/2021 neg  Final     PCP Screen, Urine   Date Value Ref Range Status   11/24/2021 neg  Final     Propoxyphene Screen, Urine   Date Value Ref Range Status   11/24/2021 neg  Final     THC Screen, Urine   Date Value Ref Range Status   11/24/2021 neg  Final     Tricyclic Antidepressants, Urine   Date Value Ref Range Status   11/24/2021 neg  Final       Last Beatriz Eitanornstad: 7/11/22  Medication Contract: 11/24/21   Last Fill: 6/10/22    Requested Prescriptions     Pending Prescriptions Disp Refills    traMADol (ULTRAM) 50 MG tablet [Pharmacy Med Name: TRAMADOL 50MG TABLETS] 45 tablet      Sig: TAKE 1 TABLET BY MOUTH EVERY 12 HOURS AS NEEDED FOR PAIN    butalbital-acetaminophen-caffeine (FIORICET, ESGIC) -40 MG per tablet [Pharmacy Med Name: BUTALBITAL/ACETAMINOPHEN/CAFF TABS] 90 tablet      Sig: TAKE 1 TABLET BY MOUTH EVERY 8 HOURS AS NEEDED FOR MIGRAINE HEADACHE         Please approve or refuse this medication.    Juanis Correia MA

## 2022-08-02 DIAGNOSIS — G43.009 MIGRAINE WITHOUT AURA AND WITHOUT STATUS MIGRAINOSUS, NOT INTRACTABLE: ICD-10-CM

## 2022-08-02 RX ORDER — PROPRANOLOL HYDROCHLORIDE 80 MG/1
CAPSULE, EXTENDED RELEASE ORAL
Qty: 30 CAPSULE | Refills: 5 | Status: SHIPPED | OUTPATIENT
Start: 2022-08-02

## 2022-08-02 NOTE — TELEPHONE ENCOUNTER
Reji Segura called requesting a refill of the below medication which has been pended for you:     Requested Prescriptions     Pending Prescriptions Disp Refills    propranolol (INDERAL LA) 80 MG extended release capsule [Pharmacy Med Name: Propranolol HCl ER 80 MG Oral Capsule Extended Release 24 Hour] 30 capsule 0     Sig: Take 1 capsule by mouth once daily       Last Appointment Date: 5/9/2022  Next Appointment Date: 8/12/2022    Allergies   Allergen Reactions    Augmentin [Amoxicillin-Pot Clavulanate] Diarrhea    Aspirin Other (See Comments)

## 2022-08-10 DIAGNOSIS — G43.009 MIGRAINE WITHOUT AURA AND WITHOUT STATUS MIGRAINOSUS, NOT INTRACTABLE: ICD-10-CM

## 2022-08-10 RX ORDER — BUTALBITAL, ACETAMINOPHEN AND CAFFEINE 50; 325; 40 MG/1; MG/1; MG/1
TABLET ORAL
Qty: 90 TABLET | Refills: 0 | Status: SHIPPED | OUTPATIENT
Start: 2022-08-10 | End: 2022-09-09

## 2022-08-10 NOTE — TELEPHONE ENCOUNTER
Simeon Chi called to request a refill on her medication. Last office visit : 5/9/2022   Next office visit : 8/15/2022     Last UDS:   Amphetamine Screen, Urine   Date Value Ref Range Status   11/24/2021 neg  Final     Barbiturate Screen, Urine   Date Value Ref Range Status   11/24/2021 pos  Final     Benzodiazepine Screen, Urine   Date Value Ref Range Status   11/24/2021 neg  Final     Buprenorphine Urine   Date Value Ref Range Status   11/24/2021 neg  Final     Cocaine Metabolite Screen, Urine   Date Value Ref Range Status   11/24/2021 neg  Final     Gabapentin Screen, Urine   Date Value Ref Range Status   11/24/2021 neg  Final     MDMA, Urine   Date Value Ref Range Status   11/24/2021 neg  Final     Methamphetamine, Urine   Date Value Ref Range Status   11/24/2021 neg  Final     Opiate Scrn, Ur   Date Value Ref Range Status   11/24/2021 neg  Final     Oxycodone Screen, Ur   Date Value Ref Range Status   11/24/2021 neg  Final     PCP Screen, Urine   Date Value Ref Range Status   11/24/2021 neg  Final     Propoxyphene Screen, Urine   Date Value Ref Range Status   11/24/2021 neg  Final     THC Screen, Urine   Date Value Ref Range Status   11/24/2021 neg  Final     Tricyclic Antidepressants, Urine   Date Value Ref Range Status   11/24/2021 neg  Final       Last Dorlene Walter: 7/11/22  Medication Contract: 11/24/21   Last Fill: 7/11/22    Requested Prescriptions     Pending Prescriptions Disp Refills    butalbital-acetaminophen-caffeine (FIORICET, ESGIC) -40 MG per tablet [Pharmacy Med Name: BUTALBITAL/ACETAMINOPHEN/CAFF TABS] 90 tablet      Sig: TAKE 1 TABLET BY MOUTH EVERY 8 HOURS AS NEEDED FOR MIGRAINE HEADACHE         Please approve or refuse this medication.    Domo Bright, [address] [place]

## 2022-08-11 DIAGNOSIS — E03.9 ACQUIRED HYPOTHYROIDISM: ICD-10-CM

## 2022-08-11 LAB
T4 FREE: 2.22 NG/DL (ref 0.93–1.7)
TSH REFLEX FT4: 0.02 UIU/ML (ref 0.35–5.5)

## 2022-08-13 DIAGNOSIS — G25.81 RLS (RESTLESS LEGS SYNDROME): ICD-10-CM

## 2022-08-13 DIAGNOSIS — G89.29 CHRONIC BILATERAL LOW BACK PAIN WITH LEFT-SIDED SCIATICA: ICD-10-CM

## 2022-08-13 DIAGNOSIS — M54.42 CHRONIC BILATERAL LOW BACK PAIN WITH LEFT-SIDED SCIATICA: ICD-10-CM

## 2022-08-13 DIAGNOSIS — M54.2 CHRONIC NECK PAIN: ICD-10-CM

## 2022-08-13 DIAGNOSIS — G43.009 MIGRAINE WITHOUT AURA AND WITHOUT STATUS MIGRAINOSUS, NOT INTRACTABLE: ICD-10-CM

## 2022-08-13 DIAGNOSIS — G89.29 CHRONIC NECK PAIN: ICD-10-CM

## 2022-08-13 DIAGNOSIS — R51.9 CHRONIC DAILY HEADACHE: ICD-10-CM

## 2022-08-15 ENCOUNTER — OFFICE VISIT (OUTPATIENT)
Dept: FAMILY MEDICINE CLINIC | Age: 57
End: 2022-08-15
Payer: COMMERCIAL

## 2022-08-15 VITALS
OXYGEN SATURATION: 99 % | WEIGHT: 136.6 LBS | TEMPERATURE: 98.1 F | SYSTOLIC BLOOD PRESSURE: 126 MMHG | HEIGHT: 61 IN | DIASTOLIC BLOOD PRESSURE: 82 MMHG | HEART RATE: 58 BPM | BODY MASS INDEX: 25.79 KG/M2

## 2022-08-15 DIAGNOSIS — Z86.39 H/O THYROID NODULE: ICD-10-CM

## 2022-08-15 DIAGNOSIS — R79.89 ABNORMAL TSH: ICD-10-CM

## 2022-08-15 DIAGNOSIS — E03.9 ACQUIRED HYPOTHYROIDISM: Primary | ICD-10-CM

## 2022-08-15 PROCEDURE — 99213 OFFICE O/P EST LOW 20 MIN: CPT | Performed by: NURSE PRACTITIONER

## 2022-08-15 RX ORDER — LEVOTHYROXINE SODIUM 88 UG/1
88 TABLET ORAL DAILY
Qty: 30 TABLET | Refills: 0 | Status: SHIPPED | OUTPATIENT
Start: 2022-08-15 | End: 2022-10-24

## 2022-08-15 ASSESSMENT — ENCOUNTER SYMPTOMS
RESPIRATORY NEGATIVE: 1
GASTROINTESTINAL NEGATIVE: 1
EYES NEGATIVE: 1
ALLERGIC/IMMUNOLOGIC NEGATIVE: 1

## 2022-08-15 ASSESSMENT — PATIENT HEALTH QUESTIONNAIRE - PHQ9
3. TROUBLE FALLING OR STAYING ASLEEP: 3
2. FEELING DOWN, DEPRESSED OR HOPELESS: 0
10. IF YOU CHECKED OFF ANY PROBLEMS, HOW DIFFICULT HAVE THESE PROBLEMS MADE IT FOR YOU TO DO YOUR WORK, TAKE CARE OF THINGS AT HOME, OR GET ALONG WITH OTHER PEOPLE: 2
SUM OF ALL RESPONSES TO PHQ QUESTIONS 1-9: 6
4. FEELING TIRED OR HAVING LITTLE ENERGY: 1
SUM OF ALL RESPONSES TO PHQ QUESTIONS 1-9: 6
8. MOVING OR SPEAKING SO SLOWLY THAT OTHER PEOPLE COULD HAVE NOTICED. OR THE OPPOSITE, BEING SO FIGETY OR RESTLESS THAT YOU HAVE BEEN MOVING AROUND A LOT MORE THAN USUAL: 0
5. POOR APPETITE OR OVEREATING: 0
SUM OF ALL RESPONSES TO PHQ9 QUESTIONS 1 & 2: 0
6. FEELING BAD ABOUT YOURSELF - OR THAT YOU ARE A FAILURE OR HAVE LET YOURSELF OR YOUR FAMILY DOWN: 0
9. THOUGHTS THAT YOU WOULD BE BETTER OFF DEAD, OR OF HURTING YOURSELF: 0
7. TROUBLE CONCENTRATING ON THINGS, SUCH AS READING THE NEWSPAPER OR WATCHING TELEVISION: 2
1. LITTLE INTEREST OR PLEASURE IN DOING THINGS: 0

## 2022-08-15 NOTE — TELEPHONE ENCOUNTER
Montez Munoz called to request a refill on her medication. Last office visit : 5/9/2022   Next office visit : 8/15/2022     Last UDS:   Amphetamine Screen, Urine   Date Value Ref Range Status   11/24/2021 neg  Final     Barbiturate Screen, Urine   Date Value Ref Range Status   11/24/2021 pos  Final     Benzodiazepine Screen, Urine   Date Value Ref Range Status   11/24/2021 neg  Final     Buprenorphine Urine   Date Value Ref Range Status   11/24/2021 neg  Final     Cocaine Metabolite Screen, Urine   Date Value Ref Range Status   11/24/2021 neg  Final     Gabapentin Screen, Urine   Date Value Ref Range Status   11/24/2021 neg  Final     MDMA, Urine   Date Value Ref Range Status   11/24/2021 neg  Final     Methamphetamine, Urine   Date Value Ref Range Status   11/24/2021 neg  Final     Opiate Scrn, Ur   Date Value Ref Range Status   11/24/2021 neg  Final     Oxycodone Screen, Ur   Date Value Ref Range Status   11/24/2021 neg  Final     PCP Screen, Urine   Date Value Ref Range Status   11/24/2021 neg  Final     Propoxyphene Screen, Urine   Date Value Ref Range Status   11/24/2021 neg  Final     THC Screen, Urine   Date Value Ref Range Status   11/24/2021 neg  Final     Tricyclic Antidepressants, Urine   Date Value Ref Range Status   11/24/2021 neg  Final       Last Jefm Goes: 7/11/22  Medication Contract: 11/24/21   Last Fill: 7/22/22    Requested Prescriptions     Pending Prescriptions Disp Refills    gabapentin (NEURONTIN) 300 MG capsule [Pharmacy Med Name: Gabapentin 300 MG Oral Capsule] 60 capsule 0     Sig: TAKE 2 CAPSULES BY MOUTH EVERY DAY AT BEDTIME         Please approve or refuse this medication.    Jesus Alberto Ghosh MA

## 2022-08-15 NOTE — PROGRESS NOTES
[facility] PHYSICIAN SERVICES  [facility] FAMILY MEDICINE  [address] [facility] [address] [address]  Dept: [phone]  Dept Fax: [phone]: [phone]    Marylin Russo is a 61 y.o. female who presents today for her medical conditions/complaints as noted below. Marylin Russo is c/o of Annual Exam (Physical ) and Headache (Headaches are reported as \"about the same. \" She is seeing pain management on  for headaches. )        HPI:   She presents for follow upon thyroid. States she is still taking the Synthroid 112mcg. Her TSH 0.02 and was 0.05 in May 2022. She reports a history of thyroid nodules. States it has been more than five years since she has had an ultrasound. She reports her sister has had thyroid cancer. She mentions weight loss since 2021 because she got dentures and has been unable to eat. She complains of headaches and has an appointment with Pain Management on 22. HPI   Chief Complaint   Patient presents with    Annual Exam     Physical     Headache     Headaches are reported as \"about the same. \" She is seeing pain management on  for headaches.       Past Medical History:   Diagnosis Date    Allergic rhinitis     Chronic bilateral low back pain with left-sided sciatica 2017    Colon polyp     Depression with anxiety     Obesity     Osteoarthritis       Past Surgical History:   Procedure Laterality Date     SECTION      COLONOSCOPY  2015    colon polyp x1, recheck in 5 yrs (Dr April Ruiz)    HYSTERECTOMY (CERVIX STATUS UNKNOWN)      [facility] [address] 8/15/2022 2022 2022 2022 2022 3/31/2886   SYSTOLIC 328 [phone]   DIASTOLIC 82 74 73 79 80 72   Site - - - - - -   Position - - - - - -   Cuff Size - - - - - -   Pulse 58 56 44 60 60 72   Temp 98.1 98.3 97.1 98.7 97.8 98.2   Resp - - 20 18 17 18   SpO2 99 99 100 100 99 97   Weight 136 lb 9.6 oz 137 lb 8 oz - - - -   Height 5' 1\" 5' 1\" - - file prior to visit. Allergies   Allergen Reactions    Augmentin [Amoxicillin-Pot Clavulanate] Diarrhea    Aspirin Other (See Comments)       Health Maintenance   Topic Date Due    Colorectal Cancer Screen  03/04/2020    COVID-19 Vaccine (3 - Booster for Moderna series) 10/03/2021    Depression Monitoring  06/14/2022    Flu vaccine (1) 09/01/2022    Breast cancer screen  10/01/2022    DTaP/Tdap/Td vaccine (2 - Td or Tdap) 08/01/2026    Lipids  02/18/2027    Shingles vaccine  Completed    Hepatitis C screen  Completed    Hepatitis A vaccine  Aged Out    Hepatitis B vaccine  Aged Out    Hib vaccine  Aged Out    Meningococcal (ACWY) vaccine  Aged Out    Pneumococcal 0-64 years Vaccine  Aged Out    HIV screen  Discontinued       Subjective   SUBJECTIVE/OBJECTIVE:  @HPI@    Review of Systems   Constitutional: Negative. HENT: Negative. Eyes: Negative. Respiratory: Negative. Cardiovascular: Negative. Gastrointestinal: Negative. Endocrine: Negative. Genitourinary: Negative. Musculoskeletal: Negative. Skin: Negative. Allergic/Immunologic: Negative. Neurological:  Positive for headaches. Hematological: Negative. Psychiatric/Behavioral: Negative. Objective   Physical Exam  Vitals and nursing note reviewed. Constitutional:       Appearance: Normal appearance. HENT:      Head: Normocephalic. Nose: Nose normal.   Eyes:      General:         Right eye: No discharge. Left eye: No discharge. Cardiovascular:      Rate and Rhythm: Normal rate and regular rhythm. Pulses: Normal pulses. Heart sounds: Normal heart sounds. Pulmonary:      Effort: Pulmonary effort is normal.      Breath sounds: Normal breath sounds. Musculoskeletal:         General: Normal range of motion. Cervical back: Normal range of motion. Skin:     General: Skin is warm and dry. Neurological:      Mental Status: She is alert and oriented to person, place, and time. Psychiatric:         Mood and Affect: Mood normal.         Behavior: Behavior normal.         Thought Content: Thought content normal.         Judgment: Judgment normal.          ASSESSMENT/PLAN:  1. Acquired hypothyroidism  -     US THYROID; Future  -     TSH with Reflex to FT4; Future  -     T4, Free; Future  2. H/O thyroid nodule  -     US THYROID; Future  -     TSH with Reflex to FT4; Future  -     T4, Free; Future  3. Abnormal TSH  -     US THYROID; Future  -     TSH with Reflex to FT4; Future  -     T4, Free; Future    Return in about 6 weeks (around 9/26/2022) for follow up with PCP. More than 50% of the time was spent counseling and coordinating care for a total time of 15-20min face to face. Schedule Thyroid ultrasound  Decrease Synthroid from 112 to 88mcg daily  Follow up and repeat labs in 6 weeks. Advised to keep appt with Pain Manangement on 8/25/22    PDMP Monitoring:    Last PDMP [place] as Reviewed:  Review User Review Instant Review Result   USMAN, JAYLA 4/5/2022  2:38 PM Reviewed PDMP [1]     Last Controlled Substance Monitoring Documentation      [address] Office Visit from 1/28/2020 in [address]   Periodic Controlled Substance Monitoring Possible medication side effects, risk of tolerance/dependence & alternative treatments discussed., No signs of potential drug abuse or diversion identified. , Assessed functional status.  filed at 01/28/2020 1507          Urine Drug Screenings (1 yr)       POCT Rapid Drug Screen  Collected: 11/24/2021 (Final result)              POCT Rapid Drug Screen  Collected: 9/25/2020 (Final result)              POCT Rapid Drug Screen  Collected: 8/16/2019 11:48 AM (Final result)              POCT Rapid Drug Screen  Collected: 9/13/2018 12:01 PM (Final result)              POCT Rapid Drug Screen  Collected: 12/1/2017 12:17 PM (Edited Result - FINAL)                  Medication Contract and Consent for Opioid Use Documents Filed       Patient Documents       Type of Document Status Date Received Received By Description    Medication Contract Received 9/13/2018 11:31 AM TEJA GEORGE Medication Agreement    Medication Contract Received 8/16/2019 11:46 AM Darryl Chatterjee med contract 8/16/2019    Medication Contract Received 9/25/2020 12:30 PM MIKI VILLAGOMEZ 9/25/20 med contract    Medication Contract Received 12/30/2021  4:36 PM Karly Turner, [address] [place] [address]                     Patient given educational materials -see patient instructions. Discussed use, benefit, and side effects of prescribed medications. All patient questions answered. Pt voiced understanding. Reviewed health maintenance. Instructed to continue currentmedications, diet and exercise. Patient agreed with treatment plan. Follow up as directed. MEDICATIONS:  Orders Placed This Encounter   Medications    levothyroxine (SYNTHROID) 88 MCG tablet     Sig: Take 1 tablet by mouth in the morning. Dispense:  30 tablet     Refill:  0           ORDERS:  Orders Placed This Encounter   Procedures    US THYROID    TSH with Reflex to FT4    T4, Free         Follow-up:  Return in about 6 weeks (around 9/26/2022) for follow up with PCP. PATIENT INSTRUCTIONS:  There are no Patient Instructions on file for this visit. Electronically signed by KALI Ardon on 8/15/2022 at 3:48 PM    EMR Dragon/transcription disclaimer:  Much of thisencounter note is electronic transcription/translation of spoken language to printed texts. The electronic translation of spoken language may be erroneous, or at times, nonsensical words or phrases may be inadvertentlytranscribed.   Although I have reviewed the note for such errors, some may still exist.

## 2022-08-16 RX ORDER — GABAPENTIN 300 MG/1
CAPSULE ORAL
Qty: 60 CAPSULE | Refills: 2 | Status: SHIPPED | OUTPATIENT
Start: 2022-08-16 | End: 2022-11-16

## 2022-09-09 DIAGNOSIS — G89.29 CHRONIC BILATERAL LOW BACK PAIN WITH LEFT-SIDED SCIATICA: ICD-10-CM

## 2022-09-09 DIAGNOSIS — M54.42 CHRONIC BILATERAL LOW BACK PAIN WITH LEFT-SIDED SCIATICA: ICD-10-CM

## 2022-09-09 DIAGNOSIS — M54.2 CHRONIC NECK PAIN: ICD-10-CM

## 2022-09-09 DIAGNOSIS — G89.29 CHRONIC NECK PAIN: ICD-10-CM

## 2022-09-09 DIAGNOSIS — G43.009 MIGRAINE WITHOUT AURA AND WITHOUT STATUS MIGRAINOSUS, NOT INTRACTABLE: ICD-10-CM

## 2022-09-09 RX ORDER — BUTALBITAL, ACETAMINOPHEN AND CAFFEINE 50; 325; 40 MG/1; MG/1; MG/1
TABLET ORAL
Qty: 90 TABLET | Refills: 0 | Status: SHIPPED | OUTPATIENT
Start: 2022-09-09 | End: 2022-10-10

## 2022-09-09 RX ORDER — TRAMADOL HYDROCHLORIDE 50 MG/1
TABLET ORAL
Qty: 45 TABLET | Refills: 2 | Status: SHIPPED | OUTPATIENT
Start: 2022-09-09 | End: 2022-10-09

## 2022-09-09 NOTE — TELEPHONE ENCOUNTER
Yifan Grimes called to request a refill on her medication. Last office visit : 8/15/2022   Next office visit : 10/3/2022     Last UDS:   Amphetamine Screen, Urine   Date Value Ref Range Status   11/24/2021 neg  Final     Barbiturate Screen, Urine   Date Value Ref Range Status   11/24/2021 pos  Final     Benzodiazepine Screen, Urine   Date Value Ref Range Status   11/24/2021 neg  Final     Buprenorphine Urine   Date Value Ref Range Status   11/24/2021 neg  Final     Cocaine Metabolite Screen, Urine   Date Value Ref Range Status   11/24/2021 neg  Final     Gabapentin Screen, Urine   Date Value Ref Range Status   11/24/2021 neg  Final     MDMA, Urine   Date Value Ref Range Status   11/24/2021 neg  Final     Methamphetamine, Urine   Date Value Ref Range Status   11/24/2021 neg  Final     Opiate Scrn, Ur   Date Value Ref Range Status   11/24/2021 neg  Final     Oxycodone Screen, Ur   Date Value Ref Range Status   11/24/2021 neg  Final     PCP Screen, Urine   Date Value Ref Range Status   11/24/2021 neg  Final     Propoxyphene Screen, Urine   Date Value Ref Range Status   11/24/2021 neg  Final     THC Screen, Urine   Date Value Ref Range Status   11/24/2021 neg  Final     Tricyclic Antidepressants, Urine   Date Value Ref Range Status   11/24/2021 neg  Final       Last Austine Sames: 7/11/22  Medication Contract: 12/30/21   Last Fill: 8/10/22    Requested Prescriptions     Pending Prescriptions Disp Refills    butalbital-acetaminophen-caffeine (FIORICET, ESGIC) -40 MG per tablet [Pharmacy Med Name: BUTALBITAL/ACETAMINOPHEN/CAFF TABS] 90 tablet      Sig: TAKE 1 TABLET BY MOUTH EVERY 8 HOURS AS NEEDED FOR MIGRAINE HEADACHE    traMADol (ULTRAM) 50 MG tablet [Pharmacy Med Name: TRAMADOL 50MG TABLETS] 45 tablet      Sig: TAKE 1 TABLET BY MOUTH EVERY 12 HOURS AS NEEDED FOR PAIN         Please approve or refuse this medication.    Surinder Terrazas MA

## 2022-09-29 DIAGNOSIS — E03.9 ACQUIRED HYPOTHYROIDISM: ICD-10-CM

## 2022-09-29 DIAGNOSIS — R79.89 ABNORMAL TSH: ICD-10-CM

## 2022-09-29 DIAGNOSIS — Z86.39 H/O THYROID NODULE: ICD-10-CM

## 2022-09-29 LAB
T4 FREE: 1.4 NG/DL (ref 0.93–1.7)
TSH REFLEX FT4: 0.5 UIU/ML (ref 0.35–5.5)

## 2022-10-09 DIAGNOSIS — G43.009 MIGRAINE WITHOUT AURA AND WITHOUT STATUS MIGRAINOSUS, NOT INTRACTABLE: ICD-10-CM

## 2022-10-10 RX ORDER — BUTALBITAL, ACETAMINOPHEN AND CAFFEINE 50; 325; 40 MG/1; MG/1; MG/1
TABLET ORAL
Qty: 90 TABLET | Refills: 0 | Status: SHIPPED | OUTPATIENT
Start: 2022-10-10

## 2022-10-10 NOTE — TELEPHONE ENCOUNTER
Gia Mckeon called to request a refill on her medication. Last office visit : 8/15/2022   Next office visit : 11/17/2022     Last UDS:   Amphetamine Screen, Urine   Date Value Ref Range Status   11/24/2021 neg  Final     Barbiturate Screen, Urine   Date Value Ref Range Status   11/24/2021 pos  Final     Benzodiazepine Screen, Urine   Date Value Ref Range Status   11/24/2021 neg  Final     Buprenorphine Urine   Date Value Ref Range Status   11/24/2021 neg  Final     Cocaine Metabolite Screen, Urine   Date Value Ref Range Status   11/24/2021 neg  Final     Gabapentin Screen, Urine   Date Value Ref Range Status   11/24/2021 neg  Final     MDMA, Urine   Date Value Ref Range Status   11/24/2021 neg  Final     Methamphetamine, Urine   Date Value Ref Range Status   11/24/2021 neg  Final     Opiate Scrn, Ur   Date Value Ref Range Status   11/24/2021 neg  Final     Oxycodone Screen, Ur   Date Value Ref Range Status   11/24/2021 neg  Final     PCP Screen, Urine   Date Value Ref Range Status   11/24/2021 neg  Final     Propoxyphene Screen, Urine   Date Value Ref Range Status   11/24/2021 neg  Final     THC Screen, Urine   Date Value Ref Range Status   11/24/2021 neg  Final     Tricyclic Antidepressants, Urine   Date Value Ref Range Status   11/24/2021 neg  Final       Last Carolyn Listen: 10/10/22  Medication Contract: 11/24/21   Last Fill: 9/9/22    Requested Prescriptions     Pending Prescriptions Disp Refills    butalbital-acetaminophen-caffeine (FIORICET, ESGIC) -40 MG per tablet [Pharmacy Med Name: BUT/ACETAMINOPHEN/CAFF -40 TB] 90 tablet      Sig: TAKE 1 TABLET BY MOUTH EVERY 8 HOURS AS NEEDED FOR MIGRAINE HEADACHE         Please approve or refuse this medication.    [place], [place]

## 2022-10-24 RX ORDER — LEVOTHYROXINE SODIUM 88 UG/1
88 TABLET ORAL DAILY
Qty: 90 TABLET | Refills: 0 | Status: SHIPPED | OUTPATIENT
Start: 2022-10-24

## 2022-11-14 DIAGNOSIS — G43.009 MIGRAINE WITHOUT AURA AND WITHOUT STATUS MIGRAINOSUS, NOT INTRACTABLE: ICD-10-CM

## 2022-11-14 RX ORDER — BUTALBITAL, ACETAMINOPHEN AND CAFFEINE 50; 325; 40 MG/1; MG/1; MG/1
TABLET ORAL
Qty: 90 TABLET | Refills: 0 | Status: SHIPPED | OUTPATIENT
Start: 2022-11-14

## 2022-11-14 NOTE — TELEPHONE ENCOUNTER
Tiffani Tena called to request a refill on her medication.       Last office visit : 8/15/2022   Next office visit : 11/17/2022     Requested Prescriptions     Pending Prescriptions Disp Refills    butalbital-acetaminophen-caffeine (FIORICET, ESGIC) -40 MG per tablet [Pharmacy Med Name: BUT/ACETAMINOPHEN/CAFF -40 TB] 90 tablet      Sig: TAKE 1 TABLET BY MOUTH EVERY 8 HOURS AS NEEDED FOR MIGRAINE HEADACHE            Ban Castañeda MA

## 2022-11-17 ENCOUNTER — OFFICE VISIT (OUTPATIENT)
Dept: FAMILY MEDICINE CLINIC | Age: 57
End: 2022-11-17

## 2022-11-17 VITALS
OXYGEN SATURATION: 98 % | DIASTOLIC BLOOD PRESSURE: 74 MMHG | WEIGHT: 138.38 LBS | HEART RATE: 59 BPM | HEIGHT: 61 IN | BODY MASS INDEX: 26.13 KG/M2 | TEMPERATURE: 97.5 F | SYSTOLIC BLOOD PRESSURE: 122 MMHG

## 2022-11-17 DIAGNOSIS — E55.9 VITAMIN D DEFICIENCY: ICD-10-CM

## 2022-11-17 DIAGNOSIS — E66.3 OVERWEIGHT (BMI 25.0-29.9): ICD-10-CM

## 2022-11-17 DIAGNOSIS — G43.009 MIGRAINE WITHOUT AURA AND WITHOUT STATUS MIGRAINOSUS, NOT INTRACTABLE: ICD-10-CM

## 2022-11-17 DIAGNOSIS — E03.9 ACQUIRED HYPOTHYROIDISM: ICD-10-CM

## 2022-11-17 DIAGNOSIS — R10.13 EPIGASTRIC PAIN: ICD-10-CM

## 2022-11-17 DIAGNOSIS — G25.81 RLS (RESTLESS LEGS SYNDROME): ICD-10-CM

## 2022-11-17 DIAGNOSIS — E78.2 MIXED HYPERLIPIDEMIA: ICD-10-CM

## 2022-11-17 DIAGNOSIS — R51.9 CHRONIC DAILY HEADACHE: ICD-10-CM

## 2022-11-17 DIAGNOSIS — Z51.81 THERAPEUTIC DRUG MONITORING: ICD-10-CM

## 2022-11-17 DIAGNOSIS — M54.42 CHRONIC BILATERAL LOW BACK PAIN WITH LEFT-SIDED SCIATICA: ICD-10-CM

## 2022-11-17 DIAGNOSIS — F33.1 MODERATE RECURRENT MAJOR DEPRESSION (HCC): Primary | ICD-10-CM

## 2022-11-17 DIAGNOSIS — M54.2 CHRONIC NECK PAIN: ICD-10-CM

## 2022-11-17 DIAGNOSIS — G89.29 CHRONIC NECK PAIN: ICD-10-CM

## 2022-11-17 DIAGNOSIS — R09.81 NASAL SINUS CONGESTION: ICD-10-CM

## 2022-11-17 DIAGNOSIS — J34.89 SINUS PRESSURE: ICD-10-CM

## 2022-11-17 DIAGNOSIS — F41.1 GAD (GENERALIZED ANXIETY DISORDER): ICD-10-CM

## 2022-11-17 DIAGNOSIS — Z23 FLU VACCINE NEED: ICD-10-CM

## 2022-11-17 DIAGNOSIS — G89.29 CHRONIC BILATERAL LOW BACK PAIN WITH LEFT-SIDED SCIATICA: ICD-10-CM

## 2022-11-17 RX ORDER — GABAPENTIN 300 MG/1
CAPSULE ORAL
Qty: 90 CAPSULE | Refills: 2 | Status: SHIPPED | OUTPATIENT
Start: 2022-11-17 | End: 2023-02-18

## 2022-11-17 RX ORDER — OMEPRAZOLE 40 MG/1
40 CAPSULE, DELAYED RELEASE ORAL
Qty: 30 CAPSULE | Refills: 5 | Status: SHIPPED | OUTPATIENT
Start: 2022-11-17

## 2022-11-17 RX ORDER — FLUVOXAMINE MALEATE 100 MG
100 TABLET ORAL NIGHTLY
Qty: 30 TABLET | Refills: 5 | Status: SHIPPED | OUTPATIENT
Start: 2022-11-17

## 2022-11-17 RX ORDER — TRIAMCINOLONE ACETONIDE 40 MG/ML
40 INJECTION, SUSPENSION INTRA-ARTICULAR; INTRAMUSCULAR ONCE
Status: COMPLETED | OUTPATIENT
Start: 2022-11-17 | End: 2022-11-17

## 2022-11-17 RX ADMIN — TRIAMCINOLONE ACETONIDE 40 MG: 40 INJECTION, SUSPENSION INTRA-ARTICULAR; INTRAMUSCULAR at 17:36

## 2022-11-17 ASSESSMENT — ENCOUNTER SYMPTOMS
DIARRHEA: 0
SHORTNESS OF BREATH: 0
COUGH: 0
RHINORRHEA: 0
BACK PAIN: 1
BLOOD IN STOOL: 0
VOMITING: 0
WHEEZING: 0
EYE REDNESS: 0
EYE DISCHARGE: 0
EYE PAIN: 0
SINUS PRESSURE: 1
CHEST TIGHTNESS: 0
PHOTOPHOBIA: 1
VOICE CHANGE: 0
SORE THROAT: 0
ABDOMINAL PAIN: 1
COLOR CHANGE: 0

## 2022-11-17 NOTE — PROGRESS NOTES
After obtaining consent, and per orders of Dr. Tom Lawson, injection of Flucelvax given in Right deltoid by Magda Grider MA. Patient instructed to remain in clinic for 20 minutes afterwards, and to report any adverse reaction to me immediately.

## 2022-11-17 NOTE — PROGRESS NOTES
Sammie Lay is a 61 y.o. female who presents today for   Chief Complaint   Patient presents with    Thyroid Problem    Cholesterol Problem    Insomnia    Migraine       Headache   Associated symptoms include abdominal pain, back pain, neck pain, photophobia and sinus pressure. Pertinent negatives include no coughing, dizziness, ear pain, eye pain, eye redness, fever, numbness, rhinorrhea, sore throat, vomiting or weakness. Other  Associated symptoms include abdominal pain, arthralgias, congestion, fatigue, headaches, myalgias and neck pain. Pertinent negatives include no chest pain, chills, coughing, fever, numbness, rash, sore throat, vomiting or weakness. Thyroid Problem  Symptoms include fatigue. Patient reports no anxiety, cold intolerance, diarrhea, heat intolerance, palpitations or tremors. 60 y/o WF presents for follow up on recurrent migraine/tension HAs, chronic neck pain with controlled med refills, chronic depression, HORACE, and elevated BMI. Patient was referred to [facility] for her chronic HAs and chronic neck pain but she was told she would have to pay $600 at her first visit before even a procedure was scheduled, so she canceled appointment. She is planning to see a chiropractor in [address] [place] [address] but she needs to schedule the appointment. She does not feel like her depression is as well controlled but HAs are still almost daily. She has stopped her vitamin supplements due to aching pain in her stomach also but she had been on omeprazole previously but then she had stopped it. She gets a burning and aching in her stomach when she takes any of her medicines. BMI Readings from Last 3 Encounters:   11/17/22 26.15 kg/m²   08/15/22 25.81 kg/m²   05/09/22 25.98 kg/m²     Wt Readings from Last 3 Encounters:   11/17/22 138 lb 6 oz (62.8 kg)   08/15/22 136 lb 9.6 oz (62 kg)   05/09/22 137 lb 8 oz (62.4 kg)     Hypothyroidism  Patient presents for follow up on thyroid function.  Symptoms consist of fatigue, anxiousness, depression. The problem has been gradually worsening. Patient has been compliant with levothyroxine. Review of Systems   Constitutional:  Positive for fatigue. Negative for appetite change, chills, fever and unexpected weight change. HENT:  Positive for congestion and sinus pressure. Negative for ear pain, rhinorrhea, sore throat and voice change. Eyes:  Positive for photophobia. Negative for pain, discharge and redness. Respiratory:  Negative for cough, chest tightness, shortness of breath and wheezing. Cardiovascular:  Negative for chest pain and palpitations. Gastrointestinal:  Positive for abdominal pain. Negative for blood in stool, diarrhea and vomiting. Endocrine: Negative for cold intolerance, heat intolerance and polydipsia. Genitourinary:  Negative for dysuria and hematuria. Musculoskeletal:  Positive for arthralgias, back pain, myalgias and neck pain. Negative for neck stiffness. Skin:  Negative for color change and rash. Neurological:  Positive for headaches. Negative for dizziness, tremors, syncope, speech difficulty, weakness and numbness. Hematological:  Negative for adenopathy. Does not bruise/bleed easily. Psychiatric/Behavioral:  Positive for decreased concentration, dysphoric mood and sleep disturbance. Negative for confusion, self-injury and suicidal ideas. The patient is not nervous/anxious. See HPI   All other systems reviewed and are negative.     Past Medical History:   Diagnosis Date    Allergic rhinitis     Chronic bilateral low back pain with left-sided sciatica 12/1/2017    Colon polyp     Depression with anxiety     Obesity     Osteoarthritis        Current Outpatient Medications   Medication Sig Dispense Refill    fluvoxaMINE (LUVOX) 100 MG tablet Take 1 tablet by mouth nightly 30 tablet 5    omeprazole (PRILOSEC) 40 MG delayed release capsule Take 1 capsule by mouth every morning (before breakfast) 30 capsule 5 gabapentin (NEURONTIN) 300 MG capsule TAKE 3 CAPSULES BY MOUTH EVERY DAY AT BEDTIME 90 capsule 2    levothyroxine (SYNTHROID) 88 MCG tablet TAKE 1 TABLET BY MOUTH IN THE MORNING 90 tablet 0    propranolol (INDERAL LA) 80 MG extended release capsule Take 1 capsule by mouth once daily 30 capsule 5    baclofen (LIORESAL) 10 MG tablet TAKE 1 TABLET BY MOUTH THREE TIMES DAILY AS NEEDED FOR MUSCLE SPASM /  NECK  PAIN 90 tablet 2    Potassium 99 MG TABS Take 1 tablet by mouth daily      TURMERIC PO Take 1 tablet by mouth daily      vitamin B-12 (CYANOCOBALAMIN) 1000 MCG tablet Take 1,000 mcg by mouth daily      Multiple Vitamins-Minerals (MULTIVITAMIN ADULT PO) Take by mouth      Omega 3 1000 MG CAPS Take by mouth      Cholecalciferol (VITAMIN D3) 3000 units TABS Take by mouth      butalbital-acetaminophen-caffeine (FIORICET, ESGIC) -40 MG per tablet TAKE 1 TABLET BY MOUTH EVERY 8 HOURS AS NEEDED FOR MIGRAINE HEADACHE 90 tablet 0    traMADol (ULTRAM) 50 MG tablet Take 1 tablet by mouth every 12 hours as needed for Pain (headache) for up to 61 days. 45 tablet 1     No current facility-administered medications for this visit. Allergies   Allergen Reactions    Augmentin [Amoxicillin-Pot Clavulanate] Diarrhea    Aspirin Other (See Comments)       Past Surgical History:   Procedure Laterality Date     SECTION      COLONOSCOPY  2015    colon polyp x1, recheck in 5 yrs (Dr Brian Jones)    HYSTERECTOMY ([address] [place] [address])      MYOMECTOMY      TONSILLECTOMY         Social History     Tobacco Use    Smoking status: Never    Smokeless tobacco: Never   Vaping Use    Vaping Use: Never used   Substance Use Topics    Alcohol use:  Yes     Alcohol/week: 1.0 standard drink     Types: 1 Standard drinks or equivalent per week     Comment: rarely    Drug use: Never       Family History   Problem Relation Age of Onset    Heart Disease Mother     High Blood Pressure Mother     Other Mother         skin CA    Osteoporosis Mother     Heart Disease Father     High Blood Pressure Father     Other Father         jaw CA    Heart Disease Sister     Diabetes Sister     Stroke Sister     High Blood Pressure Sister     Other Sister         fibromyalgia, thyroid CA       /74   Pulse 59   Temp 97.5 °F (36.4 °C)   Ht 5' 1\" (1.549 m)   Wt 138 lb 6 oz (62.8 kg)   SpO2 98%   BMI 26.15 kg/m²     Physical Exam  Vitals reviewed. Constitutional:       General: She is not in acute distress. Appearance: Normal appearance. She is well-developed, well-groomed and overweight. She is not ill-appearing or toxic-appearing. HENT:      Head: Normocephalic and atraumatic. Right Ear: External ear normal.      Left Ear: External ear normal.      Nose: Nasal tenderness and congestion present. No rhinorrhea. Right Turbinates: Swollen and pale. Left Turbinates: Swollen and pale. Comments: +moderate nasal congestion with dry nasal mucosa and mild diffuse sinus TTP     Mouth/Throat:      Lips: Pink. Mouth: Mucous membranes are moist.   Eyes:      General:         Right eye: No discharge. Left eye: No discharge. Conjunctiva/sclera: Conjunctivae normal.      Pupils: Pupils are equal, round, and reactive to light. Neck:      Thyroid: No thyromegaly. Vascular: Normal carotid pulses. No carotid bruit or JVD. Trachea: Trachea and phonation normal. No tracheal tenderness. Comments: +moderate bilateral trapezius muscle spasm  Cardiovascular:      Rate and Rhythm: Normal rate and regular rhythm. Pulses:           Carotid pulses are 2+ on the right side and 2+ on the left side. Posterior tibial pulses are 2+ on the right side and 2+ on the left side. Heart sounds: Normal heart sounds. No murmur heard. No friction rub. No gallop. Pulmonary:      Effort: Pulmonary effort is normal. No accessory muscle usage. Breath sounds: Normal breath sounds.  No decreased breath sounds, wheezing, rhonchi or rales. Abdominal:      General: Bowel sounds are normal. There is no distension. Palpations: Abdomen is soft. There is no mass. Tenderness: There is abdominal tenderness in the epigastric area. There is no guarding or rebound. Hernia: No hernia is present. Comments: +mild DAYNA TTP   Musculoskeletal:         General: No swelling, tenderness or deformity. Right wrist: Normal.      Left wrist: Normal.      Cervical back: Normal range of motion and neck supple. No edema, erythema or rigidity. Muscular tenderness present. No pain with movement. Normal range of motion. Right lower leg: No edema. Left lower leg: No edema. Right ankle: Normal.      Left ankle: Normal.   Lymphadenopathy:      Cervical: No cervical adenopathy. Upper Body:      Right upper body: No supraclavicular adenopathy. Left upper body: No supraclavicular adenopathy. Skin:     General: Skin is warm. Capillary Refill: Capillary refill takes less than 2 seconds. Coloration: Skin is not cyanotic. Findings: No rash. Nails: There is no clubbing. Neurological:      Mental Status: She is alert and oriented to person, place, and time. Cranial Nerves: No cranial nerve deficit or dysarthria. Motor: No weakness, tremor or abnormal muscle tone. Coordination: Coordination normal.      Gait: Gait is intact. Comments: CN II-XII grossly intact, speech clear, no facial droop, MAEW   Psychiatric:         Attention and Perception: Attention and perception normal.         Mood and Affect: Mood is depressed. Mood is not anxious. Speech: Speech normal.         Behavior: Behavior normal. Behavior is cooperative. Thought Content:  Thought content normal.         Cognition and Memory: Cognition and memory normal.         Judgment: Judgment normal.      Comments: Affect congruent with mood       Lab Results   Component Value Date     02/18/2022    K 4.3 02/18/2022     02/18/2022    CO2 25 02/18/2022    BUN 6 02/18/2022    CREATININE 0.6 02/18/2022    GLUCOSE 100 02/18/2022    CALCIUM 9.7 02/18/2022    PROT 6.8 02/18/2022    LABALBU 4.4 02/18/2022    BILITOT 0.4 02/18/2022    ALKPHOS 128 (H) 02/18/2022    AST 27 02/18/2022    ALT 24 02/18/2022    LABGLOM >60 02/18/2022    GFRAA >59 02/18/2022     Lab Results   Component Value Date    TSHFT4 0.50 09/29/2022    TSH 0.025 (L) 02/18/2022     Lab Results   Component Value Date    CHOL 194 02/18/2022    CHOL 226 (H) 11/17/2021    CHOL 221 (H) 06/12/2020     Lab Results   Component Value Date    TRIG 205 (H) 02/18/2022    TRIG 179 (H) 11/17/2021    TRIG 149 06/12/2020     Lab Results   Component Value Date    HDL 40 (L) 02/18/2022    HDL 41 (L) 11/17/2021    HDL 43 (L) 09/18/2020     Lab Results   Component Value Date    LDLCALC 113 02/18/2022    LDLCALC 149 11/17/2021    LDLCALC 139 09/18/2020     Lab Results   Component Value Date    TSHFT4 0.50 09/29/2022    TSH 0.025 (L) 02/18/2022           Results for orders placed or performed in visit on 11/17/22   POCT Rapid Drug Screen   Result Value Ref Range    Alcohol, Urine N     Amphetamine Screen, Urine N     Barbiturate Screen, Urine pos     Benzodiazepine Screen, Urine N     Buprenorphine Urine N     Cocaine Metabolite Screen, Urine N     FENTANYL SCREEN, URINE N     Gabapentin Screen, Urine N     MDMA, Urine N     Methadone Screen, Urine N     Methamphetamine, Urine N     Opiate Scrn, Ur N     Oxycodone Screen, Ur N     PCP Screen, Urine N     Propoxyphene Screen, Urine N     Synthetic Cannabinoids (K2) Screen, Urine N     THC Screen, Urine N     Tramadol Scrn, Ur N     Tricyclic Antidepressants, Urine N        Assessment:    ICD-10-CM    1. Moderate recurrent major depression (HCC)  F33.1 fluvoxaMINE (LUVOX) 100 MG tablet      2. HORACE (generalized anxiety disorder)  F41.1 fluvoxaMINE (LUVOX) 100 MG tablet      3.  Chronic daily headache  R51.9 [facility], MD, Neurology, [place]     gabapentin (NEURONTIN) 300 MG capsule      4. Acquired hypothyroidism  E03.9 TSH with Reflex to FT4      5. Migraine without aura and without status migrainosus, not intractable  G43.009 Lizzie Newton MD, Neurology, [place]     gabapentin (NEURONTIN) 300 MG capsule      6. RLS (restless legs syndrome)  G25.81 gabapentin (NEURONTIN) 300 MG capsule    Refill on gabapentin. UDS and controlled substance contract updated today. [facility]A report also reviewed without any suspicious filling. 7. Chronic bilateral low back pain with left-sided sciatica  M54.42 gabapentin (NEURONTIN) 300 MG capsule    G89.29       8. Chronic neck pain  M54.2 gabapentin (NEURONTIN) 300 MG capsule    G89.29       9. Epigastric pain  R10.13 omeprazole (PRILOSEC) 40 MG delayed release capsule    Start PPI. Patient encouraged to try gummy vitamin D supplement and chewable multivitamin to help with tolerance. 10. Sinus pressure  J34.89 triamcinolone acetonide (KENALOG-40) injection 40 mg      11. Nasal sinus congestion  R09.81 triamcinolone acetonide (KENALOG-40) injection 40 mg      12. Mixed hyperlipidemia  E78.2 Comprehensive Metabolic Panel     Lipid Panel      13. Vitamin D deficiency  E55.9 Vitamin D 25 Hydroxy    Patient encouraged to try gummy vitamin D supplement to help with tolerance and treatment. 14. Therapeutic drug monitoring  Z51.81 POCT Rapid Drug Screen      15. Flu vaccine need  Z23 Influenza, FLUCELVAX, (age 10 mo+), IM, Preservative Free, 0.5 mL      16. Overweight (BMI 25.0-29. 9)  E66.3           Plan:  Malachi Woodard was seen today for thyroid problem, cholesterol problem, insomnia and migraine. Diagnoses and all orders for this visit:    Moderate recurrent major depression (HCC)  -     fluvoxaMINE (LUVOX) 100 MG tablet; Take 1 tablet by mouth nightly    HORACE (generalized anxiety disorder)  -     fluvoxaMINE (LUVOX) 100 MG tablet;  Take 1 tablet by mouth nightly    Chronic daily headache  -     Ara Peabody, MD, Neurology, [place]  -     gabapentin (NEURONTIN) 300 MG capsule; TAKE 3 CAPSULES BY MOUTH EVERY DAY AT BEDTIME    Acquired hypothyroidism  -     TSH with Reflex to FT4; Future    Migraine without aura and without status migrainosus, not intractable  -     Ara Peabody, MD, Neurology, [place]  -     gabapentin (NEURONTIN) 300 MG capsule; TAKE 3 CAPSULES BY MOUTH EVERY DAY AT BEDTIME    RLS (restless legs syndrome)  Comments:  Refill on gabapentin. UDS and controlled substance contract updated today. BAUTISTA report also reviewed without any suspicious filling. Orders:  -     gabapentin (NEURONTIN) 300 MG capsule; TAKE 3 CAPSULES BY MOUTH EVERY DAY AT BEDTIME    Chronic bilateral low back pain with left-sided sciatica  -     gabapentin (NEURONTIN) 300 MG capsule; TAKE 3 CAPSULES BY MOUTH EVERY DAY AT BEDTIME    Chronic neck pain  -     gabapentin (NEURONTIN) 300 MG capsule; TAKE 3 CAPSULES BY MOUTH EVERY DAY AT BEDTIME    Epigastric pain  Comments:  Start PPI. Patient encouraged to try gummy vitamin D supplement and chewable multivitamin to help with tolerance. Orders:  -     omeprazole (PRILOSEC) 40 MG delayed release capsule; Take 1 capsule by mouth every morning (before breakfast)    Sinus pressure  -     triamcinolone acetonide (KENALOG-40) injection 40 mg    Nasal sinus congestion  -     triamcinolone acetonide (KENALOG-40) injection 40 mg    Mixed hyperlipidemia  -     Comprehensive Metabolic Panel; Future  -     Lipid Panel; Future    Vitamin D deficiency  Comments:  Patient encouraged to try gummy vitamin D supplement to help with tolerance and treatment. Orders:  -     Vitamin D 25 Hydroxy; Future    Therapeutic drug monitoring  -     POCT Rapid Drug Screen    Flu vaccine need  -     Influenza, FLUCELVAX, (age 10 mo+), IM, Preservative Free, 0.5 mL    Overweight (BMI 25.0-29. 9)    Labs reviewed with patient.   Refills Provided.   -Acquired Hypothyroidism-well controlled on review of most recent TSH with current dose of levothyroxine, which was continued today. -Major depressive disorder and generalized anxiety disorder overall controlled with fluovaxamine which will be continued, exercise and relaxation techniques encouraged to help with mood also and will refer to Pain Management to help with HAs and chronic neck pain.  -Chronic neck pain and chronic daily headache(s) are inadequately controlled despite stretching exercises to help with neck pain, massage therapy and moist heat to neck as needed. Inadequately controlled. Refill on tramadol for moderate to severe pain and continue fioricet prn headache(s) and will refer to neurology for help in evaluation and treatment. Patient may be a good candidate for botox treatment for migraines. The current medical regimen is effective. Continue present plan and medications. UDS and controlled substance contract updated today. No unusual filling on current [facility]A report. Tx continues to be medically necessary and improves patient quality of life. No signs of misuse of controlled medication.     -Return in about 3 months (around 2/17/2023) for recheck mood, high cholesterol, thyroid, Controlled med refill, [facility]PE. Over 50% of the total visit time of 42 min was spent on counseling and/or coordination of care of:   1. Moderate recurrent major depression ([address]    2. HORACE (generalized anxiety disorder)    3. Chronic daily headache    4. Acquired hypothyroidism    5. Migraine without aura and without status migrainosus, not intractable    6. RLS (restless legs syndrome)    7. Chronic bilateral low back pain with left-sided sciatica    8. Chronic neck pain    9. Epigastric pain    10. Sinus pressure    11. Nasal sinus congestion    12. Mixed hyperlipidemia    13. Vitamin D deficiency    14. Therapeutic drug monitoring    15. Flu vaccine need    16. Overweight (BMI 25.0-29. 9)         Orders Placed This Encounter   Procedures Influenza, FLUCELVAX, (age 10 mo+), IM, Preservative Free, 0.5 mL    Comprehensive Metabolic Panel     Standing Status:   Future     Standing Expiration Date:   11/17/2023    Lipid Panel     Standing Status:   Future     Standing Expiration Date:   11/17/2023     Order Specific Question:   Is Patient Fasting?/# of Hours     Answer:   yes/8 hrs    TSH with Reflex to FT4     Standing Status:   Future     Standing Expiration Date:   11/17/2023    Vitamin D 25 Hydroxy     Standing Status:   Future     Standing Expiration Date:   11/17/2023    Radha Castellanos MD, Neurology, [place]     Referral Priority:   Routine     Referral Type:   Eval and Treat     Referral Reason:   Specialty Services Required     Referred to Provider:   Itzel Taylor MD     Requested Specialty:   Neurology     Number of Visits Requested:   1    POCT Rapid Drug Screen     Orders Placed This Encounter   Medications    fluvoxaMINE (LUVOX) 100 MG tablet     Sig: Take 1 tablet by mouth nightly     Dispense:  30 tablet     Refill:  5    omeprazole (PRILOSEC) 40 MG delayed release capsule     Sig: Take 1 capsule by mouth every morning (before breakfast)     Dispense:  30 capsule     Refill:  5    gabapentin (NEURONTIN) 300 MG capsule     Sig: TAKE 3 CAPSULES BY MOUTH EVERY DAY AT BEDTIME     Dispense:  90 capsule     Refill:  2    triamcinolone acetonide (KENALOG-40) injection 40 mg       Medications Discontinued During This Encounter   Medication Reason    iron sucrose (VENOFER) 20 MG/ML injection Therapy completed    fluvoxaMINE (LUVOX) 50 MG tablet DOSE ADJUSTMENT    gabapentin (NEURONTIN) 300 MG capsule REORDER       There are no Patient Instructions on file for this visit. Patient voices understanding and agrees to plans along with risks and benefits of plan. Counseling:  Deena Lees's case, medications and options were discussed in detail. patient was instructed to call the office if she   questions regarding her treatment.  Should her conditions worsen, she should return to office to be reassessed by Dr. Nicolle Yeung. she  Should to go the closest Emergency Department for any emergency. They verbalized understanding the above instructions.

## 2022-12-02 DIAGNOSIS — G43.009 MIGRAINE WITHOUT AURA AND WITHOUT STATUS MIGRAINOSUS, NOT INTRACTABLE: ICD-10-CM

## 2022-12-02 DIAGNOSIS — M54.2 CHRONIC NECK PAIN: ICD-10-CM

## 2022-12-02 DIAGNOSIS — G89.29 CHRONIC NECK PAIN: ICD-10-CM

## 2022-12-02 DIAGNOSIS — M54.42 CHRONIC BILATERAL LOW BACK PAIN WITH LEFT-SIDED SCIATICA: ICD-10-CM

## 2022-12-02 DIAGNOSIS — G89.29 CHRONIC BILATERAL LOW BACK PAIN WITH LEFT-SIDED SCIATICA: ICD-10-CM

## 2022-12-02 RX ORDER — TRAMADOL HYDROCHLORIDE 50 MG/1
50 TABLET ORAL EVERY 12 HOURS PRN
Qty: 45 TABLET | Refills: 1 | Status: SHIPPED | OUTPATIENT
Start: 2022-12-02 | End: 2023-02-02

## 2022-12-02 NOTE — TELEPHONE ENCOUNTER
Valeria Lees called to request a refill on her medication.      Last office visit : 11/17/2022   Next office visit : 2/20/2023     Last UDS:   Amphetamine Screen, Urine   Date Value Ref Range Status   11/17/2022 N  Final     Barbiturate Screen, Urine   Date Value Ref Range Status   11/17/2022 pos  Final     Benzodiazepine Screen, Urine   Date Value Ref Range Status   11/17/2022 N  Final     Buprenorphine Urine   Date Value Ref Range Status   11/17/2022 N  Final     Cocaine Metabolite Screen, Urine   Date Value Ref Range Status   11/17/2022 N  Final     Gabapentin Screen, Urine   Date Value Ref Range Status   11/17/2022 N  Final     MDMA, Urine   Date Value Ref Range Status   11/17/2022 N  Final     Methamphetamine, Urine   Date Value Ref Range Status   11/17/2022 N  Final     Opiate Scrn, Ur   Date Value Ref Range Status   11/17/2022 N  Final     Oxycodone Screen, Ur   Date Value Ref Range Status   11/17/2022 N  Final     PCP Screen, Urine   Date Value Ref Range Status   11/17/2022 N  Final     Propoxyphene Screen, Urine   Date Value Ref Range Status   11/17/2022 N  Final     THC Screen, Urine   Date Value Ref Range Status   11/17/2022 N  Final     Tricyclic Antidepressants, Urine   Date Value Ref Range Status   11/17/2022 N  Final       Last Niraj: 10/10/22  Medication Contract: 11/17/22   Last Fill: 09/09/22    Requested Prescriptions     Pending Prescriptions Disp Refills    traMADol (ULTRAM) 50 MG tablet [Pharmacy Med Name: TRAMADOL 50MG TABLETS] 45 tablet      Sig: TAKE 1 TABLET BY MOUTH EVERY 12 HOURS AS NEEDED FOR PAIN         Please approve or refuse this medication.   Dana Aleman MA

## 2022-12-16 DIAGNOSIS — G43.009 MIGRAINE WITHOUT AURA AND WITHOUT STATUS MIGRAINOSUS, NOT INTRACTABLE: ICD-10-CM

## 2022-12-16 RX ORDER — BUTALBITAL, ACETAMINOPHEN AND CAFFEINE 50; 325; 40 MG/1; MG/1; MG/1
TABLET ORAL
Qty: 90 TABLET | Refills: 0 | Status: SHIPPED | OUTPATIENT
Start: 2022-12-16

## 2022-12-16 NOTE — TELEPHONE ENCOUNTER
Diane Clancy called to request a refill on her medication. Last office visit : 11/17/2022   Next office visit : 02/20/2023    Last UDS:   Amphetamine Screen, Urine   Date Value Ref Range Status   11/17/2022 N  Final     Barbiturate Screen, Urine   Date Value Ref Range Status   11/17/2022 pos  Final     Benzodiazepine Screen, Urine   Date Value Ref Range Status   11/17/2022 N  Final     Buprenorphine Urine   Date Value Ref Range Status   11/17/2022 N  Final     Cocaine Metabolite Screen, Urine   Date Value Ref Range Status   11/17/2022 N  Final     Gabapentin Screen, Urine   Date Value Ref Range Status   11/17/2022 N  Final     MDMA, Urine   Date Value Ref Range Status   11/17/2022 N  Final     Methamphetamine, Urine   Date Value Ref Range Status   11/17/2022 N  Final     Opiate Scrn, Ur   Date Value Ref Range Status   11/17/2022 N  Final     Oxycodone Screen, Ur   Date Value Ref Range Status   11/17/2022 N  Final     PCP Screen, Urine   Date Value Ref Range Status   11/17/2022 N  Final     Propoxyphene Screen, Urine   Date Value Ref Range Status   11/17/2022 N  Final     THC Screen, Urine   Date Value Ref Range Status   11/17/2022 N  Final     Tricyclic Antidepressants, Urine   Date Value Ref Range Status   11/17/2022 N  Final       Last Arabella Alstrom: 11/17/22  Medication Contract: 11/17/22   Last Fill: 11/14/22    Requested Prescriptions     Pending Prescriptions Disp Refills    butalbital-acetaminophen-caffeine (FIORICET, ESGIC) -40 MG per tablet [Pharmacy Med Name: BUT/ACETAMINOPHEN/CAFF -40 TB] 90 tablet      Sig: TAKE 1 TABLET BY MOUTH EVERY 8 HOURS AS NEEDED FOR MIGRAINE HEADACHE         Please approve or refuse this medication.    Janell Torrez MA [facility] Poorly Differentiated Histology Text: Full thickness keratinocyte atypia is observed microscopically. Focal dyskeratosis and apoptosis of keratinocytes is observed. The epidermis is acanthotic with growth of atypical keratinocytes beyond the level of the sebaceous glands, most keratinocytes show little to no keratinization.

## 2022-12-28 ENCOUNTER — OFFICE VISIT (OUTPATIENT)
Dept: PRIMARY CARE CLINIC | Age: 57
End: 2022-12-28
Payer: COMMERCIAL

## 2022-12-28 VITALS
TEMPERATURE: 97.9 F | BODY MASS INDEX: 26.28 KG/M2 | HEART RATE: 76 BPM | OXYGEN SATURATION: 97 % | SYSTOLIC BLOOD PRESSURE: 108 MMHG | HEIGHT: 61 IN | WEIGHT: 139.2 LBS | DIASTOLIC BLOOD PRESSURE: 68 MMHG

## 2022-12-28 DIAGNOSIS — R09.81 NASAL CONGESTION: ICD-10-CM

## 2022-12-28 DIAGNOSIS — J01.00 ACUTE NON-RECURRENT MAXILLARY SINUSITIS: Primary | ICD-10-CM

## 2022-12-28 DIAGNOSIS — R09.82 PND (POST-NASAL DRIP): ICD-10-CM

## 2022-12-28 DIAGNOSIS — R05.9 COUGH, UNSPECIFIED TYPE: ICD-10-CM

## 2022-12-28 DIAGNOSIS — J02.9 SORE THROAT: ICD-10-CM

## 2022-12-28 PROCEDURE — 99213 OFFICE O/P EST LOW 20 MIN: CPT | Performed by: FAMILY MEDICINE

## 2022-12-28 PROCEDURE — 87804 INFLUENZA ASSAY W/OPTIC: CPT | Performed by: FAMILY MEDICINE

## 2022-12-28 RX ORDER — BROMPHENIRAMINE MALEATE, PSEUDOEPHEDRINE HYDROCHLORIDE, AND DEXTROMETHORPHAN HYDROBROMIDE 2; 30; 10 MG/5ML; MG/5ML; MG/5ML
5 SYRUP ORAL 4 TIMES DAILY PRN
Qty: 100 ML | Refills: 0 | Status: SHIPPED | OUTPATIENT
Start: 2022-12-28

## 2022-12-28 SDOH — ECONOMIC STABILITY: FOOD INSECURITY: WITHIN THE PAST 12 MONTHS, YOU WORRIED THAT YOUR FOOD WOULD RUN OUT BEFORE YOU GOT MONEY TO BUY MORE.: NEVER TRUE

## 2022-12-28 SDOH — ECONOMIC STABILITY: FOOD INSECURITY: WITHIN THE PAST 12 MONTHS, THE FOOD YOU BOUGHT JUST DIDN'T LAST AND YOU DIDN'T HAVE MONEY TO GET MORE.: NEVER TRUE

## 2022-12-28 ASSESSMENT — ENCOUNTER SYMPTOMS
SHORTNESS OF BREATH: 0
SORE THROAT: 1
SINUS PAIN: 1
CHEST TIGHTNESS: 0
COUGH: 1

## 2022-12-28 ASSESSMENT — SOCIAL DETERMINANTS OF HEALTH (SDOH): HOW HARD IS IT FOR YOU TO PAY FOR THE VERY BASICS LIKE FOOD, HOUSING, MEDICAL CARE, AND HEATING?: NOT HARD AT ALL

## 2022-12-28 NOTE — PROGRESS NOTES
[address] [facility]  [address] [address] [address]  Dept: [phone]  Dept Fax: [phone]  Loc: [phone]      Subjective:     Chief Complaint   Patient presents with    Congestion    Cough    Pharyngitis       HPI:  Kyle Bernal is a 61 y.o. female presents today with sinus congestion x 2-3 weeks. No fever. No change in her sense of smell  or taste She is Covid vaccinated. She states she  has been around her grandchildren with similar symptoms  Cough is dry and worse at night. She states she has not had a good sleep in a few days  Flu test negative      ROS:   Review of Systems   Constitutional:  Negative for activity change, chills and fever. HENT:  Positive for congestion, postnasal drip, sinus pain and sore throat. Negative for ear pain. Respiratory:  Positive for cough. Negative for chest tightness and shortness of breath. All other systems reviewed and are negative.     PMHx:  Past Medical History:   Diagnosis Date    Allergic rhinitis     Chronic bilateral low back pain with left-sided sciatica 12/1/2017    Colon polyp     Depression with anxiety     Obesity     Osteoarthritis      Patient Active Problem List   Diagnosis    Acquired hypothyroidism    Allergic rhinitis    Vitamin D deficiency    Dyspareunia in female    Episodic tension type headache    Idiopathic insomnia    Migraine without aura    Mixed hyperlipidemia    Multiple thyroid nodules    Generalized osteoarthritis    RLS (restless legs syndrome)    Chronic bilateral low back pain with left-sided sciatica    Chronic fatigue    Difficulty concentrating    Overweight (BMI 25.0-29.9)    Spondylosis of cervical region without myelopathy or radiculopathy    B12 deficiency    Medication management    Chronic daily headache    Chronic neck pain    Muscle spasm    Nocturnal cough    Laryngopharyngeal reflux (LPR)    Iron deficiency anemia    Moderate recurrent major depression ([facility] [address]    HORACE (generalized anxiety disorder)    Iron deficiency anemia, unspecified       PSHx:  Past Surgical History:   Procedure Laterality Date     SECTION      COLONOSCOPY  2015    colon polyp x1, recheck in 5 yrs (Dr Ronald Simmons)    HYSTERECTOMY (4 [facility])      MYOMECTOMY      TONSILLECTOMY         PFHx:  Family History   Problem Relation Age of Onset    Heart Disease Mother     High Blood Pressure Mother     Other Mother         skin CA    Osteoporosis Mother     Heart Disease Father     High Blood Pressure Father     Other Father         jaw CA    Heart Disease Sister     Diabetes Sister     Stroke Sister     High Blood Pressure Sister     Other Sister         fibromyalgia, thyroid CA       SocialHx:  Social History     Tobacco Use    Smoking status: Never    Smokeless tobacco: Never   Substance Use Topics    Alcohol use: Yes     Alcohol/week: 1.0 standard drink     Types: 1 Standard drinks or equivalent per week     Comment: rarely       Allergies: Allergies   Allergen Reactions    Augmentin [Amoxicillin-Pot Clavulanate] Diarrhea    Aspirin Other (See Comments)       Medications:  Current Outpatient Medications   Medication Sig Dispense Refill    brompheniramine-pseudoephedrine-DM 2-30-10 MG/5ML syrup Take 5 mLs by mouth 4 times daily as needed for Cough 100 mL 0    butalbital-acetaminophen-caffeine (FIORICET, ESGIC) -40 MG per tablet TAKE 1 TABLET BY MOUTH EVERY 8 HOURS AS NEEDED FOR MIGRAINE HEADACHE 90 tablet 0    traMADol (ULTRAM) 50 MG tablet Take 1 tablet by mouth every 12 hours as needed for Pain (headache) for up to 61 days.  45 tablet 1    fluvoxaMINE (LUVOX) 100 MG tablet Take 1 tablet by mouth nightly 30 tablet 5    omeprazole (PRILOSEC) 40 MG delayed release capsule Take 1 capsule by mouth every morning (before breakfast) 30 capsule 5    gabapentin (NEURONTIN) 300 MG capsule TAKE 3 CAPSULES BY MOUTH EVERY DAY AT BEDTIME 90 capsule 2    levothyroxine (SYNTHROID) 88 MCG tablet TAKE 1 TABLET BY MOUTH IN THE MORNING 90 tablet 0    propranolol (INDERAL LA) 80 MG extended release capsule Take 1 capsule by mouth once daily 30 capsule 5    baclofen (LIORESAL) 10 MG tablet TAKE 1 TABLET BY MOUTH THREE TIMES DAILY AS NEEDED FOR MUSCLE SPASM /  NECK  PAIN 90 tablet 2    Potassium 99 MG TABS Take 1 tablet by mouth daily      TURMERIC PO Take 1 tablet by mouth daily      vitamin B-12 (CYANOCOBALAMIN) 1000 MCG tablet Take 1,000 mcg by mouth daily      Multiple Vitamins-Minerals (MULTIVITAMIN ADULT PO) Take by mouth      Omega 3 1000 MG CAPS Take by mouth      Cholecalciferol (VITAMIN D3) 3000 units TABS Take by mouth       No current facility-administered medications for this visit. Objective:   PE:  /68   Pulse 76   Temp 97.9 °F (36.6 °C) (Temporal)   Ht 5' 1\" (1.549 m)   Wt 139 lb 3.2 oz (63.1 kg)   SpO2 97%   BMI 26.30 kg/m²   Physical Exam  Vitals and nursing note reviewed. Constitutional:       General: She is not in acute distress. Appearance: She is ill-appearing. She is not toxic-appearing. HENT:      Head: Normocephalic. Right Ear: External ear normal.      Left Ear: External ear normal.      Nose: Congestion present. Comments: Nasal mucosa markedly inflamed     Mouth/Throat:      Mouth: Mucous membranes are moist.      Comments: No pharyngeal irritation  Eyes:      Conjunctiva/sclera: Conjunctivae normal.   Cardiovascular:      Rate and Rhythm: Normal rate and regular rhythm. Pulses: Normal pulses. Heart sounds: Normal heart sounds. Pulmonary:      Effort: Pulmonary effort is normal.      Breath sounds: Normal breath sounds. Abdominal:      Palpations: Abdomen is soft. Tenderness: There is no abdominal tenderness. Musculoskeletal:      Cervical back: Neck supple. Lymphadenopathy:      Cervical: No cervical adenopathy. Assessment & Plan   Mreedith Munoz was seen today for congestion, cough and pharyngitis.     Diagnoses and all orders for this visit:    Acute non-recurrent maxillary sinusitis    Cough, unspecified type  -     POCT Influenza A/B  -     brompheniramine-pseudoephedrine-DM 2-30-10 MG/5ML syrup; Take 5 mLs by mouth 4 times daily as needed for Cough    PND (post-nasal drip)    Sore throat  -     POCT Influenza A/B    Nasal congestion  -     POCT Influenza A/B    Increase oral fluid intake  Warm saline gargles twice a day  Continue all other maintenance medications   Pt requesting cough syrup with hydrocodone which I was not comfortable giving her due to the number of controlled narcotic medication she is already taking    Return for routine follow-up with PCP. All questions were answered. Medications, including possible adverse effects, and instructions were reviewed and  understanding was confirmed. Follow-up recommendations, including when to contact or return to office (ie; if symptoms worsen or fail to improve), were discussed and acknowledged.     Electronically signed by Isabel Grimaldo MD on 12/28/22 at 3:41 PM CST

## 2023-01-04 ENCOUNTER — TELEPHONE (OUTPATIENT)
Dept: PRIMARY CARE CLINIC | Age: 58
End: 2023-01-04

## 2023-01-04 DIAGNOSIS — R05.1 ACUTE COUGH: ICD-10-CM

## 2023-01-04 DIAGNOSIS — J01.00 ACUTE NON-RECURRENT MAXILLARY SINUSITIS: Primary | ICD-10-CM

## 2023-01-04 RX ORDER — CEFDINIR 300 MG/1
300 CAPSULE ORAL 2 TIMES DAILY
Qty: 14 CAPSULE | Refills: 0 | Status: SHIPPED | OUTPATIENT
Start: 2023-01-04 | End: 2023-01-11

## 2023-01-04 RX ORDER — BENZONATATE 200 MG/1
200 CAPSULE ORAL 3 TIMES DAILY PRN
Qty: 30 CAPSULE | Refills: 0 | Status: SHIPPED | OUTPATIENT
Start: 2023-01-04 | End: 2023-01-11

## 2023-01-04 NOTE — TELEPHONE ENCOUNTER
So I am highly confused, pt said Dr. Emelina Hyde told her she had a bad sinus infection and insisted she take Augmentin even after she told her how bad it affects her, however, no antibiotic was ever even sent in. No mention in her note of sinus infection She also asked for a different cough medication that does not make her drowsy and sick to her stomach but Dr. Emelina Hyde insisted on what she sent in.  Pt called saying she would come in to see you if you would like her to to get the medications fixed, but I told her I would speak with you first.

## 2023-01-04 NOTE — TELEPHONE ENCOUNTER
Pt asked for antibiotic to be sent se didn't care which one but she odes bot want the cough meds or the promethazine as she can not \"afford to take all these different medications. \" Pt was crying on the phone saying she was exteremly sick and she can not take even the full dosage of the medication that Dr. Juma Samaniego sent in so she is not getting any sleep because she can take the medication. I offered to her for us to send in the stomach medication and she refused once again saying she was just on the tussinex 10/2021 and she \"doesn't take it unless she needs it\", she also said that she was considering finding another provider since we would not send in the medication she requested. I advised her that is completely up to her but that being on too many medications of jesse type can cause respiratory suppression and that can be very dangerous, even if she has had it before and it didn't make her sleepy, there was still the chance and we were not willing to risk her life or health over a cough medication.

## 2023-01-04 NOTE — TELEPHONE ENCOUNTER
----- Message from Linh Darden sent at 1/4/2023  4:38 PM CST -----  Subject: Message to Provider    QUESTIONS  Information for Provider? Please contact Edison Gomez regarding the antibiotic   Dr. Graciela Millard stated she would send to the pharmacist. Anatoly Avery requested   a prescription for her cough. Dr. Graciela Millard could not prescribe the cough   syrup that Edison Gomez usually takes and prescribed a different one that makes   her sick to her stomach and drowsy. Edison Gomez still has a cough and a bad   headache. Please call her to schedule an appt with Dr. Kelton Tong. Dr. Graciela Millard said that only Dr. Kelton Tong or one of her PAs can prescribe   the cough medication.  ---------------------------------------------------------------------------  --------------  Tiarra Clifforderbury JEOVANY  [phone]; OK to leave message on voicemail, OK to respond with   electronic message via [facility] portal (only for patients who have   registered [facility] account)  ---------------------------------------------------------------------------  --------------  SCRIPT ANSWERS  Relationship to Patient?  Self

## 2023-01-05 NOTE — TELEPHONE ENCOUNTER
I called patient too inform  her that medications were sent in for her last night. She said she has some tessalon Perles and they don't help but thank you.

## 2023-01-17 DIAGNOSIS — G43.009 MIGRAINE WITHOUT AURA AND WITHOUT STATUS MIGRAINOSUS, NOT INTRACTABLE: ICD-10-CM

## 2023-01-18 RX ORDER — BUTALBITAL, ACETAMINOPHEN AND CAFFEINE 50; 325; 40 MG/1; MG/1; MG/1
TABLET ORAL
Qty: 90 TABLET | Refills: 0 | Status: SHIPPED | OUTPATIENT
Start: 2023-01-18

## 2023-01-18 NOTE — TELEPHONE ENCOUNTER
Emanuel Adrianne called to request a refill on her medication. Last office visit : 11/17/2022   Next office visit : 01/26/23    Last UDS:   Amphetamine Screen, Urine   Date Value Ref Range Status   11/17/2022 N  Final     Barbiturate Screen, Urine   Date Value Ref Range Status   11/17/2022 pos  Final     Benzodiazepine Screen, Urine   Date Value Ref Range Status   11/17/2022 N  Final     Buprenorphine Urine   Date Value Ref Range Status   11/17/2022 N  Final     Cocaine Metabolite Screen, Urine   Date Value Ref Range Status   11/17/2022 N  Final     Gabapentin Screen, Urine   Date Value Ref Range Status   11/17/2022 N  Final     MDMA, Urine   Date Value Ref Range Status   11/17/2022 N  Final     Methamphetamine, Urine   Date Value Ref Range Status   11/17/2022 N  Final     Opiate Scrn, Ur   Date Value Ref Range Status   11/17/2022 N  Final     Oxycodone Screen, Ur   Date Value Ref Range Status   11/17/2022 N  Final     PCP Screen, Urine   Date Value Ref Range Status   11/17/2022 N  Final     Propoxyphene Screen, Urine   Date Value Ref Range Status   11/17/2022 N  Final     THC Screen, Urine   Date Value Ref Range Status   11/17/2022 N  Final     Tricyclic Antidepressants, Urine   Date Value Ref Range Status   11/17/2022 N  Final       Last Camillia Shy: 11/17/22  Medication Contract: 11/17/22   Last Fill: 12/16/22    Requested Prescriptions     Pending Prescriptions Disp Refills    butalbital-acetaminophen-caffeine (FIORICET, ESGIC) -40 MG per tablet [Pharmacy Med Name: BUT/ACETAMINOPHEN/CAFF -40 TB] 90 tablet      Sig: TAKE 1 TABLET BY MOUTH EVERY 8 HOURS AS NEEDED FOR MIGRAINE HEADACHE         Please approve or refuse this medication.    Pat Alvarez MA

## 2023-02-02 DIAGNOSIS — G89.29 CHRONIC NECK PAIN: ICD-10-CM

## 2023-02-02 DIAGNOSIS — M54.42 CHRONIC BILATERAL LOW BACK PAIN WITH LEFT-SIDED SCIATICA: ICD-10-CM

## 2023-02-02 DIAGNOSIS — G43.009 MIGRAINE WITHOUT AURA AND WITHOUT STATUS MIGRAINOSUS, NOT INTRACTABLE: ICD-10-CM

## 2023-02-02 DIAGNOSIS — M54.2 CHRONIC NECK PAIN: ICD-10-CM

## 2023-02-02 DIAGNOSIS — G89.29 CHRONIC BILATERAL LOW BACK PAIN WITH LEFT-SIDED SCIATICA: ICD-10-CM

## 2023-02-02 RX ORDER — TRAMADOL HYDROCHLORIDE 50 MG/1
TABLET ORAL
Qty: 45 TABLET | Refills: 0 | Status: SHIPPED | OUTPATIENT
Start: 2023-02-02 | End: 2023-03-03 | Stop reason: SDUPTHER

## 2023-02-02 NOTE — TELEPHONE ENCOUNTER
Jesenia Menchaca called to request a refill on her medication. Last office visit : 12/28/2022   Next office visit : 2/20/2023     Last UDS:   Amphetamine Screen, Urine   Date Value Ref Range Status   11/17/2022 N  Final     Barbiturate Screen, Urine   Date Value Ref Range Status   11/17/2022 pos  Final     Benzodiazepine Screen, Urine   Date Value Ref Range Status   11/17/2022 N  Final     Buprenorphine Urine   Date Value Ref Range Status   11/17/2022 N  Final     Cocaine Metabolite Screen, Urine   Date Value Ref Range Status   11/17/2022 N  Final     Gabapentin Screen, Urine   Date Value Ref Range Status   11/17/2022 N  Final     MDMA, Urine   Date Value Ref Range Status   11/17/2022 N  Final     Methamphetamine, Urine   Date Value Ref Range Status   11/17/2022 N  Final     Opiate Scrn, Ur   Date Value Ref Range Status   11/17/2022 N  Final     Oxycodone Screen, Ur   Date Value Ref Range Status   11/17/2022 N  Final     PCP Screen, Urine   Date Value Ref Range Status   11/17/2022 N  Final     Propoxyphene Screen, Urine   Date Value Ref Range Status   11/17/2022 N  Final     THC Screen, Urine   Date Value Ref Range Status   11/17/2022 N  Final     Tricyclic Antidepressants, Urine   Date Value Ref Range Status   11/17/2022 N  Final       Last Aleah Shirts: 11/17/22  Medication Contract: 11/17/22   Last Fill: 12/02/22    Requested Prescriptions     Pending Prescriptions Disp Refills    traMADol (ULTRAM) 50 MG tablet [Pharmacy Med Name: TRAMADOL 50MG TABLETS] 45 tablet      Sig: TAKE 1 TABLET BY MOUTH EVERY 12 HOURS FOR UP TO 61 DAYS AS NEEDED FOR PAIN OR HEADACHE         Please approve or refuse this medication.    Mendez Chakraborty MA

## 2023-02-17 DIAGNOSIS — G43.009 MIGRAINE WITHOUT AURA AND WITHOUT STATUS MIGRAINOSUS, NOT INTRACTABLE: ICD-10-CM

## 2023-02-17 RX ORDER — BUTALBITAL, ACETAMINOPHEN AND CAFFEINE 50; 325; 40 MG/1; MG/1; MG/1
TABLET ORAL
Qty: 90 TABLET | Refills: 0 | Status: SHIPPED | OUTPATIENT
Start: 2023-02-17

## 2023-02-17 NOTE — TELEPHONE ENCOUNTER
Yris Hercules called to request a refill on her medication. Last office visit : 12/28/2022   Next office visit : 2/20/2023     Last UDS:   Amphetamine Screen, Urine   Date Value Ref Range Status   11/17/2022 N  Final     Barbiturate Screen, Urine   Date Value Ref Range Status   11/17/2022 pos  Final     Benzodiazepine Screen, Urine   Date Value Ref Range Status   11/17/2022 N  Final     Buprenorphine Urine   Date Value Ref Range Status   11/17/2022 N  Final     Cocaine Metabolite Screen, Urine   Date Value Ref Range Status   11/17/2022 N  Final     Gabapentin Screen, Urine   Date Value Ref Range Status   11/17/2022 N  Final     MDMA, Urine   Date Value Ref Range Status   11/17/2022 N  Final     Methamphetamine, Urine   Date Value Ref Range Status   11/17/2022 N  Final     Opiate Scrn, Ur   Date Value Ref Range Status   11/17/2022 N  Final     Oxycodone Screen, Ur   Date Value Ref Range Status   11/17/2022 N  Final     PCP Screen, Urine   Date Value Ref Range Status   11/17/2022 N  Final     Propoxyphene Screen, Urine   Date Value Ref Range Status   11/17/2022 N  Final     THC Screen, Urine   Date Value Ref Range Status   11/17/2022 N  Final     Tricyclic Antidepressants, Urine   Date Value Ref Range Status   11/17/2022 N  Final       Last Júnior Benedict: 2-17-23  Medication Contract: 11-17-22   Last Fill: 01-18-23    Requested Prescriptions     Pending Prescriptions Disp Refills    butalbital-acetaminophen-caffeine (FIORICET, ESGIC) -40 MG per tablet [Pharmacy Med Name: BUT/ACETAMINOPHEN/CAFF -40 TB] 90 tablet      Sig: TAKE 1 TABLET BY MOUTH EVERY 8 HOURS AS NEEDED FOR MIGRAINE HEADACHE         Please approve or refuse this medication.    Anurag Kennedy MA

## 2023-02-17 NOTE — TELEPHONE ENCOUNTER
The current medical regimen is effective. Continue present plan and medications. UDS and controlled substance contract up to date. No unusual filling on current [facility] report. Tx continues to be medically necessary.     Kerrie Sanchez MD

## 2023-02-20 ENCOUNTER — OFFICE VISIT (OUTPATIENT)
Dept: PRIMARY CARE CLINIC | Age: 58
End: 2023-02-20
Payer: COMMERCIAL

## 2023-02-20 VITALS
BODY MASS INDEX: 26.06 KG/M2 | WEIGHT: 138 LBS | OXYGEN SATURATION: 98 % | SYSTOLIC BLOOD PRESSURE: 118 MMHG | HEART RATE: 69 BPM | DIASTOLIC BLOOD PRESSURE: 78 MMHG | TEMPERATURE: 97.9 F | HEIGHT: 61 IN

## 2023-02-20 DIAGNOSIS — E78.2 MIXED HYPERLIPIDEMIA: ICD-10-CM

## 2023-02-20 DIAGNOSIS — G43.009 MIGRAINE WITHOUT AURA AND WITHOUT STATUS MIGRAINOSUS, NOT INTRACTABLE: ICD-10-CM

## 2023-02-20 DIAGNOSIS — J01.40 SUBACUTE PANSINUSITIS: ICD-10-CM

## 2023-02-20 DIAGNOSIS — G89.29 CHRONIC NECK PAIN: ICD-10-CM

## 2023-02-20 DIAGNOSIS — G89.29 CHRONIC BILATERAL LOW BACK PAIN WITH LEFT-SIDED SCIATICA: ICD-10-CM

## 2023-02-20 DIAGNOSIS — R51.9 CHRONIC DAILY HEADACHE: ICD-10-CM

## 2023-02-20 DIAGNOSIS — M54.42 CHRONIC BILATERAL LOW BACK PAIN WITH LEFT-SIDED SCIATICA: ICD-10-CM

## 2023-02-20 DIAGNOSIS — F51.01 IDIOPATHIC INSOMNIA: ICD-10-CM

## 2023-02-20 DIAGNOSIS — Z12.31 ENCOUNTER FOR SCREENING MAMMOGRAM FOR MALIGNANT NEOPLASM OF BREAST: ICD-10-CM

## 2023-02-20 DIAGNOSIS — M54.2 CHRONIC NECK PAIN: ICD-10-CM

## 2023-02-20 DIAGNOSIS — Z23 NEED FOR PROPHYLACTIC VACCINATION AND INOCULATION AGAINST VARICELLA: ICD-10-CM

## 2023-02-20 DIAGNOSIS — F33.1 MODERATE RECURRENT MAJOR DEPRESSION (HCC): ICD-10-CM

## 2023-02-20 DIAGNOSIS — Z00.01 ENCOUNTER FOR WELL ADULT EXAM WITH ABNORMAL FINDINGS: Primary | ICD-10-CM

## 2023-02-20 DIAGNOSIS — Z12.11 COLON CANCER SCREENING: ICD-10-CM

## 2023-02-20 DIAGNOSIS — E55.9 VITAMIN D DEFICIENCY: ICD-10-CM

## 2023-02-20 DIAGNOSIS — Z02.83 ENCOUNTER FOR DRUG SCREENING: ICD-10-CM

## 2023-02-20 DIAGNOSIS — E03.9 ACQUIRED HYPOTHYROIDISM: ICD-10-CM

## 2023-02-20 DIAGNOSIS — G25.81 RLS (RESTLESS LEGS SYNDROME): ICD-10-CM

## 2023-02-20 DIAGNOSIS — F41.1 GAD (GENERALIZED ANXIETY DISORDER): ICD-10-CM

## 2023-02-20 DIAGNOSIS — J30.2 SEASONAL ALLERGIC RHINITIS, UNSPECIFIED TRIGGER: ICD-10-CM

## 2023-02-20 DIAGNOSIS — Z23 NEED FOR COVID-19 VACCINE: ICD-10-CM

## 2023-02-20 DIAGNOSIS — E66.3 OVERWEIGHT (BMI 25.0-29.9): ICD-10-CM

## 2023-02-20 LAB
ALCOHOL URINE: NORMAL
AMPHETAMINE SCREEN, URINE: NORMAL
BARBITURATE SCREEN, URINE: POSITIVE
BENZODIAZEPINE SCREEN, URINE: NORMAL
BUPRENORPHINE URINE: NORMAL
COCAINE METABOLITE SCREEN URINE: NORMAL
FENTANYL SCREEN, URINE: NORMAL
GABAPENTIN SCREEN, URINE: NORMAL
MDMA URINE: NORMAL
METHADONE SCREEN, URINE: NORMAL
METHAMPHETAMINE, URINE: NORMAL
OPIATE SCREEN URINE: NORMAL
OXYCODONE SCREEN URINE: NORMAL
PHENCYCLIDINE SCREEN URINE: NORMAL
PROPOXYPHENE SCREEN, URINE: NORMAL
SYNTHETIC CANNABINOIDS(K2) SCREEN, URINE: NORMAL
THC SCREEN, URINE: NORMAL
TRAMADOL SCREEN URINE: NORMAL
TRICYCLIC ANTIDEPRESSANTS, UR: NORMAL

## 2023-02-20 PROCEDURE — 91312 COVID-19, PFIZER BIVALENT BOOSTER, DO NOT DILUTE, (AGE 12Y+), IM, 30 MCG/0.3 ML: CPT | Performed by: INTERNAL MEDICINE

## 2023-02-20 PROCEDURE — 99214 OFFICE O/P EST MOD 30 MIN: CPT | Performed by: INTERNAL MEDICINE

## 2023-02-20 PROCEDURE — 0124A COVID-19, PFIZER BIVALENT BOOSTER, DO NOT DILUTE, (AGE 12Y+), IM, 30 MCG/0.3 ML: CPT | Performed by: INTERNAL MEDICINE

## 2023-02-20 PROCEDURE — 99396 PREV VISIT EST AGE 40-64: CPT | Performed by: INTERNAL MEDICINE

## 2023-02-20 PROCEDURE — 80305 DRUG TEST PRSMV DIR OPT OBS: CPT | Performed by: INTERNAL MEDICINE

## 2023-02-20 RX ORDER — AZELASTINE 1 MG/ML
2 SPRAY, METERED NASAL 2 TIMES DAILY
Qty: 60 ML | Refills: 5 | Status: SHIPPED | OUTPATIENT
Start: 2023-02-20

## 2023-02-20 RX ORDER — LEVOFLOXACIN 500 MG/1
500 TABLET, FILM COATED ORAL DAILY
Qty: 14 TABLET | Refills: 0 | Status: SHIPPED | OUTPATIENT
Start: 2023-02-20 | End: 2023-03-06

## 2023-02-20 RX ORDER — LEVOTHYROXINE SODIUM 88 UG/1
88 TABLET ORAL DAILY
Qty: 90 TABLET | Refills: 1 | Status: SHIPPED | OUTPATIENT
Start: 2023-02-20

## 2023-02-20 RX ORDER — GABAPENTIN 300 MG/1
CAPSULE ORAL
Qty: 90 CAPSULE | Refills: 2 | Status: SHIPPED | OUTPATIENT
Start: 2023-02-20 | End: 2023-05-24

## 2023-02-20 SDOH — ECONOMIC STABILITY: FOOD INSECURITY: WITHIN THE PAST 12 MONTHS, YOU WORRIED THAT YOUR FOOD WOULD RUN OUT BEFORE YOU GOT MONEY TO BUY MORE.: NEVER TRUE

## 2023-02-20 SDOH — ECONOMIC STABILITY: INCOME INSECURITY: HOW HARD IS IT FOR YOU TO PAY FOR THE VERY BASICS LIKE FOOD, HOUSING, MEDICAL CARE, AND HEATING?: NOT HARD AT ALL

## 2023-02-20 SDOH — ECONOMIC STABILITY: FOOD INSECURITY: WITHIN THE PAST 12 MONTHS, THE FOOD YOU BOUGHT JUST DIDN'T LAST AND YOU DIDN'T HAVE MONEY TO GET MORE.: NEVER TRUE

## 2023-02-20 ASSESSMENT — VISUAL ACUITY: OU: 1

## 2023-02-20 ASSESSMENT — ENCOUNTER SYMPTOMS
DIARRHEA: 0
SHORTNESS OF BREATH: 0
RHINORRHEA: 0
COUGH: 0
BLOOD IN STOOL: 0
COLOR CHANGE: 0
EYE REDNESS: 0
VOMITING: 0
SINUS PAIN: 1
PHOTOPHOBIA: 1
SINUS PRESSURE: 1
WHEEZING: 0
SORE THROAT: 0
CHEST TIGHTNESS: 0
EYE DISCHARGE: 0
ABDOMINAL PAIN: 0
EYE PAIN: 0
VOICE CHANGE: 0

## 2023-02-20 ASSESSMENT — ANXIETY QUESTIONNAIRES
GAD7 TOTAL SCORE: 14
IF YOU CHECKED OFF ANY PROBLEMS ON THIS QUESTIONNAIRE, HOW DIFFICULT HAVE THESE PROBLEMS MADE IT FOR YOU TO DO YOUR WORK, TAKE CARE OF THINGS AT HOME, OR GET ALONG WITH OTHER PEOPLE: VERY DIFFICULT
6. BECOMING EASILY ANNOYED OR IRRITABLE: 2
4. TROUBLE RELAXING: 2
2. NOT BEING ABLE TO STOP OR CONTROL WORRYING: 3
1. FEELING NERVOUS, ANXIOUS, OR ON EDGE: 1
5. BEING SO RESTLESS THAT IT IS HARD TO SIT STILL: 1
3. WORRYING TOO MUCH ABOUT DIFFERENT THINGS: 3
7. FEELING AFRAID AS IF SOMETHING AWFUL MIGHT HAPPEN: 2

## 2023-02-20 ASSESSMENT — PATIENT HEALTH QUESTIONNAIRE - PHQ9
10. IF YOU CHECKED OFF ANY PROBLEMS, HOW DIFFICULT HAVE THESE PROBLEMS MADE IT FOR YOU TO DO YOUR WORK, TAKE CARE OF THINGS AT HOME, OR GET ALONG WITH OTHER PEOPLE: 2
3. TROUBLE FALLING OR STAYING ASLEEP: 3
8. MOVING OR SPEAKING SO SLOWLY THAT OTHER PEOPLE COULD HAVE NOTICED. OR THE OPPOSITE, BEING SO FIGETY OR RESTLESS THAT YOU HAVE BEEN MOVING AROUND A LOT MORE THAN USUAL: 1
SUM OF ALL RESPONSES TO PHQ9 QUESTIONS 1 & 2: 5
SUM OF ALL RESPONSES TO PHQ QUESTIONS 1-9: 17
5. POOR APPETITE OR OVEREATING: 1
6. FEELING BAD ABOUT YOURSELF - OR THAT YOU ARE A FAILURE OR HAVE LET YOURSELF OR YOUR FAMILY DOWN: 3
SUM OF ALL RESPONSES TO PHQ QUESTIONS 1-9: 17
1. LITTLE INTEREST OR PLEASURE IN DOING THINGS: 2
SUM OF ALL RESPONSES TO PHQ QUESTIONS 1-9: 17
2. FEELING DOWN, DEPRESSED OR HOPELESS: 3
SUM OF ALL RESPONSES TO PHQ QUESTIONS 1-9: 17
9. THOUGHTS THAT YOU WOULD BE BETTER OFF DEAD, OR OF HURTING YOURSELF: 0
4. FEELING TIRED OR HAVING LITTLE ENERGY: 2
7. TROUBLE CONCENTRATING ON THINGS, SUCH AS READING THE NEWSPAPER OR WATCHING TELEVISION: 2

## 2023-02-20 NOTE — PROGRESS NOTES
Well Adult Note  Name: Socorro Gonzalez Date: 2023   MRN: [id number] Sex: Female   Age: 61 y.o. Ethnicity: Non- / Non    : 1965 Race: White (non-)      Melanie Rodriguez is here for well adult exam.  History:  62 y/o WF here for annual wellness visit and follow up on  follow up on recurrent migraine/tension HAs, chronic neck pain with controlled med refills, chronic depression, HORACE, and elevated BMI. 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 She feels like not she has any control over this situation, which makes things harder also. Her 8 yr old grandson that has autism and ADHD is having trouble in school and making new friends right now since he has also recently moved to [employer] system recently. 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 Patient has trouble going to sleep at night because she tries to make sure her  gets up for work on time and she calls her adult daughter to wake her up for work also so she is not late to get the kids to school because she has trouble waking herself up without patient calling. Patient may only sleep about 2 hours at a time some nights and then after not sleeping for more than a day or two, she will fall asleep during the day because she is so tired, she cannot stay awake and sleeps for about 6 hours. She basically says she feels like her days and nights are mixed up but she is not sure how to get this under better control. Patient has been compliant with her fluvoxamine for chronic major depressive disorder and generalized anxiety disorder. HAs are not well controlled due to her issues with sleep either.  She was seen by [facility] previously but says that it got too expensive to pay for seeing the psychiatrist for medications and the psychologist for counseling so often and eventually she quit going. Patient did not have scheduled labs done prior to appointment today. Patient still feels like she has a sinus infection with sinus pain and pressure in her maxillary and frontal sinuses as well as ear pressure bilaterally. She may have severe nasal congestion or sinus drainage constantly but tends to alternate. Sinus drainage is clear or \"milky\" look to it. Patient was sent 10 days of oral cefdinir on 1/4/23 but symptoms did not resolve with this antibiotic. BMI Readings from Last 3 Encounters:   02/20/23 26.07 kg/m²   12/28/22 26.30 kg/m²   11/17/22 26.15 kg/m²     Wt Readings from Last 3 Encounters:   02/20/23 138 lb (62.6 kg)   12/28/22 139 lb 3.2 oz (63.1 kg)   11/17/22 138 lb 6 oz (62.8 kg)     PHQ Scores 2/20/2023 8/15/2022 6/14/2021 12/4/2020 7/16/2020 1/22/2020 3/14/2019   PHQ2 Score 5 0 1 2 5 0 2   PHQ9 Score 17 6 7 8 19 0 2     Interpretation of Total Score Depression Severity: 1-4 = Minimal depression, 5-9 = Mild depression, 10-14 = Moderate depression, 15-19 = Moderately severe depression, 20-27 = Severe depression    Hypothyroidism  Patient presents for follow up on thyroid function. Symptoms consist of fatigue, depression. The problem has been gradually worsening. Patient has been compliant with levothyroxine. Review of Systems   Constitutional:  Positive for fatigue. Negative for appetite change, chills, fever and unexpected weight change. HENT:  Positive for congestion, ear pain, sinus pressure and sinus pain. Negative for rhinorrhea, sore throat and voice change. Eyes:  Positive for photophobia. Negative for pain, discharge and redness. Respiratory:  Negative for cough, chest tightness, shortness of breath and wheezing. Cardiovascular:  Negative for chest pain and palpitations. Gastrointestinal:  Negative for abdominal pain, blood in stool, diarrhea and vomiting. Endocrine: Negative for cold intolerance, heat intolerance and polydipsia. Genitourinary:  Negative for dysuria and hematuria. Musculoskeletal:  Positive for neck pain and neck stiffness. Negative for arthralgias and myalgias. Skin:  Negative for color change and rash. Neurological:  Positive for headaches. Negative for dizziness, tremors, syncope, speech difficulty, weakness and numbness. Hematological:  Negative for adenopathy. Does not bruise/bleed easily. Psychiatric/Behavioral:  Positive for decreased concentration, dysphoric mood and sleep disturbance. Negative for confusion, self-injury and suicidal ideas. The patient is nervous/anxious. See HPI   All other systems reviewed and are negative. Allergies   Allergen Reactions    Augmentin [Amoxicillin-Pot Clavulanate] Diarrhea    Aspirin Other (See Comments)         Prior to Visit Medications    Medication Sig Taking?  Authorizing Provider   gabapentin (NEURONTIN) 300 MG capsule TAKE 3 CAPSULES BY MOUTH EVERY DAY AT BEDTIME Yes Jakob Putnam MD   levothyroxine (SYNTHROID) 88 MCG tablet Take 1 tablet by mouth daily Yes Jakob Putnam MD   azelastine (ASTELIN) 0.1 % nasal spray 2 sprays by Nasal route 2 times daily Use in each nostril as directed Yes Jakob Putnam MD   levoFLOXacin (LEVAQUIN) 500 MG tablet Take 1 tablet by mouth daily for 14 days Take with food Yes Jakob Putnam MD   butalbital-acetaminophen-caffeine (FIORICET, ESGIC) -40 MG per tablet TAKE 1 TABLET BY MOUTH EVERY 8 HOURS AS NEEDED FOR MIGRAINE HEADACHE Yes Tory Watters MD   propranolol (INDERAL LA) 80 MG extended release capsule Take 1 capsule by mouth once daily Yes Jakob Putnam MD   traMADol (ULTRAM) 50 MG tablet TAKE 1 TABLET BY MOUTH EVERY 12 HOURS FOR UP TO 61 DAYS AS NEEDED FOR PAIN OR HEADACHE Yes Jakob Putnam MD   fluvoxaMINE (LUVOX) 100 MG tablet Take 1 tablet by mouth nightly Yes Roger Ortez MD Alex   omeprazole (PRILOSEC) 40 MG delayed release capsule Take 1 capsule by mouth every morning (before breakfast) Yes Andrei Lilly MD   baclofen (LIORESAL) 10 MG tablet TAKE 1 TABLET BY MOUTH THREE TIMES DAILY AS NEEDED FOR MUSCLE SPASM /  NECK  PAIN Yes Andrei Lilly MD   Potassium 99 MG TABS Take 1 tablet by mouth daily Yes Historical Provider, MD   TURMERIC PO Take 1 tablet by mouth daily Yes Historical Provider, MD   vitamin B-12 (CYANOCOBALAMIN) 1000 MCG tablet Take 1,000 mcg by mouth daily Yes Historical Provider, MD   Multiple Vitamins-Minerals (MULTIVITAMIN ADULT PO) Take by mouth Yes Historical Provider, MD   Omega 3 1000 MG CAPS Take by mouth Yes Historical Provider, MD   Cholecalciferol (VITAMIN D3) 3000 units TABS Take by mouth Yes Historical Provider, MD         Past Medical History:   Diagnosis Date    Allergic rhinitis     Chronic bilateral low back pain with left-sided sciatica 2017    Colon polyp     Depression with anxiety     Obesity     Osteoarthritis        Past Surgical History:   Procedure Laterality Date     SECTION      COLONOSCOPY  2015    colon polyp x1, recheck in 5 yrs (Dr Christal Graham)    HYSTERECTOMY ([address] [place] [address])  2009    MYOMECTOMY      TONSILLECTOMY           Family History   Problem Relation Age of Onset    Heart Disease Mother     High Blood Pressure Mother     Other Mother         skin CA    Osteoporosis Mother     Heart Disease Father     High Blood Pressure Father     Other Father         jaw CA    Heart Disease Sister     Diabetes Sister     Stroke Sister     High Blood Pressure Sister     Other Sister         fibromyalgia, thyroid CA       Social History     Tobacco Use    Smoking status: Never    Smokeless tobacco: Never   Vaping Use    Vaping Use: Never used   Substance Use Topics    Alcohol use:  Yes     Alcohol/week: 1.0 standard drink     Types: 1 Standard drinks or equivalent per week     Comment: rarely    Drug use: Never       Objective   /78   Pulse 69   Temp 97.9 °F (36.6 °C)   Ht 5' 1\" (1.549 m)   Wt 138 lb (62.6 kg)   SpO2 98%   BMI 26.07 kg/m²   Wt Readings from Last 3 Encounters:   02/20/23 138 lb (62.6 kg)   12/28/22 139 lb 3.2 oz (63.1 kg)   11/17/22 138 lb 6 oz (62.8 kg)     There were no vitals filed for this visit. Physical Exam  Vitals and nursing note reviewed. Constitutional:       General: She is not in acute distress. Appearance: Normal appearance. She is well-developed, well-groomed and overweight. She is not ill-appearing, toxic-appearing or diaphoretic. HENT:      Head: Normocephalic and atraumatic. Right Ear: Tympanic membrane, ear canal and external ear normal.      Left Ear: Tympanic membrane, ear canal and external ear normal.      Nose: Congestion present. No nasal deformity or rhinorrhea. Right Turbinates: Swollen. Left Turbinates: Swollen. Right Sinus: Maxillary sinus tenderness and frontal sinus tenderness present. Left Sinus: Maxillary sinus tenderness and frontal sinus tenderness present. Mouth/Throat:      Lips: Pink. Mouth: Mucous membranes are moist. No oral lesions. Tongue: No lesions. Palate: No mass and lesions. Pharynx: Oropharynx is clear. Uvula midline. No pharyngeal swelling, oropharyngeal exudate, posterior oropharyngeal erythema or uvula swelling. Tonsils: 1+ on the right. 1+ on the left. Eyes:      General: Lids are normal. Vision grossly intact. No scleral icterus. Extraocular Movements: Extraocular movements intact. Conjunctiva/sclera: Conjunctivae normal.      Pupils: Pupils are equal, round, and reactive to light. Neck:      Thyroid: No thyroid mass or thyromegaly. Vascular: No carotid bruit or JVD. Trachea: Trachea and phonation normal.   Cardiovascular:      Rate and Rhythm: Normal rate and regular rhythm. Pulses: Normal pulses.            Radial pulses are 2+ on the right side and 2+ on the left side. Posterior tibial pulses are 2+ on the right side and 2+ on the left side. Heart sounds: Normal heart sounds. No murmur heard. No friction rub. No gallop. Pulmonary:      Effort: Pulmonary effort is normal. No accessory muscle usage. Breath sounds: Normal breath sounds. No decreased breath sounds, wheezing, rhonchi or rales. Chest:      Chest wall: No mass, deformity or tenderness. Breasts:     Breasts are symmetrical.      Right: Normal. No inverted nipple, mass, nipple discharge, skin change or tenderness. Left: Normal. No inverted nipple, mass, nipple discharge, skin change or tenderness. Abdominal:      General: Abdomen is flat. Bowel sounds are normal. There is no distension. Palpations: Abdomen is soft. There is no hepatomegaly, splenomegaly or mass. Tenderness: There is no abdominal tenderness. There is no guarding or rebound. Hernia: No hernia is present. Musculoskeletal:         General: Normal range of motion. Right wrist: Normal.      Left wrist: Normal.      Cervical back: Normal range of motion and neck supple. Right lower leg: No edema. Left lower leg: No edema. Right ankle: Normal.      Left ankle: Normal.   Lymphadenopathy:      Head:      Right side of head: No submandibular adenopathy. Left side of head: No submandibular adenopathy. Cervical: No cervical adenopathy. Right cervical: No superficial, deep or posterior cervical adenopathy. Left cervical: No superficial, deep or posterior cervical adenopathy. Upper Body:      Right upper body: No supraclavicular, axillary or pectoral adenopathy. Left upper body: No supraclavicular, axillary or pectoral adenopathy. Lower Body: No right inguinal adenopathy. No left inguinal adenopathy. Skin:     General: Skin is warm and dry. Capillary Refill: Capillary refill takes less than 2 seconds.       Coloration: Skin is not cyanotic. Findings: No rash. Nails: There is no clubbing. Neurological:      Mental Status: She is alert and oriented to person, place, and time. Cranial Nerves: No cranial nerve deficit, dysarthria or facial asymmetry. Sensory: Sensation is intact. Motor: Motor function is intact. No weakness, tremor, atrophy or abnormal muscle tone. Coordination: Coordination is intact. Romberg sign negative. Coordination normal.      Gait: Gait is intact. Deep Tendon Reflexes: Reflexes are normal and symmetric. Reflex Scores:       Brachioradialis reflexes are 2+ on the right side and 2+ on the left side. Patellar reflexes are 2+ on the right side and 2+ on the left side. Comments: CN II-XII grossly intact, speech clear, MAEW, no focal deficits   Psychiatric:         Attention and Perception: Attention and perception normal.         Mood and Affect: Mood and affect normal.         Speech: Speech normal.         Behavior: Behavior normal. Behavior is cooperative. Thought Content:  Thought content normal.         Cognition and Memory: Cognition and memory normal.         Judgment: Judgment normal.     Lab Results   Component Value Date    WBC 7.0 05/09/2022    HGB 15.2 05/09/2022    HCT 45.6 05/09/2022    MCV 88.7 05/09/2022     05/09/2022    LABLYMP 1.34 08/13/2015    LYMPHOPCT 30.3 05/09/2022    RBC 5.14 05/09/2022    MCH 29.6 05/09/2022    MCHC 33.3 05/09/2022    RDW 14.4 05/09/2022        Lab Results   Component Value Date     02/18/2022    K 4.3 02/18/2022     02/18/2022    CO2 25 02/18/2022    BUN 6 02/18/2022    CREATININE 0.6 02/18/2022    GLUCOSE 100 02/18/2022    CALCIUM 9.7 02/18/2022    PROT 6.8 02/18/2022    LABALBU 4.4 02/18/2022    BILITOT 0.4 02/18/2022    ALKPHOS 128 (H) 02/18/2022    AST 27 02/18/2022    ALT 24 02/18/2022    LABGLOM >60 02/18/2022    GFRAA >59 02/18/2022        Lab Results   Component Value Date    CHOL 194 02/18/2022 CHOL 226 (H) 11/17/2021    CHOL 221 (H) 06/12/2020     Lab Results   Component Value Date    TRIG 205 (H) 02/18/2022    TRIG 179 (H) 11/17/2021    TRIG 149 06/12/2020     Lab Results   Component Value Date    HDL 40 (L) 02/18/2022    HDL 41 (L) 11/17/2021    HDL 43 (L) 09/18/2020     Lab Results   Component Value Date    LDLCALC 113 02/18/2022    LDLCALC 149 11/17/2021    LDLCALC 139 09/18/2020     Lab Results   Component Value Date    TSHFT4 0.50 09/29/2022    TSH 0.025 (L) 02/18/2022      Lab Results   Component Value Date    VITD25 41.9 02/18/2022        Assessment   Plan   1. Encounter for well adult exam with abnormal findings  2. Chronic daily headache  -     gabapentin (NEURONTIN) 300 MG capsule; TAKE 3 CAPSULES BY MOUTH EVERY DAY AT BEDTIME, Disp-90 capsule, R-2Normal  3. Migraine without aura and without status migrainosus, not intractable  -     gabapentin (NEURONTIN) 300 MG capsule; TAKE 3 CAPSULES BY MOUTH EVERY DAY AT BEDTIME, Disp-90 capsule, R-2Normal  4. Chronic bilateral low back pain with left-sided sciatica  -     gabapentin (NEURONTIN) 300 MG capsule; TAKE 3 CAPSULES BY MOUTH EVERY DAY AT BEDTIME, Disp-90 capsule, R-2Normal  5. RLS (restless legs syndrome)  -     gabapentin (NEURONTIN) 300 MG capsule; TAKE 3 CAPSULES BY MOUTH EVERY DAY AT BEDTIME, Disp-90 capsule, R-2Normal  6. Chronic neck pain  -     gabapentin (NEURONTIN) 300 MG capsule; TAKE 3 CAPSULES BY MOUTH EVERY DAY AT BEDTIME, Disp-90 capsule, R-2Normal  7. Mixed hyperlipidemia  8. Acquired hypothyroidism  -     levothyroxine (SYNTHROID) 88 MCG tablet; Take 1 tablet by mouth daily, Disp-90 tablet, R-1Normal  9. Vitamin D deficiency  10. Moderate recurrent major depression ([facility] [address]  -     External Referral To Behavioral Health  11. HORACE (generalized anxiety disorder)  -     External Referral To Behavioral Health  12. Idiopathic insomnia  -     External Referral To Behavioral Health  13.  Seasonal allergic rhinitis, unspecified trigger  - azelastine (ASTELIN) 0.1 % nasal spray; 2 sprays by Nasal route 2 times daily Use in each nostril as directed, Disp-60 mL, R-5Normal  14. Subacute pansinusitis  -     levoFLOXacin (LEVAQUIN) 500 MG tablet; Take 1 tablet by mouth daily for 14 days Take with food, Disp-14 tablet, R-0Normal  15. Encounter for drug screening  -     POCT Rapid Drug Screen  16. Need for COVID-19 vaccine  -     COVID-19, PFIZER Bivalent BOOSTER, DO NOT Dilute, (age 12y+), IM, 30 mcg/0.3 mL  17. Need for prophylactic vaccination and inoculation against varicella  18. Encounter for screening mammogram for malignant neoplasm of breast  -     JAKUB DIGITAL SCREEN W OR WO CAD BILATERAL; Future  19. Colon cancer screening  20. Overweight (BMI 25.0-29. 9)     No new labs today. Previous labs reviewed. Requested patient get lab work drawn as ordered. Refills Provided. -Major depressive disorder, generalized anxiety disorder, and primary insomnia inadequately controlled-  On the basis of positive PHQ-9 screening (PHQ-9 Total Score: 17), the following plan was implemented: additional evaluation and assessment performed, follow-up plan includes but not limited to: Medication management and Referral to /Specialist for evaluation and management, referral to Behavioral health provided, and exercise program recommended for stress management. Patient will follow-up in 3 month(s) with PCP. -Chronic recurrent HAs and Chronic neck pain/tension HAs-feel stress and anxiety are worsening these issues and stressed importance of improving sleep hygiene habits and evaluation and help in treatment from behavioral health referral. Refill on gabapentin and continue prn fioricet for HAs and propranolol for migraine prophylaxis. The current medical regimen is effective. Continue present plan and medications. UDS and controlled substance contract updated today. No unusual filling on current [facility] report.  Tx continues to be medically necessary and improves patient quality of life. No signs of misuse of controlled medication.    -Health Maintenance reviewed - breast exam completed today (advised of need to have breast exam in addition to mammogram), mammogram ordered, patient to schedule appointment (advised of need to have breast exam in addition to mammogram), Colonoscopy due, referral placed with GI today, PAP smear not clinically indicated due to previous hysterectomy and no prior abnormal pap smears plus HPV high risk negative, Covid-19 vaccine booster updated today. Over 50% of the total visit time of 45 minutes was spent on counseling and/or coordination of care of:   1. Encounter for well adult exam with abnormal findings    2. Chronic daily headache    3. Migraine without aura and without status migrainosus, not intractable    4. Chronic bilateral low back pain with left-sided sciatica    5. RLS (restless legs syndrome)    6. Chronic neck pain    7. Mixed hyperlipidemia    8. Acquired hypothyroidism    9. Vitamin D deficiency    10. Moderate recurrent major depression ([facility] [address]    11. HORACE (generalized anxiety disorder)    12. Idiopathic insomnia    13. Seasonal allergic rhinitis, unspecified trigger    14. Subacute pansinusitis    15. Encounter for drug screening    16. Need for COVID-19 vaccine    17. Need for prophylactic vaccination and inoculation against varicella    18. Encounter for screening mammogram for malignant neoplasm of breast    19. Colon cancer screening    20. Overweight (BMI 25.0-29. 9)        Personalized Preventive Plan   Current Health Maintenance Status  Immunization History   Administered Date(s) Administered    COVID-19, MODERNA BLUE border, Primary or Immunocompromised, (age 12y+), IM, 100 mcg/0.5mL 04/01/2021, 05/03/2021    COVID-19, PFIZER Bivalent BOOSTER, DO NOT Dilute, (age 12y+), IM, 30 mcg/0.3 mL 02/20/2023    Influenza, FLUARIX, FLULAVAL, FLUZONE (age 10 mo+) AND AFLURIA, (age 1 y+), PF, 0.5mL 09/13/2018, 10/04/2019, 09/25/2020 Influenza, FLUCELVAX, (age 10 mo+), MDCK, PF, 0.5mL 11/17/2022    Tdap (Boostrix, Adacel) 08/01/2016    Zoster Recombinant (Shingrix) 01/22/2020, 06/19/2020        Health Maintenance   Topic Date Due    Colorectal Cancer Screen  03/04/2020    Breast cancer screen  10/01/2022    Depression Monitoring  08/15/2023    DTaP/Tdap/Td vaccine (3 - Td or Tdap) 10/13/2026    Lipids  02/18/2027    Flu vaccine  Completed    Shingles vaccine  Completed    COVID-19 Vaccine  Completed    Hepatitis C screen  Completed    Hepatitis A vaccine  Aged Out    Hib vaccine  Aged Out    Meningococcal (ACWY) vaccine  Aged Out    Pneumococcal 0-64 years Vaccine  Aged Out    HIV screen  Discontinued     Recommendations for Preventive Services Due: see orders and patient instructions/AVS.    Return in 3 months (on 5/20/2023) for high cholesterol, Controlled med refill, thyroid. Cardiovascular Disease Risk Counseling: Assessed the patient's risk to develop cardiovascular disease and reviewed main risk factors. Reviewed steps to reduce disease risk including:   Quitting tobacco use, reducing amount smoked, or not starting the habit  Making healthy food choices  Being physically active and gradualy increasing activity levels   Reduce weight and determine a healthy BMI goal  Monitor blood pressure and treat if higher than 140/90 mmHg  Maintain blood total cholesterol levels under 5 mmol/l or 190 mg/dl  Maintain LDL cholesterol levels under 3.0 mmol/l or 115 mg/dl   Control blood glucose levels  Consider taking aspirin (75 mg daily), once blood pressure is controlled   Provided a follow up plan.   Time spent (minutes): 5-7 min

## 2023-02-20 NOTE — PATIENT INSTRUCTIONS
Advance Directives: Care Instructions  Overview  An advance directive is a legal way to state your wishes at the end of your life. It tells your family and your doctor what to do if you can't say what you want. There are two main types of advance directives. You can change them any time your wishes change. Living will. This form tells your family and your doctor your wishes about life support and other treatment. The form is also called a declaration. Medical power of . This form lets you name a person to make treatment decisions for you when you can't speak for yourself. This person is called a health care agent (health care proxy, health care surrogate). The form is also called a durable power of  for health care. If you do not have an advance directive, decisions about your medical care may be made by a family member, or by a doctor or a  who doesn't know you. It may help to think of an advance directive as a gift to the people who care for you. If you have one, they won't have to make tough decisions by themselves. For more information, including forms for your state, see the [address] website (www.caringinfo.org/planning/advance-directives/). Follow-up care is a key part of your treatment and safety. Be sure to make and go to all appointments, and call your doctor if you are having problems. It's also a good idea to know your test results and keep a list of the medicines you take. What should you include in an advance directive? Many states have a unique advance directive form. (It may ask you to address specific issues.) Or you might use a universal form that's approved by many states. If your form doesn't tell you what to address, it may be hard to know what to include in your advance directive. Use the questions below to help you get started. Who do you want to make decisions about your medical care if you are not able to?   What life-support measures do you want if you have a serious illness that gets worse over time or can't be cured? What are you most afraid of that might happen? (Maybe you're afraid of having pain, losing your independence, or being kept alive by machines.)  Where would you prefer to die? (Your home? A hospital? A nursing home?)  Do you want to donate your organs when you die? Do you want certain Baptism practices performed before you die? When should you call for help? Be sure to contact your doctor if you have any questions. Where can you learn more? Go to http://www.sorenson.com/ and enter R264 to learn more about \"Advance Directives: Care Instructions. \"  Current as of: June 16, 2022               Content Version: 13.5  © 2006-2022 Healthwise, Incorporated. Care instructions adapted under license by [facility] ([facility]). If you have questions about a medical condition or this instruction, always ask your healthcare professional. [place] [address] any warranty or liability for your use of this information. Well Visit, Women 48 to 72: Care Instructions  Overview     Well visits can help you stay healthy. Your doctor has checked your overall health and may have suggested ways to take good care of yourself. Your doctor also may have recommended tests. At home, you can help prevent illness with healthy eating, regular exercise, and other steps. Follow-up care is a key part of your treatment and safety. Be sure to make and go to all appointments, and call your doctor if you are having problems. It's also a good idea to know your test results and keep a list of the medicines you take. How can you care for yourself at home? Get screening tests that you and your doctor decide on. Screening helps find diseases before any symptoms appear. Eat healthy foods. Choose fruits, vegetables, whole grains, protein, and low-fat dairy foods. Limit fat, especially saturated fat. Reduce salt in your diet. Limit alcohol.  Have no more than 1 drink a day or 7 drinks a week. Get at least 30 minutes of exercise on most days of the week. Walking is a good choice. You also may want to do other activities, such as running, swimming, cycling, or playing tennis or team sports. Reach and stay at a healthy weight. This will lower your risk for many problems, such as obesity, diabetes, heart disease, and high blood pressure. Do not smoke. Smoking can make health problems worse. If you need help quitting, talk to your doctor about stop-smoking programs and medicines. These can increase your chances of quitting for good. Care for your mental health. It is easy to get weighed down by worry and stress. Learn strategies to manage stress, like deep breathing and mindfulness, and stay connected with your family and community. If you find you often feel sad or hopeless, talk with your doctor. Treatment can help. Talk to your doctor about whether you have any risk factors for sexually transmitted infections (STIs). You can help prevent STIs if you wait to have sex with a new partner (or partners) until you've each been tested for STIs. It also helps if you use condoms (male or female condoms) and if you limit your sex partners to one person who only has sex with you. Vaccines are available for some STIs. If you think you may have a problem with alcohol or drug use, talk to your doctor. This includes prescription medicines (such as amphetamines and opioids) and illegal drugs (such as cocaine and methamphetamine). Your doctor can help you figure out what type of treatment is best for you. Protect your skin from too much sun. When you're outdoors from 10 a.m. to 4 p.m., stay in the shade or cover up with clothing and a hat with a wide brim. Wear sunglasses that block UV rays. Even when it's cloudy, put broad-spectrum sunscreen (SPF 30 or higher) on any exposed skin. See a dentist one or two times a year for checkups and to have your teeth cleaned.   Wear a seat belt in the car. When should you call for help? Watch closely for changes in your health, and be sure to contact your doctor if you have any problems or symptoms that concern you. Where can you learn more? Go to https://kevin.[employer]. org and sign in to your [employer] account. Enter G513 in the [facility] box to learn more about \"Well Visit, Women 50 to 72: Care Instructions. \"     If you do not have an account, please click on the \"Sign Up Now\" link. Current as of: June 6, 2022               Content Version: 13.4  © 2006-2022 [employer]. Care instructions adapted under license by [facility] ([facility]). If you have questions about a medical condition or this instruction, always ask your healthcare professional. [address] any warranty or liability for your use of this information. A Healthy Lifestyle: Care Instructions  A healthy lifestyle can help you feel good, have more energy, and stay at a weight that's healthy for you. You can share a healthy lifestyle with your friends and family. And you can do it on your own. Eat meals with your friends or family. You could try cooking together. Plan activities with other people. Go for a walk with a friend, try a free online fitness class, or join a sports league. Eat a variety of healthy foods. These include fruits, vegetables, whole grains, low-fat dairy, and lean protein. Choose healthy portions of food. You can use the Nutrition Facts label on food packages as a guide. Eat more fruits and vegetables. You could add vegetables to sandwiches or add fruit to cereal.   Drink water when you are thirsty. Limit soda, juice, and sports drinks. Try to exercise most days. Aim for at least 2½ hours of exercise each week. Keep moving. Work in the garden or take your dog on a walk. Use the stairs instead of the elevator. If you use tobacco or nicotine, try to quit.  Ask your doctor about programs and medicines to help you quit. Limit alcohol. Men should have no more than 2 drinks a day. Women should have no more than 1. For some people, no alcohol is the best choice. Follow-up care is a key part of your treatment and safety. Be sure to make and go to all appointments, and call your doctor if you are having problems. It's also a good idea to know your test results and keep a list of the medicines you take. Where can you learn more? Go to http://www.sorenson.com/ and enter U807 to learn more about \"A Healthy Lifestyle: Care Instructions. \"  Current as of: March 9, 2022               Content Version: 13.5  © 2006-2022 Healthwise, Incorporated. Care instructions adapted under license by [facility] ([facility]). If you have questions about a medical condition or this instruction, always ask your healthcare professional. [address] any warranty or liability for your use of this information.

## 2023-03-02 DIAGNOSIS — G43.009 MIGRAINE WITHOUT AURA AND WITHOUT STATUS MIGRAINOSUS, NOT INTRACTABLE: ICD-10-CM

## 2023-03-02 DIAGNOSIS — G89.29 CHRONIC NECK PAIN: ICD-10-CM

## 2023-03-02 DIAGNOSIS — G89.29 CHRONIC BILATERAL LOW BACK PAIN WITH LEFT-SIDED SCIATICA: ICD-10-CM

## 2023-03-02 DIAGNOSIS — M54.2 CHRONIC NECK PAIN: ICD-10-CM

## 2023-03-02 DIAGNOSIS — M54.42 CHRONIC BILATERAL LOW BACK PAIN WITH LEFT-SIDED SCIATICA: ICD-10-CM

## 2023-03-03 DIAGNOSIS — G89.29 CHRONIC BILATERAL LOW BACK PAIN WITH LEFT-SIDED SCIATICA: ICD-10-CM

## 2023-03-03 DIAGNOSIS — M54.2 CHRONIC NECK PAIN: ICD-10-CM

## 2023-03-03 DIAGNOSIS — G89.29 CHRONIC NECK PAIN: ICD-10-CM

## 2023-03-03 DIAGNOSIS — M54.42 CHRONIC BILATERAL LOW BACK PAIN WITH LEFT-SIDED SCIATICA: ICD-10-CM

## 2023-03-03 DIAGNOSIS — G43.009 MIGRAINE WITHOUT AURA AND WITHOUT STATUS MIGRAINOSUS, NOT INTRACTABLE: ICD-10-CM

## 2023-03-03 RX ORDER — TRAMADOL HYDROCHLORIDE 50 MG/1
50 TABLET ORAL EVERY 12 HOURS PRN
Qty: 45 TABLET | Refills: 0 | Status: SHIPPED | OUTPATIENT
Start: 2023-03-03 | End: 2023-04-02

## 2023-03-03 RX ORDER — TRAMADOL HYDROCHLORIDE 50 MG/1
TABLET ORAL
Qty: 45 TABLET | OUTPATIENT
Start: 2023-03-03 | End: 2023-04-02

## 2023-03-17 DIAGNOSIS — G43.009 MIGRAINE WITHOUT AURA AND WITHOUT STATUS MIGRAINOSUS, NOT INTRACTABLE: ICD-10-CM

## 2023-03-17 RX ORDER — BUTALBITAL, ACETAMINOPHEN AND CAFFEINE 50; 325; 40 MG/1; MG/1; MG/1
TABLET ORAL
Qty: 90 TABLET | Refills: 0 | Status: SHIPPED | OUTPATIENT
Start: 2023-03-17

## 2023-03-17 NOTE — TELEPHONE ENCOUNTER
César Sharma called to request a refill on her medication. Last office visit : 2/20/2023   Next office visit : 5/22/2023     Last UDS:   Amphetamine Screen, Urine   Date Value Ref Range Status   02/20/2023 N  Final     Barbiturate Screen, Urine   Date Value Ref Range Status   02/20/2023 POSITIVE  Final     Benzodiazepine Screen, Urine   Date Value Ref Range Status   02/20/2023 N  Final     Buprenorphine Urine   Date Value Ref Range Status   02/20/2023 N  Final     Cocaine Metabolite Screen, Urine   Date Value Ref Range Status   02/20/2023 N  Final     Gabapentin Screen, Urine   Date Value Ref Range Status   02/20/2023 N  Final     MDMA, Urine   Date Value Ref Range Status   02/20/2023 N  Final     Methamphetamine, Urine   Date Value Ref Range Status   02/20/2023 N  Final     Opiate Scrn, Ur   Date Value Ref Range Status   02/20/2023 N  Final     Oxycodone Screen, Ur   Date Value Ref Range Status   02/20/2023 N  Final     PCP Screen, Urine   Date Value Ref Range Status   02/20/2023 N  Final     Propoxyphene Screen, Urine   Date Value Ref Range Status   02/20/2023 N  Final     THC Screen, Urine   Date Value Ref Range Status   02/20/2023 N  Final     Tricyclic Antidepressants, Urine   Date Value Ref Range Status   02/20/2023 N  Final       Last Minor Nickels: 02/17/23  Medication Contract: 02/20/23   Last Fill: 03/07/23    Requested Prescriptions     Pending Prescriptions Disp Refills    butalbital-acetaminophen-caffeine (FIORICET, ESGIC) -40 MG per tablet [Pharmacy Med Name: BUT/ACETAMINOPHEN/CAFF -40 TB] 90 tablet      Sig: TAKE 1 TABLET BY MOUTH EVERY 8 HOURS AS NEEDED FOR MIGRAINE HEADACHE         Please approve or refuse this medication.    Toribio Khan MA

## 2023-04-03 DIAGNOSIS — G89.29 CHRONIC NECK PAIN: ICD-10-CM

## 2023-04-03 DIAGNOSIS — M54.2 CHRONIC NECK PAIN: ICD-10-CM

## 2023-04-03 DIAGNOSIS — M54.42 CHRONIC BILATERAL LOW BACK PAIN WITH LEFT-SIDED SCIATICA: ICD-10-CM

## 2023-04-03 DIAGNOSIS — G43.009 MIGRAINE WITHOUT AURA AND WITHOUT STATUS MIGRAINOSUS, NOT INTRACTABLE: ICD-10-CM

## 2023-04-03 DIAGNOSIS — G89.29 CHRONIC BILATERAL LOW BACK PAIN WITH LEFT-SIDED SCIATICA: ICD-10-CM

## 2023-04-03 DIAGNOSIS — M62.838 MUSCLE SPASM: ICD-10-CM

## 2023-04-04 RX ORDER — TRAMADOL HYDROCHLORIDE 50 MG/1
TABLET ORAL
Qty: 45 TABLET | Refills: 0 | OUTPATIENT
Start: 2023-04-04 | End: 2023-05-04

## 2023-04-05 DIAGNOSIS — G43.009 MIGRAINE WITHOUT AURA AND WITHOUT STATUS MIGRAINOSUS, NOT INTRACTABLE: ICD-10-CM

## 2023-04-05 DIAGNOSIS — G89.29 CHRONIC NECK PAIN: ICD-10-CM

## 2023-04-05 DIAGNOSIS — M54.42 CHRONIC BILATERAL LOW BACK PAIN WITH LEFT-SIDED SCIATICA: ICD-10-CM

## 2023-04-05 DIAGNOSIS — G89.29 CHRONIC BILATERAL LOW BACK PAIN WITH LEFT-SIDED SCIATICA: ICD-10-CM

## 2023-04-05 DIAGNOSIS — M54.2 CHRONIC NECK PAIN: ICD-10-CM

## 2023-04-05 RX ORDER — TRAMADOL HYDROCHLORIDE 50 MG/1
50 TABLET ORAL EVERY 12 HOURS PRN
Qty: 45 TABLET | Refills: 0 | Status: SHIPPED | OUTPATIENT
Start: 2023-04-05 | End: 2023-05-05

## 2023-04-05 NOTE — TELEPHONE ENCOUNTER
Angel Saunders called to request a refill on her medication. Last office visit : 2/20/2023   Next office visit : 5/22/2023     Last UDS:   Amphetamine Screen, Urine   Date Value Ref Range Status   02/20/2023 N  Final     Barbiturate Screen, Urine   Date Value Ref Range Status   02/20/2023 POSITIVE  Final     Benzodiazepine Screen, Urine   Date Value Ref Range Status   02/20/2023 N  Final     Buprenorphine Urine   Date Value Ref Range Status   02/20/2023 N  Final     Cocaine Metabolite Screen, Urine   Date Value Ref Range Status   02/20/2023 N  Final     Gabapentin Screen, Urine   Date Value Ref Range Status   02/20/2023 N  Final     MDMA, Urine   Date Value Ref Range Status   02/20/2023 N  Final     Methamphetamine, Urine   Date Value Ref Range Status   02/20/2023 N  Final     Opiate Scrn, Ur   Date Value Ref Range Status   02/20/2023 N  Final     Oxycodone Screen, Ur   Date Value Ref Range Status   02/20/2023 N  Final     PCP Screen, Urine   Date Value Ref Range Status   02/20/2023 N  Final     Propoxyphene Screen, Urine   Date Value Ref Range Status   02/20/2023 N  Final     THC Screen, Urine   Date Value Ref Range Status   02/20/2023 N  Final     Tricyclic Antidepressants, Urine   Date Value Ref Range Status   02/20/2023 N  Final       Last Turdi Humphreyhire: 2/17/23  Medication Contract: 11/17/22   Last Fill: 3/3/23    Requested Prescriptions     Pending Prescriptions Disp Refills    traMADol (ULTRAM) 50 MG tablet 45 tablet 0     Sig: Take 1 tablet by mouth every 12 hours as needed for Pain for up to 30 days. Max Daily Amount: 100 mg         Please approve or refuse this medication.    Viraj Moreno MA

## 2023-04-05 NOTE — TELEPHONE ENCOUNTER
The current medical regimen is effective. Continue present plan and medications. UDS and controlled substance contract up to date. No unusual filling on current [facility] report. Tx continues to be medically necessary.     Wilberto Menchaca MD

## 2023-04-09 RX ORDER — BACLOFEN 10 MG/1
TABLET ORAL
Qty: 90 TABLET | Refills: 2 | Status: SHIPPED | OUTPATIENT
Start: 2023-04-09

## 2023-04-17 DIAGNOSIS — G43.009 MIGRAINE WITHOUT AURA AND WITHOUT STATUS MIGRAINOSUS, NOT INTRACTABLE: ICD-10-CM

## 2023-04-17 RX ORDER — BUTALBITAL, ACETAMINOPHEN AND CAFFEINE 50; 325; 40 MG/1; MG/1; MG/1
TABLET ORAL
Qty: 90 TABLET | OUTPATIENT
Start: 2023-04-17

## 2023-04-19 DIAGNOSIS — G43.009 MIGRAINE WITHOUT AURA AND WITHOUT STATUS MIGRAINOSUS, NOT INTRACTABLE: ICD-10-CM

## 2023-04-19 RX ORDER — BUTALBITAL, ACETAMINOPHEN AND CAFFEINE 50; 325; 40 MG/1; MG/1; MG/1
TABLET ORAL
Qty: 90 TABLET | Refills: 0 | OUTPATIENT
Start: 2023-04-19

## 2023-04-20 DIAGNOSIS — G43.009 MIGRAINE WITHOUT AURA AND WITHOUT STATUS MIGRAINOSUS, NOT INTRACTABLE: ICD-10-CM

## 2023-04-20 RX ORDER — BUTALBITAL, ACETAMINOPHEN AND CAFFEINE 50; 325; 40 MG/1; MG/1; MG/1
1 TABLET ORAL EVERY 8 HOURS PRN
Qty: 90 TABLET | Refills: 0 | Status: SHIPPED | OUTPATIENT
Start: 2023-04-20

## 2023-04-20 NOTE — TELEPHONE ENCOUNTER
Simon Brock called to request a refill on her medication. Last office visit : 2/20/2023   Next office visit : 5/22/2023     Last UDS:   Amphetamine Screen, Urine   Date Value Ref Range Status   02/20/2023 N  Final     Barbiturate Screen, Urine   Date Value Ref Range Status   02/20/2023 POSITIVE  Final     Benzodiazepine Screen, Urine   Date Value Ref Range Status   02/20/2023 N  Final     Buprenorphine Urine   Date Value Ref Range Status   02/20/2023 N  Final     Cocaine Metabolite Screen, Urine   Date Value Ref Range Status   02/20/2023 N  Final     Gabapentin Screen, Urine   Date Value Ref Range Status   02/20/2023 N  Final     MDMA, Urine   Date Value Ref Range Status   02/20/2023 N  Final     Methamphetamine, Urine   Date Value Ref Range Status   02/20/2023 N  Final     Opiate Scrn, Ur   Date Value Ref Range Status   02/20/2023 N  Final     Oxycodone Screen, Ur   Date Value Ref Range Status   02/20/2023 N  Final     PCP Screen, Urine   Date Value Ref Range Status   02/20/2023 N  Final     Propoxyphene Screen, Urine   Date Value Ref Range Status   02/20/2023 N  Final     THC Screen, Urine   Date Value Ref Range Status   02/20/2023 N  Final     Tricyclic Antidepressants, Urine   Date Value Ref Range Status   02/20/2023 N  Final       Last Sheba Cj: 2-17-23  Medication Contract: 2-20-23   Last Fill: 3-17-23    Requested Prescriptions     Pending Prescriptions Disp Refills    butalbital-acetaminophen-caffeine (FIORICET, ESGIC) -40 MG per tablet 90 tablet 0     Sig: Take 1 tablet by mouth every 8 hours as needed for Headaches or Migraine Max Daily Amount: 3 tablets         Please approve or refuse this medication.    Artis Gordon MA

## 2023-05-02 DIAGNOSIS — M54.2 CHRONIC NECK PAIN: ICD-10-CM

## 2023-05-02 DIAGNOSIS — M54.42 CHRONIC BILATERAL LOW BACK PAIN WITH LEFT-SIDED SCIATICA: ICD-10-CM

## 2023-05-02 DIAGNOSIS — G89.29 CHRONIC NECK PAIN: ICD-10-CM

## 2023-05-02 DIAGNOSIS — G89.29 CHRONIC BILATERAL LOW BACK PAIN WITH LEFT-SIDED SCIATICA: ICD-10-CM

## 2023-05-02 DIAGNOSIS — G43.009 MIGRAINE WITHOUT AURA AND WITHOUT STATUS MIGRAINOSUS, NOT INTRACTABLE: ICD-10-CM

## 2023-05-03 RX ORDER — TRAMADOL HYDROCHLORIDE 50 MG/1
50 TABLET ORAL
Qty: 45 TABLET | Refills: 0 | Status: SHIPPED | OUTPATIENT
Start: 2023-05-03 | End: 2023-06-02

## 2023-05-23 DIAGNOSIS — G43.009 MIGRAINE WITHOUT AURA AND WITHOUT STATUS MIGRAINOSUS, NOT INTRACTABLE: ICD-10-CM

## 2023-05-23 RX ORDER — BUTALBITAL, ACETAMINOPHEN AND CAFFEINE 50; 325; 40 MG/1; MG/1; MG/1
1 TABLET ORAL EVERY 8 HOURS PRN
Qty: 90 TABLET | Refills: 0 | OUTPATIENT
Start: 2023-05-23

## 2023-05-26 DIAGNOSIS — G43.009 MIGRAINE WITHOUT AURA AND WITHOUT STATUS MIGRAINOSUS, NOT INTRACTABLE: ICD-10-CM

## 2023-05-26 RX ORDER — BUTALBITAL, ACETAMINOPHEN AND CAFFEINE 50; 325; 40 MG/1; MG/1; MG/1
1 TABLET ORAL EVERY 8 HOURS PRN
Qty: 90 TABLET | Refills: 0 | Status: SHIPPED | OUTPATIENT
Start: 2023-05-26

## 2023-05-26 NOTE — TELEPHONE ENCOUNTER
Yoan Andersen called to request a refill on her medication. Last office visit : 5/22/2023   Next office visit : 8/23/2023     Last UDS:   Amphetamine Screen, Urine   Date Value Ref Range Status   02/20/2023 N  Final     Barbiturate Screen, Urine   Date Value Ref Range Status   02/20/2023 POSITIVE  Final     Benzodiazepine Screen, Urine   Date Value Ref Range Status   02/20/2023 N  Final     Buprenorphine Urine   Date Value Ref Range Status   02/20/2023 N  Final     Cocaine Metabolite Screen, Urine   Date Value Ref Range Status   02/20/2023 N  Final     Gabapentin Screen, Urine   Date Value Ref Range Status   02/20/2023 N  Final     MDMA, Urine   Date Value Ref Range Status   02/20/2023 N  Final     Methamphetamine, Urine   Date Value Ref Range Status   02/20/2023 N  Final     Opiate Scrn, Ur   Date Value Ref Range Status   02/20/2023 N  Final     Oxycodone Screen, Ur   Date Value Ref Range Status   02/20/2023 N  Final     PCP Screen, Urine   Date Value Ref Range Status   02/20/2023 N  Final     Propoxyphene Screen, Urine   Date Value Ref Range Status   02/20/2023 N  Final     THC Screen, Urine   Date Value Ref Range Status   02/20/2023 N  Final     Tricyclic Antidepressants, Urine   Date Value Ref Range Status   02/20/2023 N  Final       Last Leathadwayne Colbyalds: 2/17/23  Medication Contract: 2/20/23   Last Fill: 4/20/23    Requested Prescriptions     Pending Prescriptions Disp Refills    butalbital-acetaminophen-caffeine (FIORICET, ESGIC) -40 MG per tablet 90 tablet 0     Sig: Take 1 tablet by mouth every 8 hours as needed for Headaches or Migraine Max Daily Amount: 3 tablets         Please approve or refuse this medication.    Sis Mancera MA

## 2023-05-31 DIAGNOSIS — G89.29 CHRONIC BILATERAL LOW BACK PAIN WITH LEFT-SIDED SCIATICA: ICD-10-CM

## 2023-05-31 DIAGNOSIS — F41.1 GAD (GENERALIZED ANXIETY DISORDER): ICD-10-CM

## 2023-05-31 DIAGNOSIS — G89.29 CHRONIC NECK PAIN: ICD-10-CM

## 2023-05-31 DIAGNOSIS — F33.1 MODERATE RECURRENT MAJOR DEPRESSION (HCC): ICD-10-CM

## 2023-05-31 DIAGNOSIS — M54.2 CHRONIC NECK PAIN: ICD-10-CM

## 2023-05-31 DIAGNOSIS — M54.42 CHRONIC BILATERAL LOW BACK PAIN WITH LEFT-SIDED SCIATICA: ICD-10-CM

## 2023-05-31 DIAGNOSIS — G43.009 MIGRAINE WITHOUT AURA AND WITHOUT STATUS MIGRAINOSUS, NOT INTRACTABLE: ICD-10-CM

## 2023-06-01 RX ORDER — FLUVOXAMINE MALEATE 100 MG
100 TABLET ORAL NIGHTLY
Qty: 30 TABLET | Refills: 5 | Status: SHIPPED | OUTPATIENT
Start: 2023-06-01

## 2023-06-01 RX ORDER — TRAMADOL HYDROCHLORIDE 50 MG/1
50 TABLET ORAL
Qty: 45 TABLET | Refills: 0 | Status: SHIPPED | OUTPATIENT
Start: 2023-06-01 | End: 2023-06-30

## 2023-06-01 RX ORDER — PROPRANOLOL HYDROCHLORIDE 80 MG/1
CAPSULE, EXTENDED RELEASE ORAL
Qty: 30 CAPSULE | Refills: 5 | Status: SHIPPED | OUTPATIENT
Start: 2023-06-01

## 2023-06-01 NOTE — TELEPHONE ENCOUNTER
Ray Penton called to request a refill on her medication.       Last office visit : 5/22/2023   Next office visit : 8/23/2023     Last UDS:   Amphetamine Screen, Urine   Date Value Ref Range Status   02/20/2023 N  Final     Barbiturate Screen, Urine   Date Value Ref Range Status   02/20/2023 POSITIVE  Final     Benzodiazepine Screen, Urine   Date Value Ref Range Status   02/20/2023 N  Final     Buprenorphine Urine   Date Value Ref Range Status   02/20/2023 N  Final     Cocaine Metabolite Screen, Urine   Date Value Ref Range Status   02/20/2023 N  Final     Gabapentin Screen, Urine   Date Value Ref Range Status   02/20/2023 N  Final     MDMA, Urine   Date Value Ref Range Status   02/20/2023 N  Final     Methamphetamine, Urine   Date Value Ref Range Status   02/20/2023 N  Final     Opiate Scrn, Ur   Date Value Ref Range Status   02/20/2023 N  Final     Oxycodone Screen, Ur   Date Value Ref Range Status   02/20/2023 N  Final     PCP Screen, Urine   Date Value Ref Range Status   02/20/2023 N  Final     Propoxyphene Screen, Urine   Date Value Ref Range Status   02/20/2023 N  Final     THC Screen, Urine   Date Value Ref Range Status   02/20/2023 N  Final     Tricyclic Antidepressants, Urine   Date Value Ref Range Status   02/20/2023 N  Final       Last Sarahnelson Shabnam: 2-17-23  Medication Contract: 2-20-23   Last Fill: 5-3-23    Requested Prescriptions     Pending Prescriptions Disp Refills    traMADol (ULTRAM) 50 MG tablet [Pharmacy Med Name: traMADol HCl 50 MG Oral Tablet] 45 tablet 0     Sig: TAKE 1 TABLET BY MOUTH EVERY 12 HOURS AS NEEDED FOR PAIN FOR  UP  TO  30  DAYS    fluvoxaMINE (LUVOX) 100 MG tablet [Pharmacy Med Name: fluvoxaMINE Maleate 100 MG Oral Tablet] 30 tablet 0     Sig: Take 1 tablet by mouth nightly    propranolol (INDERAL LA) 80 MG extended release capsule [Pharmacy Med Name: Propranolol HCl ER 80 MG Oral Capsule Extended Release 24 Hour] 30 capsule 0     Sig: Take 1 capsule by mouth once daily

## 2023-06-26 DIAGNOSIS — E03.9 ACQUIRED HYPOTHYROIDISM: ICD-10-CM

## 2023-06-26 DIAGNOSIS — E55.9 VITAMIN D DEFICIENCY: ICD-10-CM

## 2023-06-26 DIAGNOSIS — E78.2 MIXED HYPERLIPIDEMIA: ICD-10-CM

## 2023-06-26 LAB
25(OH)D3 SERPL-MCNC: 40.6 NG/ML
ALBUMIN SERPL-MCNC: 4.5 G/DL (ref 3.5–5.2)
ALP SERPL-CCNC: 106 U/L (ref 35–104)
ALT SERPL-CCNC: 18 U/L (ref 5–33)
ANION GAP SERPL CALCULATED.3IONS-SCNC: 14 MMOL/L (ref 7–19)
AST SERPL-CCNC: 24 U/L (ref 5–32)
BILIRUB SERPL-MCNC: 0.4 MG/DL (ref 0.2–1.2)
BUN SERPL-MCNC: 7 MG/DL (ref 6–20)
CALCIUM SERPL-MCNC: 9.3 MG/DL (ref 8.6–10)
CHLORIDE SERPL-SCNC: 103 MMOL/L (ref 98–111)
CHOLEST SERPL-MCNC: 209 MG/DL (ref 160–199)
CO2 SERPL-SCNC: 23 MMOL/L (ref 22–29)
CREAT SERPL-MCNC: 0.8 MG/DL (ref 0.5–0.9)
GLUCOSE SERPL-MCNC: 99 MG/DL (ref 74–109)
HDLC SERPL-MCNC: 46 MG/DL (ref 65–121)
LDLC SERPL CALC-MCNC: 142 MG/DL
POTASSIUM SERPL-SCNC: 3.7 MMOL/L (ref 3.5–5)
PROT SERPL-MCNC: 7 G/DL (ref 6.6–8.7)
SODIUM SERPL-SCNC: 140 MMOL/L (ref 136–145)
TRIGL SERPL-MCNC: 103 MG/DL (ref 0–149)
TSH SERPL DL<=0.005 MIU/L-ACNC: 1.47 UIU/ML (ref 0.35–5.5)

## 2023-06-29 DIAGNOSIS — G89.29 CHRONIC NECK PAIN: ICD-10-CM

## 2023-06-29 DIAGNOSIS — G89.29 CHRONIC BILATERAL LOW BACK PAIN WITH LEFT-SIDED SCIATICA: ICD-10-CM

## 2023-06-29 DIAGNOSIS — G43.009 MIGRAINE WITHOUT AURA AND WITHOUT STATUS MIGRAINOSUS, NOT INTRACTABLE: ICD-10-CM

## 2023-06-29 DIAGNOSIS — M54.2 CHRONIC NECK PAIN: ICD-10-CM

## 2023-06-29 DIAGNOSIS — M54.42 CHRONIC BILATERAL LOW BACK PAIN WITH LEFT-SIDED SCIATICA: ICD-10-CM

## 2023-06-30 RX ORDER — TRAMADOL HYDROCHLORIDE 50 MG/1
TABLET ORAL
Qty: 45 TABLET | Refills: 0 | Status: SHIPPED | OUTPATIENT
Start: 2023-07-01 | End: 2023-07-31

## 2023-06-30 RX ORDER — BUTALBITAL, ACETAMINOPHEN AND CAFFEINE 50; 325; 40 MG/1; MG/1; MG/1
TABLET ORAL
Qty: 60 TABLET | Refills: 0 | Status: SHIPPED | OUTPATIENT
Start: 2023-06-30 | End: 2023-07-28

## 2023-07-28 DIAGNOSIS — G43.009 MIGRAINE WITHOUT AURA AND WITHOUT STATUS MIGRAINOSUS, NOT INTRACTABLE: ICD-10-CM

## 2023-07-28 RX ORDER — BUTALBITAL, ACETAMINOPHEN AND CAFFEINE 50; 325; 40 MG/1; MG/1; MG/1
TABLET ORAL
Qty: 60 TABLET | Refills: 0 | Status: SHIPPED | OUTPATIENT
Start: 2023-07-30 | End: 2023-08-29

## 2023-07-28 NOTE — TELEPHONE ENCOUNTER
Suzi Dalton called to request a refill on her medication. Last office visit : 5/22/2023   Next office visit : 8/23/2023     Last UDS:   Amphetamine Screen, Urine   Date Value Ref Range Status   02/20/2023 N  Final     Barbiturate Screen, Urine   Date Value Ref Range Status   02/20/2023 POSITIVE  Final     Benzodiazepine Screen, Urine   Date Value Ref Range Status   02/20/2023 N  Final     Buprenorphine Urine   Date Value Ref Range Status   02/20/2023 N  Final     Cocaine Metabolite Screen, Urine   Date Value Ref Range Status   02/20/2023 N  Final     Gabapentin Screen, Urine   Date Value Ref Range Status   02/20/2023 N  Final     MDMA, Urine   Date Value Ref Range Status   02/20/2023 N  Final     Methamphetamine, Urine   Date Value Ref Range Status   02/20/2023 N  Final     Opiate Scrn, Ur   Date Value Ref Range Status   02/20/2023 N  Final     Oxycodone Screen, Ur   Date Value Ref Range Status   02/20/2023 N  Final     PCP Screen, Urine   Date Value Ref Range Status   02/20/2023 N  Final     Propoxyphene Screen, Urine   Date Value Ref Range Status   02/20/2023 N  Final     THC Screen, Urine   Date Value Ref Range Status   02/20/2023 N  Final     Tricyclic Antidepressants, Urine   Date Value Ref Range Status   02/20/2023 N  Final       Last Deondre Sea: 7-28-23  Medication Contract: 2-20-23   Last Fill: 6-30-23    Requested Prescriptions     Pending Prescriptions Disp Refills    butalbital-acetaminophen-caffeine (FIORICET, ESGIC) -40 MG per tablet [Pharmacy Med Name: Butalbital-APAP-Caffeine -40 MG Oral Tablet] 60 tablet 0     Sig: TAKE 1 TABLET BY MOUTH EVERY 8 HOURS AS NEEDED FOR HEADACHE OR  MIGRAINE  (MAX  3  TABLETS  DAILY)         Please approve or refuse this medication.    [facility] MA

## 2023-07-30 DIAGNOSIS — G89.29 CHRONIC NECK PAIN: ICD-10-CM

## 2023-07-30 DIAGNOSIS — M54.2 CHRONIC NECK PAIN: ICD-10-CM

## 2023-07-30 DIAGNOSIS — G43.009 MIGRAINE WITHOUT AURA AND WITHOUT STATUS MIGRAINOSUS, NOT INTRACTABLE: ICD-10-CM

## 2023-07-30 DIAGNOSIS — M54.42 CHRONIC BILATERAL LOW BACK PAIN WITH LEFT-SIDED SCIATICA: ICD-10-CM

## 2023-07-30 DIAGNOSIS — G89.29 CHRONIC BILATERAL LOW BACK PAIN WITH LEFT-SIDED SCIATICA: ICD-10-CM

## 2023-07-31 RX ORDER — TRAMADOL HYDROCHLORIDE 50 MG/1
50 TABLET ORAL EVERY 12 HOURS PRN
Qty: 45 TABLET | Refills: 0 | Status: SHIPPED | OUTPATIENT
Start: 2023-08-01 | End: 2023-08-29

## 2023-08-05 NOTE — TELEPHONE ENCOUNTER
I have sent 2 letters to pt re: recall colon and have had no response. I called and spoke to pt about it today. She tells me she has some other health issues at this time and her PCP is setting her up for some tests. She wants to get those done before scheduling a colonoscopy. I gave pt my number to call back when she is ready so we can discuss if she can be fast-tracked.    Ambulatory

## 2023-08-23 ENCOUNTER — OFFICE VISIT (OUTPATIENT)
Dept: PRIMARY CARE CLINIC | Age: 58
End: 2023-08-23
Payer: COMMERCIAL

## 2023-08-23 VITALS
HEART RATE: 78 BPM | SYSTOLIC BLOOD PRESSURE: 119 MMHG | TEMPERATURE: 97.2 F | BODY MASS INDEX: 27.02 KG/M2 | DIASTOLIC BLOOD PRESSURE: 78 MMHG | WEIGHT: 143 LBS | OXYGEN SATURATION: 97 %

## 2023-08-23 DIAGNOSIS — G25.81 RLS (RESTLESS LEGS SYNDROME): ICD-10-CM

## 2023-08-23 DIAGNOSIS — E66.3 OVERWEIGHT (BMI 25.0-29.9): ICD-10-CM

## 2023-08-23 DIAGNOSIS — G89.29 CHRONIC BILATERAL LOW BACK PAIN WITH LEFT-SIDED SCIATICA: ICD-10-CM

## 2023-08-23 DIAGNOSIS — M54.42 CHRONIC BILATERAL LOW BACK PAIN WITH LEFT-SIDED SCIATICA: ICD-10-CM

## 2023-08-23 DIAGNOSIS — B37.0 ORAL THRUSH: ICD-10-CM

## 2023-08-23 DIAGNOSIS — E03.9 ACQUIRED HYPOTHYROIDISM: ICD-10-CM

## 2023-08-23 DIAGNOSIS — R51.9 CHRONIC DAILY HEADACHE: ICD-10-CM

## 2023-08-23 DIAGNOSIS — G89.29 CHRONIC NECK PAIN: ICD-10-CM

## 2023-08-23 DIAGNOSIS — E78.2 MIXED HYPERLIPIDEMIA: ICD-10-CM

## 2023-08-23 DIAGNOSIS — M62.838 MUSCLE SPASM: ICD-10-CM

## 2023-08-23 DIAGNOSIS — F41.1 GAD (GENERALIZED ANXIETY DISORDER): ICD-10-CM

## 2023-08-23 DIAGNOSIS — G43.009 MIGRAINE WITHOUT AURA AND WITHOUT STATUS MIGRAINOSUS, NOT INTRACTABLE: Primary | ICD-10-CM

## 2023-08-23 DIAGNOSIS — F33.1 MODERATE RECURRENT MAJOR DEPRESSION (HCC): ICD-10-CM

## 2023-08-23 DIAGNOSIS — M54.2 CHRONIC NECK PAIN: ICD-10-CM

## 2023-08-23 DIAGNOSIS — E55.9 VITAMIN D DEFICIENCY: ICD-10-CM

## 2023-08-23 PROCEDURE — 99214 OFFICE O/P EST MOD 30 MIN: CPT | Performed by: INTERNAL MEDICINE

## 2023-08-23 RX ORDER — LEVOTHYROXINE SODIUM 88 UG/1
88 TABLET ORAL DAILY
Qty: 90 TABLET | Refills: 3 | Status: SHIPPED | OUTPATIENT
Start: 2023-08-23

## 2023-08-23 RX ORDER — GABAPENTIN 300 MG/1
CAPSULE ORAL
Qty: 90 CAPSULE | Refills: 2 | Status: SHIPPED | OUTPATIENT
Start: 2023-08-23 | End: 2024-02-23

## 2023-08-23 NOTE — PROGRESS NOTES
Elana Patterson is a 60 y.o. female who presents today for   Chief Complaint   Patient presents with    3 Month Follow-Up    Joint Pain     Hands, elbows, knees    Other     Pt states she has sores in her mouth due to her dentures not fitting well and rubs her gums    Hypothyroidism    Cholesterol Problem       Joint Pain   Pertinent negatives include no fever or numbness. Other  Associated symptoms include arthralgias, congestion, fatigue, myalgias and neck pain. Pertinent negatives include no abdominal pain, chest pain, chills, coughing, fever, headaches, numbness, rash, sore throat, vomiting or weakness. 61 y/o WF here for annual wellness visit and follow up on  follow up on recurrent migraine/tension HAs, chronic neck pain with controlled med refills, chronic depression, HORACE, and elevated BMI. She has a sore area on her lower gumline that started about a week ago that is from her dentures rubbing because they do not fit well. She has had trouble with her dentures since they were made by [employer] about 2 yrs. Previous visit:  She has gained 5 lbs since last visit 3 months ago. Patient has been under more stress since last visit however, because she has had her grandchildren more while her daughter is recovering from umbilical hernia repair, her father had surgery for cancer in his ear recently (history of cancer in his jaw also), and now her mother is going to have shoulder surgery so she will have to help care for both of her parents. She is still having frequent HAs and neck pain with tension HAs also. She has had pain in her stomach when she takes her other medicines but no vomiting. She is trying to eat something before taking her medicines, but it is not helping a lot. She is still on fluvoxamine for chronic major depressive disorder and generalized anxiety disorder previously prescribed by [facility] before she stopped seeing them.      BMI Readings from Last 3 Encounters:   08/23/23

## 2023-08-28 DIAGNOSIS — G43.009 MIGRAINE WITHOUT AURA AND WITHOUT STATUS MIGRAINOSUS, NOT INTRACTABLE: ICD-10-CM

## 2023-08-28 DIAGNOSIS — M54.42 CHRONIC BILATERAL LOW BACK PAIN WITH LEFT-SIDED SCIATICA: ICD-10-CM

## 2023-08-28 DIAGNOSIS — G89.29 CHRONIC BILATERAL LOW BACK PAIN WITH LEFT-SIDED SCIATICA: ICD-10-CM

## 2023-08-28 DIAGNOSIS — M54.2 CHRONIC NECK PAIN: ICD-10-CM

## 2023-08-28 DIAGNOSIS — G89.29 CHRONIC NECK PAIN: ICD-10-CM

## 2023-08-28 NOTE — TELEPHONE ENCOUNTER
Katie Ilda called to request a refill on her medication. Last office visit : 8/23/2023   Next office visit : Visit date not found     Last UDS:   Amphetamine Screen, Urine   Date Value Ref Range Status   02/20/2023 N  Final     Barbiturate Screen, Urine   Date Value Ref Range Status   02/20/2023 POSITIVE  Final     Benzodiazepine Screen, Urine   Date Value Ref Range Status   02/20/2023 N  Final     Buprenorphine Urine   Date Value Ref Range Status   02/20/2023 N  Final     Cocaine Metabolite Screen, Urine   Date Value Ref Range Status   02/20/2023 N  Final     Gabapentin Screen, Urine   Date Value Ref Range Status   02/20/2023 N  Final     MDMA, Urine   Date Value Ref Range Status   02/20/2023 N  Final     Methamphetamine, Urine   Date Value Ref Range Status   02/20/2023 N  Final     Opiate Scrn, Ur   Date Value Ref Range Status   02/20/2023 N  Final     Oxycodone Screen, Ur   Date Value Ref Range Status   02/20/2023 N  Final     PCP Screen, Urine   Date Value Ref Range Status   02/20/2023 N  Final     Propoxyphene Screen, Urine   Date Value Ref Range Status   02/20/2023 N  Final     THC Screen, Urine   Date Value Ref Range Status   02/20/2023 N  Final     Tricyclic Antidepressants, Urine   Date Value Ref Range Status   02/20/2023 N  Final       Last Corine Huddle: 7/28/23  Medication Contract: 2/20/23   Last Fill: 8/1/23 & 7/30/23    Requested Prescriptions     Pending Prescriptions Disp Refills    traMADol (ULTRAM) 50 MG tablet [Pharmacy Med Name: traMADol HCl 50 MG Oral Tablet] 45 tablet 0     Sig: TAKE 1 TABLET BY MOUTH EVERY 12 HOURS AS NEEDED FOR PAIN (HEADACHE)  MAX  DAILY  AMOUNT  100MG    butalbital-acetaminophen-caffeine (FIORICET, ESGIC) -40 MG per tablet [Pharmacy Med Name: Butalbital-APAP-Caffeine -40 MG Oral Tablet] 60 tablet 0     Sig: TAKE 1 TABLET BY MOUTH EVERY 8 HOURS AS NEEDED FOR HEADACHE OR  MIGRAINE  (MAX  OF  3  TABLETS  DAILY)         Please approve or refuse this medication.

## 2023-08-29 RX ORDER — BUTALBITAL, ACETAMINOPHEN AND CAFFEINE 50; 325; 40 MG/1; MG/1; MG/1
TABLET ORAL
Qty: 60 TABLET | Refills: 0 | Status: SHIPPED | OUTPATIENT
Start: 2023-08-29

## 2023-08-29 RX ORDER — TRAMADOL HYDROCHLORIDE 50 MG/1
TABLET ORAL
Qty: 45 TABLET | Refills: 0 | Status: SHIPPED | OUTPATIENT
Start: 2023-08-29 | End: 2023-09-28

## 2023-09-04 ASSESSMENT — ENCOUNTER SYMPTOMS
SORE THROAT: 0
VOMITING: 0
PHOTOPHOBIA: 1
SHORTNESS OF BREATH: 0
BACK PAIN: 1
COUGH: 0
DIARRHEA: 0
SINUS PAIN: 0
WHEEZING: 0
EYE PAIN: 0
RHINORRHEA: 0
BLOOD IN STOOL: 0
EYE REDNESS: 0
VOICE CHANGE: 0
COLOR CHANGE: 0
SINUS PRESSURE: 1
ABDOMINAL PAIN: 0
EYE DISCHARGE: 0
CHEST TIGHTNESS: 0

## 2023-09-28 DIAGNOSIS — G43.009 MIGRAINE WITHOUT AURA AND WITHOUT STATUS MIGRAINOSUS, NOT INTRACTABLE: ICD-10-CM

## 2023-09-29 RX ORDER — BUTALBITAL, ACETAMINOPHEN AND CAFFEINE 50; 325; 40 MG/1; MG/1; MG/1
TABLET ORAL
Qty: 60 TABLET | Refills: 0 | Status: SHIPPED | OUTPATIENT
Start: 2023-09-29

## 2023-10-06 ENCOUNTER — TELEPHONE (OUTPATIENT)
Dept: PRIMARY CARE CLINIC | Age: 58
End: 2023-10-06

## 2023-10-06 DIAGNOSIS — G89.29 CHRONIC BILATERAL LOW BACK PAIN WITH LEFT-SIDED SCIATICA: ICD-10-CM

## 2023-10-06 DIAGNOSIS — M54.2 CHRONIC NECK PAIN: ICD-10-CM

## 2023-10-06 DIAGNOSIS — M54.42 CHRONIC BILATERAL LOW BACK PAIN WITH LEFT-SIDED SCIATICA: ICD-10-CM

## 2023-10-06 DIAGNOSIS — G43.009 MIGRAINE WITHOUT AURA AND WITHOUT STATUS MIGRAINOSUS, NOT INTRACTABLE: ICD-10-CM

## 2023-10-06 DIAGNOSIS — G89.29 CHRONIC NECK PAIN: ICD-10-CM

## 2023-10-06 RX ORDER — TRAMADOL HYDROCHLORIDE 50 MG/1
50 TABLET ORAL EVERY 12 HOURS PRN
Qty: 45 TABLET | Refills: 0 | Status: SHIPPED | OUTPATIENT
Start: 2023-10-06 | End: 2023-11-05

## 2023-10-06 NOTE — TELEPHONE ENCOUNTER
Gardner Nageotte called to request a refill on her medication. Last office visit : 8/23/2023   Next office visit : 11/22/2023     Last UDS:   Amphetamine Screen, Urine   Date Value Ref Range Status   02/20/2023 N  Final     Barbiturate Screen, Urine   Date Value Ref Range Status   02/20/2023 POSITIVE  Final     Benzodiazepine Screen, Urine   Date Value Ref Range Status   02/20/2023 N  Final     Buprenorphine Urine   Date Value Ref Range Status   02/20/2023 N  Final     Cocaine Metabolite Screen, Urine   Date Value Ref Range Status   02/20/2023 N  Final     Gabapentin Screen, Urine   Date Value Ref Range Status   02/20/2023 N  Final     MDMA, Urine   Date Value Ref Range Status   02/20/2023 N  Final     Methamphetamine, Urine   Date Value Ref Range Status   02/20/2023 N  Final     Opiate Scrn, Ur   Date Value Ref Range Status   02/20/2023 N  Final     Oxycodone Screen, Ur   Date Value Ref Range Status   02/20/2023 N  Final     PCP Screen, Urine   Date Value Ref Range Status   02/20/2023 N  Final     Propoxyphene Screen, Urine   Date Value Ref Range Status   02/20/2023 N  Final     THC Screen, Urine   Date Value Ref Range Status   02/20/2023 N  Final     Tricyclic Antidepressants, Urine   Date Value Ref Range Status   02/20/2023 N  Final       Last Ophelia Jose: 7/28/23  Medication Contract: 2/20/23   Last Fill: 8/29/23    Requested Prescriptions      No prescriptions requested or ordered in this encounter         Please approve or refuse this medication.    Nina Perkins, [address] [facility]

## 2023-10-06 NOTE — TELEPHONE ENCOUNTER
----- Message from Owen Jarrett sent at 10/5/2023  4:04 PM CDT -----  Subject: Refill Request    QUESTIONS  Name of Medication? traMADol (ULTRAM) 50 MG tablet  Patient-reported dosage and instructions? daily  How many days do you have left? 0  Preferred Pharmacy? [id number] phone number (if available)? [phone]  ---------------------------------------------------------------------------  --------------  CALL BACK INFO  What is the best way for the office to contact you? OK to leave message on   voicemail  Preferred Call Back Phone Number? [phone]  ---------------------------------------------------------------------------  --------------  SCRIPT ANSWERS  Relationship to Patient?  Self

## 2023-10-06 NOTE — TELEPHONE ENCOUNTER
----- Message from Princess Drew sent at 10/5/2023  4:04 PM CDT -----  Subject: Refill Request    QUESTIONS  Name of Medication? traMADol (ULTRAM) 50 MG tablet  Patient-reported dosage and instructions? daily  How many days do you have left? 0  Preferred Pharmacy? [id number] phone number (if available)? [phone]  ---------------------------------------------------------------------------  --------------  CALL BACK INFO  What is the best way for the office to contact you? OK to leave message on   voicemail  Preferred Call Back Phone Number? [phone]  ---------------------------------------------------------------------------  --------------  SCRIPT ANSWERS  Relationship to Patient?  Self

## 2023-10-30 DIAGNOSIS — G43.009 MIGRAINE WITHOUT AURA AND WITHOUT STATUS MIGRAINOSUS, NOT INTRACTABLE: ICD-10-CM

## 2023-10-30 RX ORDER — BUTALBITAL, ACETAMINOPHEN AND CAFFEINE 50; 325; 40 MG/1; MG/1; MG/1
TABLET ORAL
Qty: 60 TABLET | Refills: 0 | Status: SHIPPED | OUTPATIENT
Start: 2023-10-30

## 2023-11-06 DIAGNOSIS — G43.009 MIGRAINE WITHOUT AURA AND WITHOUT STATUS MIGRAINOSUS, NOT INTRACTABLE: ICD-10-CM

## 2023-11-06 DIAGNOSIS — M54.2 CHRONIC NECK PAIN: ICD-10-CM

## 2023-11-06 DIAGNOSIS — G89.29 CHRONIC NECK PAIN: ICD-10-CM

## 2023-11-06 DIAGNOSIS — M54.42 CHRONIC BILATERAL LOW BACK PAIN WITH LEFT-SIDED SCIATICA: ICD-10-CM

## 2023-11-06 DIAGNOSIS — G89.29 CHRONIC BILATERAL LOW BACK PAIN WITH LEFT-SIDED SCIATICA: ICD-10-CM

## 2023-11-07 NOTE — TELEPHONE ENCOUNTER
Alda Eaton called to request a refill on her medication. Last office visit : 8/23/2023   Next office visit : 11/22/2023     Last UDS:   Amphetamine Screen, Urine   Date Value Ref Range Status   02/20/2023 N  Final     Barbiturate Screen, Urine   Date Value Ref Range Status   02/20/2023 POSITIVE  Final     Benzodiazepine Screen, Urine   Date Value Ref Range Status   02/20/2023 N  Final     Buprenorphine Urine   Date Value Ref Range Status   02/20/2023 N  Final     Cocaine Metabolite Screen, Urine   Date Value Ref Range Status   02/20/2023 N  Final     Gabapentin Screen, Urine   Date Value Ref Range Status   02/20/2023 N  Final     MDMA, Urine   Date Value Ref Range Status   02/20/2023 N  Final     Methamphetamine, Urine   Date Value Ref Range Status   02/20/2023 N  Final     Opiate Scrn, Ur   Date Value Ref Range Status   02/20/2023 N  Final     Oxycodone Screen, Ur   Date Value Ref Range Status   02/20/2023 N  Final     PCP Screen, Urine   Date Value Ref Range Status   02/20/2023 N  Final     Propoxyphene Screen, Urine   Date Value Ref Range Status   02/20/2023 N  Final     THC Screen, Urine   Date Value Ref Range Status   02/20/2023 N  Final     Tricyclic Antidepressants, Urine   Date Value Ref Range Status   02/20/2023 N  Final       Last Niraj:7/28/23  Medication Contract: 2/20/23   Last Fill: 10/6/23    Requested Prescriptions     Pending Prescriptions Disp Refills    traMADol (ULTRAM) 50 MG tablet [Pharmacy Med Name: traMADol HCl 50 MG Oral Tablet] 45 tablet 0     Sig: TAKE 1 TABLET BY MOUTH EVERY 12 HOURS AS NEEDED FOR PAIN (MAX  DAILY  AMOUNT  OF  100MG)         Please approve or refuse this medication.    Sean Smith [place]

## 2023-11-08 RX ORDER — TRAMADOL HYDROCHLORIDE 50 MG/1
TABLET ORAL
Qty: 45 TABLET | Refills: 0 | Status: SHIPPED | OUTPATIENT
Start: 2023-11-08 | End: 2023-12-08

## 2023-11-20 DIAGNOSIS — E03.9 ACQUIRED HYPOTHYROIDISM: ICD-10-CM

## 2023-11-20 DIAGNOSIS — E78.2 MIXED HYPERLIPIDEMIA: ICD-10-CM

## 2023-11-20 LAB
ALBUMIN SERPL-MCNC: 4.6 G/DL (ref 3.5–5.2)
ALP SERPL-CCNC: 109 U/L (ref 35–104)
ALT SERPL-CCNC: 24 U/L (ref 5–33)
ANION GAP SERPL CALCULATED.3IONS-SCNC: 11 MMOL/L (ref 7–19)
AST SERPL-CCNC: 28 U/L (ref 5–32)
BILIRUB SERPL-MCNC: 0.4 MG/DL (ref 0.2–1.2)
BUN SERPL-MCNC: 8 MG/DL (ref 6–20)
CALCIUM SERPL-MCNC: 9.9 MG/DL (ref 8.6–10)
CHLORIDE SERPL-SCNC: 105 MMOL/L (ref 98–111)
CHOLEST SERPL-MCNC: 215 MG/DL (ref 160–199)
CO2 SERPL-SCNC: 27 MMOL/L (ref 22–29)
CREAT SERPL-MCNC: 0.9 MG/DL (ref 0.5–0.9)
GLUCOSE SERPL-MCNC: 95 MG/DL (ref 74–109)
HDLC SERPL-MCNC: 50 MG/DL (ref 65–121)
LDLC SERPL CALC-MCNC: 129 MG/DL
POTASSIUM SERPL-SCNC: 4.6 MMOL/L (ref 3.5–5)
PROT SERPL-MCNC: 7.3 G/DL (ref 6.6–8.7)
SODIUM SERPL-SCNC: 143 MMOL/L (ref 136–145)
T4 FREE SERPL-MCNC: 1.23 NG/DL (ref 0.93–1.7)
TRIGL SERPL-MCNC: 180 MG/DL (ref 0–149)
TSH SERPL DL<=0.005 MIU/L-ACNC: 7.42 UIU/ML (ref 0.35–5.5)

## 2023-11-29 DIAGNOSIS — G43.009 MIGRAINE WITHOUT AURA AND WITHOUT STATUS MIGRAINOSUS, NOT INTRACTABLE: ICD-10-CM

## 2023-11-30 RX ORDER — BUTALBITAL, ACETAMINOPHEN AND CAFFEINE 50; 325; 40 MG/1; MG/1; MG/1
TABLET ORAL
Qty: 60 TABLET | Refills: 0 | Status: SHIPPED | OUTPATIENT
Start: 2023-11-30

## 2023-11-30 NOTE — TELEPHONE ENCOUNTER
Cassie Wise called to request a refill on her medication. Last office visit : 8/23/2023   Next office visit : 12/5/2023     Last UDS:   Amphetamine Screen, Urine   Date Value Ref Range Status   02/20/2023 N  Final     Barbiturate Screen, Urine   Date Value Ref Range Status   02/20/2023 POSITIVE  Final     Benzodiazepine Screen, Urine   Date Value Ref Range Status   02/20/2023 N  Final     Buprenorphine Urine   Date Value Ref Range Status   02/20/2023 N  Final     Cocaine Metabolite Screen, Urine   Date Value Ref Range Status   02/20/2023 N  Final     Gabapentin Screen, Urine   Date Value Ref Range Status   02/20/2023 N  Final     MDMA, Urine   Date Value Ref Range Status   02/20/2023 N  Final     Methamphetamine, Urine   Date Value Ref Range Status   02/20/2023 N  Final     Opiate Scrn, Ur   Date Value Ref Range Status   02/20/2023 N  Final     Oxycodone Screen, Ur   Date Value Ref Range Status   02/20/2023 N  Final     PCP Screen, Urine   Date Value Ref Range Status   02/20/2023 N  Final     Propoxyphene Screen, Urine   Date Value Ref Range Status   02/20/2023 N  Final     THC Screen, Urine   Date Value Ref Range Status   02/20/2023 N  Final     Tricyclic Antidepressants, Urine   Date Value Ref Range Status   02/20/2023 N  Final       Last Eudelia Harp: 7/28/23  Medication Contract: 2/20/23   Last Fill: 10/30/23    Requested Prescriptions     Pending Prescriptions Disp Refills    butalbital-acetaminophen-caffeine (FIORICET, ESGIC) -40 MG per tablet [Pharmacy Med Name: Butalbital-APAP-Caffeine -40 MG Oral Tablet] 60 tablet 0     Sig: TAKE 1 TABLET BY MOUTH EVERY 8 HOURS AS NEEDED FOR MIGRAINE HEADACHE (MAX  OF  3  TABLETS  DAILY)         Please approve or refuse this medication.    Brady Mensah [place]

## 2023-12-07 ENCOUNTER — OFFICE VISIT (OUTPATIENT)
Dept: PRIMARY CARE CLINIC | Age: 58
End: 2023-12-07

## 2023-12-07 VITALS
DIASTOLIC BLOOD PRESSURE: 72 MMHG | BODY MASS INDEX: 27.47 KG/M2 | WEIGHT: 145.38 LBS | HEART RATE: 76 BPM | SYSTOLIC BLOOD PRESSURE: 110 MMHG | OXYGEN SATURATION: 98 % | TEMPERATURE: 97.8 F

## 2023-12-07 DIAGNOSIS — Z23 FLU VACCINE NEED: ICD-10-CM

## 2023-12-07 DIAGNOSIS — F41.1 GAD (GENERALIZED ANXIETY DISORDER): ICD-10-CM

## 2023-12-07 DIAGNOSIS — E53.8 B12 DEFICIENCY: ICD-10-CM

## 2023-12-07 DIAGNOSIS — E03.9 ACQUIRED HYPOTHYROIDISM: ICD-10-CM

## 2023-12-07 DIAGNOSIS — K52.9 CHRONIC DIARRHEA: Primary | ICD-10-CM

## 2023-12-07 DIAGNOSIS — F33.1 MODERATE RECURRENT MAJOR DEPRESSION (HCC): ICD-10-CM

## 2023-12-07 DIAGNOSIS — E66.3 OVERWEIGHT (BMI 25.0-29.9): ICD-10-CM

## 2023-12-07 DIAGNOSIS — K21.9 LARYNGOPHARYNGEAL REFLUX (LPR): ICD-10-CM

## 2023-12-07 DIAGNOSIS — E78.2 MIXED HYPERLIPIDEMIA: ICD-10-CM

## 2023-12-07 DIAGNOSIS — R30.0 DYSURIA: ICD-10-CM

## 2023-12-07 DIAGNOSIS — G44.219 EPISODIC TENSION-TYPE HEADACHE, NOT INTRACTABLE: ICD-10-CM

## 2023-12-07 DIAGNOSIS — Z12.11 COLON CANCER SCREENING: ICD-10-CM

## 2023-12-07 DIAGNOSIS — N39.46 MIXED STRESS AND URGE URINARY INCONTINENCE: ICD-10-CM

## 2023-12-07 DIAGNOSIS — G25.81 RLS (RESTLESS LEGS SYNDROME): ICD-10-CM

## 2023-12-07 DIAGNOSIS — E55.9 VITAMIN D DEFICIENCY: ICD-10-CM

## 2023-12-07 DIAGNOSIS — N30.00 ACUTE CYSTITIS WITHOUT HEMATURIA: ICD-10-CM

## 2023-12-07 LAB
APPEARANCE FLUID: CLEAR
BILIRUBIN, POC: NORMAL
BLOOD URINE, POC: NORMAL
CLARITY, POC: CLEAR
COLOR, POC: YELLOW
GLUCOSE URINE, POC: NORMAL
KETONES, POC: NORMAL
LEUKOCYTE EST, POC: NORMAL
NITRITE, POC: NORMAL
PH, POC: 7
PROTEIN, POC: NORMAL
SPECIFIC GRAVITY, POC: 1.01
UROBILINOGEN, POC: 0.2

## 2023-12-07 RX ORDER — LEVOTHYROXINE SODIUM 0.1 MG/1
100 TABLET ORAL DAILY
Qty: 30 TABLET | Refills: 3 | Status: SHIPPED | OUTPATIENT
Start: 2023-12-07

## 2023-12-07 RX ORDER — CIPROFLOXACIN 250 MG/1
250 TABLET, FILM COATED ORAL 2 TIMES DAILY
Qty: 10 TABLET | Refills: 0 | Status: SHIPPED | OUTPATIENT
Start: 2023-12-07 | End: 2023-12-12

## 2023-12-07 NOTE — PROGRESS NOTES
Cinthya Warren is a 60 y.o. female who presents today for   Chief Complaint   Patient presents with    Medication Refill    Headache    Follow-up     Recheck mood    Hypothyroidism    Cholesterol Problem    Diarrhea       Joint Pain   Pertinent negatives include no fever or numbness. Other  Associated symptoms include arthralgias, congestion, fatigue, headaches, myalgias and neck pain. Pertinent negatives include no abdominal pain, chest pain, chills, coughing, fever, numbness, rash, vomiting or weakness. Medication Refill  Associated symptoms include arthralgias, congestion, fatigue, headaches, myalgias and neck pain. Pertinent negatives include no abdominal pain, chest pain, chills, coughing, fever, numbness, rash, vomiting or weakness. Headache  Weight Management  Associated symptoms include arthralgias, congestion, fatigue, headaches, myalgias and neck pain. Pertinent negatives include no abdominal pain, chest pain, chills, coughing, fever, numbness, rash, vomiting or weakness. Hand Injury   Pertinent negatives include no chest pain or numbness. 59 y/o WF here for annual wellness visit and follow up on  follow up on recurrent migraine/tension HAs, chronic neck pain with controlled med refills, chronic depression, HORACE, and elevated BMI. Patient feels like she may have a bladder infection because has had burning with urination past few days. She has had issues feeling like she cannot empty her bladder all the way because after she thinks she is finished urinating, she will have dribbling and have to wipe again. She has urine leakage with coughing, sneezing, and walking sometimes. Patient has also been having diarrhea for \"months\" and she sometimes sees blood in the stool more recently. She has not had gallbladder surgery in the past.      Migraine and tension HAs and neck pain are still issues patient struggles with despite taking gabapentin for migraine prophylaxis.  She is still requiring

## 2023-12-08 DIAGNOSIS — G89.29 CHRONIC BILATERAL LOW BACK PAIN WITH LEFT-SIDED SCIATICA: ICD-10-CM

## 2023-12-08 DIAGNOSIS — F33.1 MODERATE RECURRENT MAJOR DEPRESSION (HCC): ICD-10-CM

## 2023-12-08 DIAGNOSIS — G43.009 MIGRAINE WITHOUT AURA AND WITHOUT STATUS MIGRAINOSUS, NOT INTRACTABLE: ICD-10-CM

## 2023-12-08 DIAGNOSIS — F41.1 GAD (GENERALIZED ANXIETY DISORDER): ICD-10-CM

## 2023-12-08 DIAGNOSIS — M54.42 CHRONIC BILATERAL LOW BACK PAIN WITH LEFT-SIDED SCIATICA: ICD-10-CM

## 2023-12-08 DIAGNOSIS — G89.29 CHRONIC NECK PAIN: ICD-10-CM

## 2023-12-08 DIAGNOSIS — M54.2 CHRONIC NECK PAIN: ICD-10-CM

## 2023-12-11 RX ORDER — PROPRANOLOL HYDROCHLORIDE 80 MG/1
CAPSULE, EXTENDED RELEASE ORAL
Qty: 30 CAPSULE | Refills: 0 | Status: SHIPPED | OUTPATIENT
Start: 2023-12-11

## 2023-12-11 RX ORDER — FLUVOXAMINE MALEATE 100 MG
100 TABLET ORAL NIGHTLY
Qty: 30 TABLET | Refills: 5 | Status: SHIPPED | OUTPATIENT
Start: 2023-12-11

## 2023-12-11 RX ORDER — TRAMADOL HYDROCHLORIDE 50 MG/1
50 TABLET ORAL EVERY 12 HOURS PRN
Qty: 45 TABLET | Refills: 5 | Status: SHIPPED | OUTPATIENT
Start: 2023-12-11 | End: 2024-06-10

## 2023-12-11 NOTE — TELEPHONE ENCOUNTER
Vikram Sagastume called to request a refill on her medication.       Last office visit : 12/7/2023   Next office visit : Visit date not found     Last UDS:   Amphetamine Screen, Urine   Date Value Ref Range Status   02/20/2023 N  Final     Barbiturate Screen, Urine   Date Value Ref Range Status   02/20/2023 POSITIVE  Final     Benzodiazepine Screen, Urine   Date Value Ref Range Status   02/20/2023 N  Final     Buprenorphine Urine   Date Value Ref Range Status   02/20/2023 N  Final     Cocaine Metabolite Screen, Urine   Date Value Ref Range Status   02/20/2023 N  Final     Gabapentin Screen, Urine   Date Value Ref Range Status   02/20/2023 N  Final     MDMA, Urine   Date Value Ref Range Status   02/20/2023 N  Final     Methamphetamine, Urine   Date Value Ref Range Status   02/20/2023 N  Final     Opiate Scrn, Ur   Date Value Ref Range Status   02/20/2023 N  Final     Oxycodone Screen, Ur   Date Value Ref Range Status   02/20/2023 N  Final     PCP Screen, Urine   Date Value Ref Range Status   02/20/2023 N  Final     Propoxyphene Screen, Urine   Date Value Ref Range Status   02/20/2023 N  Final     THC Screen, Urine   Date Value Ref Range Status   02/20/2023 N  Final     Tricyclic Antidepressants, Urine   Date Value Ref Range Status   02/20/2023 N  Final       Last Carloz Champ: 2/20/23  Medication Contract: 7/28/23   Last Fill: 11/8/23    Requested Prescriptions     Pending Prescriptions Disp Refills    traMADol (ULTRAM) 50 MG tablet [Pharmacy Med Name: traMADol HCl 50 MG Oral Tablet] 45 tablet 0     Sig: Take 1 tablet by mouth every 12 hours as needed for Pain.    propranolol (INDERAL LA) 80 MG extended release capsule [Pharmacy Med Name: Propranolol HCl ER 80 MG Oral Capsule Extended Release 24 Hour] 30 capsule 0     Sig: Take 1 capsule by mouth once daily    fluvoxaMINE (LUVOX) 100 MG tablet [Pharmacy Med Name: fluvoxaMINE Maleate 100 MG Oral Tablet] 30 tablet 0     Sig: Take 1 tablet by mouth nightly         Please

## 2023-12-12 NOTE — PROGRESS NOTES
normal mood with appropriate affect TRIG 166 (H) 11/20/2019    TRIG 139 08/07/2019     Lab Results   Component Value Date    HDL 46 (L) 06/12/2020    HDL 46 (L) 11/20/2019    HDL 46 (L) 08/07/2019     Lab Results   Component Value Date    LDLCALC 145 06/12/2020    LDLCALC 147 11/20/2019    LDLCALC 136 08/07/2019     No results found for: LABVLDL, VLDL  No results found for: CHOLHDLRATIO    Review of Systems   Constitutional: Positive for fatigue. Negative for appetite change, chills, fever and unexpected weight change. HENT: Positive for dental problem. Negative for ear pain, rhinorrhea, sinus pressure, sore throat and voice change. Eyes: Positive for photophobia. Negative for pain, discharge and redness. Respiratory: Negative for cough, chest tightness, shortness of breath and wheezing. Cardiovascular: Negative for chest pain and palpitations. Gastrointestinal: Negative for abdominal pain, blood in stool, diarrhea and vomiting. Endocrine: Negative for cold intolerance, heat intolerance and polydipsia. Genitourinary: Negative for dysuria and hematuria. Musculoskeletal: Positive for neck pain and neck stiffness. Negative for arthralgias and myalgias. Skin: Negative for color change and rash. Allergic/Immunologic: Positive for environmental allergies. Neurological: Positive for headaches. Negative for dizziness, tremors, syncope, speech difficulty, weakness and numbness. Hematological: Negative for adenopathy. Does not bruise/bleed easily. Psychiatric/Behavioral: Positive for dysphoric mood. Negative for confusion, hallucinations, self-injury, sleep disturbance and suicidal ideas. The patient is nervous/anxious. See HPI   All other systems reviewed and are negative. Prior to Visit Medications    Medication Sig Taking?  Authorizing Provider   gabapentin (NEURONTIN) 300 MG capsule TAKE 2 CAPSULES PO QHS Yes Norberto Duron MD   butalbital-acetaminophen-caffeine (FIORICET, ESGIC) -40 MG per tablet TAKE 1 TABLET BY MOUTH EVERY 8 HOURS AS NEEDED FOR HEADACHE OR  MIGRAINE  Jaciel Maher MD   desvenlafaxine succinate (PRISTIQ) 100 MG TB24 extended release tablet Take 1 tablet by mouth daily  Jaciel Maher MD   tiZANidine (ZANAFLEX) 2 MG tablet Take 1 tablet by mouth every 8 hours as needed (muscle spasm/tension)  Jaciel Maher MD   triamcinolone (NASACORT ALLERGY 24HR) 55 MCG/ACT nasal inhaler 2 sprays by Each Nostril route daily  Jaciel Maher MD   levothyroxine (SYNTHROID) 88 MCG tablet TAKE 1 TABLET BY MOUTH ONCE DAILY  Jaciel Maher MD   celecoxib (CELEBREX) 200 MG capsule Take 1 capsule by mouth daily as needed for Pain (arthritis)  Jaciel Maher MD   valACYclovir (VALTREX) 500 MG tablet   Historical Provider, MD   Potassium 99 MG TABS Take 1 tablet by mouth daily  Historical Provider, MD   BIOTIN PO Take 1 tablet by mouth daily  Historical Provider, MD   TURMERIC PO Take 1 tablet by mouth daily  Historical Provider, MD   vitamin B-12 (CYANOCOBALAMIN) 1000 MCG tablet Take 1,000 mcg by mouth daily  Historical Provider, MD   Multiple Vitamins-Minerals (MULTIVITAMIN ADULT PO) Take by mouth  Historical Provider, MD   Omega 3 1000 MG CAPS Take by mouth  Historical Provider, MD   Cholecalciferol (VITAMIN D3) 3000 units TABS Take by mouth  Historical Provider, MD       Social History     Tobacco Use    Smoking status: Never Smoker    Smokeless tobacco: Never Used   Substance Use Topics    Alcohol use: Yes     Comment: rarely    Drug use: No        Allergies   Allergen Reactions    Augmentin [Amoxicillin-Pot Clavulanate] Diarrhea    Aspirin Other (See Comments)   ,   Past Medical History:   Diagnosis Date    Allergic rhinitis     Chronic bilateral low back pain with left-sided sciatica 2017    Colon polyp     Depression with anxiety     Obesity     Osteoarthritis    ,   Past Surgical History:   Procedure Laterality Date     SECTION      COLONOSCOPY  03/04/2015    colon polyp x1, recheck in 5 yrs (Dr Mike Garcia)   [address]     ,   Social History     Tobacco Use    Smoking status: Never Smoker    Smokeless tobacco: Never Used   Substance Use Topics    Alcohol use: Yes     Comment: rarely    Drug use: No   ,   Family History   Problem Relation Age of Onset    Heart Disease Mother     High Blood Pressure Mother     Other Mother         skin CA    Osteoporosis Mother     Heart Disease Father     High Blood Pressure Father     Other Father         jaw CA    Heart Disease Sister     Diabetes Sister     Stroke Sister     High Blood Pressure Sister     Other Sister         fibromyalgia, thyroid CA   ,   Immunization History   Administered Date(s) Administered    Influenza, Quadv, IM, PF (6 mo and older Fluzone, Flulaval, Fluarix, and 3 yrs and older Afluria) 09/13/2018, 10/04/2019    Tdap (Boostrix, Adacel) 08/01/2016    Zoster Recombinant (Shingrix) 01/22/2020, 06/19/2020       PHYSICAL EXAMINATION:  [ INSTRUCTIONS:  \"[x]\" Indicates a positive item  \"[]\" Indicates a negative item  -- DELETE ALL ITEMS NOT EXAMINED]  Vital Signs: (As obtained by patient/caregiver or practitioner observation)    Blood pressure-  Heart rate-    Respiratory rate-    Temperature-  Pulse oximetry-     Constitutional: [] Appears well-developed and well-nourished [] No apparent distress      [] Abnormal-   Mental status  [] Alert and awake  [] Oriented to person/place/time []Able to follow commands      Eyes:  EOM    []  Normal  [] Abnormal-  Sclera  []  Normal  [] Abnormal -         Discharge []  None visible  [] Abnormal -    HENT:   [] Normocephalic, atraumatic.   [] Abnormal   [] Mouth/Throat: Mucous membranes are moist.     External Ears [] Normal  [] Abnormal-     Neck: [] No visualized mass     Pulmonary/Chest: [] Respiratory effort normal.  [] No visualized signs of difficulty breathing or respiratory distress        [] Abnormal-      Musculoskeletal:   [] Normal gait with no signs of ataxia         [] Normal range of motion of neck        [] Abnormal-       Neurological:        [] No Facial Asymmetry (Cranial nerve 7 motor function) (limited exam to video visit)          [] No gaze palsy        [] Abnormal-         Skin:        [] No significant exanthematous lesions or discoloration noted on facial skin         [] Abnormal-            Psychiatric:       [] Normal Affect [] No Hallucinations        [x] Abnormal- Anxious and slightly tearful, depressed mood    Other pertinent observable physical exam findings-     Due to this being a TeleHealth encounter, evaluation of the following organ systems is limited: Vitals/Constitutional/EENT/Resp/CV/GI//MS/Neuro/Skin/Heme-Lymph-Imm. Lab Results   Component Value Date    WBC 6.5 06/12/2020    HGB 12.6 06/12/2020    HCT 40.4 06/12/2020    MCV 80.5 (L) 06/12/2020     06/12/2020    LABLYMP 1.34 08/13/2015    LYMPHOPCT 20.3 06/12/2020    RBC 5.02 06/12/2020    MCH 25.1 (L) 06/12/2020    MCHC 31.2 (L) 06/12/2020    RDW 17.0 (H) 06/12/2020     Lab Results   Component Value Date    IRON 42 06/12/2020    TIBC 438 (H) 06/12/2020     Lab Results   Component Value Date    QFVEHTSV14 605 06/12/2020     Lab Results   Component Value Date     06/12/2020    K 3.8 06/12/2020     06/12/2020    CO2 25 06/12/2020    BUN 7 06/12/2020    CREATININE 0.7 06/12/2020    GLUCOSE 94 06/12/2020    CALCIUM 9.4 06/12/2020    PROT 7.2 06/12/2020    LABALBU 4.8 06/12/2020    BILITOT 0.3 06/12/2020    ALKPHOS 112 (H) 06/12/2020    AST 25 06/12/2020    ALT 21 06/12/2020    LABGLOM >60 06/12/2020     Lab Results   Component Value Date    VITD25 41.2 06/12/2020       ASSESSMENT/PLAN:  Gypsy Lopez was seen today for depression, anxiety, migraine and hypothyroidism.     Diagnoses and all orders for this visit:    Moderate recurrent major depression ([address]  -     KALI Gordon, Psychiatry, [place]    HORACE (generalized anxiety disorder)  -     KALI Morales, Psychiatry, [place]    Worsening headaches  -     CT HEAD WO CONTRAST; Future    Chronic daily headache  -     gabapentin (NEURONTIN) 300 MG capsule; TAKE 2 CAPSULES PO QHS    Chronic pansinusitis  -     CT HEAD WO CONTRAST; Future    Migraine without aura and without status migrainosus, not intractable  -     gabapentin (NEURONTIN) 300 MG capsule; TAKE 2 CAPSULES PO QHS    Chronic bilateral low back pain with left-sided sciatica  -     gabapentin (NEURONTIN) 300 MG capsule; TAKE 2 CAPSULES PO QHS    Chronic neck pain  -     gabapentin (NEURONTIN) 300 MG capsule; TAKE 2 CAPSULES PO QHS    RLS (restless legs syndrome)  Comments: Well controlled with gabapentin. Refill provided. Orders:  -     gabapentin (NEURONTIN) 300 MG capsule; TAKE 2 CAPSULES PO QHS    Acquired hypothyroidism  Comments:  well controlled. Continue levothyroxine at current dose. Vitamin D deficiency  Comments: Well controlled on recent lab work. Continue current vitamin D replacement. B12 deficiency  Comments: Well controlled on recent lab work. Idiopathic insomnia  Comments:  Improving. No medication changes today. Iron deficiency  Comments:  Improving. Encouraged compliance with iron replacement. Mixed hyperlipidemia    Chronic fatigue      - Controlled substance contract and urine drug screen are up-to-date currently. No unusual filling on recent Shante Gary report. Treatment continues to be medically necessary and improves patient quality of life. No signs of misuse of controlled medication.   -Return in about 3 months (around 9/17/2020) for ECPE, high cholesterol, thyroid, Controlled med refill, insomnia. Over 50% of the total visit time of 45 minutes was spent on counseling and/or coordination of care of:   1. Moderate recurrent major depression ([facility] [address]    2. HORACE (generalized anxiety disorder)    3. Worsening headaches    4.  Chronic daily headache    5. Chronic pansinusitis    6. Migraine without aura and without status migrainosus, not intractable    7. Chronic bilateral low back pain with left-sided sciatica    8. Chronic neck pain    9. RLS (restless legs syndrome)    10. Acquired hypothyroidism    11. Vitamin D deficiency    12. B12 deficiency    13. Idiopathic insomnia    14. Iron deficiency    15. Mixed hyperlipidemia    16. Chronic fatigue         An  electronic signature was used to authenticate this note. --Harley Che MD on 7/12/2020 at 11:13 PM        Pursuant to the emergency declaration under the [facility], [facility] waiver authority and the [employer] Act, this Virtual  Visit was conducted, with patient's consent, to reduce the patient's risk of exposure to COVID-19 and provide continuity of care for an established patient. Services were provided through a video synchronous discussion virtually to substitute for in-person clinic visit.

## 2023-12-28 ENCOUNTER — TELEPHONE (OUTPATIENT)
Dept: PRIMARY CARE CLINIC | Age: 58
End: 2023-12-28

## 2023-12-28 LAB
ADV 40+41 DNA STL QL NAA+NON-PROBE: NOT DETECTED
C CAYETANENSIS DNA STL QL NAA+NON-PROBE: NOT DETECTED
C COLI+JEJ+UPSA DNA STL QL NAA+NON-PROBE: NOT DETECTED
C DIF TOX TCDA+TCDB STL QL NAA+NON-PROBE: NOT DETECTED
CRYPTOSP DNA STL QL NAA+NON-PROBE: NOT DETECTED
E HISTOLYT DNA STL QL NAA+NON-PROBE: NOT DETECTED
EAEC PAA PLAS AGGR+AATA ST NAA+NON-PRB: NOT DETECTED
EC STX1+STX2 GENES STL QL NAA+NON-PROBE: NOT DETECTED
EPEC EAE GENE STL QL NAA+NON-PROBE: DETECTED
ETEC LTA+ST1A+ST1B TOX ST NAA+NON-PROBE: NOT DETECTED
G LAMBLIA DNA STL QL NAA+NON-PROBE: NOT DETECTED
GI PATH DNA+RNA PNL STL NAA+NON-PROBE: NOT DETECTED
NOROVIRUS GI+II RNA STL QL NAA+NON-PROBE: NOT DETECTED
P SHIGELLOIDES DNA STL QL NAA+NON-PROBE: NOT DETECTED
RVA RNA STL QL NAA+NON-PROBE: NOT DETECTED
S ENT+BONG DNA STL QL NAA+NON-PROBE: NOT DETECTED
SAPO I+II+IV+V RNA STL QL NAA+NON-PROBE: NOT DETECTED
SHIGELLA SP+EIEC IPAH ST NAA+NON-PROBE: NOT DETECTED
V CHOL+PARA+VUL DNA STL QL NAA+NON-PROBE: NOT DETECTED
V CHOLERAE DNA STL QL NAA+NON-PROBE: NOT DETECTED
Y ENTEROCOL DNA STL QL NAA+NON-PROBE: NOT DETECTED

## 2023-12-29 DIAGNOSIS — A04.4 E COLI ENTERITIS: Primary | ICD-10-CM

## 2023-12-29 RX ORDER — CIPROFLOXACIN 250 MG/1
250 TABLET, FILM COATED ORAL 2 TIMES DAILY
Qty: 10 TABLET | Refills: 0 | Status: SHIPPED | OUTPATIENT
Start: 2023-12-29 | End: 2024-01-03

## 2024-01-02 DIAGNOSIS — G43.009 MIGRAINE WITHOUT AURA AND WITHOUT STATUS MIGRAINOSUS, NOT INTRACTABLE: ICD-10-CM

## 2024-01-03 RX ORDER — BUTALBITAL, ACETAMINOPHEN AND CAFFEINE 50; 325; 40 MG/1; MG/1; MG/1
TABLET ORAL
Qty: 60 TABLET | Refills: 0 | Status: SHIPPED | OUTPATIENT
Start: 2024-01-03

## 2024-01-03 NOTE — TELEPHONE ENCOUNTER
Valeria Lees called to request a refill on her medication.      Last office visit : 12/7/2023   Next office visit : Visit date not found     Last UDS:   Amphetamine Screen, Urine   Date Value Ref Range Status   02/20/2023 N  Final     Barbiturate Screen, Urine   Date Value Ref Range Status   02/20/2023 POSITIVE  Final     Benzodiazepine Screen, Urine   Date Value Ref Range Status   02/20/2023 N  Final     Buprenorphine Urine   Date Value Ref Range Status   02/20/2023 N  Final     Cocaine Metabolite Screen, Urine   Date Value Ref Range Status   02/20/2023 N  Final     Gabapentin Screen, Urine   Date Value Ref Range Status   02/20/2023 N  Final     MDMA, Urine   Date Value Ref Range Status   02/20/2023 N  Final     Methamphetamine, Urine   Date Value Ref Range Status   02/20/2023 N  Final     Opiate Scrn, Ur   Date Value Ref Range Status   02/20/2023 N  Final     Oxycodone Screen, Ur   Date Value Ref Range Status   02/20/2023 N  Final     PCP Screen, Urine   Date Value Ref Range Status   02/20/2023 N  Final     Propoxyphene Screen, Urine   Date Value Ref Range Status   02/20/2023 N  Final     THC Screen, Urine   Date Value Ref Range Status   02/20/2023 N  Final     Tricyclic Antidepressants, Urine   Date Value Ref Range Status   02/20/2023 N  Final       Last Niraj: 7/28/23  Medication Contract: 2/20/23   Last Fill: 11/30/23    Requested Prescriptions     Pending Prescriptions Disp Refills    butalbital-acetaminophen-caffeine (FIORICET, ESGIC) -40 MG per tablet [Pharmacy Med Name: Butalbital-APAP-Caffeine -40 MG Oral Tablet] 60 tablet 0     Sig: TAKE 1 TABLET BY MOUTH EVERY 8 HOURS AS NEEDED FOR  MIGRAINE  HEADACHE.  MAX  3  TABS  DAILY         Please approve or refuse this medication.   Latasha Gant MA

## 2024-01-26 ENCOUNTER — OFFICE VISIT (OUTPATIENT)
Dept: GASTROENTEROLOGY | Age: 59
End: 2024-01-26
Payer: COMMERCIAL

## 2024-01-26 VITALS
WEIGHT: 147 LBS | SYSTOLIC BLOOD PRESSURE: 130 MMHG | BODY MASS INDEX: 27.75 KG/M2 | DIASTOLIC BLOOD PRESSURE: 80 MMHG | HEIGHT: 61 IN | HEART RATE: 60 BPM | OXYGEN SATURATION: 98 %

## 2024-01-26 DIAGNOSIS — R19.7 DIARRHEA, UNSPECIFIED TYPE: Primary | ICD-10-CM

## 2024-01-26 DIAGNOSIS — R10.9 ABDOMINAL CRAMPING: ICD-10-CM

## 2024-01-26 PROCEDURE — 99204 OFFICE O/P NEW MOD 45 MIN: CPT | Performed by: NURSE PRACTITIONER

## 2024-01-26 NOTE — PROGRESS NOTES
Subjective:     Patient ID: Valeria Lees is a 58 y.o. female  PCP: Domonique Johnson MD  Referring Provider: Domonique Johnson,*    HPI  Patient presents to the office today with the following complaints: New Patient      Patient seen in the office today with complaints of diarrhea stools for the past several months   And she is having abd cramping   Denies blood in her stool or increased mucus     She was treated for e coli in her stool and she was given antibiotics her diarrhea did improve    She does have protonix for acid reflux, but she has not been taking this for some time and she is not having any trouble with acid reflux     Assessment:     1. Diarrhea, unspecified type  2. Abdominal cramping           Plan:   Schedule Colonoscopy  Instruct on bowel prep.   Nothing to eat or drink after midnight the day of the exam.  Unable to drive for 24 hours after the procedure.   No aspirin or nonsteroidal anti-inflammatories for 5 days before procedure.  I have discussed the benefits, alternatives, and risks (including bleeding, perforation and death)  for pursuing Endoscopy (EGD/Colonscopy/EUS/ERCP) with the patient and they are willing to continue. We also discussed the need for anesthesia, IV access, proper dietary changes, medication changes if necessary, and need for bowel prep (if ordered) prior to their Endoscopic procedure.  They are aware they must have someone accompany them to their scheduled procedure to drive them home - they agree to the above and are willing to continue.     Orders  No orders of the defined types were placed in this encounter.    Medications  No orders of the defined types were placed in this encounter.        Patient History:     Past Medical History:   Diagnosis Date    Allergic rhinitis     Chronic bilateral low back pain with left-sided sciatica 12/01/2017    Colon polyp     Depression with anxiety     Laryngopharyngeal reflux (LPR) 10/06/2020    Mixed stress and urge

## 2024-01-29 ENCOUNTER — TELEPHONE (OUTPATIENT)
Dept: GASTROENTEROLOGY | Age: 59
End: 2024-01-29

## 2024-01-29 DIAGNOSIS — R51.9 CHRONIC DAILY HEADACHE: ICD-10-CM

## 2024-01-29 DIAGNOSIS — G89.29 CHRONIC NECK PAIN: ICD-10-CM

## 2024-01-29 DIAGNOSIS — G43.009 MIGRAINE WITHOUT AURA AND WITHOUT STATUS MIGRAINOSUS, NOT INTRACTABLE: ICD-10-CM

## 2024-01-29 DIAGNOSIS — G89.29 CHRONIC BILATERAL LOW BACK PAIN WITH LEFT-SIDED SCIATICA: ICD-10-CM

## 2024-01-29 DIAGNOSIS — M54.42 CHRONIC BILATERAL LOW BACK PAIN WITH LEFT-SIDED SCIATICA: ICD-10-CM

## 2024-01-29 DIAGNOSIS — M54.2 CHRONIC NECK PAIN: ICD-10-CM

## 2024-01-29 DIAGNOSIS — G25.81 RLS (RESTLESS LEGS SYNDROME): ICD-10-CM

## 2024-01-29 DIAGNOSIS — M62.838 MUSCLE SPASM: ICD-10-CM

## 2024-01-29 RX ORDER — GABAPENTIN 300 MG/1
CAPSULE ORAL
Qty: 90 CAPSULE | Refills: 2 | Status: SHIPPED | OUTPATIENT
Start: 2024-01-29 | End: 2024-04-29

## 2024-01-29 RX ORDER — PROPRANOLOL HYDROCHLORIDE 80 MG/1
CAPSULE, EXTENDED RELEASE ORAL
Qty: 30 CAPSULE | Refills: 5 | Status: SHIPPED | OUTPATIENT
Start: 2024-01-29

## 2024-01-29 NOTE — TELEPHONE ENCOUNTER
Valeria Lees called to request a refill on her medication.      Last office visit : 12/7/2023   Next office visit : Visit date not found     Last UDS:   Amphetamine Screen, Urine   Date Value Ref Range Status   02/20/2023 N  Final     Barbiturate Screen, Urine   Date Value Ref Range Status   02/20/2023 POSITIVE  Final     Benzodiazepine Screen, Urine   Date Value Ref Range Status   02/20/2023 N  Final     Buprenorphine Urine   Date Value Ref Range Status   02/20/2023 N  Final     Cocaine Metabolite Screen, Urine   Date Value Ref Range Status   02/20/2023 N  Final     Gabapentin Screen, Urine   Date Value Ref Range Status   02/20/2023 N  Final     MDMA, Urine   Date Value Ref Range Status   02/20/2023 N  Final     Methamphetamine, Urine   Date Value Ref Range Status   02/20/2023 N  Final     Opiate Scrn, Ur   Date Value Ref Range Status   02/20/2023 N  Final     Oxycodone Screen, Ur   Date Value Ref Range Status   02/20/2023 N  Final     PCP Screen, Urine   Date Value Ref Range Status   02/20/2023 N  Final     Propoxyphene Screen, Urine   Date Value Ref Range Status   02/20/2023 N  Final     THC Screen, Urine   Date Value Ref Range Status   02/20/2023 N  Final     Tricyclic Antidepressants, Urine   Date Value Ref Range Status   02/20/2023 N  Final       Last Niraj: 7/28/23  Medication Contract: 2/20/23   Last Fill: 8/23/23    Requested Prescriptions     Pending Prescriptions Disp Refills    propranolol (INDERAL LA) 80 MG extended release capsule [Pharmacy Med Name: Propranolol HCl ER 80 MG Oral Capsule Extended Release 24 Hour] 30 capsule 0     Sig: Take 1 capsule by mouth once daily    gabapentin (NEURONTIN) 300 MG capsule [Pharmacy Med Name: Gabapentin 300 MG Oral Capsule] 90 capsule 0     Sig: TAKE 3 CAPSULES BY MOUTH EVERY DAY AT BEDTIME         Please approve or refuse this medication.   Latasha Gant MA

## 2024-01-29 NOTE — TELEPHONE ENCOUNTER
Called patient to remind them of their procedure with Dr. Martin Dan  at [facility]  on 2/1/24 to arrive at 700 AM     NO ANS / LM

## 2024-01-29 NOTE — TELEPHONE ENCOUNTER
Valeria Lees called to request a refill on her medication.      Last office visit : 12/7/2023   Next office visit : Visit date not found     Requested Prescriptions     Pending Prescriptions Disp Refills    propranolol (INDERAL LA) 80 MG extended release capsule [Pharmacy Med Name: Propranolol HCl ER 80 MG Oral Capsule Extended Release 24 Hour] 30 capsule 0     Sig: Take 1 capsule by mouth once daily    gabapentin (NEURONTIN) 300 MG capsule [Pharmacy Med Name: Gabapentin 300 MG Oral Capsule] 90 capsule 0     Sig: TAKE 3 CAPSULES BY MOUTH EVERY DAY AT BEDTIME            Latasha Gant MA

## 2024-01-30 ENCOUNTER — TELEPHONE (OUTPATIENT)
Dept: GASTROENTEROLOGY | Age: 59
End: 2024-01-30

## 2024-01-30 RX ORDER — BACLOFEN 10 MG/1
TABLET ORAL
Qty: 90 TABLET | Refills: 2 | Status: SHIPPED | OUTPATIENT
Start: 2024-01-30

## 2024-01-30 NOTE — TELEPHONE ENCOUNTER
Valeria Lees called to request a refill on her medication.      Last office visit : 12/7/2023   Next office visit : Visit date not found     Requested Prescriptions     Pending Prescriptions Disp Refills    baclofen (LIORESAL) 10 MG tablet 90 tablet 2     Sig: TAKE 1 TABLET BY MOUTH THREE TIMES DAILY AS NEEDED FOR MUSCLE SPASM OR NECK PAIN            Latasha Gant MA

## 2024-01-31 ASSESSMENT — ENCOUNTER SYMPTOMS
BLOOD IN STOOL: 0
TROUBLE SWALLOWING: 0
VOMITING: 0
ANAL BLEEDING: 0
DIARRHEA: 1
CONSTIPATION: 0
ABDOMINAL PAIN: 1
NAUSEA: 0
ABDOMINAL DISTENTION: 0
RECTAL PAIN: 0
COUGH: 0
SHORTNESS OF BREATH: 0
CHOKING: 0

## 2024-02-05 DIAGNOSIS — G25.81 RLS (RESTLESS LEGS SYNDROME): ICD-10-CM

## 2024-02-05 DIAGNOSIS — G89.29 CHRONIC NECK PAIN: ICD-10-CM

## 2024-02-05 DIAGNOSIS — M54.2 CHRONIC NECK PAIN: ICD-10-CM

## 2024-02-05 DIAGNOSIS — M54.42 CHRONIC BILATERAL LOW BACK PAIN WITH LEFT-SIDED SCIATICA: ICD-10-CM

## 2024-02-05 DIAGNOSIS — R51.9 CHRONIC DAILY HEADACHE: ICD-10-CM

## 2024-02-05 DIAGNOSIS — G89.29 CHRONIC BILATERAL LOW BACK PAIN WITH LEFT-SIDED SCIATICA: ICD-10-CM

## 2024-02-05 DIAGNOSIS — G43.009 MIGRAINE WITHOUT AURA AND WITHOUT STATUS MIGRAINOSUS, NOT INTRACTABLE: ICD-10-CM

## 2024-02-06 ENCOUNTER — TELEPHONE (OUTPATIENT)
Dept: PRIMARY CARE CLINIC | Age: 59
End: 2024-02-06

## 2024-02-06 ENCOUNTER — PATIENT MESSAGE (OUTPATIENT)
Dept: PRIMARY CARE CLINIC | Age: 59
End: 2024-02-06

## 2024-02-06 RX ORDER — FLUCONAZOLE 150 MG/1
150 TABLET ORAL ONCE
Qty: 1 TABLET | Refills: 0 | Status: SHIPPED | OUTPATIENT
Start: 2024-02-06 | End: 2024-02-06

## 2024-02-06 NOTE — TELEPHONE ENCOUNTER
From: Valeria Lees  To: Dr. Domonique Johnson  Sent: 2/6/2024 12:26 PM CST  Subject: Vaginal Yeast Infection     I’m having the pains of a yeast infection I believe from having taken the 2 rounds of antibiotics in December & January. The irritation has been going on for a while, but the last few days to a week, I’ve also had a discharge and the irritation/itching have gotten a lot worse. I tried to do the e-visit but it said I needed to talk to you. I just wanted to get a prescription for Diflucan (i think that’s right). If I need to make an appointment please call me. Thank you.

## 2024-02-06 NOTE — TELEPHONE ENCOUNTER
Valeria Lees called to request a refill on her medication.      Last office visit : 12/7/2023   Next office visit : 2/6/2024     Last UDS:   Amphetamine Screen, Urine   Date Value Ref Range Status   02/20/2023 N  Final     Barbiturate Screen, Urine   Date Value Ref Range Status   02/20/2023 POSITIVE  Final     Benzodiazepine Screen, Urine   Date Value Ref Range Status   02/20/2023 N  Final     Buprenorphine Urine   Date Value Ref Range Status   02/20/2023 N  Final     Cocaine Metabolite Screen, Urine   Date Value Ref Range Status   02/20/2023 N  Final     Gabapentin Screen, Urine   Date Value Ref Range Status   02/20/2023 N  Final     MDMA, Urine   Date Value Ref Range Status   02/20/2023 N  Final     Methamphetamine, Urine   Date Value Ref Range Status   02/20/2023 N  Final     Opiate Scrn, Ur   Date Value Ref Range Status   02/20/2023 N  Final     Oxycodone Screen, Ur   Date Value Ref Range Status   02/20/2023 N  Final     PCP Screen, Urine   Date Value Ref Range Status   02/20/2023 N  Final     Propoxyphene Screen, Urine   Date Value Ref Range Status   02/20/2023 N  Final     THC Screen, Urine   Date Value Ref Range Status   02/20/2023 N  Final     Tricyclic Antidepressants, Urine   Date Value Ref Range Status   02/20/2023 N  Final       Last Niraj: 7/28/23  Medication Contract: 2/20/23   Last Fill: 1/3/24    Requested Prescriptions     Pending Prescriptions Disp Refills    butalbital-acetaminophen-caffeine (FIORICET, ESGIC) -40 MG per tablet [Pharmacy Med Name: Butalbital-APAP-Caffeine -40 MG Oral Tablet] 60 tablet 0     Sig: TAKE 1 TABLET BY MOUTH EVERY 8 HOURS AS NEEDED FOR MIGRAINE HEADACHE . DO NOT EXCEED 3 PER 24 HOURS    propranolol (INDERAL LA) 80 MG extended release capsule [Pharmacy Med Name: Propranolol HCl ER 80 MG Oral Capsule Extended Release 24 Hour] 30 capsule 0     Sig: Take 1 capsule by mouth once daily    gabapentin (NEURONTIN) 300 MG capsule [Pharmacy Med Name: Gabapentin 300

## 2024-02-06 NOTE — TELEPHONE ENCOUNTER
Pt left vm stating that she need a prescription for diflucan. She said she has been on 2 antibiotics in Dec and January.  I returned call to pt and asked her to go onto her mychart and complete an evisit that way something can be called in for her. She agreed to do the evisit.

## 2024-02-07 RX ORDER — BUTALBITAL, ACETAMINOPHEN AND CAFFEINE 50; 325; 40 MG/1; MG/1; MG/1
TABLET ORAL
Qty: 60 TABLET | Refills: 0 | Status: SHIPPED | OUTPATIENT
Start: 2024-02-07

## 2024-02-07 RX ORDER — GABAPENTIN 300 MG/1
CAPSULE ORAL
Qty: 90 CAPSULE | Refills: 2 | Status: SHIPPED | OUTPATIENT
Start: 2024-02-07 | End: 2024-04-06

## 2024-02-07 RX ORDER — PROPRANOLOL HYDROCHLORIDE 80 MG/1
CAPSULE, EXTENDED RELEASE ORAL
Qty: 30 CAPSULE | Refills: 5 | Status: SHIPPED | OUTPATIENT
Start: 2024-02-07

## 2024-02-20 ENCOUNTER — OFFICE VISIT (OUTPATIENT)
Dept: PRIMARY CARE CLINIC | Age: 59
End: 2024-02-20
Payer: COMMERCIAL

## 2024-02-20 VITALS
BODY MASS INDEX: 27.38 KG/M2 | HEIGHT: 61 IN | OXYGEN SATURATION: 99 % | SYSTOLIC BLOOD PRESSURE: 126 MMHG | WEIGHT: 145 LBS | TEMPERATURE: 98.3 F | HEART RATE: 87 BPM | DIASTOLIC BLOOD PRESSURE: 88 MMHG

## 2024-02-20 DIAGNOSIS — J20.9 ACUTE BRONCHITIS, UNSPECIFIED ORGANISM: ICD-10-CM

## 2024-02-20 DIAGNOSIS — G89.29 CHRONIC NECK PAIN: ICD-10-CM

## 2024-02-20 DIAGNOSIS — R05.1 ACUTE COUGH: ICD-10-CM

## 2024-02-20 DIAGNOSIS — M62.838 MUSCLE SPASM: ICD-10-CM

## 2024-02-20 DIAGNOSIS — B96.89 BACTERIAL URI: ICD-10-CM

## 2024-02-20 DIAGNOSIS — M54.2 CHRONIC NECK PAIN: ICD-10-CM

## 2024-02-20 DIAGNOSIS — J06.9 BACTERIAL URI: ICD-10-CM

## 2024-02-20 LAB
INFLUENZA A ANTIGEN, POC: NEGATIVE
INFLUENZA B ANTIGEN, POC: NEGATIVE
LOT EXPIRE DATE: NORMAL
LOT KIT NUMBER: NORMAL
SARS-COV-2, POC: NORMAL
VALID INTERNAL CONTROL: NORMAL
VENDOR AND KIT NAME POC: NORMAL

## 2024-02-20 PROCEDURE — 96372 THER/PROPH/DIAG INJ SC/IM: CPT | Performed by: NURSE PRACTITIONER

## 2024-02-20 PROCEDURE — 99213 OFFICE O/P EST LOW 20 MIN: CPT | Performed by: NURSE PRACTITIONER

## 2024-02-20 RX ORDER — HYDROCODONE POLISTIREX AND CHLORPHENIRAMINE POLISTIREX 10; 8 MG/5ML; MG/5ML
5 SUSPENSION, EXTENDED RELEASE ORAL EVERY 12 HOURS PRN
Qty: 100 ML | Refills: 0 | Status: SHIPPED | OUTPATIENT
Start: 2024-02-20 | End: 2024-03-01

## 2024-02-20 RX ORDER — DEXAMETHASONE SODIUM PHOSPHATE 10 MG/ML
10 INJECTION INTRAMUSCULAR; INTRAVENOUS ONCE
Status: COMPLETED | OUTPATIENT
Start: 2024-02-20 | End: 2024-02-20

## 2024-02-20 RX ORDER — BACLOFEN 10 MG/1
TABLET ORAL
Qty: 90 TABLET | Refills: 2 | Status: SHIPPED | OUTPATIENT
Start: 2024-02-20

## 2024-02-20 RX ORDER — METHYLPREDNISOLONE 4 MG/1
TABLET ORAL
Qty: 1 KIT | Refills: 0 | Status: SHIPPED | OUTPATIENT
Start: 2024-02-20 | End: 2024-02-26

## 2024-02-20 RX ORDER — AZITHROMYCIN 250 MG/1
TABLET, FILM COATED ORAL
Qty: 6 TABLET | Refills: 0 | Status: SHIPPED | OUTPATIENT
Start: 2024-02-20 | End: 2024-03-01

## 2024-02-20 RX ADMIN — DEXAMETHASONE SODIUM PHOSPHATE 10 MG: 10 INJECTION INTRAMUSCULAR; INTRAVENOUS at 15:33

## 2024-02-20 ASSESSMENT — ENCOUNTER SYMPTOMS
SORE THROAT: 0
DIARRHEA: 0
SINUS PRESSURE: 1
NAUSEA: 0
WHEEZING: 0
SHORTNESS OF BREATH: 1
ABDOMINAL PAIN: 0
CHEST TIGHTNESS: 0
COUGH: 0

## 2024-02-20 NOTE — TELEPHONE ENCOUNTER
Valeria YAMILET Lees called to request a refill on her medication.      Last office visit : 2/20/2024   Next office visit : 3/15/2024     Requested Prescriptions     Pending Prescriptions Disp Refills    baclofen (LIORESAL) 10 MG tablet [Pharmacy Med Name: BACLOFEN 10MG TABLETS] 90 tablet 2     Sig: TAKE 1 TABLET BY MOUTH THREE TIMES DAILY AS NEEDED FOR MUSCLE SPASM OR NECK PAIN            Latasha Gant MA

## 2024-02-20 NOTE — PROGRESS NOTES
Ms.Ronda YAMILET Lees is a 58 y.o. female who presents today for  Chief Complaint   Patient presents with    Congestion    Cough    Fever     She says she was feeling bad for about a week but got really bad over the weekend.She is feeing achy and fever.        HPI:  Patient of Dr. Johnson here for acute issue.    She started feeling poorly about a week ago with cough, congestion.  Symptoms have progressively worsened.  She has been achy.  Back is hurting which she relates to muscle pain from frequent coughing.  She has felt feverish but has not checked temp.  She has had some sinus pressure and feels like nasal mucus is getting thicker.  She has been taking DayQuil with no improvement.    Review of Systems   Constitutional:  Positive for fever (Subjective). Negative for chills.   HENT:  Positive for congestion and sinus pressure. Negative for ear pain and sore throat.    Respiratory:  Positive for shortness of breath. Negative for cough, chest tightness and wheezing.    Cardiovascular:  Negative for chest pain.   Gastrointestinal:  Negative for abdominal pain, diarrhea and nausea.   Musculoskeletal:  Negative for arthralgias and myalgias.   Skin:  Negative for rash.       Past Medical History:   Diagnosis Date    Allergic rhinitis     Chronic bilateral low back pain with left-sided sciatica 12/01/2017    Colon polyp     Depression with anxiety     Laryngopharyngeal reflux (LPR) 10/06/2020    Mixed stress and urge urinary incontinence 12/07/2023    Obesity     Osteoarthritis     RLS (restless legs syndrome) 08/07/2017       Current Outpatient Medications   Medication Sig Dispense Refill    azithromycin (ZITHROMAX) 250 MG tablet 500mg on day 1 followed by 250mg on days 2 - 5 6 tablet 0    methylPREDNISolone (MEDROL DOSEPACK) 4 MG tablet Take by mouth. 1 kit 0    HYDROcodone-chlorpheniramine (TUSSIONEX) 10-8 MG/5ML SUER Take 5 mLs by mouth every 12 hours as needed (cough) for up to 10 days. Max Daily Amount: 10 mLs

## 2024-03-05 DIAGNOSIS — M54.42 CHRONIC BILATERAL LOW BACK PAIN WITH LEFT-SIDED SCIATICA: ICD-10-CM

## 2024-03-05 DIAGNOSIS — G89.29 CHRONIC NECK PAIN: ICD-10-CM

## 2024-03-05 DIAGNOSIS — G43.009 MIGRAINE WITHOUT AURA AND WITHOUT STATUS MIGRAINOSUS, NOT INTRACTABLE: ICD-10-CM

## 2024-03-05 DIAGNOSIS — G89.29 CHRONIC BILATERAL LOW BACK PAIN WITH LEFT-SIDED SCIATICA: ICD-10-CM

## 2024-03-05 DIAGNOSIS — M54.2 CHRONIC NECK PAIN: ICD-10-CM

## 2024-03-05 NOTE — TELEPHONE ENCOUNTER
Valeria YAMILET Lees called to request a refill on her medication.      Last office visit : 2/20/2024   Next office visit : 3/15/2024     Requested Prescriptions     Pending Prescriptions Disp Refills    traMADol (ULTRAM) 50 MG tablet 45 tablet 5     Sig: Take 1 tablet by mouth every 12 hours as needed for Pain for up to 182 days. Max Daily Amount: 100 mg    butalbital-acetaminophen-caffeine (FIORICET, ESGIC) -40 MG per tablet 60 tablet 0            Maria T Fuller MA

## 2024-03-06 RX ORDER — BUTALBITAL, ACETAMINOPHEN AND CAFFEINE 50; 325; 40 MG/1; MG/1; MG/1
1 TABLET ORAL EVERY 12 HOURS PRN
Qty: 30 TABLET | Refills: 0 | Status: SHIPPED | OUTPATIENT
Start: 2024-03-06

## 2024-03-06 RX ORDER — TRAMADOL HYDROCHLORIDE 50 MG/1
50 TABLET ORAL EVERY 12 HOURS PRN
Qty: 45 TABLET | Refills: 2 | Status: SHIPPED | OUTPATIENT
Start: 2024-03-06 | End: 2024-09-04

## 2024-03-06 NOTE — TELEPHONE ENCOUNTER
Valeria Lees called to request a refill on her medication.      Last office visit : 2/20/2024   Next office visit : 3/15/2024     Last UDS:   Amphetamine Screen, Urine   Date Value Ref Range Status   02/20/2023 N  Final     Barbiturate Screen, Urine   Date Value Ref Range Status   02/20/2023 POSITIVE  Final     Benzodiazepine Screen, Urine   Date Value Ref Range Status   02/20/2023 N  Final     Buprenorphine Urine   Date Value Ref Range Status   02/20/2023 N  Final     Cocaine Metabolite Screen, Urine   Date Value Ref Range Status   02/20/2023 N  Final     Gabapentin Screen, Urine   Date Value Ref Range Status   02/20/2023 N  Final     MDMA, Urine   Date Value Ref Range Status   02/20/2023 N  Final     Methamphetamine, Urine   Date Value Ref Range Status   02/20/2023 N  Final     Opiate Scrn, Ur   Date Value Ref Range Status   02/20/2023 N  Final     Oxycodone Screen, Ur   Date Value Ref Range Status   02/20/2023 N  Final     PCP Screen, Urine   Date Value Ref Range Status   02/20/2023 N  Final     Propoxyphene Screen, Urine   Date Value Ref Range Status   02/20/2023 N  Final     THC Screen, Urine   Date Value Ref Range Status   02/20/2023 N  Final     Tricyclic Antidepressants, Urine   Date Value Ref Range Status   02/20/2023 N  Final       Last Niraj: 3/6/24  Medication Contract: 2/20/23   Last Fill: 12/11/23 & 2/7/24    Requested Prescriptions     Pending Prescriptions Disp Refills    traMADol (ULTRAM) 50 MG tablet 45 tablet 5     Sig: Take 1 tablet by mouth every 12 hours as needed for Pain for up to 182 days. Max Daily Amount: 100 mg    butalbital-acetaminophen-caffeine (FIORICET, ESGIC) -40 MG per tablet 45 tablet 0     Sig: Take 1 tablet by mouth every 12 hours as needed for Headaches (chronic neck pain) Max Daily Amount: 2 tablets         Please approve or refuse this medication.   Latasha Gant MA

## 2024-03-06 NOTE — TELEPHONE ENCOUNTER
I refilled patient's tramadol and her Fioricet but given that it looks like she is going through 45 tramadol and 60 Fioricet monthly on review of her Niraj report, I am going to need to cut back on the total amount of Fioricet prescribed monthly or she has been a half to start picking which of the 2 that she wants to continue for her chronic headaches.

## 2024-03-10 NOTE — PROGRESS NOTES
6/14/2021  Lee Phelps is a 63 y.o. female evaluated via telephone on 6/14/2021. Consent:  She and/or health care decision maker is aware that that she may receive a bill for this telephone service, depending on her insurance coverage, and has provided verbal consent to proceed: Yes      Documentation:  I communicated with the patient and/or health care decision maker about anxiety, depression. Details of this discussion including any medical advice provided: see below      I affirm this is a Patient Initiated Episode with a Patient who has not had a related appointment within my department in the past 7 days or scheduled within the next 24 hours. Patient identification was verified at the start of the visit: Yes    Total Time: minutes: 11-20 minutes    Note: not billable if this call serves to triage the patient into an appointment for the relevant concern      KALI Segovia NP         6/14/2021 9:32 AM   Progress Note    IN: 0915  OUT: [address] [facility] 1965      Chief Complaint   Patient presents with    Follow-up    Anxiety    Depression         Subjective:  Patient is a 63 y.o. female diagnosed with MDD and presents today for follow-up. Last seen in clinic on 12/420 and prior records were reviewed. Today patient states, \"I'm doing pretty good. \" She reports that she has no complaints or concerns. She reports that Dr. Halle Ellison increased Luvox to 1.5 tabs at bedtime. Patient reports side effects as follows: none. No evidence of EPS, no cogwheeling or abnormal motor movements. Absent  suicidal ideation. Reports compliance with medications as good . Current Substance Use:  See history    BP: There were no vitals taken for this visit.       Review of Systems - 14 point review:  Negative except being treated for: migraine headaches, muscle tension, HTN, depression, anxiety, tinnitis in both ears      Constitutional: (fevers, chills, night sweats, wt loss/gain, Use    Smoking status: Never Smoker    Smokeless tobacco: Never Used   Vaping Use    Vaping Use: Never used   Substance and Sexual Activity    Alcohol use: Yes     Alcohol/week: 1.0 standard drinks     Types: 1 Standard drinks or equivalent per week     Comment: rarely    Drug use: Never    Sexual activity: Yes     Partners: Male   Other Topics Concern    None   Social History Narrative    PRIOR MEDICATION TRIALS    Desvenlafaxine (ineffective)    Wellbutrin    Temazepam    Lexapro    Sertraline    . SLEEP STUDY: no,  reports that she snores and has some daytime fatigue    . negative history of seizures. .    negative history of head trauma. Jade Gregory PREVIOUS PSYCHIATRIC HISTORY    Sister, depression/anxiety    Daughter, depression/anxiety (bipolar ?)    . FAMILY PSYCHIATRIC HISTORY    Sister, depression/anxiety    Daughter, depression/anxiety (bipolar ?)    Mother, anxiety    . SOCIAL HISTORY    Born and Raised - [place], 2 parent home with 2 siblings. Characterizes childhood as happy. Trauma and/or Abuse - denies    Legal - denies    Substance Use - see history    Work History - see history    Education - see history     status - denies    . PAST SUICIDE ATTEMPTS:    no    .    INPATIENT HOSPITALIZATIONS:    no    .    DRUG REHABILITATION:    no    .    PSYCHIATRIC REVIEW OF SYSTEMS, 7/20/2020    . Mood:  positive for low motivation; feeling down, depressed, hopeless, sleep disturbance, trouble concentrating, guilt, denies suicidality      (Depression: sadness, tearfulness, sleep, appetite, energy, concentration, sexual function, guilt, psychomotor agitation or slowing, interest, suicidality)    . Elisa: negative      (impulsivity, grandiosity, recklessness, excessive energy, decreased need for sleep, increased spending beyond means, hyperverbal, grandiose, racing thoughts, hypersexuality)    .     Other: positive for irritable      (Irritability, lability, Financial Resource Strain: Medium Risk    Difficulty of Paying Living Expenses: Somewhat hard   Food Insecurity: No Food Insecurity    Worried About Running Out of Food in the Last Year: Never true    Noe of Food in the Last Year: Never true   Transportation Needs: No Transportation Needs    Lack of Transportation (Medical): No    Lack of Transportation (Non-Medical): No   Physical Activity:     Days of Exercise per Week:     Minutes of Exercise per Session:    Stress:     Feeling of Stress :    Social Connections: Socially Integrated    Frequency of Communication with Friends and Family: More than three times a week    Frequency of Social Gatherings with Friends and Family: More than three times a week    Attends Buddhism Services: More than 4 times per year    Active Member of [employer] or Organizations: Yes    Attends Club or Organization Meetings: More than 4 times per year    Marital Status:    Intimate Partner Violence: Not At Risk    Fear of Current or Ex-Partner: No    Emotionally Abused: No    Physically Abused: No    Sexually Abused: No       MSE:  Patient is  A & O x 4. Appearance:  TERE. Cognition:  Recent memory intact , remote memory intact , good fund of knowledge, average  intelligence level. Speech:  normal  Language: Naming: intact;  Word Finding: intact  Conversation no evidence of delusions  Behavior:  Cooperative  Mood: \"good\"  Affect: TERE  Thought Content: negative delusions, negative hallucinations, negative obsessions,  negativehomicidal and negative suicidal   Thought Process: linear, goal directed and coherent  Associations: logical connections  Attention Span and concentration: Normal   Judgement Insight:  normal and appropriate  Gait and Station:TERE   Sleep: avg 5-6 hrs    Appetite: ok      Lab Results   Component Value Date     09/18/2020    K 4.2 09/18/2020     09/18/2020    CO2 25 09/18/2020    BUN 7 09/18/2020    CREATININE 0.8 09/18/2020 GLUCOSE 94 06/12/2020    CALCIUM 9.5 09/18/2020    PROT 7.1 09/18/2020    LABALBU 4.6 09/18/2020    BILITOT 0.3 09/18/2020    ALKPHOS 104 09/18/2020    AST 21 09/18/2020    ALT 16 09/18/2020    LABGLOM >60 09/18/2020    GFRAA >59 09/18/2020     Lab Results   Component Value Date     09/18/2020    K 4.2 09/18/2020     09/18/2020    CO2 25 09/18/2020    BUN 7 09/18/2020    CREATININE 0.8 09/18/2020    GLUCOSE 94 06/12/2020    CALCIUM 9.5 09/18/2020      Lab Results   Component Value Date    CHOL 221 (H) 06/12/2020     Lab Results   Component Value Date    TRIG 149 06/12/2020     Lab Results   Component Value Date    HDL 43 (L) 09/18/2020     Lab Results   Component Value Date    LDLCALC 139 09/18/2020     No results found for: LABVLDL, VLDL  No results found for: CHOLHDLRATIO  No results found for: LABA1C  No results found for: EAG  Lab Results   Component Value Date    TSH 3.600 04/29/2021     Lab Results   Component Value Date    VITD25 41.2 06/12/2020       Assessments Administered:    PHQ9: 7, mild  GAD7: 8, mild    Cognition: Can spell \"world\" backwards: Yes                    Can do serial 7's: Yes    Assessment:   1. Mild episode of recurrent major depressive disorder ([facility] [address]    2. HORACE (generalized anxiety disorder)    3. Hoarding behavior         R/O Obsessive Compulsive Disorder    Plan:  Continue:  Fluvoxamine, 50mg, take 1.5 tabs nightly (increased by Dr. Tad Araiza)    Continue therapy with Narinder Angulo    Follow up: No follow-ups on file. 1. The risks, benefits, side effects, indications, contraindications, and adverse effects of the medications have been discussed. Yes.  2. The pt has verbalized understanding and has capacity to give informed consent. 3. The Luz Fruit report has been reviewed according to [facility] regulations. 4. Supportive therapy offered. 5. Follow up: No follow-ups on file. 6. The patient has been advised to call with any problems. 7. Controlled substance Treatment Plan: none. 8. The above listed medications have been continued, modifications in meds and other orders/labs as follows: No orders of the defined types were placed in this encounter. No orders of the defined types were placed in this encounter. 9. Additional comments: She says that she is working on getting things organized and getting rid of things she no longer needs. She would like to switch the prescribing of Fluvoxamine to Dr. Lucie Holland who actually did the last increase on Fluvoxamine. She would like to get back in to SHELBY Medrano for therapy. Will refer for a theray appt. She reports that she is working on organization and getting rid of things. Will make no changes today. No follow up by this provider. Patient was informed that this provider is moving back to [place] in October. 10.Over 50% of the total visit time of 15 minutes was spent on counseling and/or coordination of care of:                        1. Mild episode of recurrent major depressive disorder ([facility] [address]    2. HORACE (generalized anxiety disorder)    3.  Hoarding behavior                      Psychotherapy Topics: mood/medication effectiveness       KALI Chanel - NP PMHNP-BC 1.93

## 2024-03-11 ENCOUNTER — TELEPHONE (OUTPATIENT)
Dept: GASTROENTEROLOGY | Age: 59
End: 2024-03-11

## 2024-03-11 DIAGNOSIS — E53.8 B12 DEFICIENCY: ICD-10-CM

## 2024-03-11 DIAGNOSIS — E03.9 ACQUIRED HYPOTHYROIDISM: ICD-10-CM

## 2024-03-11 DIAGNOSIS — E78.2 MIXED HYPERLIPIDEMIA: ICD-10-CM

## 2024-03-11 DIAGNOSIS — E55.9 VITAMIN D DEFICIENCY: ICD-10-CM

## 2024-03-11 LAB
25(OH)D3 SERPL-MCNC: 45.1 NG/ML
ALBUMIN SERPL-MCNC: 4.4 G/DL (ref 3.5–5.2)
ALP SERPL-CCNC: 114 U/L (ref 35–104)
ALT SERPL-CCNC: 24 U/L (ref 5–33)
ANION GAP SERPL CALCULATED.3IONS-SCNC: 7 MMOL/L (ref 7–19)
AST SERPL-CCNC: 22 U/L (ref 5–32)
BASOPHILS # BLD: 0.1 K/UL (ref 0–0.2)
BASOPHILS NFR BLD: 0.9 % (ref 0–1)
BILIRUB SERPL-MCNC: 0.4 MG/DL (ref 0.2–1.2)
BUN SERPL-MCNC: 5 MG/DL (ref 6–20)
CALCIUM SERPL-MCNC: 9.7 MG/DL (ref 8.6–10)
CHLORIDE SERPL-SCNC: 103 MMOL/L (ref 98–111)
CHOLEST SERPL-MCNC: 210 MG/DL (ref 160–199)
CO2 SERPL-SCNC: 30 MMOL/L (ref 22–29)
CREAT SERPL-MCNC: 0.8 MG/DL (ref 0.5–0.9)
EOSINOPHIL # BLD: 0.3 K/UL (ref 0–0.6)
EOSINOPHIL NFR BLD: 4.8 % (ref 0–5)
ERYTHROCYTE [DISTWIDTH] IN BLOOD BY AUTOMATED COUNT: 12.8 % (ref 11.5–14.5)
GLUCOSE SERPL-MCNC: 92 MG/DL (ref 74–109)
HCT VFR BLD AUTO: 42.7 % (ref 37–47)
HDLC SERPL-MCNC: 43 MG/DL (ref 65–121)
HGB BLD-MCNC: 14.6 G/DL (ref 12–16)
IMM GRANULOCYTES # BLD: 0 K/UL
LDLC SERPL CALC-MCNC: 131 MG/DL
LYMPHOCYTES # BLD: 1.2 K/UL (ref 1.1–4.5)
LYMPHOCYTES NFR BLD: 23.1 % (ref 20–40)
MCH RBC QN AUTO: 30.8 PG (ref 27–31)
MCHC RBC AUTO-ENTMCNC: 34.2 G/DL (ref 33–37)
MCV RBC AUTO: 90.1 FL (ref 81–99)
MONOCYTES # BLD: 0.4 K/UL (ref 0–0.9)
MONOCYTES NFR BLD: 6.9 % (ref 0–10)
NEUTROPHILS # BLD: 3.4 K/UL (ref 1.5–7.5)
NEUTS SEG NFR BLD: 64.1 % (ref 50–65)
PLATELET # BLD AUTO: 259 K/UL (ref 130–400)
PMV BLD AUTO: 10.1 FL (ref 9.4–12.3)
POTASSIUM SERPL-SCNC: 4.1 MMOL/L (ref 3.5–5)
PROT SERPL-MCNC: 7.2 G/DL (ref 6.6–8.7)
RBC # BLD AUTO: 4.74 M/UL (ref 4.2–5.4)
SODIUM SERPL-SCNC: 140 MMOL/L (ref 136–145)
TRIGL SERPL-MCNC: 178 MG/DL (ref 0–149)
TSH SERPL DL<=0.005 MIU/L-ACNC: 0.45 UIU/ML (ref 0.35–5.5)
VIT B12 SERPL-MCNC: 595 PG/ML (ref 211–946)
WBC # BLD AUTO: 5.4 K/UL (ref 4.8–10.8)

## 2024-03-11 NOTE — TELEPHONE ENCOUNTER
Called patient to remind them of [facility] to arrive at 1030 AM     CONFIRMED    PATIENT HAS PREP INSTR & PRESCRIPTION

## 2024-03-14 ENCOUNTER — HOSPITAL ENCOUNTER (OUTPATIENT)
Age: 59
Setting detail: OUTPATIENT SURGERY
Discharge: HOME | End: 2024-03-14
Attending: INTERNAL MEDICINE | Admitting: INTERNAL MEDICINE

## 2024-03-14 ENCOUNTER — APPOINTMENT (OUTPATIENT)
Dept: OPERATING ROOM | Age: 59
End: 2024-03-14
Attending: INTERNAL MEDICINE

## 2024-03-14 ENCOUNTER — ANESTHESIA (OUTPATIENT)
Dept: OPERATING ROOM | Age: 59
End: 2024-03-14

## 2024-03-14 ENCOUNTER — ANESTHESIA EVENT (OUTPATIENT)
Dept: OPERATING ROOM | Age: 59
End: 2024-03-14

## 2024-03-14 ENCOUNTER — HOSPITAL ENCOUNTER (OUTPATIENT)
Age: 59
Setting detail: SPECIMEN
Discharge: HOME | End: 2024-03-14
Payer: COMMERCIAL

## 2024-03-14 VITALS
DIASTOLIC BLOOD PRESSURE: 71 MMHG | SYSTOLIC BLOOD PRESSURE: 131 MMHG | RESPIRATION RATE: 16 BRPM | TEMPERATURE: 97.4 F | BODY MASS INDEX: 27 KG/M2 | WEIGHT: 143 LBS | HEIGHT: 61 IN | HEART RATE: 64 BPM | OXYGEN SATURATION: 99 %

## 2024-03-14 PROCEDURE — 45380 COLONOSCOPY AND BIOPSY: CPT

## 2024-03-14 PROCEDURE — 88305 TISSUE EXAM BY PATHOLOGIST: CPT

## 2024-03-14 RX ORDER — SODIUM CHLORIDE, SODIUM LACTATE, POTASSIUM CHLORIDE, CALCIUM CHLORIDE 600; 310; 30; 20 MG/100ML; MG/100ML; MG/100ML; MG/100ML
INJECTION, SOLUTION INTRAVENOUS CONTINUOUS PRN
Status: DISCONTINUED | OUTPATIENT
Start: 2024-03-14 | End: 2024-03-14 | Stop reason: SDUPTHER

## 2024-03-14 RX ORDER — LIDOCAINE HYDROCHLORIDE 10 MG/ML
INJECTION, SOLUTION INFILTRATION; PERINEURAL PRN
Status: DISCONTINUED | OUTPATIENT
Start: 2024-03-14 | End: 2024-03-14 | Stop reason: SDUPTHER

## 2024-03-14 RX ORDER — PROPOFOL 10 MG/ML
INJECTION, EMULSION INTRAVENOUS PRN
Status: DISCONTINUED | OUTPATIENT
Start: 2024-03-14 | End: 2024-03-14 | Stop reason: SDUPTHER

## 2024-03-14 RX ORDER — SODIUM CHLORIDE, SODIUM LACTATE, POTASSIUM CHLORIDE, CALCIUM CHLORIDE 600; 310; 30; 20 MG/100ML; MG/100ML; MG/100ML; MG/100ML
INJECTION, SOLUTION INTRAVENOUS CONTINUOUS
Status: DISCONTINUED | OUTPATIENT
Start: 2024-03-14 | End: 2024-03-14 | Stop reason: HOSPADM

## 2024-03-14 RX ADMIN — SODIUM CHLORIDE, SODIUM LACTATE, POTASSIUM CHLORIDE, CALCIUM CHLORIDE: 600; 310; 30; 20 INJECTION, SOLUTION INTRAVENOUS at 11:18

## 2024-03-14 RX ADMIN — PROPOFOL 20 MG: 10 INJECTION, EMULSION INTRAVENOUS at 11:46

## 2024-03-14 RX ADMIN — PROPOFOL 70 MG: 10 INJECTION, EMULSION INTRAVENOUS at 11:40

## 2024-03-14 RX ADMIN — LIDOCAINE HYDROCHLORIDE 50 MG: 10 INJECTION, SOLUTION INFILTRATION; PERINEURAL at 11:40

## 2024-03-14 RX ADMIN — PROPOFOL 20 MG: 10 INJECTION, EMULSION INTRAVENOUS at 11:44

## 2024-03-14 RX ADMIN — PROPOFOL 30 MG: 10 INJECTION, EMULSION INTRAVENOUS at 11:42

## 2024-03-14 RX ADMIN — SODIUM CHLORIDE, SODIUM LACTATE, POTASSIUM CHLORIDE, CALCIUM CHLORIDE: 600; 310; 30; 20 INJECTION, SOLUTION INTRAVENOUS at 11:24

## 2024-03-14 ASSESSMENT — PAIN - FUNCTIONAL ASSESSMENT: PAIN_FUNCTIONAL_ASSESSMENT: NONE - DENIES PAIN

## 2024-03-14 NOTE — DISCHARGE INSTRUCTIONS
POST-OP ORDERS: ENDOSCOPY & COLONOSCOPY:    1. Rest today.    2. DO NOT eat or drink until wide awake; eat your usual diet today in moderate amount only.    3. DO NOT drive today.    4. Call physician if you have severe pain, vomiting, fever, rectal bleeding or black bowel movements.    5.  If a biopsy was taken or a polyp removed, you should expect to hear results in about 21 days.  If you have heard nothing from your physician by then, call the office for results.    6.  Discharge home when patient awake, vitals signs stable and tolerating liquids.    7. Call with questions or concerns [phone].    Recommendations:  1. Repeat colonoscopy: pending pathology -  5 years for CRC screening  2. Await biopsy results  3. Follow up OV with KALI Meredith in 8 weeks.

## 2024-03-14 NOTE — ANESTHESIA POSTPROCEDURE EVALUATION
Department of Anesthesiology  Postprocedure Note    Patient: Valeria Lees  MRN: [id number]  YOB: 1965  Date of evaluation: 3/14/2024    Procedure Summary       Date: 03/14/24 Room / Location: [place] [address] / [facility]    Anesthesia Start: 1124 Anesthesia Stop: 1150    Procedure: COLONOSCOPY BIOPSY (Abdomen) Diagnosis:       Diarrhea, unspecified type      Abdominal cramping      (Diarrhea, unspecified type [R19.7])      (Abdominal cramping [R10.9])    Surgeons: Martin Dan MD Responsible Provider: Tony Rivas APRN - CRNA    Anesthesia Type: general, TIVA ASA Status: 2            Anesthesia Type: No value filed.    Ricarda Phase I:      Ricarda Phase II:      Anesthesia Post Evaluation    Patient location during evaluation: PACU  Patient participation: complete - patient participated  Level of consciousness: sleepy but conscious  Pain score: 0  Airway patency: patent  Nausea & Vomiting: no nausea and no vomiting  Cardiovascular status: blood pressure returned to baseline  Respiratory status: acceptable  Pain management: adequate        No notable events documented.

## 2024-03-14 NOTE — OP NOTE
Patient: Valeria Lees : 1965  [facility] Rec#: [id number] Acc#: [id number]   Primary Care Provider Domonique Johnson MD    Date of Procedure:  3/14/2024    Endoscopist: Martin Dan MD    Referring Provider: Domonique Johnson MD, KALI Obregon    Operation Performed: Colonoscopy with biopsy    Indications: diarrhea, abdominal pain, hx of polyps    Anesthesia:  Sedation was administered by anesthesia who monitored the patient during the procedure.    I met with Valeria Lees prior to procedure. We discussed the procedure itself, and I have discussed the risks of endoscopy (including-- but not limited to-- pain, discomfort, bleeding potentially requiring second endoscopic procedure and/or blood transfusion, organ perforation requiring operative repair, damage to organs near the colon, infection, aspiration, cardiopulmonary/allergic reaction), benefits, indications to endoscopy. Additionally, we discussed options other than colonoscopy. The patient expressed understanding. All questions answered. The patient decided to proceed with the procedure.  Signed informed consent was placed on the chart.    Blood Loss: minimal    Withdrawal time: n/a  Bowel Prep: adequate     Complications: no immediate complications    DESCRIPTION OF PROCEDURE:     A time out was performed. After written informed consent was obtained, the patient was placed in the left lateral position.     The perianal area was inspected, and a digital rectal exam was performed. A rectal exam was performed: normal tone, no palpable lesions. At this point, a forward viewing Olympus colonoscope was inserted into the anus and carefully advanced to the cecum.  The cecum was identified by the ileocecal valve and the appendiceal orifice. The colonoscope was then slowly withdrawn with careful inspection of the mucosa in a linear and circumferential fashion. The scope was retroflexed in the rectum. Suction was utilized during the

## 2024-03-14 NOTE — H&P
Patient Name: Valeria Lees  : 1965  MRN: [id number]  DATE: 24    Allergies:   Allergies   Allergen Reactions    Augmentin [Amoxicillin-Pot Clavulanate] Diarrhea    Aspirin Rash        ENDOSCOPY  History and Physical    Procedure:    [x] Diagnostic Colonoscopy       [] Screening Colonoscopy  [] EGD      [] ERCP      [] EUS       [] Other    [x] Previous office notes/History and Physical reviewed from the patients chart. Please see EMR for further details of HPI. I have examined the patient's status immediately prior to the procedure and:      Indications/HPI:    [x]Abdominal Pain   []Barretts  []Screening/Surveillance   []History of Polyps  []Dysphagia            [] +Cologard/DNA testing  []Abnormal Imaging              []EOE Hx              [] Family Hx of CRC/Polyps  []Anemia                            []Food Impaction       []Recent Poor Prep  []GI Bleed             []Lymphadenopathy  []History of Polyps  []Change in bowel habits []Heartburn/Reflux  []Cancer- GI/Lung  []Chest Pain - Non Cardiac []Heme (+) Stool []Ulcers  []Constipation  []Hemoptysis  []Incontinence    [x]Diarrhea  []Hypoxemia  []Rectal Bleed (BRBPR)  []Nausea/Vomiting   [] Varices  []Crohns/Colitis  []Pancreatic Cyst   [] Cirrhosis   []Pancreatitis    []Abnormal MRCP  []Elevated LFT [] Stent Removal, Previous ERCP  []Other:     Anesthesia:   [x] MAC [] Moderate Sedation   [] General   [] None     ROS: 12 pt Review of Symptoms was negative unless mentioned above    Medications:   Prior to Admission medications    Medication Sig Start Date End Date Taking? Authorizing Provider   traMADol (ULTRAM) 50 MG tablet Take 1 tablet by mouth every 12 hours as needed for Pain for up to 182 days. Max Daily Amount: 100 mg 3/6/24 9/4/24  Domonique Johnson MD   butalbital-acetaminophen-caffeine (FIORICET, ESGIC) -40 MG per tablet Take 1 tablet by mouth every 12 hours as needed for Headaches (chronic neck pain) Max Daily Amount: 2

## 2024-03-14 NOTE — ANESTHESIA PRE PROCEDURE
Department of Anesthesiology  Preprocedure Note       Name:  Valeria Lees   Age:  58 y.o.  :  1965                                          MRN:  [id number]         Date:  3/14/2024      Surgeon: Surgeon(s):  Martin Dan MD    Procedure: Procedure(s):  COLONOSCOPY DIAGNOSTIC    Medications prior to admission:   Prior to Admission medications    Medication Sig Start Date End Date Taking? Authorizing Provider   traMADol (ULTRAM) 50 MG tablet Take 1 tablet by mouth every 12 hours as needed for Pain for up to 182 days. Max Daily Amount: 100 mg 3/6/24 9/4/24  Domonique Johnson MD   butalbital-acetaminophen-caffeine (FIORICET, ESGIC) -40 MG per tablet Take 1 tablet by mouth every 12 hours as needed for Headaches (chronic neck pain) Max Daily Amount: 2 tablets 3/6/24   Domonique Johnson MD   baclofen (LIORESAL) 10 MG tablet TAKE 1 TABLET BY MOUTH THREE TIMES DAILY AS NEEDED FOR MUSCLE SPASM OR NECK PAIN 24   Domonique Johnson MD   propranolol (INDERAL LA) 80 MG extended release capsule Take 1 capsule by mouth once daily 24   Domonique Johnson MD   gabapentin (NEURONTIN) 300 MG capsule TAKE 3 CAPSULES BY MOUTH EVERY DAY AT BEDTIME 24  Domonique Johnson MD   fluvoxaMINE (LUVOX) 100 MG tablet Take 1 tablet by mouth nightly 23   Domonique Johnson MD   levothyroxine (SYNTHROID) 100 MCG tablet Take 1 tablet by mouth daily 23   Domonique Johnson MD   pantoprazole (PROTONIX) 40 MG tablet Take 1 tablet by mouth every morning (before breakfast) 23   Domonique Johnson MD   azelastine (ASTELIN) 0.1 % nasal spray 2 sprays by Nasal route 2 times daily Use in each nostril as directed 23   Domonique Johnson MD   Potassium 99 MG TABS Take 1 tablet by mouth daily    Piedad Meyers MD   Multiple Vitamins-Minerals (MULTIVITAMIN ADULT PO) Take by mouth    Piedad Meyers MD   Omega 3 1000 MG CAPS Take by mouth    Provider,

## 2024-03-18 DIAGNOSIS — K52.832 LYMPHOCYTIC COLITIS: Primary | ICD-10-CM

## 2024-03-18 RX ORDER — BUDESONIDE 3 MG/1
9 CAPSULE, COATED PELLETS ORAL EVERY MORNING
Qty: 90 CAPSULE | Refills: 2 | Status: SHIPPED | OUTPATIENT
Start: 2024-03-18 | End: 2024-06-16

## 2024-03-19 ENCOUNTER — TELEPHONE (OUTPATIENT)
Dept: GASTROENTEROLOGY | Age: 59
End: 2024-03-19

## 2024-03-19 NOTE — TELEPHONE ENCOUNTER
Left vm for pt to call back    ----- Message from KALI Rucker sent at 3/18/2024  7:36 PM CDT -----  RX sent for budesonide 9mg daily

## 2024-03-25 ENCOUNTER — OFFICE VISIT (OUTPATIENT)
Dept: PRIMARY CARE CLINIC | Age: 59
End: 2024-03-25

## 2024-03-25 ENCOUNTER — HOSPITAL ENCOUNTER (OUTPATIENT)
Dept: GENERAL RADIOLOGY | Age: 59
Discharge: HOME | End: 2024-03-25
Payer: COMMERCIAL

## 2024-03-25 VITALS
HEIGHT: 61 IN | OXYGEN SATURATION: 97 % | DIASTOLIC BLOOD PRESSURE: 76 MMHG | HEART RATE: 61 BPM | TEMPERATURE: 97.1 F | BODY MASS INDEX: 27.99 KG/M2 | SYSTOLIC BLOOD PRESSURE: 132 MMHG | WEIGHT: 148.25 LBS

## 2024-03-25 DIAGNOSIS — M54.2 CHRONIC NECK PAIN: ICD-10-CM

## 2024-03-25 DIAGNOSIS — J30.2 PERENNIAL ALLERGIC RHINITIS WITH SEASONAL VARIATION: ICD-10-CM

## 2024-03-25 DIAGNOSIS — G89.29 CHRONIC PAIN OF BOTH KNEES: ICD-10-CM

## 2024-03-25 DIAGNOSIS — F33.1 MODERATE RECURRENT MAJOR DEPRESSION (HCC): ICD-10-CM

## 2024-03-25 DIAGNOSIS — E66.3 OVERWEIGHT (BMI 25.0-29.9): ICD-10-CM

## 2024-03-25 DIAGNOSIS — G89.29 CHRONIC BILATERAL LOW BACK PAIN WITH LEFT-SIDED SCIATICA: ICD-10-CM

## 2024-03-25 DIAGNOSIS — E55.9 VITAMIN D DEFICIENCY: ICD-10-CM

## 2024-03-25 DIAGNOSIS — G44.86 CERVICOGENIC HEADACHE: ICD-10-CM

## 2024-03-25 DIAGNOSIS — E03.9 ACQUIRED HYPOTHYROIDISM: ICD-10-CM

## 2024-03-25 DIAGNOSIS — M54.2 CHRONIC NECK PAIN: Primary | ICD-10-CM

## 2024-03-25 DIAGNOSIS — G89.29 CHRONIC NECK PAIN: ICD-10-CM

## 2024-03-25 DIAGNOSIS — M25.562 CHRONIC PAIN OF BOTH KNEES: ICD-10-CM

## 2024-03-25 DIAGNOSIS — M54.42 CHRONIC BILATERAL LOW BACK PAIN WITH LEFT-SIDED SCIATICA: ICD-10-CM

## 2024-03-25 DIAGNOSIS — F41.1 GAD (GENERALIZED ANXIETY DISORDER): ICD-10-CM

## 2024-03-25 DIAGNOSIS — J30.89 PERENNIAL ALLERGIC RHINITIS WITH SEASONAL VARIATION: ICD-10-CM

## 2024-03-25 DIAGNOSIS — M25.561 CHRONIC PAIN OF BOTH KNEES: ICD-10-CM

## 2024-03-25 DIAGNOSIS — G89.29 CHRONIC NECK PAIN: Primary | ICD-10-CM

## 2024-03-25 DIAGNOSIS — E78.2 MIXED HYPERLIPIDEMIA: ICD-10-CM

## 2024-03-25 PROCEDURE — 72050 X-RAY EXAM NECK SPINE 4/5VWS: CPT

## 2024-03-25 PROCEDURE — 72100 X-RAY EXAM L-S SPINE 2/3 VWS: CPT

## 2024-03-25 RX ORDER — KETOROLAC TROMETHAMINE 30 MG/ML
30 INJECTION, SOLUTION INTRAMUSCULAR; INTRAVENOUS ONCE
Status: COMPLETED | OUTPATIENT
Start: 2024-03-25 | End: 2024-03-25

## 2024-03-25 RX ADMIN — KETOROLAC TROMETHAMINE 30 MG: 30 INJECTION, SOLUTION INTRAMUSCULAR; INTRAVENOUS at 12:05

## 2024-03-25 SDOH — ECONOMIC STABILITY: FOOD INSECURITY: WITHIN THE PAST 12 MONTHS, YOU WORRIED THAT YOUR FOOD WOULD RUN OUT BEFORE YOU GOT MONEY TO BUY MORE.: NEVER TRUE

## 2024-03-25 SDOH — ECONOMIC STABILITY: INCOME INSECURITY: HOW HARD IS IT FOR YOU TO PAY FOR THE VERY BASICS LIKE FOOD, HOUSING, MEDICAL CARE, AND HEATING?: SOMEWHAT HARD

## 2024-03-25 SDOH — ECONOMIC STABILITY: FOOD INSECURITY: WITHIN THE PAST 12 MONTHS, THE FOOD YOU BOUGHT JUST DIDN'T LAST AND YOU DIDN'T HAVE MONEY TO GET MORE.: NEVER TRUE

## 2024-03-25 ASSESSMENT — PATIENT HEALTH QUESTIONNAIRE - PHQ9
9. THOUGHTS THAT YOU WOULD BE BETTER OFF DEAD, OR OF HURTING YOURSELF: NOT AT ALL
7. TROUBLE CONCENTRATING ON THINGS, SUCH AS READING THE NEWSPAPER OR WATCHING TELEVISION: NEARLY EVERY DAY
5. POOR APPETITE OR OVEREATING: MORE THAN HALF THE DAYS
SUM OF ALL RESPONSES TO PHQ QUESTIONS 1-9: 15
8. MOVING OR SPEAKING SO SLOWLY THAT OTHER PEOPLE COULD HAVE NOTICED. OR THE OPPOSITE, BEING SO FIGETY OR RESTLESS THAT YOU HAVE BEEN MOVING AROUND A LOT MORE THAN USUAL: SEVERAL DAYS
SUM OF ALL RESPONSES TO PHQ9 QUESTIONS 1 & 2: 2
3. TROUBLE FALLING OR STAYING ASLEEP: MORE THAN HALF THE DAYS
2. FEELING DOWN, DEPRESSED OR HOPELESS: SEVERAL DAYS
1. LITTLE INTEREST OR PLEASURE IN DOING THINGS: SEVERAL DAYS
SUM OF ALL RESPONSES TO PHQ QUESTIONS 1-9: 15
5. POOR APPETITE OR OVEREATING: MORE THAN HALF THE DAYS
6. FEELING BAD ABOUT YOURSELF - OR THAT YOU ARE A FAILURE OR HAVE LET YOURSELF OR YOUR FAMILY DOWN: NEARLY EVERY DAY
1. LITTLE INTEREST OR PLEASURE IN DOING THINGS: SEVERAL DAYS
SUM OF ALL RESPONSES TO PHQ QUESTIONS 1-9: 15
6. FEELING BAD ABOUT YOURSELF - OR THAT YOU ARE A FAILURE OR HAVE LET YOURSELF OR YOUR FAMILY DOWN: NEARLY EVERY DAY
4. FEELING TIRED OR HAVING LITTLE ENERGY: MORE THAN HALF THE DAYS
10. IF YOU CHECKED OFF ANY PROBLEMS, HOW DIFFICULT HAVE THESE PROBLEMS MADE IT FOR YOU TO DO YOUR WORK, TAKE CARE OF THINGS AT HOME, OR GET ALONG WITH OTHER PEOPLE: VERY DIFFICULT
7. TROUBLE CONCENTRATING ON THINGS, SUCH AS READING THE NEWSPAPER OR WATCHING TELEVISION: NEARLY EVERY DAY
10. IF YOU CHECKED OFF ANY PROBLEMS, HOW DIFFICULT HAVE THESE PROBLEMS MADE IT FOR YOU TO DO YOUR WORK, TAKE CARE OF THINGS AT HOME, OR GET ALONG WITH OTHER PEOPLE: VERY DIFFICULT
3. TROUBLE FALLING OR STAYING ASLEEP: MORE THAN HALF THE DAYS
4. FEELING TIRED OR HAVING LITTLE ENERGY: MORE THAN HALF THE DAYS
SUM OF ALL RESPONSES TO PHQ QUESTIONS 1-9: 15
9. THOUGHTS THAT YOU WOULD BE BETTER OFF DEAD, OR OF HURTING YOURSELF: NOT AT ALL
SUM OF ALL RESPONSES TO PHQ QUESTIONS 1-9: 15
2. FEELING DOWN, DEPRESSED OR HOPELESS: SEVERAL DAYS
8. MOVING OR SPEAKING SO SLOWLY THAT OTHER PEOPLE COULD HAVE NOTICED. OR THE OPPOSITE - BEING SO FIDGETY OR RESTLESS THAT YOU HAVE BEEN MOVING AROUND A LOT MORE THAN USUAL: SEVERAL DAYS

## 2024-03-25 NOTE — PROGRESS NOTES
Valeria Lees is a 58 y.o. female who presents today for   Chief Complaint   Patient presents with    Thyroid Problem    Cholesterol Problem    Follow-up    Depression    Headache       Joint Pain   Pertinent negatives include no fever or numbness.   Other  Associated symptoms include arthralgias, congestion, fatigue, headaches, myalgias and neck pain. Pertinent negatives include no abdominal pain, chest pain, chills, coughing, fever, numbness, rash, vomiting or weakness.   Medication Refill  Associated symptoms include arthralgias, congestion, fatigue, headaches, myalgias and neck pain. Pertinent negatives include no abdominal pain, chest pain, chills, coughing, fever, numbness, rash, vomiting or weakness.   Headache  Weight Management  Associated symptoms include arthralgias, congestion, fatigue, headaches, myalgias and neck pain. Pertinent negatives include no abdominal pain, chest pain, chills, coughing, fever, numbness, rash, vomiting or weakness.   Hand Injury   Pertinent negatives include no chest pain or numbness.   Thyroid Problem  Symptoms include anxiety, diarrhea and fatigue. Patient reports no cold intolerance, heat intolerance, palpitations or tremors.     59 y/o WF here for follow up on  follow up on recurrent migraine/tension HAs, chronic neck pain with controlled med refills, chronic depression, HORACE, and elevated BMI. Patient had colonoscopy on 3/14/24 with pathology showing lymphocytic colitis and prescription for budesonide 3 mg delayed release tablets last visit, but patient has not picked it up yet. She feels like diarrhea may have improved some with eating with yogurt with probiotic/prebiotic/fiber as well adding belia seeds to her yogurt. She has gastroenterology follow up on 5/9/24.     Patient's neck pain and low back pain have been worse for the past 6 months approximately but neck is worse lower back. She has been taking tramadol 1/2 tablet twice a day to make her pills last for 30 days

## 2024-04-03 ENCOUNTER — TELEPHONE (OUTPATIENT)
Dept: PSYCHIATRY | Age: 59
End: 2024-04-03

## 2024-04-04 ENCOUNTER — TELEPHONE (OUTPATIENT)
Dept: PSYCHIATRY | Age: 59
End: 2024-04-04

## 2024-04-04 DIAGNOSIS — G43.009 MIGRAINE WITHOUT AURA AND WITHOUT STATUS MIGRAINOSUS, NOT INTRACTABLE: ICD-10-CM

## 2024-04-04 NOTE — TELEPHONE ENCOUNTER
Valeria WOODARD Yoana called to request a refill on her medication.      Last office visit : 3/25/2024   Next office visit : 5/10/2024     Requested Prescriptions     Pending Prescriptions Disp Refills    butalbital-acetaminophen-caffeine (FIORICET, ESGIC) -40 MG per tablet [Pharmacy Med Name: BUT/ACETAMINOPHEN/CAFF -40 TB] 30 tablet      Sig: TAKE 1 TABLET BY MOUTH EVERY 12 HOURS AS NEEDED FOR HEADACHE OR CHRONIC NECK PAIN. MAX DAILY AMOUNT: 2 TABLETS            Maria T Fuller MA

## 2024-04-04 NOTE — TELEPHONE ENCOUNTER
Called pt to schedule an appt from a referral    No answer and LVM  This is the third attempt to reach the pt  It is up to the pt to call and schedule an appt    Sending back to referring provider.    Electronically signed by Svetlana Simon MA on 4/4/2024 at 11:02 AM

## 2024-04-05 ENCOUNTER — TELEPHONE (OUTPATIENT)
Dept: PSYCHIATRY | Age: 59
End: 2024-04-05

## 2024-04-05 NOTE — TELEPHONE ENCOUNTER
Pt called to schedule an appt from a referral for Specialty Services     Appt was scheduled with Dr. Gutierres for 4/15 @ 12 PM      Electronically signed by Zulma Bustos on 4/5/2024 at 3:36 PM

## 2024-04-07 DIAGNOSIS — G44.86 CERVICOGENIC HEADACHE: ICD-10-CM

## 2024-04-07 DIAGNOSIS — M54.42 CHRONIC BILATERAL LOW BACK PAIN WITH LEFT-SIDED SCIATICA: ICD-10-CM

## 2024-04-07 DIAGNOSIS — G89.29 CHRONIC NECK PAIN: Primary | ICD-10-CM

## 2024-04-07 DIAGNOSIS — G89.29 CHRONIC BILATERAL LOW BACK PAIN WITH LEFT-SIDED SCIATICA: ICD-10-CM

## 2024-04-07 DIAGNOSIS — M54.2 CHRONIC NECK PAIN: Primary | ICD-10-CM

## 2024-04-07 RX ORDER — BUTALBITAL, ACETAMINOPHEN AND CAFFEINE 50; 325; 40 MG/1; MG/1; MG/1
TABLET ORAL
Qty: 30 TABLET | Refills: 0 | Status: SHIPPED | OUTPATIENT
Start: 2024-04-07

## 2024-04-09 ENCOUNTER — TELEPHONE (OUTPATIENT)
Dept: PSYCHIATRY | Age: 59
End: 2024-04-09

## 2024-04-09 NOTE — TELEPHONE ENCOUNTER
Called pt to see if she wanted to take an earlier appt that came open for Dr. Gutierres for tomorrow 04/10/24.  No answer and did not leave a voicemail.    Electronically signed by Svetlana Simon MA on 4/9/2024 at 4:29 PM

## 2024-04-12 ENCOUNTER — TELEPHONE (OUTPATIENT)
Dept: PSYCHIATRY | Age: 59
End: 2024-04-12

## 2024-04-15 ENCOUNTER — TELEPHONE (OUTPATIENT)
Dept: PSYCHIATRY | Age: 59
End: 2024-04-15

## 2024-04-15 NOTE — TELEPHONE ENCOUNTER
[facility] no [facility] Office called the patient to check on them and to reschedule their appointment.     Left voicemail for patient to call the office back at [phone] Option 3 and provided education regarding the three no show and dismissal policies. Provided education that after three no show, patients can be dismissed by the provider and practice.    Discussed the three late cancellation and dismissal policies with the patient. Provided education that after three no show, patients can be dismissed by the provider and practice.     Barriers to treatment: N/A - didn't speak to patient.      Electronically signed by Svetlana Simon MA on 4/15/2024 at 4:43 PM

## 2024-04-22 ENCOUNTER — TELEPHONE (OUTPATIENT)
Dept: PSYCHIATRY | Age: 59
End: 2024-04-22

## 2024-04-22 NOTE — TELEPHONE ENCOUNTER
Pt called to reschedule an appt that she missed on 04/15/24 with Dr. Gutierres.    Discussed the three late cancellation and dismissal policies with the patient. Provided education that after three no show, patients can be dismissed by the provider and practice.     Discussed the three no show and dismissal policies with the patient. Provided education that after three no show, patients can be dismissed by the provider and practice.     Rescheduled for 05/12/24 @ 12:00.    Electronically signed by Svetlana Simon MA on 4/22/2024 at 10:26 AM

## 2024-05-06 DIAGNOSIS — G43.009 MIGRAINE WITHOUT AURA AND WITHOUT STATUS MIGRAINOSUS, NOT INTRACTABLE: ICD-10-CM

## 2024-05-06 RX ORDER — BUTALBITAL, ACETAMINOPHEN AND CAFFEINE 50; 325; 40 MG/1; MG/1; MG/1
TABLET ORAL
Qty: 30 TABLET | Refills: 0 | Status: SHIPPED | OUTPATIENT
Start: 2024-05-06

## 2024-05-06 NOTE — TELEPHONE ENCOUNTER
Valeria Lees called to request a refill on her medication.      Last office visit : 3/25/2024   Next office visit : 5/10/2024     Last UDS:   Amphetamine Screen, Urine   Date Value Ref Range Status   02/20/2023 N  Final     Barbiturate Screen, Urine   Date Value Ref Range Status   02/20/2023 POSITIVE  Final     Benzodiazepine Screen, Urine   Date Value Ref Range Status   02/20/2023 N  Final     Buprenorphine Urine   Date Value Ref Range Status   02/20/2023 N  Final     Cocaine Metabolite Screen, Urine   Date Value Ref Range Status   02/20/2023 N  Final     Gabapentin Screen, Urine   Date Value Ref Range Status   02/20/2023 N  Final     MDMA, Urine   Date Value Ref Range Status   02/20/2023 N  Final     Opiate Scrn, Ur   Date Value Ref Range Status   02/20/2023 N  Final     Oxycodone Screen, Ur   Date Value Ref Range Status   02/20/2023 N  Final     PCP Screen, Urine   Date Value Ref Range Status   02/20/2023 N  Final     Propoxyphene Screen, Urine   Date Value Ref Range Status   02/20/2023 N  Final     THC Screen, Urine   Date Value Ref Range Status   02/20/2023 N  Final     Tricyclic Antidepressants, Urine   Date Value Ref Range Status   02/20/2023 N  Final       Last Niraj: 04-  Medication Contract: 0-  Last Fill: 04/07/2024    Requested Prescriptions     Pending Prescriptions Disp Refills    butalbital-acetaminophen-caffeine (FIORICET, ESGIC) -40 MG per tablet [Pharmacy Med Name: BUT/ACETAMINOPHEN/CAFF -40 TB] 30 tablet      Sig: TAKE 1 TABLET BY MOUTH EVERY 12 HOURS AS NEEDED FOR HEADACHE OR CHRONIC NECK PAIN. MAX DAILY AMOUNT: 2 TABLETS         Please approve or refuse this medication.   Daphne Hammonds MA

## 2024-05-07 DIAGNOSIS — E03.9 ACQUIRED HYPOTHYROIDISM: ICD-10-CM

## 2024-05-07 NOTE — TELEPHONE ENCOUNTER
Valeria Lees called to request a refill on her medication.      Last office visit : 3/25/2024   Next office visit : 5/10/2024     Requested Prescriptions     Pending Prescriptions Disp Refills    levothyroxine (SYNTHROID) 100 MCG tablet [Pharmacy Med Name: Levothyroxine Sodium 100 MCG Oral Tablet] 30 tablet 0     Sig: Take 1 tablet by mouth once daily            Latasha Gant MA

## 2024-05-08 ENCOUNTER — TELEPHONE (OUTPATIENT)
Dept: PSYCHIATRY | Age: 59
End: 2024-05-08

## 2024-05-08 RX ORDER — LEVOTHYROXINE SODIUM 0.1 MG/1
100 TABLET ORAL DAILY
Qty: 30 TABLET | Refills: 5 | Status: SHIPPED | OUTPATIENT
Start: 2024-05-08

## 2024-05-08 NOTE — TELEPHONE ENCOUNTER
Called pt on 05/08/24 for appointment reminder for 05/09/24.     -left voicemail, requesting a return call    Reminded patient to complete their visit pre-check/digital registration in [employer].

## 2024-05-09 ENCOUNTER — OFFICE VISIT (OUTPATIENT)
Dept: PSYCHIATRY | Age: 59
End: 2024-05-09
Payer: COMMERCIAL

## 2024-05-09 ENCOUNTER — OFFICE VISIT (OUTPATIENT)
Dept: GASTROENTEROLOGY | Age: 59
End: 2024-05-09
Payer: COMMERCIAL

## 2024-05-09 VITALS
OXYGEN SATURATION: 94 % | HEIGHT: 61 IN | DIASTOLIC BLOOD PRESSURE: 85 MMHG | BODY MASS INDEX: 27.98 KG/M2 | WEIGHT: 148.2 LBS | HEART RATE: 88 BPM | SYSTOLIC BLOOD PRESSURE: 137 MMHG | TEMPERATURE: 97.6 F

## 2024-05-09 VITALS
HEIGHT: 61 IN | BODY MASS INDEX: 28.32 KG/M2 | OXYGEN SATURATION: 96 % | HEART RATE: 62 BPM | SYSTOLIC BLOOD PRESSURE: 128 MMHG | DIASTOLIC BLOOD PRESSURE: 80 MMHG | WEIGHT: 150 LBS

## 2024-05-09 DIAGNOSIS — G47.10 HYPERSOMNIA: ICD-10-CM

## 2024-05-09 DIAGNOSIS — R19.7 DIARRHEA, UNSPECIFIED TYPE: ICD-10-CM

## 2024-05-09 DIAGNOSIS — F41.1 GAD (GENERALIZED ANXIETY DISORDER): ICD-10-CM

## 2024-05-09 DIAGNOSIS — K21.9 LARYNGOPHARYNGEAL REFLUX (LPR): ICD-10-CM

## 2024-05-09 DIAGNOSIS — R10.9 ABDOMINAL CRAMPING: ICD-10-CM

## 2024-05-09 DIAGNOSIS — G47.00 INSOMNIA, UNSPECIFIED TYPE: ICD-10-CM

## 2024-05-09 DIAGNOSIS — K21.9 GASTROESOPHAGEAL REFLUX DISEASE, UNSPECIFIED WHETHER ESOPHAGITIS PRESENT: Primary | ICD-10-CM

## 2024-05-09 DIAGNOSIS — R10.13 MIDEPIGASTRIC PAIN: ICD-10-CM

## 2024-05-09 DIAGNOSIS — F33.0 MAJOR DEPRESSIVE DISORDER, RECURRENT, MILD (HCC): Primary | ICD-10-CM

## 2024-05-09 PROCEDURE — 99213 OFFICE O/P EST LOW 20 MIN: CPT | Performed by: NURSE PRACTITIONER

## 2024-05-09 PROCEDURE — 90792 PSYCH DIAG EVAL W/MED SRVCS: CPT | Performed by: PSYCHIATRY & NEUROLOGY

## 2024-05-09 RX ORDER — PANTOPRAZOLE SODIUM 40 MG/1
40 TABLET, DELAYED RELEASE ORAL
Qty: 30 TABLET | Refills: 11 | Status: SHIPPED | OUTPATIENT
Start: 2024-05-09

## 2024-05-09 ASSESSMENT — PATIENT HEALTH QUESTIONNAIRE - PHQ9
3. TROUBLE FALLING OR STAYING ASLEEP: NEARLY EVERY DAY
7. TROUBLE CONCENTRATING ON THINGS, SUCH AS READING THE NEWSPAPER OR WATCHING TELEVISION: NEARLY EVERY DAY
SUM OF ALL RESPONSES TO PHQ9 QUESTIONS 1 & 2: 5
5. POOR APPETITE OR OVEREATING: NEARLY EVERY DAY
SUM OF ALL RESPONSES TO PHQ QUESTIONS 1-9: 21
8. MOVING OR SPEAKING SO SLOWLY THAT OTHER PEOPLE COULD HAVE NOTICED. OR THE OPPOSITE, BEING SO FIGETY OR RESTLESS THAT YOU HAVE BEEN MOVING AROUND A LOT MORE THAN USUAL: SEVERAL DAYS
6. FEELING BAD ABOUT YOURSELF - OR THAT YOU ARE A FAILURE OR HAVE LET YOURSELF OR YOUR FAMILY DOWN: NEARLY EVERY DAY
SUM OF ALL RESPONSES TO PHQ QUESTIONS 1-9: 21
1. LITTLE INTEREST OR PLEASURE IN DOING THINGS: NEARLY EVERY DAY
2. FEELING DOWN, DEPRESSED OR HOPELESS: MORE THAN HALF THE DAYS
SUM OF ALL RESPONSES TO PHQ QUESTIONS 1-9: 21
4. FEELING TIRED OR HAVING LITTLE ENERGY: NEARLY EVERY DAY
9. THOUGHTS THAT YOU WOULD BE BETTER OFF DEAD, OR OF HURTING YOURSELF: NOT AT ALL
10. IF YOU CHECKED OFF ANY PROBLEMS, HOW DIFFICULT HAVE THESE PROBLEMS MADE IT FOR YOU TO DO YOUR WORK, TAKE CARE OF THINGS AT HOME, OR GET ALONG WITH OTHER PEOPLE: VERY DIFFICULT
SUM OF ALL RESPONSES TO PHQ QUESTIONS 1-9: 21

## 2024-05-09 ASSESSMENT — COLUMBIA-SUICIDE SEVERITY RATING SCALE - C-SSRS
1. WITHIN THE PAST MONTH, HAVE YOU WISHED YOU WERE DEAD OR WISHED YOU COULD GO TO SLEEP AND NOT WAKE UP?: NO
6. HAVE YOU EVER DONE ANYTHING, STARTED TO DO ANYTHING, OR PREPARED TO DO ANYTHING TO END YOUR LIFE?: NO
2. HAVE YOU ACTUALLY HAD ANY THOUGHTS OF KILLING YOURSELF?: NO

## 2024-05-09 NOTE — PATIENT INSTRUCTIONS
Continue Budesonide 3 tabs daily until 7/1/2024 then take 2 daily for 2 weeks then 1 daily for 2 weeks then stop

## 2024-05-09 NOTE — PROGRESS NOTES
status: Never    Smokeless tobacco: Never   Vaping Use    Vaping Use: Never used   Substance and Sexual Activity    Alcohol use: Yes     Alcohol/week: 2.0 standard drinks of alcohol     Types: 1 Shots of liquor, 1 Standard drinks or equivalent per week     Comment: rarely 2 drinks yearly    Drug use: Never    Sexual activity: Yes     Partners: Male   Social History Narrative    PRIOR MEDICATION TRIALS    Desvenlafaxine (ineffective)    Wellbutrin    Temazepam    Lexapro    Sertraline    .    SLEEP STUDY: no,  reports that she snores and has some daytime fatigue    .    negative history of seizures.    .    negative history of head trauma.    .    PREVIOUS PSYCHIATRIC HISTORY    Sister, depression/anxiety    Daughter, depression/anxiety (bipolar ?)    .    FAMILY PSYCHIATRIC HISTORY    Sister, depression/anxiety    Daughter, depression/anxiety (bipolar ?)    Mother, anxiety    .    SOCIAL HISTORY    Born and Raised - [facility], 2 parent home with 2 siblings. Characterizes childhood as happy.     Trauma and/or Abuse - denies    Legal - denies    Substance Use - see history    Work History - see history    Education - see history     status - denies    .    PAST SUICIDE ATTEMPTS:    no    .    INPATIENT HOSPITALIZATIONS:    no    .    DRUG REHABILITATION:    no    .    PSYCHIATRIC REVIEW OF SYSTEMS, 7/20/2020    .    Mood:  positive for low motivation; feeling down, depressed, hopeless, sleep disturbance, trouble concentrating, guilt, denies suicidality      (Depression: sadness, tearfulness, sleep, appetite, energy, concentration, sexual function, guilt, psychomotor agitation or slowing, interest, suicidality)    .    Elisa: negative      (impulsivity, grandiosity, recklessness, excessive energy, decreased need for sleep, increased spending beyond means, hyperverbal, grandiose, racing thoughts, hypersexuality)    .    Other: positive for irritable      (Irritability, lability, anger)    .

## 2024-05-09 NOTE — PROGRESS NOTES
PSYCHIATRIC EVALUATION    Date of Service:  5/9/2024    Chief Complaint   Patient presents with    Mental Health Problem       HISTORY OF PRESENT ILLNESS  58-year-old white female with history of depression, hypothyroidism, chronic pain, migraines, presents to [facility] care, referred by her primary care provider, Dr. Johnson.  Previous records and labs reviewed.  Patient is on Luvox and  mg nightly.    Pt is calm and cooperative. Reports depressed mood, poor energy level, poor concentration. She requires naps during the day. Forgetful. Her daughter is . Pt worries about her. She is in a good relationship with her . She babysits her grandkids.  1 grandchild has autism.  She does not have motivation to exercise. Unable to lose weight.  We discussed my suspicion for a sleep disorder.  Recommended sleep study before making medication adjustment.  Patient was receptive.  She is open to psychotherapy.    PSYCHIATRIC HISTORY  Diagnoses: depression  Suicide attempts/gestures: Denies   Prior hospitalizations: Denies   Medication trials: Lexapro, Luvox,    Mental health contact: saw a counselor in the past     SUBSTANCE USE HISTORY  Denies alcohol and illicit drug use.  Denies tobacco use.    FAMILY PSYCHIATRIC HISTORY  Depression - sister. Grandson - ADHD, autism. No history of suicide attempts.    Social History  Marital status -  x 2  - second marriage 35 yrs together  Children - 2 kids, 4 grandkids   Trauma and/or Abuse - denies  Legal - denies  Work History - banking and insurance   Education - Ass[employer] degree    status - none    /85   Pulse 88   Temp 97.6 °F (36.4 °C)   Ht 1.549 m (5' 1\")   Wt 67.2 kg (148 lb 3.2 oz)   SpO2 94%   BMI 28.00 kg/m²       negative history of seizures.    negative history of head trauma.  See past medical history below.      Information obtained via patient and chart review    PCP is  Domonique Johnson MD    Allergies: Augmentin

## 2024-05-10 ENCOUNTER — OFFICE VISIT (OUTPATIENT)
Dept: PRIMARY CARE CLINIC | Age: 59
End: 2024-05-10

## 2024-05-10 VITALS
SYSTOLIC BLOOD PRESSURE: 132 MMHG | HEART RATE: 84 BPM | TEMPERATURE: 96.7 F | WEIGHT: 149.38 LBS | BODY MASS INDEX: 28.22 KG/M2 | OXYGEN SATURATION: 97 % | DIASTOLIC BLOOD PRESSURE: 77 MMHG

## 2024-05-10 DIAGNOSIS — M47.812 SPONDYLOSIS OF CERVICAL REGION WITHOUT MYELOPATHY OR RADICULOPATHY: ICD-10-CM

## 2024-05-10 DIAGNOSIS — M54.2 CHRONIC NECK PAIN: ICD-10-CM

## 2024-05-10 DIAGNOSIS — E66.3 OVERWEIGHT (BMI 25.0-29.9): ICD-10-CM

## 2024-05-10 DIAGNOSIS — M15.9 GENERALIZED OSTEOARTHRITIS: ICD-10-CM

## 2024-05-10 DIAGNOSIS — E03.9 ACQUIRED HYPOTHYROIDISM: ICD-10-CM

## 2024-05-10 DIAGNOSIS — G25.81 RLS (RESTLESS LEGS SYNDROME): ICD-10-CM

## 2024-05-10 DIAGNOSIS — G89.29 CHRONIC NECK PAIN: ICD-10-CM

## 2024-05-10 DIAGNOSIS — F33.1 MODERATE RECURRENT MAJOR DEPRESSION (HCC): ICD-10-CM

## 2024-05-10 DIAGNOSIS — M54.42 CHRONIC BILATERAL LOW BACK PAIN WITH LEFT-SIDED SCIATICA: ICD-10-CM

## 2024-05-10 DIAGNOSIS — G89.29 CHRONIC BILATERAL LOW BACK PAIN WITH LEFT-SIDED SCIATICA: ICD-10-CM

## 2024-05-10 DIAGNOSIS — E78.2 MIXED HYPERLIPIDEMIA: ICD-10-CM

## 2024-05-10 DIAGNOSIS — G43.009 MIGRAINE WITHOUT AURA AND WITHOUT STATUS MIGRAINOSUS, NOT INTRACTABLE: ICD-10-CM

## 2024-05-10 DIAGNOSIS — R51.9 CHRONIC DAILY HEADACHE: Primary | ICD-10-CM

## 2024-05-10 DIAGNOSIS — E55.9 VITAMIN D DEFICIENCY: ICD-10-CM

## 2024-05-10 RX ORDER — MELOXICAM 15 MG/1
TABLET ORAL
Qty: 30 TABLET | Refills: 5 | Status: SHIPPED | OUTPATIENT
Start: 2024-05-10

## 2024-05-10 RX ORDER — GABAPENTIN 300 MG/1
CAPSULE ORAL
Qty: 90 CAPSULE | Refills: 2 | Status: SHIPPED | OUTPATIENT
Start: 2024-05-10 | End: 2024-08-10

## 2024-05-15 ENCOUNTER — TELEPHONE (OUTPATIENT)
Dept: PSYCHIATRY | Age: 59
End: 2024-05-15

## 2024-05-15 NOTE — TELEPHONE ENCOUNTER
Contacted the patient and left a message letting her know that we were following up on her appointment with Dr. Gutierres last week.  I left our office number should the patient want to return the phone call.

## 2024-05-16 ASSESSMENT — ENCOUNTER SYMPTOMS
NAUSEA: 0
ANAL BLEEDING: 0
BLOOD IN STOOL: 0
CHOKING: 0
DIARRHEA: 0
ABDOMINAL PAIN: 0
COUGH: 0
SHORTNESS OF BREATH: 0
RECTAL PAIN: 0
CONSTIPATION: 0
ABDOMINAL DISTENTION: 0
TROUBLE SWALLOWING: 0

## 2024-05-19 ENCOUNTER — TELEPHONE (OUTPATIENT)
Dept: PRIMARY CARE CLINIC | Age: 59
End: 2024-05-19

## 2024-05-20 NOTE — TELEPHONE ENCOUNTER
Can you help patient schedule annual wellness visit and follow up with me after 8/8/24? She was last patient of the day when I saw her recently and appointment was not scheduled. I still don't see appointment scheduled with pain management yet either.

## 2024-05-21 NOTE — TELEPHONE ENCOUNTER
Spoke with patient and offered her 8/14/24 @ 845 & 330. She agreed to come in at the 330 appt date and time

## 2024-06-04 DIAGNOSIS — M54.42 CHRONIC BILATERAL LOW BACK PAIN WITH LEFT-SIDED SCIATICA: ICD-10-CM

## 2024-06-04 DIAGNOSIS — M54.2 CHRONIC NECK PAIN: ICD-10-CM

## 2024-06-04 DIAGNOSIS — G89.29 CHRONIC NECK PAIN: ICD-10-CM

## 2024-06-04 DIAGNOSIS — G89.29 CHRONIC BILATERAL LOW BACK PAIN WITH LEFT-SIDED SCIATICA: ICD-10-CM

## 2024-06-04 DIAGNOSIS — G43.009 MIGRAINE WITHOUT AURA AND WITHOUT STATUS MIGRAINOSUS, NOT INTRACTABLE: ICD-10-CM

## 2024-06-05 ENCOUNTER — TELEPHONE (OUTPATIENT)
Dept: PSYCHIATRY | Age: 59
End: 2024-06-05

## 2024-06-05 RX ORDER — TRAMADOL HYDROCHLORIDE 50 MG/1
50 TABLET ORAL EVERY 12 HOURS PRN
Qty: 45 TABLET | Refills: 0 | Status: SHIPPED | OUTPATIENT
Start: 2024-06-05 | End: 2024-12-04

## 2024-06-05 RX ORDER — BUTALBITAL, ACETAMINOPHEN AND CAFFEINE 50; 325; 40 MG/1; MG/1; MG/1
TABLET ORAL
Qty: 30 TABLET | Refills: 0 | Status: SHIPPED | OUTPATIENT
Start: 2024-06-05

## 2024-06-05 NOTE — TELEPHONE ENCOUNTER
Valeria Lees called to request a refill on her medication.      Last office visit : 5/10/2024   Next office visit : 8/14/2024     Last UDS:   Benzodiazepine Screen, Urine   Date Value Ref Range Status   02/20/2023 N  Final     Buprenorphine Urine   Date Value Ref Range Status   02/20/2023 N  Final     Cocaine Metabolite Screen, Urine   Date Value Ref Range Status   02/20/2023 N  Final     Gabapentin Screen, Urine   Date Value Ref Range Status   02/20/2023 N  Final     MDMA, Urine   Date Value Ref Range Status   02/20/2023 N  Final     Oxycodone Screen, Ur   Date Value Ref Range Status   02/20/2023 N  Final     Propoxyphene Screen, Urine   Date Value Ref Range Status   02/20/2023 N  Final     THC Screen, Urine   Date Value Ref Range Status   02/20/2023 N  Final     Tricyclic Antidepressants, Urine   Date Value Ref Range Status   02/20/2023 N  Final       Last Niraj: 5/7/24  Medication Contract: 2/20/23   Last Fill: 5/6/24 & 3/6/24    Requested Prescriptions     Pending Prescriptions Disp Refills    butalbital-acetaminophen-caffeine (FIORICET, ESGIC) -40 MG per tablet [Pharmacy Med Name: BUT/ACETAMINOPHEN/CAFF -40 TB] 30 tablet      Sig: TAKE 1 TABLET BY MOUTH EVERY 12 HOURS AS NEEDED FOR HEADACHE OR CHRONIC NECK PAIN. MAX DAILY AMOUNT: 2 TABLETS    traMADol (ULTRAM) 50 MG tablet [Pharmacy Med Name: TRAMADOL 50MG TABLETS] 45 tablet      Sig: TAKE 1 TABLET BY MOUTH EVERY 12 HOURS AS NEEDED FOR PAIN. MAX DAILY AMOUNT: 100 MG         Please approve or refuse this medication.   Latasha Gant MA

## 2024-06-06 ENCOUNTER — TELEPHONE (OUTPATIENT)
Dept: PSYCHIATRY | Age: 59
End: 2024-06-06

## 2024-06-06 NOTE — TELEPHONE ENCOUNTER
Pt called to cancel/reschedule her appt for today 06/06/24 with Yara German because she has a stomach bug and will not be able to make it.    Rescheduled for 07/12/24 @ 3:00.    Electronically signed by Svetlana Simon MA on 6/6/2024 at 8:41 AM

## 2024-06-13 DIAGNOSIS — F41.1 GAD (GENERALIZED ANXIETY DISORDER): ICD-10-CM

## 2024-06-13 DIAGNOSIS — F33.1 MODERATE RECURRENT MAJOR DEPRESSION (HCC): ICD-10-CM

## 2024-06-13 RX ORDER — FLUVOXAMINE MALEATE 100 MG
100 TABLET ORAL NIGHTLY
Qty: 30 TABLET | Refills: 0 | Status: SHIPPED | OUTPATIENT
Start: 2024-06-13

## 2024-06-18 ENCOUNTER — TELEPHONE (OUTPATIENT)
Dept: PSYCHIATRY | Age: 59
End: 2024-06-18

## 2024-06-18 NOTE — TELEPHONE ENCOUNTER
Called patient to remind them of their appointment   -left voicemail, requesting a return call  Reminded patient to complete their visit pre-check/digital registration in [employer].    Electronically signed by Svetlana Simon MA on 6/18/2024 at 11:30 AM

## 2024-07-03 DIAGNOSIS — M54.42 CHRONIC BILATERAL LOW BACK PAIN WITH LEFT-SIDED SCIATICA: ICD-10-CM

## 2024-07-03 DIAGNOSIS — G89.29 CHRONIC BILATERAL LOW BACK PAIN WITH LEFT-SIDED SCIATICA: ICD-10-CM

## 2024-07-03 DIAGNOSIS — G89.29 CHRONIC NECK PAIN: ICD-10-CM

## 2024-07-03 DIAGNOSIS — G43.009 MIGRAINE WITHOUT AURA AND WITHOUT STATUS MIGRAINOSUS, NOT INTRACTABLE: ICD-10-CM

## 2024-07-03 DIAGNOSIS — M54.2 CHRONIC NECK PAIN: ICD-10-CM

## 2024-07-03 RX ORDER — TRAMADOL HYDROCHLORIDE 50 MG/1
50 TABLET ORAL EVERY 12 HOURS
Qty: 45 TABLET | Refills: 0 | Status: SHIPPED | OUTPATIENT
Start: 2024-07-03 | End: 2024-08-02

## 2024-07-03 RX ORDER — BUTALBITAL, ACETAMINOPHEN AND CAFFEINE 50; 325; 40 MG/1; MG/1; MG/1
TABLET ORAL
Qty: 30 TABLET | Refills: 0 | Status: SHIPPED | OUTPATIENT
Start: 2024-07-05

## 2024-07-03 NOTE — TELEPHONE ENCOUNTER
Patient has still not scheduled appointment with pain management. I will fill tramadol and fioricet once more but then she will need to choose between the two at this point and she needs to see pain management.

## 2024-07-03 NOTE — TELEPHONE ENCOUNTER
Valeria Lees called to request a refill on her medication.      Last office visit : 5/10/2024   Next office visit : 8/14/2024     Last UDS:   Benzodiazepine Screen, Urine   Date Value Ref Range Status   02/20/2023 N  Final     Buprenorphine Urine   Date Value Ref Range Status   02/20/2023 N  Final     Cocaine Metabolite Screen, Urine   Date Value Ref Range Status   02/20/2023 N  Final     Gabapentin Screen, Urine   Date Value Ref Range Status   02/20/2023 N  Final     MDMA, Urine   Date Value Ref Range Status   02/20/2023 N  Final     Oxycodone Screen, Ur   Date Value Ref Range Status   02/20/2023 N  Final     Propoxyphene Screen, Urine   Date Value Ref Range Status   02/20/2023 N  Final     THC Screen, Urine   Date Value Ref Range Status   02/20/2023 N  Final     Tricyclic Antidepressants, Urine   Date Value Ref Range Status   02/20/2023 N  Final       Last Niraj: 05-  Medication Contract: 05-  Last Fill: 06-    Requested Prescriptions     Pending Prescriptions Disp Refills    butalbital-acetaminophen-caffeine (FIORICET, ESGIC) -40 MG per tablet [Pharmacy Med Name: BUT/ACETAMINOPHEN/CAFF -40 TB] 30 tablet      Sig: TAKE 1 TABLET BY MOUTH EVERY 12 HOURS AS NEEDED FOR HEADACHE OR CHRONIC NECK PAIN. MAX DAILY AMOUNT: 2 TABLETS    traMADol (ULTRAM) 50 MG tablet [Pharmacy Med Name: TRAMADOL 50MG TABLETS] 45 tablet      Sig: TAKE 1 TABLET BY MOUTH EVERY 12 HOURS AS NEEDED FOR PAIN. MAX DAILY AMOUNT: 100 MG         Please approve or refuse this medication.   Daphne Hammonds MA

## 2024-07-09 ENCOUNTER — OFFICE VISIT (OUTPATIENT)
Dept: PRIMARY CARE CLINIC | Age: 59
End: 2024-07-09
Payer: COMMERCIAL

## 2024-07-09 VITALS
HEIGHT: 61 IN | OXYGEN SATURATION: 97 % | BODY MASS INDEX: 27.56 KG/M2 | WEIGHT: 146 LBS | DIASTOLIC BLOOD PRESSURE: 78 MMHG | TEMPERATURE: 97.9 F | HEART RATE: 72 BPM | SYSTOLIC BLOOD PRESSURE: 116 MMHG

## 2024-07-09 DIAGNOSIS — J20.9 ACUTE BRONCHITIS, UNSPECIFIED ORGANISM: ICD-10-CM

## 2024-07-09 DIAGNOSIS — H65.01 NON-RECURRENT ACUTE SEROUS OTITIS MEDIA OF RIGHT EAR: ICD-10-CM

## 2024-07-09 DIAGNOSIS — R05.1 ACUTE COUGH: ICD-10-CM

## 2024-07-09 DIAGNOSIS — J30.2 SEASONAL ALLERGIC RHINITIS, UNSPECIFIED TRIGGER: ICD-10-CM

## 2024-07-09 PROCEDURE — 99213 OFFICE O/P EST LOW 20 MIN: CPT | Performed by: NURSE PRACTITIONER

## 2024-07-09 PROCEDURE — 96372 THER/PROPH/DIAG INJ SC/IM: CPT | Performed by: NURSE PRACTITIONER

## 2024-07-09 RX ORDER — METHYLPREDNISOLONE 4 MG/1
TABLET ORAL
Qty: 1 KIT | Refills: 1 | Status: SHIPPED | OUTPATIENT
Start: 2024-07-09 | End: 2024-07-15

## 2024-07-09 RX ORDER — DEXAMETHASONE SODIUM PHOSPHATE 10 MG/ML
10 INJECTION INTRAMUSCULAR; INTRAVENOUS ONCE
Status: COMPLETED | OUTPATIENT
Start: 2024-07-09 | End: 2024-07-09

## 2024-07-09 RX ORDER — AZITHROMYCIN 250 MG/1
TABLET, FILM COATED ORAL
Qty: 6 TABLET | Refills: 0 | Status: SHIPPED | OUTPATIENT
Start: 2024-07-09 | End: 2024-07-19

## 2024-07-09 RX ORDER — HYDROCODONE POLISTIREX AND CHLORPHENIRAMINE POLISTIREX 10; 8 MG/5ML; MG/5ML
5 SUSPENSION, EXTENDED RELEASE ORAL EVERY 12 HOURS PRN
Qty: 70 ML | Refills: 0 | Status: SHIPPED | OUTPATIENT
Start: 2024-07-09 | End: 2024-07-16

## 2024-07-09 RX ORDER — AZELASTINE 1 MG/ML
2 SPRAY, METERED NASAL 2 TIMES DAILY
Qty: 60 ML | Refills: 5 | Status: SHIPPED | OUTPATIENT
Start: 2024-07-09

## 2024-07-09 RX ADMIN — DEXAMETHASONE SODIUM PHOSPHATE 10 MG: 10 INJECTION INTRAMUSCULAR; INTRAVENOUS at 16:32

## 2024-07-09 ASSESSMENT — ENCOUNTER SYMPTOMS
ABDOMINAL PAIN: 0
DIARRHEA: 0
SHORTNESS OF BREATH: 0
CHEST TIGHTNESS: 1
COUGH: 1
WHEEZING: 0
SINUS PRESSURE: 1
NAUSEA: 0
SORE THROAT: 0

## 2024-07-09 NOTE — PROGRESS NOTES
Ms.Ronda YAMILET Lees is a 59 y.o. female who presents today for  Chief Complaint   Patient presents with    Cough    Congestion     Started feeling bad last week on Monday.        HPI:  Patient of Dr. Johnson here for acute issue.    She has had progressive cough and congestion for 8 days.  Symptoms are worsening.  Sputum has been thick and purulent.  She has had some chest pressure and frequent cough.  She is not sure about fever.  She has had sinus pressure as well.  She has tried multiple OTC cough and cold medications with no improvement.      Review of Systems   Constitutional:  Negative for chills and fever.   HENT:  Positive for congestion, postnasal drip and sinus pressure. Negative for ear pain and sore throat.    Respiratory:  Positive for cough and chest tightness. Negative for shortness of breath and wheezing.    Cardiovascular:  Negative for chest pain.   Gastrointestinal:  Negative for abdominal pain, diarrhea and nausea.   Musculoskeletal:  Negative for arthralgias and myalgias.   Skin:  Negative for rash.   Neurological:  Negative for dizziness and light-headedness.       Past Medical History:   Diagnosis Date    Allergic rhinitis     Chronic bilateral low back pain with left-sided sciatica 12/01/2017    Colon polyp     Depression with anxiety     Laryngopharyngeal reflux (LPR) 10/06/2020    Mixed stress and urge urinary incontinence 12/07/2023    Obesity     Osteoarthritis     RLS (restless legs syndrome) 08/07/2017       Current Outpatient Medications   Medication Sig Dispense Refill    azithromycin (ZITHROMAX) 250 MG tablet 500mg on day 1 followed by 250mg on days 2 - 5 6 tablet 0    methylPREDNISolone (MEDROL DOSEPACK) 4 MG tablet Take by mouth. 1 kit 1    azelastine (ASTELIN) 0.1 % nasal spray 2 sprays by Nasal route 2 times daily Use in each nostril as directed 60 mL 5    HYDROcodone-chlorpheniramine (TUSSIONEX) 10-8 MG/5ML SUER Take 5 mLs by mouth every 12 hours as needed (cough) for up to

## 2024-07-12 ENCOUNTER — TELEPHONE (OUTPATIENT)
Dept: PSYCHIATRY | Age: 59
End: 2024-07-12

## 2024-07-12 NOTE — TELEPHONE ENCOUNTER
Pt called to cancel/reschedule her appt for today 07/12/24 with Swati Porter because she is sick.    Rescheduled for 08/16/24 @ 3:00.    Electronically signed by Svetlana Simon MA on 7/12/2024 at 9:09 AM

## 2024-07-15 ENCOUNTER — TELEPHONE (OUTPATIENT)
Dept: PSYCHIATRY | Age: 59
End: 2024-07-15

## 2024-07-15 NOTE — TELEPHONE ENCOUNTER
Called patient on 07/10/24 to remind them of their appointment on 07/12/24    -Pt confirmed  Reminded patient to complete their visit pre-check/digital registration in [employer].    Electronically signed by Svetlana Simon MA on 7/15/2024 at 12:03 PM

## 2024-07-17 ENCOUNTER — TELEPHONE (OUTPATIENT)
Dept: PSYCHIATRY | Age: 59
End: 2024-07-17

## 2024-07-17 NOTE — TELEPHONE ENCOUNTER
Called pt to cancel/reschedule appt for 07/26/24 with Dr. Gutierres because the provider will not be in the office that day.    No answer and vm full.    Electronically signed by Svetlana Simon MA on 7/17/2024 at 4:03 PM

## 2024-07-24 ENCOUNTER — TELEPHONE (OUTPATIENT)
Dept: PSYCHIATRY | Age: 59
End: 2024-07-24

## 2024-07-24 NOTE — TELEPHONE ENCOUNTER
Called pt to cancel/reschedule appt for 07/26/24 with Dr. Gutierres because the provider will not be in the office that day.     Rescheduled for 07/25/24 @ 2:30.    Electronically signed by Svetlana Simon MA on 7/24/2024 at 3:39 PM

## 2024-07-25 ENCOUNTER — OFFICE VISIT (OUTPATIENT)
Dept: PSYCHIATRY | Age: 59
End: 2024-07-25
Payer: COMMERCIAL

## 2024-07-25 VITALS
HEIGHT: 61 IN | SYSTOLIC BLOOD PRESSURE: 131 MMHG | BODY MASS INDEX: 26.96 KG/M2 | DIASTOLIC BLOOD PRESSURE: 87 MMHG | OXYGEN SATURATION: 96 % | WEIGHT: 142.8 LBS | TEMPERATURE: 97.7 F | HEART RATE: 70 BPM

## 2024-07-25 DIAGNOSIS — F33.1 MODERATE RECURRENT MAJOR DEPRESSION (HCC): ICD-10-CM

## 2024-07-25 DIAGNOSIS — F33.0 MAJOR DEPRESSIVE DISORDER, RECURRENT, MILD (HCC): Primary | ICD-10-CM

## 2024-07-25 DIAGNOSIS — F41.1 GAD (GENERALIZED ANXIETY DISORDER): ICD-10-CM

## 2024-07-25 DIAGNOSIS — G47.00 INSOMNIA, UNSPECIFIED TYPE: ICD-10-CM

## 2024-07-25 PROCEDURE — 99215 OFFICE O/P EST HI 40 MIN: CPT | Performed by: PSYCHIATRY & NEUROLOGY

## 2024-07-25 RX ORDER — FLUVOXAMINE MALEATE 100 MG
100 TABLET ORAL 2 TIMES DAILY
Qty: 60 TABLET | Refills: 1 | Status: SHIPPED | OUTPATIENT
Start: 2024-07-25 | End: 2024-08-24

## 2024-07-25 NOTE — PROGRESS NOTES
7/25/2024 2:57 PM   Progress Note          Valeria Lees 1965      Chief Complaint   Patient presents with    Medication Check         Subjective:    59-year-old white female with history of depression, hypothyroidism, chronic pain, migraines, presents for follow up.  Patient is on Luvox and  mg nightly. Sleep study scheduled for tomorrow - prepay is 300 dollars - encouraged to call the clinic.      Pt is calm and cooperative. Not doing well. Low mood. No SI. Sleep is poor. Tired. Stressed out about some life issues. Feels burnt out.  Discussed the need for self-care.  Her therapy appointment is coming up.      Objective:      There were no vitals taken for this visit.      Review of Systems - 14 point review:  Negative except for    Constitutional: (fevers, chills, night sweats, wt loss/gain, change in appetite, fatigue, somnolence)    HEENT: (ear pain or discharge, hearing loss, ear ringing, sinus pressure, nosebleed, nasal discharge, sore throat, oral sores, tooth pain, bleeding gums, hoarse voice, neck pain)      Cardiovascular: (HTN, chest pain, elevated cholesterol/lipids, palpitations, leg swelling, leg pain with walking)    Respiratory: (cough, wheezing, snoring, SOB with activity (dyspnea), SOB while lying flat (orthopnea), awakening with severe SOB (paroxysmal nocturnal dyspnea))    Gastrointestinal: (NVD, constipation, abdominal pain, bright red stools, black tarry stools, stool incontinence)     Genitourinary:  (pelvic pain, burning or frequency of urination, urinary urgency, blood in urine incomplete bladder emptying, urinary incontinence, STD; MEN: testicular pain or swelling, erectile dysfunction; WOMEN: LMP, heavy menstrual bleeding (menorrhagia), irregular periods, postmenopausal bleeding, menstrual pain (dymenorrhea, vaginal discharge)    Musculoskeletal: (bone pain/fracture, joint pain or swelling, musle pain)    Integumentary: (rashes, acne, non-healing sores, itching, breast lumps,

## 2024-07-28 DIAGNOSIS — F33.1 MODERATE RECURRENT MAJOR DEPRESSION (HCC): ICD-10-CM

## 2024-07-28 DIAGNOSIS — F41.1 GAD (GENERALIZED ANXIETY DISORDER): ICD-10-CM

## 2024-07-28 DIAGNOSIS — K52.832 LYMPHOCYTIC COLITIS: ICD-10-CM

## 2024-07-29 RX ORDER — BUDESONIDE 3 MG/1
CAPSULE, COATED PELLETS ORAL
Qty: 90 CAPSULE | Refills: 0 | Status: SHIPPED | OUTPATIENT
Start: 2024-07-29

## 2024-07-29 RX ORDER — FLUVOXAMINE MALEATE 100 MG
100 TABLET ORAL NIGHTLY
Qty: 30 TABLET | Refills: 0 | OUTPATIENT
Start: 2024-07-29

## 2024-07-29 NOTE — TELEPHONE ENCOUNTER
Electronic refill request received today:    Requested Prescriptions     Pending Prescriptions Disp Refills    budesonide (ENTOCORT EC) 3 MG delayed release capsule [Pharmacy Med Name: Budesonide 3 MG Oral Capsule Delayed Release Particles] 90 capsule 0     Sig: TAKE 3 CAPSULES BY MOUTH IN THE MORNING     Last ov 5/9/24, no f/u, last colon with Dr Dan 3/17/24

## 2024-08-05 DIAGNOSIS — G43.009 MIGRAINE WITHOUT AURA AND WITHOUT STATUS MIGRAINOSUS, NOT INTRACTABLE: ICD-10-CM

## 2024-08-06 RX ORDER — BUTALBITAL, ACETAMINOPHEN AND CAFFEINE 50; 325; 40 MG/1; MG/1; MG/1
TABLET ORAL
Qty: 30 TABLET | Refills: 0 | Status: SHIPPED | OUTPATIENT
Start: 2024-08-06

## 2024-08-06 NOTE — TELEPHONE ENCOUNTER
Valeria Lees called to request a refill on her medication.      Last office visit : 7/9/2024   Next office visit : 8/14/2024     Last UDS:   Benzodiazepine Screen, Urine   Date Value Ref Range Status   02/20/2023 N  Final     Buprenorphine Urine   Date Value Ref Range Status   02/20/2023 N  Final     Cocaine Metabolite Screen, Urine   Date Value Ref Range Status   02/20/2023 N  Final     Gabapentin Screen, Urine   Date Value Ref Range Status   02/20/2023 N  Final     Oxycodone Screen, Ur   Date Value Ref Range Status   02/20/2023 N  Final     Propoxyphene Screen, Urine   Date Value Ref Range Status   02/20/2023 N  Final     THC Screen, Urine   Date Value Ref Range Status   02/20/2023 N  Final     Tricyclic Antidepressants, Urine   Date Value Ref Range Status   02/20/2023 N  Final       Last Niraj: 7/23/2024  Medication Contract: 5/9/2024   Last Fill: 7/5/2024    Requested Prescriptions     Pending Prescriptions Disp Refills    butalbital-acetaminophen-caffeine (FIORICET, ESGIC) -40 MG per tablet [Pharmacy Med Name: BUT/ACETAMINOPHEN/CAFF -40 TB] 30 tablet      Sig: TAKE 1 TABLET BY MOUTH EVERY 12 HOURS AS NEEDED FOR HEADACHE OR CHRONIC NECK PAIN. MAX DAILY AMOUNT: 2 TABLETS         Please approve or refuse this medication.   Maria T Fuller MA

## 2024-08-15 ENCOUNTER — TELEPHONE (OUTPATIENT)
Dept: PSYCHIATRY | Age: 59
End: 2024-08-15

## 2024-08-15 NOTE — TELEPHONE ENCOUNTER
Called and lvm reminding pt of her appt for 8/16 @ 3 PM      Electronically signed by Zulma Bustos on 8/15/2024 at 11:29 AM

## 2024-08-16 ENCOUNTER — OFFICE VISIT (OUTPATIENT)
Dept: PSYCHIATRY | Age: 59
End: 2024-08-16

## 2024-08-16 DIAGNOSIS — F33.0 MAJOR DEPRESSIVE DISORDER, RECURRENT, MILD (HCC): Primary | ICD-10-CM

## 2024-08-16 DIAGNOSIS — F41.1 GAD (GENERALIZED ANXIETY DISORDER): ICD-10-CM

## 2024-08-16 ASSESSMENT — PATIENT HEALTH QUESTIONNAIRE - PHQ9
6. FEELING BAD ABOUT YOURSELF - OR THAT YOU ARE A FAILURE OR HAVE LET YOURSELF OR YOUR FAMILY DOWN: MORE THAN HALF THE DAYS
2. FEELING DOWN, DEPRESSED OR HOPELESS: SEVERAL DAYS
4. FEELING TIRED OR HAVING LITTLE ENERGY: SEVERAL DAYS
SUM OF ALL RESPONSES TO PHQ QUESTIONS 1-9: 12
3. TROUBLE FALLING OR STAYING ASLEEP: MORE THAN HALF THE DAYS
9. THOUGHTS THAT YOU WOULD BE BETTER OFF DEAD, OR OF HURTING YOURSELF: NOT AT ALL
8. MOVING OR SPEAKING SO SLOWLY THAT OTHER PEOPLE COULD HAVE NOTICED. OR THE OPPOSITE, BEING SO FIGETY OR RESTLESS THAT YOU HAVE BEEN MOVING AROUND A LOT MORE THAN USUAL: MORE THAN HALF THE DAYS
1. LITTLE INTEREST OR PLEASURE IN DOING THINGS: SEVERAL DAYS
7. TROUBLE CONCENTRATING ON THINGS, SUCH AS READING THE NEWSPAPER OR WATCHING TELEVISION: MORE THAN HALF THE DAYS
10. IF YOU CHECKED OFF ANY PROBLEMS, HOW DIFFICULT HAVE THESE PROBLEMS MADE IT FOR YOU TO DO YOUR WORK, TAKE CARE OF THINGS AT HOME, OR GET ALONG WITH OTHER PEOPLE: EXTREMELY DIFFICULT
SUM OF ALL RESPONSES TO PHQ9 QUESTIONS 1 & 2: 2
SUM OF ALL RESPONSES TO PHQ QUESTIONS 1-9: 12
5. POOR APPETITE OR OVEREATING: SEVERAL DAYS

## 2024-08-16 ASSESSMENT — ANXIETY QUESTIONNAIRES
6. BECOMING EASILY ANNOYED OR IRRITABLE: NEARLY EVERY DAY
GAD7 TOTAL SCORE: 19
2. NOT BEING ABLE TO STOP OR CONTROL WORRYING: NEARLY EVERY DAY
3. WORRYING TOO MUCH ABOUT DIFFERENT THINGS: NEARLY EVERY DAY
5. BEING SO RESTLESS THAT IT IS HARD TO SIT STILL: MORE THAN HALF THE DAYS
1. FEELING NERVOUS, ANXIOUS, OR ON EDGE: NEARLY EVERY DAY
IF YOU CHECKED OFF ANY PROBLEMS ON THIS QUESTIONNAIRE, HOW DIFFICULT HAVE THESE PROBLEMS MADE IT FOR YOU TO DO YOUR WORK, TAKE CARE OF THINGS AT HOME, OR GET ALONG WITH OTHER PEOPLE: EXTREMELY DIFFICULT
7. FEELING AFRAID AS IF SOMETHING AWFUL MIGHT HAPPEN: NEARLY EVERY DAY
4. TROUBLE RELAXING: MORE THAN HALF THE DAYS

## 2024-08-16 NOTE — PROGRESS NOTES
Resource Strain: Medium Risk (3/25/2024)    Overall Financial Resource Strain (CARDIA)     Difficulty of Paying Living Expenses: Somewhat hard   Food Insecurity: No Food Insecurity (3/25/2024)    Hunger Vital Sign     Worried About Running Out of Food in the Last Year: Never true     Ran Out of Food in the Last Year: Never true   Transportation Needs: Unknown (3/25/2024)    PRAPARE - Transportation     Lack of Transportation (Medical): Not on file     Lack of Transportation (Non-Medical): No   Physical Activity: Not on file   Stress: Not on file   Social Connections: Unknown (10/10/2023)    [facility]    Family and Community Support     Help with Day-to-Day Activities: Not on file     Lonely or Isolated: Not on file   Intimate Partner Violence: Unknown (10/10/2023)    [facility]    Abuse Screen     Unsafe at Home or Work/School: Not on file     Feels Threatened by Someone?: Not on file     Does Anyone Keep You from Contacting Others or Doint Things Outside the Home?: Not on file     Physical Sign of Abuse Present: Not on file   Housing Stability: Unknown (3/25/2024)    Housing Stability Vital Sign     Unable to Pay for Housing in the Last Year: Not on file     Number of Places Lived in the Last Year: Not on file     Unstable Housing in the Last Year: No       Psychiatric Review Of Systems:     Sleep (specify as to how it has changed): poor sleep  Appetite changes (specify): poor appetite  Weight changes (specify): reports gaining weight  Energy/anergy: low energy  Interest/pleasure/anhedonia: play games on phone, spending time with grandchildren  Anxiety/panic: positive for anxiety  Guilty/hopeless: positive for guilt; patient reports not feeling she does a good enough job taking care of the house and grand kids.  S.I.B.s/risky behavior: denies  Any drugs: denies  Alcohol: minimally     Mental Status

## 2024-09-05 ENCOUNTER — TELEPHONE (OUTPATIENT)
Dept: PSYCHIATRY | Age: 59
End: 2024-09-05

## 2024-09-05 NOTE — TELEPHONE ENCOUNTER
Appointment canceled for Valeria YAMILET PriceLees ([id number])  Visit type:  FOLLOW UP  9/6/2024 11:30 AM (30 minutes) with Dr. Katharine Gutierres MD in [facility]     Reason for cancellation: Financial     Patient comments: currently no health insurance- trying to find coverage      Electronically signed by Zulma Bustos on 9/5/2024 at 1:24 PM

## 2024-09-05 NOTE — TELEPHONE ENCOUNTER
Called and roxanam reminding pt of her appt for 9/6 @ 11:30 AM    Electronically signed by Zulma Bustos on 9/5/2024 at 9:59 AM    
97

## 2024-09-10 DIAGNOSIS — G43.009 MIGRAINE WITHOUT AURA AND WITHOUT STATUS MIGRAINOSUS, NOT INTRACTABLE: ICD-10-CM

## 2024-09-11 DIAGNOSIS — B37.0 ORAL THRUSH: ICD-10-CM

## 2024-09-11 RX ORDER — NYSTATIN 100000 [USP'U]/ML
SUSPENSION ORAL
Qty: 200 ML | Refills: 0 | OUTPATIENT
Start: 2024-09-11

## 2024-09-11 RX ORDER — BUTALBITAL, ACETAMINOPHEN AND CAFFEINE 50; 325; 40 MG/1; MG/1; MG/1
TABLET ORAL
Qty: 30 TABLET | Refills: 0 | Status: SHIPPED | OUTPATIENT
Start: 2024-09-11

## 2024-09-11 NOTE — TELEPHONE ENCOUNTER
Valeria Lees called to request a refill on her medication.      Last office visit : 7/9/2024   Next office visit : 9/13/2024     Requested Prescriptions     Pending Prescriptions Disp Refills    nystatin (MYCOSTATIN) 229759 UNIT/ML suspension [Pharmacy Med Name: Nystatin 284158 UNIT/ML Mouth/Throat Suspension] 200 mL 0     Sig: TAKE 5 ML BY MOUTH  4 TIMES DAILY            Latasha Gant MA

## 2024-09-13 ENCOUNTER — TELEPHONE (OUTPATIENT)
Dept: PRIMARY CARE CLINIC | Age: 59
End: 2024-09-13

## 2024-09-16 ENCOUNTER — TELEPHONE (OUTPATIENT)
Dept: PRIMARY CARE CLINIC | Age: 59
End: 2024-09-16

## 2024-10-06 DIAGNOSIS — F33.1 MODERATE RECURRENT MAJOR DEPRESSION (HCC): ICD-10-CM

## 2024-10-06 DIAGNOSIS — F41.1 GAD (GENERALIZED ANXIETY DISORDER): ICD-10-CM

## 2024-10-07 ENCOUNTER — TELEPHONE (OUTPATIENT)
Dept: PSYCHIATRY | Age: 59
End: 2024-10-07

## 2024-10-07 NOTE — TELEPHONE ENCOUNTER
Called and lvm asking pt to return phone call to schedule an appt    Electronically signed by Zulma Bustos on 10/7/2024 at 2:17 PM

## 2024-10-08 ENCOUNTER — TELEPHONE (OUTPATIENT)
Dept: PRIMARY CARE CLINIC | Age: 59
End: 2024-10-08

## 2024-10-08 DIAGNOSIS — G43.009 MIGRAINE WITHOUT AURA AND WITHOUT STATUS MIGRAINOSUS, NOT INTRACTABLE: ICD-10-CM

## 2024-10-08 DIAGNOSIS — F33.1 MODERATE RECURRENT MAJOR DEPRESSION (HCC): ICD-10-CM

## 2024-10-08 DIAGNOSIS — F41.1 GAD (GENERALIZED ANXIETY DISORDER): ICD-10-CM

## 2024-10-08 RX ORDER — BUTALBITAL, ACETAMINOPHEN AND CAFFEINE 50; 325; 40 MG/1; MG/1; MG/1
TABLET ORAL
Qty: 30 TABLET | OUTPATIENT
Start: 2024-10-08

## 2024-10-08 RX ORDER — FLUVOXAMINE MALEATE 100 MG
100 TABLET ORAL 2 TIMES DAILY
Qty: 60 TABLET | Refills: 0 | OUTPATIENT
Start: 2024-10-08

## 2024-10-08 NOTE — TELEPHONE ENCOUNTER
PT has had 3 or more no shows with same provider in last 12 months. Provider requested dismissal of patient due to breakdown of patient/provider relationship.

## 2024-10-09 RX ORDER — FLUVOXAMINE MALEATE 100 MG
100 TABLET ORAL 2 TIMES DAILY
Qty: 60 TABLET | Refills: 3 | Status: SHIPPED | OUTPATIENT
Start: 2024-10-09

## 2024-10-09 NOTE — TELEPHONE ENCOUNTER
[NHANES]      Frequency of Communication with Friends and Family: More than three times a week      Frequency of Social Gatherings with Friends and Family: More than three times a week      Attends Yazidi Services: More than 4 times per year      Active Member of Clubs or Organizations: Yes      Attends Club or Organization Meetings: More than 4 times per year      Marital Status:    Intimate Partner Violence: Not At Risk (7/16/2020)     Humiliation, Afraid, Rape, and Kick questionnaire      Fear of Current or Ex-Partner: No      Emotionally Abused: No      Physically Abused: No      Sexually Abused: No   Housing Stability: Unknown (3/25/2024)     Housing Stability Vital Sign      Unstable Housing in the Last Year: No            MSE:  Appearance: Appropriately groomed. Made good eye contact.  Gait stable.  No abnormal movements or tremor.  Behavior: Calm, cooperative, and socially appropriate. No psychomotor retardation/agitation appreciated.   Speech: Normal in tone, volume, and quality. No slurring, dysarthria or pressured speech noted.   Mood: \"not good\"   Affect: Mood congruent.   Thought Process: Appears linear, logical and goal oriented. Causality appears intact.   Thought Content: Denies active suicidal and homicidal ideations. No overt delusions or paranoia appreciated.   Perceptions: Denies auditory or visual hallucinations at present time. Not responding to internal stimuli.   Concentration: Intact.   Orientation: to person, place, date, and situation.   Language: Intact.   Fund of information: Intact.   Memory: Recent and remote appear intact.   Impulsivity: Limited.   Neurovegitative: Poor appetite and sleep.   Insight: Fair.   Judgment: Fair.              Lab Results   Component Value Date      03/11/2024     K 4.1 03/11/2024      03/11/2024     CO2 30 (H) 03/11/2024     BUN 5 (L) 03/11/2024     CREATININE 0.8 03/11/2024     GLUCOSE 92 03/11/2024     CALCIUM 9.7 03/11/2024

## 2024-10-17 ENCOUNTER — TELEPHONE (OUTPATIENT)
Dept: PSYCHIATRY | Age: 59
End: 2024-10-17

## 2024-10-17 NOTE — TELEPHONE ENCOUNTER
SW called pt on 10/17/24 for an appointment reminder for 10/18/24.   ?   -left voicemail, requesting a return call   ?   Reminded patient to complete their visit pre-check/digital registration in [employer].

## 2024-10-18 ENCOUNTER — OFFICE VISIT (OUTPATIENT)
Dept: PSYCHIATRY | Age: 59
End: 2024-10-18

## 2024-10-18 DIAGNOSIS — F41.1 GENERALIZED ANXIETY DISORDER: ICD-10-CM

## 2024-10-18 DIAGNOSIS — F33.0 MILD RECURRENT MAJOR DEPRESSION (HCC): Primary | ICD-10-CM

## 2024-10-18 PROCEDURE — 90832 PSYTX W PT 30 MINUTES: CPT | Performed by: COUNSELOR

## 2024-10-18 ASSESSMENT — PATIENT HEALTH QUESTIONNAIRE - PHQ9
8. MOVING OR SPEAKING SO SLOWLY THAT OTHER PEOPLE COULD HAVE NOTICED. OR THE OPPOSITE, BEING SO FIGETY OR RESTLESS THAT YOU HAVE BEEN MOVING AROUND A LOT MORE THAN USUAL: MORE THAN HALF THE DAYS
7. TROUBLE CONCENTRATING ON THINGS, SUCH AS READING THE NEWSPAPER OR WATCHING TELEVISION: MORE THAN HALF THE DAYS
4. FEELING TIRED OR HAVING LITTLE ENERGY: NEARLY EVERY DAY
SUM OF ALL RESPONSES TO PHQ QUESTIONS 1-9: 18
SUM OF ALL RESPONSES TO PHQ9 QUESTIONS 1 & 2: 3
SUM OF ALL RESPONSES TO PHQ QUESTIONS 1-9: 18
10. IF YOU CHECKED OFF ANY PROBLEMS, HOW DIFFICULT HAVE THESE PROBLEMS MADE IT FOR YOU TO DO YOUR WORK, TAKE CARE OF THINGS AT HOME, OR GET ALONG WITH OTHER PEOPLE: VERY DIFFICULT
SUM OF ALL RESPONSES TO PHQ QUESTIONS 1-9: 18
6. FEELING BAD ABOUT YOURSELF - OR THAT YOU ARE A FAILURE OR HAVE LET YOURSELF OR YOUR FAMILY DOWN: NEARLY EVERY DAY
SUM OF ALL RESPONSES TO PHQ QUESTIONS 1-9: 18
2. FEELING DOWN, DEPRESSED OR HOPELESS: MORE THAN HALF THE DAYS
9. THOUGHTS THAT YOU WOULD BE BETTER OFF DEAD, OR OF HURTING YOURSELF: NOT AT ALL
5. POOR APPETITE OR OVEREATING: NEARLY EVERY DAY
1. LITTLE INTEREST OR PLEASURE IN DOING THINGS: SEVERAL DAYS
3. TROUBLE FALLING OR STAYING ASLEEP: MORE THAN HALF THE DAYS

## 2024-10-18 ASSESSMENT — ANXIETY QUESTIONNAIRES
3. WORRYING TOO MUCH ABOUT DIFFERENT THINGS: NEARLY EVERY DAY
6. BECOMING EASILY ANNOYED OR IRRITABLE: SEVERAL DAYS
7. FEELING AFRAID AS IF SOMETHING AWFUL MIGHT HAPPEN: MORE THAN HALF THE DAYS
5. BEING SO RESTLESS THAT IT IS HARD TO SIT STILL: MORE THAN HALF THE DAYS
GAD7 TOTAL SCORE: 15
2. NOT BEING ABLE TO STOP OR CONTROL WORRYING: NEARLY EVERY DAY
IF YOU CHECKED OFF ANY PROBLEMS ON THIS QUESTIONNAIRE, HOW DIFFICULT HAVE THESE PROBLEMS MADE IT FOR YOU TO DO YOUR WORK, TAKE CARE OF THINGS AT HOME, OR GET ALONG WITH OTHER PEOPLE: VERY DIFFICULT
4. TROUBLE RELAXING: MORE THAN HALF THE DAYS
1. FEELING NERVOUS, ANXIOUS, OR ON EDGE: MORE THAN HALF THE DAYS

## 2024-10-18 NOTE — PROGRESS NOTES
take care of things at home, or get along with other people? Extremely difficult Very difficult    Feeling nervous, anxious, or on edge   1-Several days   Not able to stop or control worrying   2-Over half the days   Worrying too much about different things   2-Over half the days   Trouble relaxing   1-Several days   Being so restless that it's hard to sit still   0-Not at all   Becoming easily annoyed or irritable   1-Several days   Feeling afraid as if something awful might happen   1-Several days   HORACE-7 Total Score   8        C-SSRS Suicide Screening:       Audit Questionnaire: Screen for Alcohol Misuse:  Cognitive Screening: Mini-Cog:  Geriatric Depression Scale:  (MDQ) The Mood Disorder Questionnaire: Positive or Negative  If the patient answers:  1. “Yes” to seven or more of the 13 items in question number 1;  AND  2. “Yes” to question number 2;  AND  3. “Moderate” or “Serious” to question number 3;  you have a positive screen. All three of the criteria above should be met. A positive screen should  be followed by a comprehensive medical evaluation for Bipolar Spectrum Disorder.  PCL-5 PTSD:  Postpartum Depression Scale:  Abuse Screening:      MSE:    Appearance    cooperative, crying  Appetite abnormal: poor  Sleep disturbance Yes  Fatigue Yes  Loss of pleasure No  Impulsive behavior No  Speech    normal rate and normal volume  Mood    Depressed  Affect    depressed affect  Thought Content    intrusive thoughts  Thought Process    coherent  Associations    logical connections  Insight    Fair  Judgment    Intact  Orientation    oriented to person, place, time, and general circumstances  Memory    recent and remote memory intact  Attention/Concentration    intact  Morbid ideation No  Suicide Assessment    no suicidal ideation      History:  Social History     Socioeconomic History    Marital status:     Number of children: 2    Highest education level: Associate degree: occupational, technical, or

## 2024-11-05 ENCOUNTER — OFFICE VISIT (OUTPATIENT)
Dept: FAMILY MEDICINE CLINIC | Facility: CLINIC | Age: 59
End: 2024-11-05
Payer: MEDICAID

## 2024-11-05 VITALS
RESPIRATION RATE: 12 BRPM | OXYGEN SATURATION: 97 % | BODY MASS INDEX: 28.32 KG/M2 | HEIGHT: 61 IN | WEIGHT: 150 LBS | TEMPERATURE: 97.2 F | SYSTOLIC BLOOD PRESSURE: 118 MMHG | HEART RATE: 61 BPM | DIASTOLIC BLOOD PRESSURE: 86 MMHG

## 2024-11-05 DIAGNOSIS — G25.81 RESTLESS LEG SYNDROME: ICD-10-CM

## 2024-11-05 DIAGNOSIS — Z00.00 ENCOUNTER FOR MEDICAL EXAMINATION TO ESTABLISH CARE: Primary | ICD-10-CM

## 2024-11-05 DIAGNOSIS — M15.0 PRIMARY OSTEOARTHRITIS INVOLVING MULTIPLE JOINTS: ICD-10-CM

## 2024-11-05 DIAGNOSIS — F32.4 MAJOR DEPRESSIVE DISORDER IN PARTIAL REMISSION, UNSPECIFIED WHETHER RECURRENT: ICD-10-CM

## 2024-11-05 DIAGNOSIS — G43.809 OTHER MIGRAINE WITHOUT STATUS MIGRAINOSUS, NOT INTRACTABLE: ICD-10-CM

## 2024-11-05 DIAGNOSIS — E03.9 ACQUIRED HYPOTHYROIDISM: ICD-10-CM

## 2024-11-05 DIAGNOSIS — Z86.39 HISTORY OF IRON DEFICIENCY: ICD-10-CM

## 2024-11-05 DIAGNOSIS — Z23 NEED FOR INFLUENZA VACCINATION: ICD-10-CM

## 2024-11-05 PROCEDURE — 99204 OFFICE O/P NEW MOD 45 MIN: CPT | Performed by: STUDENT IN AN ORGANIZED HEALTH CARE EDUCATION/TRAINING PROGRAM

## 2024-11-05 PROCEDURE — 90471 IMMUNIZATION ADMIN: CPT | Performed by: STUDENT IN AN ORGANIZED HEALTH CARE EDUCATION/TRAINING PROGRAM

## 2024-11-05 PROCEDURE — 90656 IIV3 VACC NO PRSV 0.5 ML IM: CPT | Performed by: STUDENT IN AN ORGANIZED HEALTH CARE EDUCATION/TRAINING PROGRAM

## 2024-11-05 RX ORDER — FLUVOXAMINE MALEATE 100 MG
1 TABLET ORAL 2 TIMES DAILY
COMMUNITY
Start: 2024-10-09

## 2024-11-05 RX ORDER — AZELASTINE 1 MG/ML
2 SPRAY, METERED NASAL
COMMUNITY
Start: 2024-07-09

## 2024-11-05 RX ORDER — GABAPENTIN 300 MG/1
300 CAPSULE ORAL 3 TIMES DAILY PRN
COMMUNITY
End: 2024-11-05

## 2024-11-05 RX ORDER — CHLORAL HYDRATE 500 MG
CAPSULE ORAL
COMMUNITY

## 2024-11-05 RX ORDER — MELOXICAM 15 MG/1
15 TABLET ORAL DAILY
COMMUNITY

## 2024-11-05 RX ORDER — TRAMADOL HYDROCHLORIDE 50 MG/1
50 TABLET ORAL EVERY 6 HOURS PRN
COMMUNITY
End: 2024-11-05 | Stop reason: SDUPTHER

## 2024-11-05 RX ORDER — BACLOFEN 10 MG/1
10 TABLET ORAL 2 TIMES DAILY
COMMUNITY
Start: 2024-08-07

## 2024-11-05 RX ORDER — PROPRANOLOL HYDROCHLORIDE 80 MG/1
1 CAPSULE, EXTENDED RELEASE ORAL DAILY
COMMUNITY
Start: 2024-10-06

## 2024-11-05 RX ORDER — TRAMADOL HYDROCHLORIDE 50 MG/1
50 TABLET ORAL EVERY 6 HOURS PRN
Qty: 90 TABLET | Refills: 2 | Status: SHIPPED | OUTPATIENT
Start: 2024-11-05

## 2024-11-05 RX ORDER — ROPINIROLE 0.25 MG/1
TABLET, FILM COATED ORAL
Qty: 103 TABLET | Refills: 0 | Status: SHIPPED | OUTPATIENT
Start: 2024-11-05 | End: 2024-12-05

## 2024-11-05 RX ORDER — PANTOPRAZOLE SODIUM 40 MG/1
40 TABLET, DELAYED RELEASE ORAL
COMMUNITY
Start: 2024-10-27

## 2024-11-05 RX ORDER — LEVOTHYROXINE SODIUM 100 UG/1
1 TABLET ORAL DAILY
COMMUNITY
Start: 2024-10-06

## 2024-11-05 RX ORDER — BUTALBITAL, ACETAMINOPHEN AND CAFFEINE 50; 325; 40 MG/1; MG/1; MG/1
1 TABLET ORAL EVERY 6 HOURS PRN
Qty: 30 TABLET | Refills: 0 | Status: SHIPPED | OUTPATIENT
Start: 2024-11-05

## 2024-11-05 RX ORDER — BUTALBITAL, ACETAMINOPHEN AND CAFFEINE 50; 325; 40 MG/1; MG/1; MG/1
TABLET ORAL
COMMUNITY
Start: 2024-09-11 | End: 2024-11-05 | Stop reason: SDUPTHER

## 2024-11-05 NOTE — PROGRESS NOTES
"       Chief Complaint  Establish Care    Subjective        Bing Doran presents to [facility]    HPI    Establish care    Hypothyroidism-On levothyroxine 100 mcg. Labs from 3/2024 wnl  Depression-Sx controlled w/ fluvoxamine 100mg bid.  OA-Multiple sites, particularly low back, hands, knees. On mobic 15 mg, baclofen. Takes tumeric as well as prn tramadol 50 mg  GERD-On protonix 40 mg, symptoms controlled  Migraines-On propanolol 80 mg, fioricet prn.  Pt indicating frequent HA's still. Has been on imitrex, had adverse s/e.  She also endorses sinus-related HA's about twice per year.    Patient endorses restless legs symptoms in the evenings, finding a difficult to not have drive to move the legs, affecting sleep.      Past Medical History:   Diagnosis Date    Anxiety     Arthritis     Depression     Hypothyroidism     Migraines      Past Surgical History:   Procedure Laterality Date    HYSTERECTOMY      OOPHORECTOMY       Social History     Socioeconomic History    Marital status:    Tobacco Use    Smoking status: Never     Passive exposure: Never    Smokeless tobacco: Never   Vaping Use    Vaping status: Never Used   Substance and Sexual Activity    Alcohol use: Never    Drug use: Never    Sexual activity: Yes     Partners: Male       Objective   Vital Signs:  /86   Pulse 61   Temp 97.2 °F (36.2 °C) (Temporal)   Resp 12   Ht 154.9 cm (61\")   Wt 68 kg (150 lb)   SpO2 97%   BMI 28.34 kg/m²   Estimated body mass index is 28.34 kg/m² as calculated from the following:    Height as of this encounter: 154.9 cm (61\").    Weight as of this encounter: 68 kg (150 lb).       BMI is >= 25 and <30. (Overweight) The following options were offered after discussion;: exercise counseling/recommendations and nutrition counseling/recommendations      Physical Exam  Vitals reviewed.   Constitutional:       Appearance: Normal appearance.   HENT:      Head: Normocephalic.      Nose: " Nose normal.      Mouth/Throat:      Mouth: Mucous membranes are moist.   Eyes:      Extraocular Movements: Extraocular movements intact.   Cardiovascular:      Rate and Rhythm: Normal rate and regular rhythm.      Heart sounds: Normal heart sounds.   Pulmonary:      Effort: Pulmonary effort is normal.      Breath sounds: Normal breath sounds.   Musculoskeletal:         General: Normal range of motion.      Cervical back: Normal range of motion.   Skin:     General: Skin is warm and dry.   Neurological:      General: No focal deficit present.      Mental Status: She is alert and oriented to person, place, and time.   Psychiatric:         Mood and Affect: Mood normal.         Behavior: Behavior normal.        Result Review :                   Assessment and Plan   Diagnoses and all orders for this visit:    1. Encounter for medical examination to establish care (Primary)    2. Acquired hypothyroidism  -Continue levothyroxine 100 mcg for now  -     TSH Rfx On Abnormal To Free T4; Future    3. Major depressive disorder in partial remission, unspecified whether recurrent  -Continue fluvoxamine.  Check labs  -     Hemoglobin A1c; Future  -     Lipid panel; Future  -     Comprehensive metabolic panel; Future  -     Vitamin D 25 hydroxy; Future  -     Vitamin B12 & Folate; Future    4. Other migraine without status migrainosus, not intractable  -Continue propranolol  -     butalbital-acetaminophen-caffeine (FIORICET, ESGIC) -40 MG per tablet; Take 1 tablet by mouth Every 6 (Six) Hours As Needed for Headache.  Dispense: 30 tablet; Refill: 0  -     Urine Drug Screen - Urine, Clean Catch; Future    5. Primary osteoarthritis involving multiple joints  -UDS, med contract signed  -     traMADol (ULTRAM) 50 MG tablet; Take 1 tablet by mouth Every 6 (Six) Hours As Needed for Moderate Pain (PRN arthritis).  Dispense: 90 tablet; Refill: 2    6. Restless leg syndrome  Discussed Requip trial, titration of dose-  -     rOPINIRole  (REQUIP) 0.25 MG tablet; Take 1 tablet by mouth Every Night for 3 days, THEN 2 tablets Every Night for 4 days, THEN 4 tablets Every Night for 23 days. Take 1 hour before bedtime.  Dispense: 103 tablet; Refill: 0    7. History of iron deficiency  -     CBC w AUTO Differential; Future  -     Iron and TIBC; Future  -     Ferritin; Future    8. Need for influenza vaccination  -     Fluzone >6mos ([id number])             EMR Dragon/Transcription disclaimer:   Much of this encounter note is an electronic transcription/translation of spoken language to printed text. The electronic translation of spoken language may permit erroneous, or at times, nonsensical words or phrases to be inadvertently transcribed; although attempts have made to review the note for such errors, some may still exist. Please excuse any unrecognized transcription errors and contact us if the air is unintelligible or needs documented correction. Also, portions of this note have been copied forward, however, changed to reflect the most current clinical status of this patient.  Follow Up   No follow-ups on file.  Patient was given instructions and counseling regarding her condition or for health maintenance advice. Please see specific information pulled into the AVS if appropriate.

## 2024-11-07 ENCOUNTER — PATIENT ROUNDING (BHMG ONLY) (OUTPATIENT)
Dept: FAMILY MEDICINE CLINIC | Facility: CLINIC | Age: 59
End: 2024-11-07
Payer: MEDICAID

## 2024-11-07 NOTE — PROGRESS NOTES
November 7, 2024    Hello, may I speak with Bing Doran?    My name is Savannah      I am  with [facility]  [facility]  [address] [place] [address] [phone].    Called pt and got vm. Left message, welcoming her to our clinic and left our phone number for her to call back.    Before we get started may I verify your date of birth? 1965    I am calling to officially welcome you to our practice and ask about your recent visit. Is this a good time to talk?     Tell me about your visit with us. What things went well?         We're always looking for ways to make our patients' experiences even better. Do you have recommendations on ways we may improve?      Overall were you satisfied with your first visit to our practice?        I appreciate you taking the time to speak with me today. Is there anything else I can do for you?       Thank you, and have a great day.

## 2024-11-11 PROBLEM — Z86.39 HISTORY OF IRON DEFICIENCY: Status: ACTIVE | Noted: 2024-11-11

## 2024-11-11 PROBLEM — G25.81 RESTLESS LEG SYNDROME: Status: ACTIVE | Noted: 2024-11-11

## 2024-11-11 PROBLEM — M15.0 PRIMARY OSTEOARTHRITIS INVOLVING MULTIPLE JOINTS: Status: ACTIVE | Noted: 2024-11-11

## 2024-11-11 PROBLEM — E03.9 ACQUIRED HYPOTHYROIDISM: Status: ACTIVE | Noted: 2024-11-11

## 2024-11-11 PROBLEM — F32.4 MAJOR DEPRESSIVE DISORDER IN PARTIAL REMISSION: Status: ACTIVE | Noted: 2024-11-11

## 2024-11-11 PROBLEM — G43.909 MIGRAINE: Status: ACTIVE | Noted: 2024-11-11

## 2024-11-11 PROBLEM — K21.9 GASTROESOPHAGEAL REFLUX DISEASE WITHOUT ESOPHAGITIS: Status: ACTIVE | Noted: 2024-11-11

## 2024-11-22 ENCOUNTER — TELEPHONE (OUTPATIENT)
Dept: PSYCHIATRY | Age: 59
End: 2024-11-22

## 2024-11-22 NOTE — TELEPHONE ENCOUNTER
Pt was scheduled for 06/12/24 with the sleep center from a referral for a sleep study.  Pt was a no show to that appt.  Pt rescheduled for 07/26/24, then later cancelled because it was an out of pocket expense for her.  No follow up on file.    Notified provider.    Electronically signed by Svetlana Simon MA on 11/22/2024 at 3:53 PM

## 2024-11-25 ENCOUNTER — TELEPHONE (OUTPATIENT)
Dept: FAMILY MEDICINE CLINIC | Facility: CLINIC | Age: 59
End: 2024-11-25
Payer: MEDICAID

## 2024-12-03 DIAGNOSIS — E03.9 ACQUIRED HYPOTHYROIDISM: ICD-10-CM

## 2024-12-03 RX ORDER — LEVOTHYROXINE SODIUM 100 UG/1
100 TABLET ORAL DAILY
Qty: 30 TABLET | Refills: 0 | OUTPATIENT
Start: 2024-12-03

## 2024-12-04 ENCOUNTER — LAB (OUTPATIENT)
Dept: LAB | Facility: HOSPITAL | Age: 59
End: 2024-12-04

## 2024-12-04 DIAGNOSIS — G43.809 OTHER MIGRAINE WITHOUT STATUS MIGRAINOSUS, NOT INTRACTABLE: ICD-10-CM

## 2024-12-04 DIAGNOSIS — F32.4 MAJOR DEPRESSIVE DISORDER IN PARTIAL REMISSION, UNSPECIFIED WHETHER RECURRENT: ICD-10-CM

## 2024-12-04 DIAGNOSIS — E03.9 ACQUIRED HYPOTHYROIDISM: ICD-10-CM

## 2024-12-04 DIAGNOSIS — Z86.39 HISTORY OF IRON DEFICIENCY: ICD-10-CM

## 2024-12-04 LAB
ALBUMIN SERPL-MCNC: 4.6 G/DL (ref 3.5–5)
ALBUMIN/GLOB SERPL: 1.5 G/DL (ref 1.1–2.5)
ALP SERPL-CCNC: 107 U/L (ref 24–120)
ALT SERPL W P-5'-P-CCNC: 23 U/L (ref 0–35)
AMPHET+METHAMPHET UR QL: NEGATIVE
AMPHETAMINES UR QL: NEGATIVE
ANION GAP SERPL CALCULATED.3IONS-SCNC: 14 MMOL/L (ref 4–13)
AST SERPL-CCNC: 32 U/L (ref 7–45)
AUTO MIXED CELLS #: 0.7 10*3/MM3 (ref 0.1–2.6)
AUTO MIXED CELLS %: 8.1 % (ref 0.1–24)
BARBITURATES UR QL SCN: POSITIVE
BENZODIAZ UR QL SCN: NEGATIVE
BILIRUB SERPL-MCNC: 0.7 MG/DL (ref 0.1–1)
BUN SERPL-MCNC: 8 MG/DL (ref 5–21)
BUN/CREAT SERPL: 10
BUPRENORPHINE SERPL-MCNC: NEGATIVE NG/ML
CALCIUM SPEC-SCNC: 9.7 MG/DL (ref 8.6–10.5)
CANNABINOIDS SERPL QL: NEGATIVE
CHLORIDE SERPL-SCNC: 95 MMOL/L (ref 98–110)
CHOLEST SERPL-MCNC: 207 MG/DL (ref 130–200)
CO2 SERPL-SCNC: 29 MMOL/L (ref 24–31)
COCAINE UR QL: NEGATIVE
CREAT SERPL-MCNC: 0.8 MG/DL (ref 0.5–1.4)
EGFRCR SERPLBLD CKD-EPI 2021: 84.5 ML/MIN/1.73
ERYTHROCYTE [DISTWIDTH] IN BLOOD BY AUTOMATED COUNT: 13.2 % (ref 12.3–15.4)
FENTANYL UR-MCNC: NEGATIVE NG/ML
GLOBULIN UR ELPH-MCNC: 3 GM/DL
GLUCOSE SERPL-MCNC: 111 MG/DL (ref 65–99)
HBA1C MFR BLD: 5.3 % (ref 4.8–5.9)
HCT VFR BLD AUTO: 46.9 % (ref 34–46.6)
HDLC SERPL-MCNC: 45 MG/DL
HGB BLD-MCNC: 16.1 G/DL (ref 12–15.9)
LDLC SERPL CALC-MCNC: 130 MG/DL (ref 0–99)
LDLC/HDLC SERPL: 2.8 {RATIO}
LYMPHOCYTES # BLD AUTO: 1.7 10*3/MM3 (ref 0.7–3.1)
LYMPHOCYTES NFR BLD AUTO: 21 % (ref 19.6–45.3)
MCH RBC QN AUTO: 29.4 PG (ref 26.6–33)
MCHC RBC AUTO-ENTMCNC: 34.3 G/DL (ref 31.5–35.7)
MCV RBC AUTO: 85.6 FL (ref 79–97)
METHADONE UR QL SCN: NEGATIVE
NEUTROPHILS NFR BLD AUTO: 5.9 10*3/MM3 (ref 1.7–7)
NEUTROPHILS NFR BLD AUTO: 70.9 % (ref 42.7–76)
OPIATES UR QL: NEGATIVE
OXYCODONE UR QL SCN: NEGATIVE
PCP UR QL SCN: NEGATIVE
PLATELET # BLD AUTO: 311 10*3/MM3 (ref 140–450)
PMV BLD AUTO: 9 FL (ref 6–12)
POTASSIUM SERPL-SCNC: 3.6 MMOL/L (ref 3.5–5.3)
PROT SERPL-MCNC: 7.6 G/DL (ref 6.3–8.7)
RBC # BLD AUTO: 5.48 10*6/MM3 (ref 3.77–5.28)
SODIUM SERPL-SCNC: 138 MMOL/L (ref 135–145)
TRICYCLICS UR QL SCN: NEGATIVE
TRIGL SERPL-MCNC: 180 MG/DL (ref 0–149)
VLDLC SERPL-MCNC: 32 MG/DL (ref 5–40)
WBC NRBC COR # BLD AUTO: 8.3 10*3/MM3 (ref 3.4–10.8)

## 2024-12-04 PROCEDURE — 84443 ASSAY THYROID STIM HORMONE: CPT

## 2024-12-04 PROCEDURE — 82746 ASSAY OF FOLIC ACID SERUM: CPT

## 2024-12-04 PROCEDURE — 83036 HEMOGLOBIN GLYCOSYLATED A1C: CPT

## 2024-12-04 PROCEDURE — 36415 COLL VENOUS BLD VENIPUNCTURE: CPT

## 2024-12-04 PROCEDURE — 82607 VITAMIN B-12: CPT

## 2024-12-04 PROCEDURE — 83540 ASSAY OF IRON: CPT

## 2024-12-04 PROCEDURE — 80053 COMPREHEN METABOLIC PANEL: CPT

## 2024-12-04 PROCEDURE — 82306 VITAMIN D 25 HYDROXY: CPT

## 2024-12-04 PROCEDURE — 85025 COMPLETE CBC W/AUTO DIFF WBC: CPT

## 2024-12-04 PROCEDURE — 80061 LIPID PANEL: CPT

## 2024-12-04 PROCEDURE — 80307 DRUG TEST PRSMV CHEM ANLYZR: CPT

## 2024-12-04 PROCEDURE — 82728 ASSAY OF FERRITIN: CPT

## 2024-12-04 PROCEDURE — 84439 ASSAY OF FREE THYROXINE: CPT

## 2024-12-04 PROCEDURE — 84466 ASSAY OF TRANSFERRIN: CPT

## 2024-12-05 LAB
25(OH)D3 SERPL-MCNC: 37.6 NG/ML (ref 30–100)
FERRITIN SERPL-MCNC: 40.5 NG/ML (ref 13–150)
FOLATE SERPL-MCNC: 6.71 NG/ML (ref 4.78–24.2)
IRON 24H UR-MRATE: 136 MCG/DL (ref 37–145)
IRON SATN MFR SERPL: 30 % (ref 20–50)
T4 FREE SERPL-MCNC: 2.1 NG/DL (ref 0.92–1.68)
TIBC SERPL-MCNC: 459 MCG/DL (ref 298–536)
TRANSFERRIN SERPL-MCNC: 308 MG/DL (ref 200–360)
TSH SERPL DL<=0.05 MIU/L-ACNC: 0.07 UIU/ML (ref 0.27–4.2)
VIT B12 BLD-MCNC: 432 PG/ML (ref 211–946)

## 2024-12-06 DIAGNOSIS — G43.809 OTHER MIGRAINE WITHOUT STATUS MIGRAINOSUS, NOT INTRACTABLE: ICD-10-CM

## 2024-12-06 RX ORDER — BUTALBITAL, ACETAMINOPHEN AND CAFFEINE 50; 325; 40 MG/1; MG/1; MG/1
1 TABLET ORAL EVERY 6 HOURS PRN
Qty: 30 TABLET | Refills: 0 | Status: SHIPPED | OUTPATIENT
Start: 2024-12-06

## 2024-12-06 NOTE — TELEPHONE ENCOUNTER
Rx Refill Note  Requested Prescriptions     Pending Prescriptions Disp Refills    butalbital-acetaminophen-caffeine (FIORICET, ESGIC) -40 MG per tablet [Pharmacy Med Name: Butalbital-APAP-Caffeine -40 MG Oral Tablet] 30 tablet 0     Sig: TAKE 1 TABLET BY MOUTH EVERY 6 HOURS AS NEEDED FOR HEADACHE      Last office visit with prescribing clinician: 11/5/2024   Last telemedicine visit with prescribing clinician: Visit date not found   Next office visit with prescribing clinician: 12/11/2024

## 2024-12-11 ENCOUNTER — OFFICE VISIT (OUTPATIENT)
Dept: FAMILY MEDICINE CLINIC | Facility: CLINIC | Age: 59
End: 2024-12-11

## 2024-12-11 VITALS
HEIGHT: 61 IN | BODY MASS INDEX: 27.56 KG/M2 | HEART RATE: 61 BPM | RESPIRATION RATE: 12 BRPM | OXYGEN SATURATION: 98 % | SYSTOLIC BLOOD PRESSURE: 120 MMHG | TEMPERATURE: 96.8 F | DIASTOLIC BLOOD PRESSURE: 80 MMHG | WEIGHT: 146 LBS

## 2024-12-11 DIAGNOSIS — Z12.31 ENCOUNTER FOR SCREENING MAMMOGRAM FOR MALIGNANT NEOPLASM OF BREAST: ICD-10-CM

## 2024-12-11 DIAGNOSIS — G43.809 OTHER MIGRAINE WITHOUT STATUS MIGRAINOSUS, NOT INTRACTABLE: ICD-10-CM

## 2024-12-11 DIAGNOSIS — E03.9 ACQUIRED HYPOTHYROIDISM: Primary | ICD-10-CM

## 2024-12-11 DIAGNOSIS — G25.81 RESTLESS LEG SYNDROME: ICD-10-CM

## 2024-12-11 DIAGNOSIS — D75.1 ERYTHROCYTOSIS: ICD-10-CM

## 2024-12-11 DIAGNOSIS — J30.89 ALLERGIC RHINITIS DUE TO OTHER ALLERGIC TRIGGER, UNSPECIFIED SEASONALITY: ICD-10-CM

## 2024-12-11 RX ORDER — LEVOTHYROXINE SODIUM 88 UG/1
88 TABLET ORAL DAILY
Qty: 30 TABLET | Refills: 2 | Status: SHIPPED | OUTPATIENT
Start: 2024-12-11

## 2024-12-11 NOTE — PROGRESS NOTES
"       Chief Complaint  Labs Only and Migraine (Qulipta samples didn't help would like to try nurtec/)    Subjective        Bing Doran presents to [facility]    HPI  Follow-up  -Patient had labs done.  A1c 5.3.  Hemoglobin 16.1.  Normal iron indicators.  , HDL 45, triglycerides 180, total cholesterol 207.  CMP, vitamin D, vitamin B12 and folate.  Unremarkable TSH suppressed 0.072, T4 elevated 2.1  Migraines not better w/ qulipta samples. On propanolol daily.C/o pressure in sinuses, contributing to headaches, an episode about 2 weeks ago that resolved. No fever or s/o infection. Is not taking azelastine.   RLS better with requip, 0.5 mg nightly.  C/o pressure in sinuses, contributing to headaches, an episode about 2 weeks ago that resolved. No fever or s/o infection. Is not taking azelastine.   Up-to-date on colonoscopy, mammogram due.  She is self-pay    Past Medical History:   Diagnosis Date    Anxiety     Arthritis     Depression     Hypothyroidism     Migraines      Past Surgical History:   Procedure Laterality Date    HYSTERECTOMY      OOPHORECTOMY       Social History     Socioeconomic History    Marital status:    Tobacco Use    Smoking status: Never     Passive exposure: Never    Smokeless tobacco: Never   Vaping Use    Vaping status: Never Used   Substance and Sexual Activity    Alcohol use: Never    Drug use: Never    Sexual activity: Yes     Partners: Male       Objective   Vital Signs:  /80   Pulse 61   Temp 96.8 °F (36 °C) (Temporal)   Resp 12   Ht 154.9 cm (60.98\")   Wt 66.2 kg (146 lb)   SpO2 98%   BMI 27.60 kg/m²   Estimated body mass index is 27.6 kg/m² as calculated from the following:    Height as of this encounter: 154.9 cm (60.98\").    Weight as of this encounter: 66.2 kg (146 lb).              Physical Exam  Vitals reviewed.   Constitutional:       Appearance: Normal appearance.   HENT:      Head: Normocephalic.      Nose: Nose " normal.      Mouth/Throat:      Mouth: Mucous membranes are moist.   Eyes:      Extraocular Movements: Extraocular movements intact.   Cardiovascular:      Rate and Rhythm: Normal rate and regular rhythm.      Heart sounds: Normal heart sounds.   Pulmonary:      Effort: Pulmonary effort is normal.      Breath sounds: Normal breath sounds.   Musculoskeletal:         General: Normal range of motion.      Cervical back: Normal range of motion.   Skin:     General: Skin is warm and dry.   Neurological:      General: No focal deficit present.      Mental Status: She is alert and oriented to person, place, and time.   Psychiatric:         Mood and Affect: Mood normal.         Behavior: Behavior normal.        Result Review :                   Assessment and Plan   Diagnoses and all orders for this visit:    1. Acquired hypothyroidism (Primary)  -Adjust Synthroid down, repeat labs 4 to 6 weeks  -     levothyroxine (Synthroid) 88 MCG tablet; Take 1 tablet by mouth Daily.  Dispense: 30 tablet; Refill: 2  -     TSH Rfx On Abnormal To Free T4; Future    2. Restless leg syndrome  -Improved, continue Requip 0.5 mg nightly    3. Encounter for screening mammogram for malignant neoplasm of breast  -Advised patient she can check with [facility] [place] as well as [facility] to compare cost out-of-pocket  -     Mammo Screening Digital Tomosynthesis Bilateral With CAD; Future    4. Other migraine without status migrainosus, not intractable  -Continue propranolol, given 1 sample of Nurtec, will contact when we have more so she can try every other day dosing for preventative.  Sinus issues may be playing into this, see below  Advised she likely needs to restart as well as seen    5. Allergic rhinitis due to other allergic trigger, unspecified seasonality  -Advised she likely needs to restart azelastine and remain on this medicine for a period, may also try nasal corticosteroid such as Flonase    6. Erythrocytosis  -Recommend repeating this  test when completing thyroid labs  -     CBC w AUTO Differential; Future             EMR Dragon/Transcription disclaimer:   Much of this encounter note is an electronic transcription/translation of spoken language to printed text. The electronic translation of spoken language may permit erroneous, or at times, nonsensical words or phrases to be inadvertently transcribed; although attempts have made to review the note for such errors, some may still exist. Please excuse any unrecognized transcription errors and contact us if the air is unintelligible or needs documented correction. Also, portions of this note have been copied forward, however, changed to reflect the most current clinical status of this patient.  Follow Up   Return in about 6 months (around 6/11/2025).  Patient was given instructions and counseling regarding her condition or for health maintenance advice. Please see specific information pulled into the AVS if appropriate.

## 2024-12-31 DIAGNOSIS — M54.42 CHRONIC BILATERAL LOW BACK PAIN WITH LEFT-SIDED SCIATICA: ICD-10-CM

## 2024-12-31 DIAGNOSIS — G25.81 RESTLESS LEG SYNDROME: ICD-10-CM

## 2024-12-31 DIAGNOSIS — G89.29 CHRONIC BILATERAL LOW BACK PAIN WITH LEFT-SIDED SCIATICA: ICD-10-CM

## 2024-12-31 DIAGNOSIS — G43.809 OTHER MIGRAINE WITHOUT STATUS MIGRAINOSUS, NOT INTRACTABLE: Primary | ICD-10-CM

## 2024-12-31 DIAGNOSIS — M54.2 CHRONIC NECK PAIN: ICD-10-CM

## 2024-12-31 DIAGNOSIS — G89.29 CHRONIC NECK PAIN: ICD-10-CM

## 2024-12-31 DIAGNOSIS — M47.812 SPONDYLOSIS OF CERVICAL REGION WITHOUT MYELOPATHY OR RADICULOPATHY: ICD-10-CM

## 2024-12-31 DIAGNOSIS — M15.9 GENERALIZED OSTEOARTHRITIS: ICD-10-CM

## 2024-12-31 RX ORDER — MELOXICAM 15 MG/1
15 TABLET ORAL DAILY
Qty: 90 TABLET | Refills: 2 | Status: SHIPPED | OUTPATIENT
Start: 2024-12-31

## 2024-12-31 RX ORDER — MELOXICAM 15 MG/1
TABLET ORAL
Qty: 30 TABLET | Refills: 0 | OUTPATIENT
Start: 2024-12-31

## 2024-12-31 RX ORDER — ROPINIROLE 0.25 MG/1
TABLET, FILM COATED ORAL
Qty: 103 TABLET | Refills: 0 | Status: SHIPPED | OUTPATIENT
Start: 2024-12-31 | End: 2025-01-29

## 2024-12-31 NOTE — TELEPHONE ENCOUNTER
Caller: Bing Doran ELSIE    Relationship: Self    Best call back number: [phone]     Requested Prescriptions:   Requested Prescriptions     Pending Prescriptions Disp Refills    rOPINIRole (REQUIP) 0.25 MG tablet 103 tablet 0     Sig: Take 1 tablet by mouth Every Night for 3 days, THEN 2 tablets Every Night for 4 days, THEN 4 tablets Every Night for 23 days. Take 1 hour before bedtime.    meloxicam (MOBIC) 15 MG tablet       Sig: Take 1 tablet by mouth Daily.        Pharmacy where request should be sent: [address] [phone] PH - [phone]      Last office visit with prescribing clinician: 12/11/2024   Last telemedicine visit with prescribing clinician: Visit date not found   Next office visit with prescribing clinician: Visit date not found     Additional details provided by patient: COMPLETELY OUT     Does the patient have less than a 3 day supply:  [x] Yes  [] No    Would you like a call back once the refill request has been completed: [x] Yes [] No    If the office needs to give you a call back, can they leave a voicemail: [] Yes [] No    Elli Holt Rep   12/31/24 10:57 CST

## 2025-01-03 ENCOUNTER — TELEPHONE (OUTPATIENT)
Dept: FAMILY MEDICINE CLINIC | Facility: CLINIC | Age: 60
End: 2025-01-03

## 2025-01-03 NOTE — TELEPHONE ENCOUNTER
Sent pt message via [employer] about overdue lab/test of Mammo Screening Digital Tomosynthesis Bilateral With CAD

## 2025-01-08 DIAGNOSIS — G43.809 OTHER MIGRAINE WITHOUT STATUS MIGRAINOSUS, NOT INTRACTABLE: ICD-10-CM

## 2025-01-08 RX ORDER — BUTALBITAL, ACETAMINOPHEN AND CAFFEINE 50; 325; 40 MG/1; MG/1; MG/1
1 TABLET ORAL EVERY 6 HOURS PRN
Qty: 30 TABLET | Refills: 0 | Status: SHIPPED | OUTPATIENT
Start: 2025-01-08

## 2025-01-09 ENCOUNTER — TELEPHONE (OUTPATIENT)
Dept: PSYCHIATRY | Age: 60
End: 2025-01-09

## 2025-01-09 NOTE — TELEPHONE ENCOUNTER
Called pt to cancel/reschedule appt for 01/10/25 with Swati Porter @ 3:00 because of the weather that is coming in.      No answer and LVM  Sent a [employer] message as well.  Rescheduled for 03/04/25  @ 3:00.    Electronically signed by Svetlana Simon MA on 1/9/2025 at 4:41 PM

## 2025-02-04 DIAGNOSIS — G43.809 OTHER MIGRAINE WITHOUT STATUS MIGRAINOSUS, NOT INTRACTABLE: ICD-10-CM

## 2025-02-04 RX ORDER — BUTALBITAL, ACETAMINOPHEN AND CAFFEINE 50; 325; 40 MG/1; MG/1; MG/1
1 TABLET ORAL EVERY 6 HOURS PRN
Qty: 30 TABLET | Refills: 0 | Status: SHIPPED | OUTPATIENT
Start: 2025-02-04

## 2025-02-04 NOTE — TELEPHONE ENCOUNTER
Rx Refill Note  Requested Prescriptions     Pending Prescriptions Disp Refills    butalbital-acetaminophen-caffeine (FIORICET, ESGIC) -40 MG per tablet [Pharmacy Med Name: Butalbital-APAP-Caffeine -40 MG Oral Tablet] 30 tablet 0     Sig: TAKE 1 TABLET BY MOUTH EVERY 6 HOURS AS NEEDED FOR HEADACHE      Last office visit with prescribing clinician: 12/11/2024   Last telemedicine visit with prescribing clinician: Visit date not found   Next office visit with prescribing clinician: 6/10/2025                         Johana Mosley MA  02/04/25, 09:49 CST

## 2025-02-05 ENCOUNTER — TELEPHONE (OUTPATIENT)
Dept: PSYCHIATRY | Age: 60
End: 2025-02-05

## 2025-02-05 NOTE — TELEPHONE ENCOUNTER
Called pt to offer her an appt with Yue Porter [facility] for 2/5 @ 11 am       Pt didn't answer the phone     Electronically signed by Zulma Bustos on 2/5/2025 at 9:37 AM

## 2025-02-06 ENCOUNTER — TELEPHONE (OUTPATIENT)
Dept: FAMILY MEDICINE CLINIC | Facility: CLINIC | Age: 60
End: 2025-02-06

## 2025-02-07 DIAGNOSIS — G25.81 RESTLESS LEG SYNDROME: ICD-10-CM

## 2025-02-07 RX ORDER — ROPINIROLE 0.25 MG/1
TABLET, FILM COATED ORAL
Qty: 103 TABLET | Refills: 0 | Status: SHIPPED | OUTPATIENT
Start: 2025-02-07 | End: 2025-03-08

## 2025-02-07 NOTE — TELEPHONE ENCOUNTER
Rx Refill Note  Requested Prescriptions     Pending Prescriptions Disp Refills    rOPINIRole (REQUIP) 0.25 MG tablet 103 tablet 0     Sig: Take 1 tablet by mouth Every Night for 3 days, THEN 2 tablets Every Night for 4 days, THEN 4 tablets Every Night for 23 days. Take 1 hour before bedtime.      Last office visit with prescribing clinician: 12/11/2024   Last telemedicine visit with prescribing clinician: Visit date not found   Next office visit with prescribing clinician: 6/10/2025       REFILL?      Adina Hyatt MA  02/07/25, 14:14 CST

## 2025-02-07 NOTE — TELEPHONE ENCOUNTER
Caller: Bing Doran ELSIE    Relationship: Self    Best call back number: [phone]     Requested Prescriptions:   Requested Prescriptions     Pending Prescriptions Disp Refills    rOPINIRole (REQUIP) 0.25 MG tablet 103 tablet 0     Sig: Take 1 tablet by mouth Every Night for 3 days, THEN 2 tablets Every Night for 4 days, THEN 4 tablets Every Night for 23 days. Take 1 hour before bedtime.        Pharmacy where request should be sent: [address] [phone] [place] [phone]      Last office visit with prescribing clinician: 12/11/2024   Last telemedicine visit with prescribing clinician: Visit date not found   Next office visit with prescribing clinician: 6/10/2025     Additional details provided by patient: PATIENT STATED SHE HAS BEEN WITHOUT THIS MEDICATION FOR 4 DAYS AND CAN REALLY TELL A DIFFERENCE IN HER ARMS AND LEGS, THEY ARE ACHING.     PATIENT IS NEEDING TO CLARIFY THE INSTRUCTIONS. SHOULD SHE BE TAKING 4 A DAY?    PLEASE CALL TO ADVISE.    Does the patient have less than a 3 day supply:  [x] Yes  [] No  Elli Llanes Rep   02/07/25 09:36 CST

## 2025-02-20 ENCOUNTER — TELEPHONE (OUTPATIENT)
Dept: PSYCHIATRY | Age: 60
End: 2025-02-20

## 2025-02-20 NOTE — TELEPHONE ENCOUNTER
Called pt to see if they wanted to take an earlier appt that came open with Swati Porter for 02/21/25.    No answer and did not leave a voicemail.    Electronically signed by Svetlana Simon MA on 2/20/2025 at 4:58 PM

## 2025-03-04 ENCOUNTER — TELEPHONE (OUTPATIENT)
Dept: PSYCHIATRY | Age: 60
End: 2025-03-04

## 2025-03-04 DIAGNOSIS — G43.009 MIGRAINE WITHOUT AURA AND WITHOUT STATUS MIGRAINOSUS, NOT INTRACTABLE: ICD-10-CM

## 2025-03-04 RX ORDER — PROPRANOLOL HYDROCHLORIDE 80 MG/1
CAPSULE, EXTENDED RELEASE ORAL
Qty: 30 CAPSULE | Refills: 0 | OUTPATIENT
Start: 2025-03-04

## 2025-03-04 NOTE — TELEPHONE ENCOUNTER
Appointment canceled for Valeria Lees ([id number])  Visit type:  FOLLOW UP  3/4/2025 3:00 PM (60 minutes) with Swati ALFARO in [facility]     Reason for cancellation: Changed Provider    Electronically signed by Zulma Bustos on 3/4/2025 at 11:47 AM

## 2025-03-05 DIAGNOSIS — F33.1 MODERATE RECURRENT MAJOR DEPRESSION (HCC): ICD-10-CM

## 2025-03-05 DIAGNOSIS — G43.809 OTHER MIGRAINE WITHOUT STATUS MIGRAINOSUS, NOT INTRACTABLE: ICD-10-CM

## 2025-03-05 DIAGNOSIS — G25.81 RESTLESS LEG SYNDROME: ICD-10-CM

## 2025-03-05 DIAGNOSIS — F41.1 GAD (GENERALIZED ANXIETY DISORDER): ICD-10-CM

## 2025-03-05 RX ORDER — FLUVOXAMINE MALEATE 100 MG
100 TABLET ORAL 2 TIMES DAILY
Qty: 60 TABLET | Refills: 0 | Status: SHIPPED | OUTPATIENT
Start: 2025-03-05

## 2025-03-05 RX ORDER — BUTALBITAL, ACETAMINOPHEN AND CAFFEINE 50; 325; 40 MG/1; MG/1; MG/1
1 TABLET ORAL
Qty: 30 TABLET | Refills: 0 | Status: SHIPPED | OUTPATIENT
Start: 2025-03-05

## 2025-03-05 RX ORDER — ROPINIROLE 0.25 MG/1
TABLET, FILM COATED ORAL
Qty: 103 TABLET | Refills: 0 | Status: SHIPPED | OUTPATIENT
Start: 2025-03-05

## 2025-03-05 NOTE — TELEPHONE ENCOUNTER
problems.  Instructed to call suicide hotline, 911 or come to the ER if suicidal or symptoms worsen.  7. Controlled substance Treatment Plan:   8. The above listed medications have been continued, modifications in meds and other orders/labs as follows:                 Encounter Medications   No orders of the defined types were placed in this encounter.                  No orders of the defined types were placed in this encounter.        9. Additional comments:            10.Over 50% of the total visit time of   40  minutes was spent on counseling and/or coordination of care of:                         1. Major depressive disorder, recurrent, mild (HCC)    2. HORACE (generalized anxiety disorder)    3. Insomnia, unspecified type    4. Moderate recurrent major depression (HCC)                Katharine Gutierres MD

## 2025-03-05 NOTE — TELEPHONE ENCOUNTER
Rx Refill Note  Requested Prescriptions     Pending Prescriptions Disp Refills    butalbital-acetaminophen-caffeine (FIORICET, ESGIC) -40 MG per tablet [Pharmacy Med Name: Butalbital-APAP-Caffeine -40 MG Oral Tablet] 30 tablet 0     Sig: TAKE 1 TABLET BY MOUTH EVERY 6 HOURS AS NEEDED FOR HEADACHE    rOPINIRole (REQUIP) 0.25 MG tablet [Pharmacy Med Name: rOPINIRole HCl 0.25 MG Oral Tablet] 103 tablet 0     Sig: TAKE 1 TABLET BY MOUTH AT NIGHT FOR 3 DAYS AND THEN 2 AT NIGHT FOR 4 DAYS AND THEN 4 AT NIGHT FOR 23 DAYS **TAKE  1  HOUR  BEFORE  BEDTIME**      Last office visit with prescribing clinician: 12/11/2024   Last telemedicine visit with prescribing clinician: Visit date not found   Next office visit with prescribing clinician: 6/10/2025             Adina Hyatt MA  03/05/25, 07:27 CST

## 2025-03-06 ENCOUNTER — TELEPHONE (OUTPATIENT)
Dept: PSYCHIATRY | Age: 60
End: 2025-03-06

## 2025-03-06 NOTE — TELEPHONE ENCOUNTER
Called pt to see if they wanted to take an earlier appt that came open with Swati Porter for 03/06/25.    No answer and did not leave a voicemail.    Electronically signed by Svetlana Simon MA on 3/6/2025 at 11:55 AM

## 2025-03-11 DIAGNOSIS — E03.9 ACQUIRED HYPOTHYROIDISM: ICD-10-CM

## 2025-03-12 RX ORDER — LEVOTHYROXINE SODIUM 88 UG/1
88 TABLET ORAL DAILY
Qty: 90 TABLET | Refills: 0 | Status: SHIPPED | OUTPATIENT
Start: 2025-03-12

## 2025-03-12 NOTE — TELEPHONE ENCOUNTER
Rx Refill Note  Requested Prescriptions     Pending Prescriptions Disp Refills    levothyroxine (SYNTHROID, LEVOTHROID) 88 MCG tablet [Pharmacy Med Name: Levothyroxine Sodium 88 MCG Oral Tablet] 90 tablet 0     Sig: Take 1 tablet by mouth once daily      Last office visit with prescribing clinician: 12/11/2024   Last telemedicine visit with prescribing clinician: Visit date not found   Next office visit with prescribing clinician: 6/10/2025       Protocol not met      Adina Hyatt MA  03/12/25, 07:31 CDT

## 2025-03-13 DIAGNOSIS — G43.009 MIGRAINE WITHOUT AURA AND WITHOUT STATUS MIGRAINOSUS, NOT INTRACTABLE: ICD-10-CM

## 2025-03-13 RX ORDER — PROPRANOLOL HYDROCHLORIDE 80 MG/1
80 CAPSULE, EXTENDED RELEASE ORAL DAILY
Qty: 30 CAPSULE | Refills: 0 | OUTPATIENT
Start: 2025-03-13

## 2025-03-17 ENCOUNTER — TELEPHONE (OUTPATIENT)
Dept: FAMILY MEDICINE CLINIC | Facility: CLINIC | Age: 60
End: 2025-03-17

## 2025-03-25 RX ORDER — PROPRANOLOL HYDROCHLORIDE 80 MG/1
80 CAPSULE, EXTENDED RELEASE ORAL DAILY
Qty: 30 CAPSULE | Refills: 2 | Status: SHIPPED | OUTPATIENT
Start: 2025-03-25

## 2025-04-03 DIAGNOSIS — G43.809 OTHER MIGRAINE WITHOUT STATUS MIGRAINOSUS, NOT INTRACTABLE: ICD-10-CM

## 2025-04-03 RX ORDER — BUTALBITAL, ACETAMINOPHEN AND CAFFEINE 50; 325; 40 MG/1; MG/1; MG/1
1 TABLET ORAL
Qty: 30 TABLET | Refills: 0 | Status: SHIPPED | OUTPATIENT
Start: 2025-04-03

## 2025-04-03 NOTE — TELEPHONE ENCOUNTER
Rx Refill Note  Requested Prescriptions     Pending Prescriptions Disp Refills    butalbital-acetaminophen-caffeine (FIORICET, ESGIC) -40 MG per tablet [Pharmacy Med Name: Butalbital-APAP-Caffeine -40 MG Oral Tablet] 30 tablet 0     Sig: TAKE 1 TABLET BY MOUTH EVERY 6 HOURS AS NEEDED FOR HEADACHE      Last office visit with prescribing clinician: 12/11/2024   Last telemedicine visit with prescribing clinician: Visit date not found   Next office visit with prescribing clinician: 6/10/2025       Candis 12-04-24  Baljit  11-06-24        Adina Hyatt MA  04/03/25, 14:23 CDT

## 2025-04-09 ENCOUNTER — TELEPHONE (OUTPATIENT)
Dept: FAMILY MEDICINE CLINIC | Facility: CLINIC | Age: 60
End: 2025-04-09

## 2025-04-09 NOTE — TELEPHONE ENCOUNTER
Caller: Bing Doran    Relationship: Self    Best call back number: [phone]     What is the best time to reach you: AS SOON AS POSSIBLE     Who are you requesting to speak with (clinical staff, provider,  specific staff member): CLINICAL STAFF     What was the call regarding:     PATIENT IS REQUESTING TO SPEAK TO CLINICAL STAFF REGARDING THE STRENGTH OF HER REQUIP PRESCRIPTION.     Is it okay if the provider responds through MyChart: PLEASE CALL

## 2025-04-09 NOTE — TELEPHONE ENCOUNTER
Pt is due for refill and is requesting increase in dosage of Requip. She states her legs have a stinging/crawling sensation at bedtime and is hoping an increase would solve this probem. Please advise.

## 2025-04-11 NOTE — TELEPHONE ENCOUNTER
Caller: Bing Doran    Relationship: Self    Best call back number: [phone]     What is the best time to reach you: ANY    Who are you requesting to speak with (clinical staff, provider,  specific staff member): NONE SPECIFIED     What was the call regarding:     PATIENT STATES SHE HAS BEEN UNABLE TO SLEEP DUE TO BEING WITHOUT THIS MEDICATION. PATIENT CANCELLED 04.11.25 APPOINTMENT DUE TO LACK OF SLEEP. PATIENT IS REQUESTING A REFILL ON THE ORIGINAL DOSAGE OF THIS MEDICATION.     Is it okay if the provider responds through "[employer]t: PLEASE CALL IF FURTHER QUESTIONS

## 2025-05-02 DIAGNOSIS — G43.809 OTHER MIGRAINE WITHOUT STATUS MIGRAINOSUS, NOT INTRACTABLE: ICD-10-CM

## 2025-05-02 DIAGNOSIS — F41.1 GAD (GENERALIZED ANXIETY DISORDER): ICD-10-CM

## 2025-05-02 DIAGNOSIS — F33.1 MODERATE RECURRENT MAJOR DEPRESSION (HCC): ICD-10-CM

## 2025-05-02 RX ORDER — FLUVOXAMINE MALEATE 100 MG
100 TABLET ORAL 2 TIMES DAILY
Qty: 60 TABLET | Refills: 2 | Status: SHIPPED | OUTPATIENT
Start: 2025-05-02

## 2025-05-02 NOTE — TELEPHONE ENCOUNTER
seizures.     .     negative history of head trauma.     .     PREVIOUS PSYCHIATRIC HISTORY     Sister, depression/anxiety     Daughter, depression/anxiety (bipolar ?)     .     FAMILY PSYCHIATRIC HISTORY     Sister, depression/anxiety     Daughter, depression/anxiety (bipolar ?)     Mother, anxiety     .     SOCIAL HISTORY     Born and Raised - [facility], 2 parent home with 2 siblings. Characterizes childhood as happy.      Trauma and/or Abuse - denies     Legal - denies     Substance Use - see history     Work History - see history     Education - see history      status - denies     .     PAST SUICIDE ATTEMPTS:     no     .     INPATIENT HOSPITALIZATIONS:     no     .     DRUG REHABILITATION:     no     .     PSYCHIATRIC REVIEW OF SYSTEMS, 7/20/2020     .     Mood:  positive for low motivation; feeling down, depressed, hopeless, sleep disturbance, trouble concentrating, guilt, denies suicidality       (Depression: sadness, tearfulness, sleep, appetite, energy, concentration, sexual function, guilt, psychomotor agitation or slowing, interest, suicidality)     .     Elisa: negative       (impulsivity, grandiosity, recklessness, excessive energy, decreased need for sleep, increased spending beyond means, hyperverbal, grandiose, racing thoughts, hypersexuality)     .     Other: positive for irritable       (Irritability, lability, anger)     .     Anxiety:  positive for worries about politics, her daughter's marital problems, feeling nervous/anxious/on edge; worrying too much about different things       (Generalized anxiety: where, when, who, how long, how frequent)     .     Panic Disorder symptoms: negative       (Palpitations, racing heart beat, sweating, sense of impending doom, fear of recurrence, shortness of breath)     .     OCD symptoms:  positive for obsessive about organization       (checking, cleaning, organizing, rituals, hang-ups, obsessive thoughts, counting, rational vs. Irrational

## 2025-05-06 RX ORDER — BUTALBITAL, ACETAMINOPHEN AND CAFFEINE 50; 325; 40 MG/1; MG/1; MG/1
1 TABLET ORAL
Qty: 30 TABLET | Refills: 0 | Status: SHIPPED | OUTPATIENT
Start: 2025-05-06

## 2025-05-06 NOTE — TELEPHONE ENCOUNTER
Rx Refill Note  Requested Prescriptions     Pending Prescriptions Disp Refills    butalbital-acetaminophen-caffeine (FIORICET, ESGIC) -40 MG per tablet [Pharmacy Med Name: Butalbital-APAP-Caffeine -40 MG Oral Tablet] 30 tablet 0     Sig: TAKE 1 TABLET BY MOUTH EVERY 6 HOURS AS NEEDED FOR HEADACHE      Last office visit with prescribing clinician: 12/11/2024   Last telemedicine visit with prescribing clinician: Visit date not found   Next office visit with prescribing clinician: 6/10/2025     [facility] 11-06-24  No uds  add to labs ordered          Adina Hyatt MA  05/06/25, 08:22 CDT

## 2025-05-12 DIAGNOSIS — M15.0 PRIMARY OSTEOARTHRITIS INVOLVING MULTIPLE JOINTS: ICD-10-CM

## 2025-05-13 RX ORDER — TRAMADOL HYDROCHLORIDE 50 MG/1
50 TABLET ORAL EVERY 6 HOURS PRN
Qty: 90 TABLET | Refills: 0 | Status: SHIPPED | OUTPATIENT
Start: 2025-05-13

## 2025-05-13 NOTE — TELEPHONE ENCOUNTER
Rx Refill Note  Requested Prescriptions     Pending Prescriptions Disp Refills    traMADol (ULTRAM) 50 MG tablet [Pharmacy Med Name: traMADol HCl 50 MG Oral Tablet] 90 tablet 0     Sig: TAKE 1 TABLET BY MOUTH EVERY 6 HOURS AS NEEDED FOR MODERATE PAIN FOR ARTHRITIS      Last office visit with prescribing clinician: 12/11/2024   Last telemedicine visit with prescribing clinician: Visit date not found   Next office visit with prescribing clinician: 6/10/2025           Adian Hyatt MA  05/13/25, 06:57 CDT

## 2025-05-27 ENCOUNTER — HOSPITAL ENCOUNTER (OUTPATIENT)
Dept: MAMMOGRAPHY | Facility: HOSPITAL | Age: 60
Discharge: HOME | End: 2025-05-27
Admitting: STUDENT IN AN ORGANIZED HEALTH CARE EDUCATION/TRAINING PROGRAM
Payer: COMMERCIAL

## 2025-05-27 DIAGNOSIS — Z12.31 ENCOUNTER FOR SCREENING MAMMOGRAM FOR MALIGNANT NEOPLASM OF BREAST: ICD-10-CM

## 2025-05-27 PROCEDURE — 77067 SCR MAMMO BI INCL CAD: CPT

## 2025-05-27 PROCEDURE — 77063 BREAST TOMOSYNTHESIS BI: CPT

## 2025-05-29 DIAGNOSIS — R10.13 MIDEPIGASTRIC PAIN: ICD-10-CM

## 2025-05-29 DIAGNOSIS — K21.9 LARYNGOPHARYNGEAL REFLUX (LPR): ICD-10-CM

## 2025-05-29 DIAGNOSIS — K21.9 GASTROESOPHAGEAL REFLUX DISEASE, UNSPECIFIED WHETHER ESOPHAGITIS PRESENT: ICD-10-CM

## 2025-05-29 NOTE — TELEPHONE ENCOUNTER
05- Called the patient LVM x 2 as that the pharmacy has requested a refill on your current medication-Pantoprazole.  If regular medications are being prescribed our Providers prefers that patients have a yearly follow up apt.     Your last apt was on 05- with Pee BASS       My chart message sent to the patient     Sent letter to the patient     Routed to CT KALI

## 2025-06-03 DIAGNOSIS — K21.9 GASTROESOPHAGEAL REFLUX DISEASE, UNSPECIFIED WHETHER ESOPHAGITIS PRESENT: ICD-10-CM

## 2025-06-03 DIAGNOSIS — R10.13 MIDEPIGASTRIC PAIN: ICD-10-CM

## 2025-06-03 DIAGNOSIS — K21.9 LARYNGOPHARYNGEAL REFLUX (LPR): ICD-10-CM

## 2025-06-03 DIAGNOSIS — G43.009 MIGRAINE WITHOUT AURA AND WITHOUT STATUS MIGRAINOSUS, NOT INTRACTABLE: ICD-10-CM

## 2025-06-03 RX ORDER — PANTOPRAZOLE SODIUM 40 MG/1
40 TABLET, DELAYED RELEASE ORAL
Qty: 30 TABLET | Refills: 0 | Status: SHIPPED | OUTPATIENT
Start: 2025-06-03

## 2025-06-04 RX ORDER — PANTOPRAZOLE SODIUM 40 MG/1
TABLET, DELAYED RELEASE ORAL
Qty: 30 TABLET | Refills: 0 | Status: SHIPPED | OUTPATIENT
Start: 2025-06-04

## 2025-06-04 NOTE — TELEPHONE ENCOUNTER
06- Called the patient LVM for her to call the office to scheduled an apt.  CT APRN has sent in 1 month but that patient needed a yearly follow up apt.     Requested that she contact the office for an apt

## 2025-06-05 DIAGNOSIS — G43.809 OTHER MIGRAINE WITHOUT STATUS MIGRAINOSUS, NOT INTRACTABLE: ICD-10-CM

## 2025-06-05 RX ORDER — BUTALBITAL, ACETAMINOPHEN AND CAFFEINE 50; 325; 40 MG/1; MG/1; MG/1
1 TABLET ORAL
Qty: 30 TABLET | Refills: 0 | Status: SHIPPED | OUTPATIENT
Start: 2025-06-05

## 2025-06-09 ENCOUNTER — LAB (OUTPATIENT)
Dept: LAB | Facility: HOSPITAL | Age: 60
End: 2025-06-09

## 2025-06-09 DIAGNOSIS — E03.9 ACQUIRED HYPOTHYROIDISM: ICD-10-CM

## 2025-06-09 DIAGNOSIS — D75.1 ERYTHROCYTOSIS: ICD-10-CM

## 2025-06-09 LAB
AUTO MIXED CELLS #: 0.6 10*3/MM3 (ref 0.1–2.6)
AUTO MIXED CELLS %: 10.6 % (ref 0.1–24)
ERYTHROCYTE [DISTWIDTH] IN BLOOD BY AUTOMATED COUNT: 13.1 % (ref 12.3–15.4)
HCT VFR BLD AUTO: 42.7 % (ref 34–46.6)
HGB BLD-MCNC: 14.8 G/DL (ref 12–15.9)
LYMPHOCYTES # BLD AUTO: 1.2 10*3/MM3 (ref 0.7–3.1)
LYMPHOCYTES NFR BLD AUTO: 20.3 % (ref 19.6–45.3)
MCH RBC QN AUTO: 30 PG (ref 26.6–33)
MCHC RBC AUTO-ENTMCNC: 34.7 G/DL (ref 31.5–35.7)
MCV RBC AUTO: 86.4 FL (ref 79–97)
NEUTROPHILS NFR BLD AUTO: 4.1 10*3/MM3 (ref 1.7–7)
NEUTROPHILS NFR BLD AUTO: 69.1 % (ref 42.7–76)
PLATELET # BLD AUTO: 280 10*3/MM3 (ref 140–450)
PMV BLD AUTO: 9.3 FL (ref 6–12)
RBC # BLD AUTO: 4.94 10*6/MM3 (ref 3.77–5.28)
WBC NRBC COR # BLD AUTO: 5.9 10*3/MM3 (ref 3.4–10.8)

## 2025-06-09 PROCEDURE — 85025 COMPLETE CBC W/AUTO DIFF WBC: CPT

## 2025-06-09 PROCEDURE — 84443 ASSAY THYROID STIM HORMONE: CPT

## 2025-06-09 PROCEDURE — 84439 ASSAY OF FREE THYROXINE: CPT

## 2025-06-09 PROCEDURE — 36415 COLL VENOUS BLD VENIPUNCTURE: CPT

## 2025-06-10 ENCOUNTER — OFFICE VISIT (OUTPATIENT)
Dept: FAMILY MEDICINE CLINIC | Facility: CLINIC | Age: 60
End: 2025-06-10

## 2025-06-10 VITALS
DIASTOLIC BLOOD PRESSURE: 76 MMHG | RESPIRATION RATE: 12 BRPM | OXYGEN SATURATION: 97 % | SYSTOLIC BLOOD PRESSURE: 110 MMHG | HEIGHT: 61 IN | HEART RATE: 74 BPM | BODY MASS INDEX: 27.56 KG/M2 | TEMPERATURE: 96.7 F | WEIGHT: 146 LBS

## 2025-06-10 DIAGNOSIS — Z79.1 ENCOUNTER FOR MONITORING CHRONIC NSAID THERAPY: ICD-10-CM

## 2025-06-10 DIAGNOSIS — E78.2 MIXED HYPERLIPIDEMIA: ICD-10-CM

## 2025-06-10 DIAGNOSIS — Z23 NEED FOR STREPTOCOCCUS PNEUMONIAE VACCINATION: ICD-10-CM

## 2025-06-10 DIAGNOSIS — F33.41 RECURRENT MAJOR DEPRESSIVE DISORDER, IN PARTIAL REMISSION: ICD-10-CM

## 2025-06-10 DIAGNOSIS — E03.9 ACQUIRED HYPOTHYROIDISM: Primary | ICD-10-CM

## 2025-06-10 DIAGNOSIS — Z51.81 ENCOUNTER FOR MONITORING CHRONIC NSAID THERAPY: ICD-10-CM

## 2025-06-10 DIAGNOSIS — G25.81 RESTLESS LEG SYNDROME: ICD-10-CM

## 2025-06-10 DIAGNOSIS — Z86.39 HISTORY OF IRON DEFICIENCY: ICD-10-CM

## 2025-06-10 LAB
T4 FREE SERPL-MCNC: 2 NG/DL (ref 0.92–1.68)
TSH SERPL DL<=0.05 MIU/L-ACNC: 0.17 UIU/ML (ref 0.27–4.2)

## 2025-06-10 RX ORDER — LEVOTHYROXINE SODIUM 75 UG/1
75 TABLET ORAL
Qty: 30 TABLET | Refills: 2 | Status: SHIPPED | OUTPATIENT
Start: 2025-06-10

## 2025-06-10 RX ORDER — ROPINIROLE 1 MG/1
1 TABLET, FILM COATED ORAL NIGHTLY
Qty: 90 TABLET | Refills: 2 | Status: SHIPPED | OUTPATIENT
Start: 2025-06-10

## 2025-06-10 NOTE — PROGRESS NOTES
"       Chief Complaint  lab results    Subjective        Bing Doran presents to [facility]    HPI    History of Present Illness  The patient presents for evaluation of hyperthyroidism, cholesterol management, and health maintenance.    She has been experiencing weight gain and has exhausted her supply of Requip, necessitating a refill. Additionally, she has a history of thyroid nodules, which have not been evaluated for several years. She was previously on thyroid medication.    She is not currently on any cholesterol-lowering medication. Her dietary habits include the consumption of Activia yogurt with fiber, cheese, and flaxseed, which she also incorporates into her oatmeal.    She received influenza vaccine in the fall. She is uncertain about her pneumonia vaccine status.    FAMILY HISTORY  Her sister had thyroid cancer.    Past Medical History:   Diagnosis Date    Anxiety     Arthritis     Depression     Hypothyroidism     Migraines      Past Surgical History:   Procedure Laterality Date    HYSTERECTOMY      OOPHORECTOMY       Social History     Socioeconomic History    Marital status:    Tobacco Use    Smoking status: Never     Passive exposure: Never    Smokeless tobacco: Never   Vaping Use    Vaping status: Never Used   Substance and Sexual Activity    Alcohol use: Never    Drug use: Never    Sexual activity: Yes     Partners: Male       Objective   Vital Signs:  /76   Pulse 74   Temp 96.7 °F (35.9 °C) (Temporal)   Resp 12   Ht 154.9 cm (60.98\")   Wt 66.2 kg (146 lb)   SpO2 97%   BMI 27.60 kg/m²   Estimated body mass index is 27.6 kg/m² as calculated from the following:    Height as of this encounter: 154.9 cm (60.98\").    Weight as of this encounter: 66.2 kg (146 lb).              Physical Exam  Vitals reviewed.   Constitutional:       Appearance: Normal appearance.   HENT:      Head: Normocephalic.      Nose: Nose normal.      Mouth/Throat:      Mouth: " Mucous membranes are moist.   Eyes:      Extraocular Movements: Extraocular movements intact.   Cardiovascular:      Rate and Rhythm: Normal rate and regular rhythm.      Heart sounds: Normal heart sounds.   Pulmonary:      Effort: Pulmonary effort is normal.      Breath sounds: Normal breath sounds.   Musculoskeletal:         General: Normal range of motion.      Cervical back: Normal range of motion.   Skin:     General: Skin is warm and dry.   Neurological:      General: No focal deficit present.      Mental Status: She is alert and oriented to person, place, and time.   Psychiatric:         Mood and Affect: Mood normal.         Behavior: Behavior normal.        Physical Exam      Result Review :        Results  Labs   - TSH: Low   - T4: Elevated at 1.6    Imaging   - Mammogram: Normal             Assessment and Plan   Diagnoses and all orders for this visit:    1. Acquired hypothyroidism (Primary)  -TSH suppressed, T4 elevated.  Adjust levothyroxine down to 75 mcg.  Repeat labs in 4 to 8 weeks.  Given patient reported history of nodules we will obtain ultrasound.  -     levothyroxine (Synthroid) 75 MCG tablet; Take 1 tablet by mouth Every Morning.  Dispense: 30 tablet; Refill: 2  -     TSH Rfx On Abnormal To Free T4; Future  -     US Thyroid    2. Restless leg syndrome  -     rOPINIRole (REQUIP) 1 MG tablet; Take 1 tablet by mouth Every Night. Take 1 hour before bedtime.  Dispense: 90 tablet; Refill: 2    3. Need for Streptococcus pneumoniae vaccination  -     Pneumococcal Conjugate Vaccine 20-Valent (<18 yrs)      FU 6 mo for annual physical w/ labs       EMR Dragon/Transcription disclaimer:   Part of this note may be an electronic transcription/translation of spoken language to printed text using the Dragon Dictation System     Follow Up   No follow-ups on file.    Patient or patient representative verbalized consent for the use of Ambient Listening during the visit with  Nolan Hodge MD for chart  documentation. 6/10/2025  17:46 CDT    Patient was given instructions and counseling regarding her condition or for health maintenance advice. Please see specific information pulled into the AVS if appropriate.

## 2025-06-10 NOTE — PROGRESS NOTES
After obtaining consent, and per orders of Dr. Hodge, injection of Pneumococcal 20, given in right deltoid by Ivania Corea MA. Patient instructed to remain in clinic for 20 minutes afterwards, and to report any adverse reaction to me immediately.  Patient tolerated well.

## 2025-06-18 ENCOUNTER — TELEPHONE (OUTPATIENT)
Dept: PSYCHIATRY | Age: 60
End: 2025-06-18

## 2025-06-18 NOTE — TELEPHONE ENCOUNTER
Called and lvm reminding pt of her office visit for 6/19 @ 3 pm     Electronically signed by Zulma Bustos on 6/18/2025 at 12:16 PM

## 2025-06-19 ENCOUNTER — TELEPHONE (OUTPATIENT)
Dept: PSYCHIATRY | Age: 60
End: 2025-06-19

## 2025-06-26 ENCOUNTER — OFFICE VISIT (OUTPATIENT)
Dept: FAMILY MEDICINE CLINIC | Facility: CLINIC | Age: 60
End: 2025-06-26
Payer: COMMERCIAL

## 2025-06-26 VITALS
BODY MASS INDEX: 27.56 KG/M2 | SYSTOLIC BLOOD PRESSURE: 120 MMHG | HEIGHT: 61 IN | DIASTOLIC BLOOD PRESSURE: 68 MMHG | WEIGHT: 146 LBS | TEMPERATURE: 97.6 F | HEART RATE: 97 BPM | RESPIRATION RATE: 12 BRPM | OXYGEN SATURATION: 98 %

## 2025-06-26 DIAGNOSIS — J40 BRONCHITIS: Primary | ICD-10-CM

## 2025-06-26 RX ORDER — PROMETHAZINE HYDROCHLORIDE AND CODEINE PHOSPHATE 6.25; 1 MG/5ML; MG/5ML
5 SOLUTION ORAL EVERY 6 HOURS PRN
Qty: 118 ML | Refills: 0 | Status: SHIPPED | OUTPATIENT
Start: 2025-06-26

## 2025-06-26 RX ORDER — METHYLPREDNISOLONE 4 MG/1
TABLET ORAL
Qty: 21 TABLET | Refills: 1 | Status: SHIPPED | OUTPATIENT
Start: 2025-06-26

## 2025-06-26 RX ORDER — DEXAMETHASONE SODIUM PHOSPHATE 4 MG/ML
8 INJECTION, SOLUTION INTRA-ARTICULAR; INTRALESIONAL; INTRAMUSCULAR; INTRAVENOUS; SOFT TISSUE ONCE
Status: COMPLETED | OUTPATIENT
Start: 2025-06-26 | End: 2025-06-26

## 2025-06-26 RX ORDER — AZITHROMYCIN 250 MG/1
TABLET, FILM COATED ORAL
Qty: 6 TABLET | Refills: 0 | Status: SHIPPED | OUTPATIENT
Start: 2025-06-26

## 2025-06-26 RX ADMIN — DEXAMETHASONE SODIUM PHOSPHATE 8 MG: 4 INJECTION, SOLUTION INTRA-ARTICULAR; INTRALESIONAL; INTRAMUSCULAR; INTRAVENOUS; SOFT TISSUE at 14:37

## 2025-06-26 NOTE — PROGRESS NOTES
"       Chief Complaint  Cough, Nasal Congestion, and Generalized Body Aches    Subjective        Bing Doran presents to [facility]    HPI    History of Present Illness  The patient presents for evaluation of a persistent cough.    She has been experiencing a persistent cough, accompanied by body aches and a suspected fever, for approximately one week. The severity of her symptoms escalated following her son's wedding on Saturday. She reports no shortness of breath but mentions sinus congestion and a runny nose, which has since transitioned to a milky discharge. Her throat was previously sore but has since improved. She also reports a dry cough that was present last night. She has attempted to manage her symptoms with Mucinex DM, Advil, NyQuil, and DayQuil, but these have not provided significant relief. Previous use of Tessalon Perles proved ineffective.   She has tried Robitussin and dextromethorphan, but these did not provide lasting relief.    Past Medical History:   Diagnosis Date    Anxiety     Arthritis     Depression     Hypothyroidism     Migraines      Past Surgical History:   Procedure Laterality Date    HYSTERECTOMY      OOPHORECTOMY       Social History     Socioeconomic History    Marital status:    Tobacco Use    Smoking status: Never     Passive exposure: Never    Smokeless tobacco: Never   Vaping Use    Vaping status: Never Used   Substance and Sexual Activity    Alcohol use: Never    Drug use: Never    Sexual activity: Yes     Partners: Male       Objective   Vital Signs:  /68   Pulse 97   Temp 97.6 °F (36.4 °C) (Temporal)   Resp 12   Ht 154.9 cm (60.98\")   Wt 66.2 kg (146 lb)   SpO2 98%   BMI 27.60 kg/m²   Estimated body mass index is 27.6 kg/m² as calculated from the following:    Height as of this encounter: 154.9 cm (60.98\").    Weight as of this encounter: 66.2 kg (146 lb).              Physical Exam  Vitals reviewed.   Constitutional:     "   Appearance: Normal appearance.   HENT:      Head: Normocephalic.      Nose: Nose normal.      Mouth/Throat:      Mouth: Mucous membranes are moist.   Eyes:      Extraocular Movements: Extraocular movements intact.   Cardiovascular:      Rate and Rhythm: Normal rate and regular rhythm.      Heart sounds: Normal heart sounds.   Pulmonary:      Effort: Pulmonary effort is normal.      Breath sounds: Normal breath sounds.   Musculoskeletal:         General: Normal range of motion.      Cervical back: Normal range of motion.   Skin:     General: Skin is warm and dry.   Neurological:      General: No focal deficit present.      Mental Status: She is alert and oriented to person, place, and time.   Psychiatric:         Mood and Affect: Mood normal.         Behavior: Behavior normal.        Physical Exam  Respiratory: Clear to auscultation, no wheezing, rales or rhonchi    Result Review :        Results               Assessment and Plan   Diagnoses and all orders for this visit:    1. Bronchitis (Primary)  -     azithromycin (Zithromax Z-Stephon) 250 MG tablet; Take 2 tablets by mouth on day 1, then 1 tablet daily on days 2-5  Dispense: 6 tablet; Refill: 0  -     methylPREDNISolone (MEDROL) 4 MG dose pack; Take as directed on package instructions.  Dispense: 21 tablet; Refill: 1  -     promethazine-codeine (PHENERGAN with CODEINE) 6.25-10 MG/5ML solution; Take 5 mL by mouth Every 6 (Six) Hours As Needed for Cough.  Dispense: 118 mL; Refill: 0  -     dexAMETHasone (DECADRON) injection 8 mg             EMR Dragon/Transcription disclaimer:   Part of this note may be an electronic transcription/translation of spoken language to printed text using the Dragon Dictation System     Follow Up   No follow-ups on file.    Patient or patient representative verbalized consent for the use of Ambient Listening during the visit with  Nolan Hodge MD for chart documentation. 6/26/2025  14:53 CDT    Patient was given instructions and  counseling regarding her condition or for health maintenance advice. Please see specific information pulled into the AVS if appropriate.

## 2025-07-03 DIAGNOSIS — G43.809 OTHER MIGRAINE WITHOUT STATUS MIGRAINOSUS, NOT INTRACTABLE: ICD-10-CM

## 2025-07-07 NOTE — TELEPHONE ENCOUNTER
Rx Refill Note  Requested Prescriptions     Pending Prescriptions Disp Refills    butalbital-acetaminophen-caffeine (FIORICET, ESGIC) -40 MG per tablet [Pharmacy Med Name: Butalbital-APAP-Caffeine -40 MG Oral Tablet] 30 tablet 0     Sig: TAKE 1 TABLET BY MOUTH EVERY 6 HOURS AS NEEDED FOR HEADACHE    propranolol LA (INDERAL LA) 80 MG 24 hr capsule [Pharmacy Med Name: Propranolol HCl ER 80 MG Oral Capsule Extended Release 24 Hour] 90 capsule 0     Sig: Take 1 capsule by mouth once daily      Last office visit with prescribing clinician: 6/26/2025   Last telemedicine visit with prescribing clinician: Visit date not found   Next office visit with prescribing clinician: 12/10/2025                         Would you like a call back once the refill request has been completed: [] Yes [] No    If the office needs to give you a call back, can they leave a voicemail: [] Yes [] No    Christy Hodge MA  07/07/25, 16:45 CDT

## 2025-07-08 DIAGNOSIS — G43.809 OTHER MIGRAINE WITHOUT STATUS MIGRAINOSUS, NOT INTRACTABLE: ICD-10-CM

## 2025-07-08 RX ORDER — BUTALBITAL, ACETAMINOPHEN AND CAFFEINE 50; 325; 40 MG/1; MG/1; MG/1
1 TABLET ORAL
Qty: 30 TABLET | Refills: 0 | Status: SHIPPED | OUTPATIENT
Start: 2025-07-08

## 2025-07-08 RX ORDER — PROPRANOLOL HYDROCHLORIDE 80 MG/1
80 CAPSULE, EXTENDED RELEASE ORAL DAILY
Qty: 90 CAPSULE | Refills: 0 | Status: SHIPPED | OUTPATIENT
Start: 2025-07-08

## 2025-07-08 RX ORDER — PROPRANOLOL HYDROCHLORIDE 80 MG/1
80 CAPSULE, EXTENDED RELEASE ORAL DAILY
Qty: 90 CAPSULE | Refills: 0 | OUTPATIENT
Start: 2025-07-08

## 2025-07-08 RX ORDER — BUTALBITAL, ACETAMINOPHEN AND CAFFEINE 50; 325; 40 MG/1; MG/1; MG/1
1 TABLET ORAL
Qty: 30 TABLET | Refills: 0 | OUTPATIENT
Start: 2025-07-08

## 2025-07-14 ENCOUNTER — LAB (OUTPATIENT)
Dept: LAB | Facility: HOSPITAL | Age: 60
End: 2025-07-14

## 2025-07-14 ENCOUNTER — HOSPITAL ENCOUNTER (OUTPATIENT)
Dept: ULTRASOUND IMAGING | Facility: HOSPITAL | Age: 60
Discharge: HOME | End: 2025-07-14

## 2025-07-14 DIAGNOSIS — E03.9 ACQUIRED HYPOTHYROIDISM: ICD-10-CM

## 2025-07-14 PROCEDURE — 76536 US EXAM OF HEAD AND NECK: CPT

## 2025-07-14 PROCEDURE — 84443 ASSAY THYROID STIM HORMONE: CPT

## 2025-07-14 PROCEDURE — 36415 COLL VENOUS BLD VENIPUNCTURE: CPT

## 2025-07-15 LAB — TSH SERPL DL<=0.05 MIU/L-ACNC: 0.91 UIU/ML (ref 0.27–4.2)

## 2025-07-21 DIAGNOSIS — M15.0 PRIMARY OSTEOARTHRITIS INVOLVING MULTIPLE JOINTS: ICD-10-CM

## 2025-07-21 NOTE — TELEPHONE ENCOUNTER
Rx Refill Note  Requested Prescriptions     Pending Prescriptions Disp Refills    traMADol (ULTRAM) 50 MG tablet [Pharmacy Med Name: traMADol HCl 50 MG Oral Tablet] 90 tablet 0     Sig: TAKE 1 TABLET BY MOUTH EVERY 6 HOURS AS NEEDED FOR MODERATE PAIN      Last office visit with prescribing clinician: 6/26/2025   Last telemedicine visit with prescribing clinician: Visit date not found   Next office visit with prescribing clinician: 12/10/2025                         Would you like a call back once the refill request has been completed: [] Yes [] No    If the office needs to give you a call back, can they leave a voicemail: [] Yes [] No    Christy Hodge MA  07/21/25, 15:52 CDT

## 2025-07-22 RX ORDER — TRAMADOL HYDROCHLORIDE 50 MG/1
50 TABLET ORAL
Qty: 90 TABLET | Refills: 0 | Status: SHIPPED | OUTPATIENT
Start: 2025-07-22

## 2025-07-22 NOTE — TELEPHONE ENCOUNTER
Rx Refill Note  Requested Prescriptions     Pending Prescriptions Disp Refills    traMADol (ULTRAM) 50 MG tablet [Pharmacy Med Name: traMADol HCl 50 MG Oral Tablet] 90 tablet 0     Sig: TAKE 1 TABLET BY MOUTH EVERY 6 HOURS AS NEEDED FOR MODERATE PAIN      Last office visit with prescribing clinician: 6/26/2025   Last telemedicine visit with prescribing clinician: Visit date not found   Next office visit with prescribing clinician: 12/10/2025       Protocol met      Adina Hyatt MA  07/22/25, 10:18 CDT

## 2025-08-04 DIAGNOSIS — R10.13 MIDEPIGASTRIC PAIN: ICD-10-CM

## 2025-08-04 DIAGNOSIS — K21.9 LARYNGOPHARYNGEAL REFLUX (LPR): ICD-10-CM

## 2025-08-04 DIAGNOSIS — K21.9 GASTROESOPHAGEAL REFLUX DISEASE, UNSPECIFIED WHETHER ESOPHAGITIS PRESENT: ICD-10-CM

## 2025-08-04 RX ORDER — PANTOPRAZOLE SODIUM 40 MG/1
TABLET, DELAYED RELEASE ORAL
Qty: 30 TABLET | Refills: 0 | Status: SHIPPED | OUTPATIENT
Start: 2025-08-04

## 2025-08-10 DIAGNOSIS — G43.809 OTHER MIGRAINE WITHOUT STATUS MIGRAINOSUS, NOT INTRACTABLE: ICD-10-CM

## 2025-08-11 RX ORDER — BUTALBITAL, ACETAMINOPHEN AND CAFFEINE 50; 325; 40 MG/1; MG/1; MG/1
1 TABLET ORAL
Qty: 30 TABLET | Refills: 0 | Status: SHIPPED | OUTPATIENT
Start: 2025-08-11

## 2025-09-05 DIAGNOSIS — K21.9 GASTROESOPHAGEAL REFLUX DISEASE, UNSPECIFIED WHETHER ESOPHAGITIS PRESENT: ICD-10-CM

## 2025-09-05 DIAGNOSIS — K21.9 LARYNGOPHARYNGEAL REFLUX (LPR): ICD-10-CM

## 2025-09-05 DIAGNOSIS — R10.13 MIDEPIGASTRIC PAIN: ICD-10-CM

## 2025-09-05 RX ORDER — PANTOPRAZOLE SODIUM 40 MG/1
TABLET, DELAYED RELEASE ORAL
Qty: 30 TABLET | Refills: 0 | Status: SHIPPED | OUTPATIENT
Start: 2025-09-05

## (undated) DEVICE — CANNULA NSL AD L7FT DIV O2 CO2 W/ M LUERLOCK TRMPT CONN

## (undated) DEVICE — SINGLE PORT MANIFOLD: Brand: NEPTUNE 2

## (undated) DEVICE — COLON KIT WITH 1.1 OZ ORCA HYDRA SEAL 2 GOWN

## (undated) DEVICE — FORCEPS BX 240CM 2.4MM L NDL RAD JAW 4 M00513334

## (undated) DEVICE — ADAPTER CLEANING PORPOISE CLEANING

## (undated) DEVICE — BRUSH ENDOSCP 2 END CHN HEDGEHOG

## (undated) DEVICE — CLEANING SPONGE: Brand: KOALA™